# Patient Record
Sex: FEMALE | Race: WHITE | NOT HISPANIC OR LATINO | Employment: OTHER | URBAN - METROPOLITAN AREA
[De-identification: names, ages, dates, MRNs, and addresses within clinical notes are randomized per-mention and may not be internally consistent; named-entity substitution may affect disease eponyms.]

---

## 2017-02-27 ENCOUNTER — HOSPITAL ENCOUNTER (OUTPATIENT)
Dept: LAB | Facility: MEDICAL CENTER | Age: 67
End: 2017-02-27
Attending: GENERAL PRACTICE
Payer: MEDICARE

## 2017-02-27 LAB
BACTERIA #/AREA URNS HPF: ABNORMAL /HPF
EPITHELIAL CELLS 1715: ABNORMAL /HPF
MUCOUS THREADS URNS QL MICRO: ABNORMAL /HPF
RBC #/AREA URNS HPF: ABNORMAL /HPF
T3FREE SERPL-MCNC: 3.76 PG/ML (ref 2.4–4.2)
TRANS CELLS URNS QL MICRO: ABNORMAL /HPF
WBC #/AREA URNS HPF: ABNORMAL /HPF

## 2017-02-27 PROCEDURE — 86141 C-REACTIVE PROTEIN HS: CPT

## 2017-02-27 PROCEDURE — 80061 LIPID PANEL: CPT

## 2017-02-27 PROCEDURE — 36415 COLL VENOUS BLD VENIPUNCTURE: CPT

## 2017-02-27 PROCEDURE — 83550 IRON BINDING TEST: CPT

## 2017-02-27 PROCEDURE — 85652 RBC SED RATE AUTOMATED: CPT

## 2017-02-27 PROCEDURE — 84443 ASSAY THYROID STIM HORMONE: CPT

## 2017-02-27 PROCEDURE — 83090 ASSAY OF HOMOCYSTEINE: CPT

## 2017-02-27 PROCEDURE — 83036 HEMOGLOBIN GLYCOSYLATED A1C: CPT

## 2017-02-27 PROCEDURE — 87086 URINE CULTURE/COLONY COUNT: CPT

## 2017-02-27 PROCEDURE — 83540 ASSAY OF IRON: CPT

## 2017-02-27 PROCEDURE — 80053 COMPREHEN METABOLIC PANEL: CPT

## 2017-02-27 PROCEDURE — 81001 URINALYSIS AUTO W/SCOPE: CPT

## 2017-02-27 PROCEDURE — 84270 ASSAY OF SEX HORMONE GLOBUL: CPT

## 2017-02-27 PROCEDURE — 82306 VITAMIN D 25 HYDROXY: CPT

## 2017-02-27 PROCEDURE — 85025 COMPLETE CBC W/AUTO DIFF WBC: CPT

## 2017-02-27 PROCEDURE — 82533 TOTAL CORTISOL: CPT

## 2017-02-27 PROCEDURE — 84436 ASSAY OF TOTAL THYROXINE: CPT

## 2017-02-27 PROCEDURE — 82607 VITAMIN B-12: CPT

## 2017-02-27 PROCEDURE — 84481 FREE ASSAY (FT-3): CPT

## 2017-02-27 PROCEDURE — 82626 DEHYDROEPIANDROSTERONE: CPT

## 2017-02-27 PROCEDURE — 83525 ASSAY OF INSULIN: CPT

## 2017-02-27 PROCEDURE — 84403 ASSAY OF TOTAL TESTOSTERONE: CPT

## 2017-03-01 LAB
25(OH)D3 SERPL-MCNC: 39 NG/ML (ref 30–100)
ALBUMIN SERPL BCP-MCNC: 4.3 G/DL (ref 3.2–4.9)
ALBUMIN/GLOB SERPL: 1.5 G/DL
ALP SERPL-CCNC: 72 U/L (ref 30–99)
ALT SERPL-CCNC: 21 U/L (ref 2–50)
ANION GAP SERPL CALC-SCNC: 7 MMOL/L (ref 0–11.9)
APPEARANCE UR: ABNORMAL
AST SERPL-CCNC: 21 U/L (ref 12–45)
BACTERIA UR CULT: NORMAL
BASOPHILS # BLD AUTO: 0.04 K/UL (ref 0–0.12)
BASOPHILS NFR BLD AUTO: 0.6 % (ref 0–1.8)
BILIRUB SERPL-MCNC: 0.5 MG/DL (ref 0.1–1.5)
BILIRUB UR QL STRIP.AUTO: NEGATIVE
BUN SERPL-MCNC: 17 MG/DL (ref 8–22)
CALCIUM SERPL-MCNC: 9.5 MG/DL (ref 8.5–10.5)
CHLORIDE SERPL-SCNC: 106 MMOL/L (ref 96–112)
CHOLEST SERPL-MCNC: 221 MG/DL (ref 100–199)
CO2 SERPL-SCNC: 28 MMOL/L (ref 20–33)
COLOR UR AUTO: YELLOW
CORTIS SERPL-MCNC: 8.3 UG/DL (ref 0–23)
CREAT SERPL-MCNC: 0.65 MG/DL (ref 0.5–1.4)
CRP SERPL HS-MCNC: 1.7 MG/L (ref 0–7.5)
CULTURE IF INDICATED INDCX: YES UA CULTURE
DHEA SERPL-MCNC: 0.84 NG/ML (ref 0.63–4.7)
EOSINOPHIL # BLD: 0.12 K/UL (ref 0–0.51)
EOSINOPHIL NFR BLD AUTO: 1.8 % (ref 0–6.9)
ERYTHROCYTE [DISTWIDTH] IN BLOOD BY AUTOMATED COUNT: 43.8 FL (ref 35.9–50)
ERYTHROCYTE [SEDIMENTATION RATE] IN BLOOD BY WESTERGREN METHOD: 14 MM/HOUR (ref 0–30)
EST. AVERAGE GLUCOSE BLD GHB EST-MCNC: 108 MG/DL
GLOBULIN SER CALC-MCNC: 2.8 G/DL (ref 1.9–3.5)
GLUCOSE SERPL-MCNC: 101 MG/DL (ref 65–99)
GLUCOSE UR STRIP.AUTO-MCNC: NEGATIVE MG/DL
HBA1C MFR BLD: 5.4 % (ref 0–5.6)
HCT VFR BLD AUTO: 45.3 % (ref 37–47)
HCYS SERPL-SCNC: 8 UMOL/L
HDLC SERPL-MCNC: 67 MG/DL
HGB BLD-MCNC: 14.9 G/DL (ref 12–16)
IMM GRANULOCYTES # BLD AUTO: 0.02 K/UL (ref 0–0.11)
IMM GRANULOCYTES NFR BLD AUTO: 0.3 % (ref 0–0.9)
INSULIN P FAST SERPL-ACNC: 11 UIU/ML (ref 3–19)
IRON SATN MFR SERPL: 26 % (ref 15–55)
IRON SERPL-MCNC: 104 UG/DL (ref 40–170)
KETONES UR STRIP.AUTO-MCNC: NEGATIVE MG/DL
LDLC SERPL CALC-MCNC: 126 MG/DL
LEUKOCYTE ESTERASE UR QL STRIP.AUTO: ABNORMAL
LYMPHOCYTES # BLD: 3.16 K/UL (ref 1–4.8)
LYMPHOCYTES NFR BLD AUTO: 46.1 % (ref 22–41)
MCH RBC QN AUTO: 30.6 PG (ref 27–33)
MCHC RBC AUTO-ENTMCNC: 32.9 G/DL (ref 33.6–35)
MCV RBC AUTO: 93 FL (ref 81.4–97.8)
MICRO URNS: ABNORMAL
MONOCYTES # BLD: 0.33 K/UL (ref 0–0.85)
MONOCYTES NFR BLD AUTO: 4.8 % (ref 0–13.4)
NEUTROPHILS # BLD: 3.18 K/UL (ref 2–7.15)
NEUTROPHILS NFR BLD AUTO: 46.4 % (ref 44–72)
NITRITE UR QL STRIP.AUTO: NEGATIVE
NRBC # BLD AUTO: 0 K/UL
NRBC BLD-RTO: 0 /100 WBC
PH UR: 6 [PH]
PLATELET # BLD AUTO: 239 K/UL (ref 164–446)
PMV BLD AUTO: 10.2 FL (ref 9–12.9)
POTASSIUM SERPL-SCNC: 4.1 MMOL/L (ref 3.6–5.5)
PROT SERPL-MCNC: 7.1 G/DL (ref 6–8.2)
PROT UR QL STRIP: NEGATIVE MG/DL
RBC # BLD AUTO: 4.87 M/UL (ref 4.2–5.4)
RBC UR QL AUTO: NEGATIVE
SHBG SERPL-SCNC: 63 NMOL/L (ref 30–135)
SIGNIFICANT IND 70042: NORMAL
SODIUM SERPL-SCNC: 141 MMOL/L (ref 135–145)
SOURCE SOURCE: NORMAL
SP GR UR STRIP.AUTO: 1.01
T4 SERPL-MCNC: 4.2 UG/DL (ref 4–12)
TESTOST FREE SERPL-MCNC: 9.7 PG/ML (ref 0.6–3.8)
TESTOST SERPL-MCNC: 86 NG/DL (ref 5–32)
TIBC SERPL-MCNC: 406 UG/DL (ref 250–450)
TRIGL SERPL-MCNC: 142 MG/DL (ref 0–149)
TSH SERPL DL<=0.005 MIU/L-ACNC: 0.7 UIU/ML (ref 0.3–3.7)
VIT B12 SERPL-MCNC: 578 PG/ML (ref 211–911)
WBC # BLD AUTO: 6.9 K/UL (ref 4.8–10.8)

## 2017-03-06 ENCOUNTER — HOSPITAL ENCOUNTER (OUTPATIENT)
Facility: MEDICAL CENTER | Age: 67
End: 2017-03-06
Attending: GENERAL PRACTICE
Payer: COMMERCIAL

## 2017-03-06 LAB
APPEARANCE UR: CLEAR
BILIRUB UR QL STRIP.AUTO: NEGATIVE
COLOR UR AUTO: NORMAL
GLUCOSE UR STRIP.AUTO-MCNC: NEGATIVE MG/DL
KETONES UR STRIP.AUTO-MCNC: NEGATIVE MG/DL
LEUKOCYTE ESTERASE UR QL STRIP.AUTO: NEGATIVE
MICRO URNS: NORMAL
NITRITE UR QL STRIP.AUTO: NEGATIVE
PH UR: 6 [PH]
PROT UR QL STRIP: NEGATIVE MG/DL
RBC UR QL AUTO: NEGATIVE
SP GR UR STRIP.AUTO: 1.01

## 2017-03-06 PROCEDURE — 81003 URINALYSIS AUTO W/O SCOPE: CPT

## 2017-04-05 ENCOUNTER — HOSPITAL ENCOUNTER (OUTPATIENT)
Dept: RADIOLOGY | Facility: MEDICAL CENTER | Age: 67
End: 2017-04-05
Attending: GENERAL PRACTICE
Payer: MEDICARE

## 2017-04-05 DIAGNOSIS — E78.5 HYPERLIPIDEMIA, UNSPECIFIED HYPERLIPIDEMIA TYPE: ICD-10-CM

## 2017-04-05 PROCEDURE — 93880 EXTRACRANIAL BILAT STUDY: CPT

## 2017-04-13 ENCOUNTER — HOSPITAL ENCOUNTER (OUTPATIENT)
Dept: RADIOLOGY | Facility: MEDICAL CENTER | Age: 67
End: 2017-04-13
Attending: GENERAL PRACTICE
Payer: MEDICARE

## 2017-04-13 DIAGNOSIS — Z12.31 VISIT FOR SCREENING MAMMOGRAM: ICD-10-CM

## 2017-04-13 PROCEDURE — 77063 BREAST TOMOSYNTHESIS BI: CPT

## 2017-11-09 ENCOUNTER — APPOINTMENT (RX ONLY)
Dept: URBAN - METROPOLITAN AREA CLINIC 4 | Facility: CLINIC | Age: 67
Setting detail: DERMATOLOGY
End: 2017-11-09

## 2017-11-09 DIAGNOSIS — L91.8 OTHER HYPERTROPHIC DISORDERS OF THE SKIN: ICD-10-CM

## 2017-11-09 DIAGNOSIS — L82.0 INFLAMED SEBORRHEIC KERATOSIS: ICD-10-CM

## 2017-11-09 DIAGNOSIS — L81.4 OTHER MELANIN HYPERPIGMENTATION: ICD-10-CM

## 2017-11-09 DIAGNOSIS — D18.0 HEMANGIOMA: ICD-10-CM

## 2017-11-09 DIAGNOSIS — D22 MELANOCYTIC NEVI: ICD-10-CM

## 2017-11-09 DIAGNOSIS — L82.1 OTHER SEBORRHEIC KERATOSIS: ICD-10-CM

## 2017-11-09 PROBLEM — D22.61 MELANOCYTIC NEVI OF RIGHT UPPER LIMB, INCLUDING SHOULDER: Status: ACTIVE | Noted: 2017-11-09

## 2017-11-09 PROBLEM — D22.5 MELANOCYTIC NEVI OF TRUNK: Status: ACTIVE | Noted: 2017-11-09

## 2017-11-09 PROBLEM — D22.62 MELANOCYTIC NEVI OF LEFT UPPER LIMB, INCLUDING SHOULDER: Status: ACTIVE | Noted: 2017-11-09

## 2017-11-09 PROBLEM — D22.39 MELANOCYTIC NEVI OF OTHER PARTS OF FACE: Status: ACTIVE | Noted: 2017-11-09

## 2017-11-09 PROBLEM — D18.01 HEMANGIOMA OF SKIN AND SUBCUTANEOUS TISSUE: Status: ACTIVE | Noted: 2017-11-09

## 2017-11-09 PROBLEM — D22.71 MELANOCYTIC NEVI OF RIGHT LOWER LIMB, INCLUDING HIP: Status: ACTIVE | Noted: 2017-11-09

## 2017-11-09 PROBLEM — D22.72 MELANOCYTIC NEVI OF LEFT LOWER LIMB, INCLUDING HIP: Status: ACTIVE | Noted: 2017-11-09

## 2017-11-09 PROCEDURE — ? COUNSELING

## 2017-11-09 PROCEDURE — 99213 OFFICE O/P EST LOW 20 MIN: CPT | Mod: 25

## 2017-11-09 PROCEDURE — ? LIQUID NITROGEN

## 2017-11-09 PROCEDURE — 17111 DESTRUCTION B9 LESIONS 15/>: CPT

## 2017-11-09 ASSESSMENT — LOCATION SIMPLE DESCRIPTION DERM
LOCATION SIMPLE: LEFT POPLITEAL SKIN
LOCATION SIMPLE: LEFT FOREARM
LOCATION SIMPLE: LEFT POSTERIOR THIGH
LOCATION SIMPLE: LEFT CHEEK
LOCATION SIMPLE: LEFT HAND
LOCATION SIMPLE: LEFT BREAST
LOCATION SIMPLE: RIGHT CHEEK
LOCATION SIMPLE: RIGHT EYEBROW
LOCATION SIMPLE: RIGHT POSTERIOR UPPER ARM
LOCATION SIMPLE: LEFT UPPER BACK
LOCATION SIMPLE: LEFT AXILLARY VAULT
LOCATION SIMPLE: RIGHT POPLITEAL SKIN
LOCATION SIMPLE: LEFT ELBOW
LOCATION SIMPLE: RIGHT THIGH
LOCATION SIMPLE: LEFT POSTERIOR UPPER ARM
LOCATION SIMPLE: ABDOMEN
LOCATION SIMPLE: LEFT UPPER ARM
LOCATION SIMPLE: RIGHT PRETIBIAL REGION
LOCATION SIMPLE: RIGHT HAND
LOCATION SIMPLE: LEFT FOREHEAD
LOCATION SIMPLE: LEFT PRETIBIAL REGION
LOCATION SIMPLE: LEFT SHOULDER
LOCATION SIMPLE: CHEST
LOCATION SIMPLE: RIGHT UPPER BACK
LOCATION SIMPLE: RIGHT SCALP
LOCATION SIMPLE: LEFT ANTERIOR NECK
LOCATION SIMPLE: RIGHT POSTERIOR THIGH
LOCATION SIMPLE: RIGHT LOWER BACK
LOCATION SIMPLE: RIGHT FOREARM
LOCATION SIMPLE: RIGHT BREAST
LOCATION SIMPLE: UPPER BACK
LOCATION SIMPLE: LEFT LOWER BACK
LOCATION SIMPLE: RIGHT AXILLARY VAULT

## 2017-11-09 ASSESSMENT — LOCATION DETAILED DESCRIPTION DERM
LOCATION DETAILED: RIGHT MEDIAL SUPERIOR CHEST
LOCATION DETAILED: RIGHT ANTERIOR PROXIMAL THIGH
LOCATION DETAILED: RIGHT LATERAL SUPERIOR CHEST
LOCATION DETAILED: LEFT SUPERIOR MEDIAL BUCCAL CHEEK
LOCATION DETAILED: RIGHT PROXIMAL DORSAL FOREARM
LOCATION DETAILED: LEFT SUPERIOR MEDIAL LOWER BACK
LOCATION DETAILED: LEFT SUPERIOR FLANK
LOCATION DETAILED: RIGHT INFERIOR UPPER BACK
LOCATION DETAILED: RIGHT ANTERIOR DISTAL THIGH
LOCATION DETAILED: RIGHT LATERAL ABDOMEN
LOCATION DETAILED: RIGHT SUPERIOR MEDIAL BUCCAL CHEEK
LOCATION DETAILED: LEFT LATERAL SUPERIOR CHEST
LOCATION DETAILED: LOWER STERNUM
LOCATION DETAILED: LEFT LATERAL PROXIMAL UPPER ARM
LOCATION DETAILED: LEFT DISTAL POSTERIOR UPPER ARM
LOCATION DETAILED: RIGHT MID-UPPER BACK
LOCATION DETAILED: LEFT VENTRAL LATERAL PROXIMAL FOREARM
LOCATION DETAILED: LEFT INFERIOR LATERAL UPPER BACK
LOCATION DETAILED: RIGHT LATERAL BREAST 10-11:00 REGION
LOCATION DETAILED: LEFT LATERAL ELBOW
LOCATION DETAILED: LEFT SUPERIOR UPPER BACK
LOCATION DETAILED: LEFT PROXIMAL PRETIBIAL REGION
LOCATION DETAILED: RIGHT MEDIAL FRONTAL SCALP
LOCATION DETAILED: UPPER STERNUM
LOCATION DETAILED: LEFT MID-UPPER BACK
LOCATION DETAILED: RIGHT SUPERIOR UPPER BACK
LOCATION DETAILED: LEFT LATERAL BREAST 2-3:00 REGION
LOCATION DETAILED: LEFT POPLITEAL SKIN
LOCATION DETAILED: RIGHT CENTRAL EYEBROW
LOCATION DETAILED: SUBXIPHOID
LOCATION DETAILED: RIGHT LATERAL BREAST 9-10:00 REGION
LOCATION DETAILED: RIGHT ULNAR DORSAL HAND
LOCATION DETAILED: STERNAL NOTCH
LOCATION DETAILED: RIGHT INFERIOR FLANK
LOCATION DETAILED: RIGHT INFERIOR LATERAL MIDBACK
LOCATION DETAILED: RIGHT PROXIMAL POSTERIOR UPPER ARM
LOCATION DETAILED: RIGHT INFERIOR MEDIAL MIDBACK
LOCATION DETAILED: RIGHT MEDIAL UPPER BACK
LOCATION DETAILED: RIGHT DISTAL POSTERIOR THIGH
LOCATION DETAILED: INFERIOR THORACIC SPINE
LOCATION DETAILED: LEFT POSTERIOR SHOULDER
LOCATION DETAILED: LEFT AXILLARY VAULT
LOCATION DETAILED: LEFT LATERAL UPPER BACK
LOCATION DETAILED: RIGHT SUPERIOR MEDIAL UPPER BACK
LOCATION DETAILED: RIGHT PROXIMAL PRETIBIAL REGION
LOCATION DETAILED: RIGHT SUPERIOR MEDIAL MIDBACK
LOCATION DETAILED: LEFT ANTERIOR SHOULDER
LOCATION DETAILED: LEFT ULNAR DORSAL HAND
LOCATION DETAILED: LEFT CLAVICULAR NECK
LOCATION DETAILED: LEFT DISTAL DORSAL FOREARM
LOCATION DETAILED: LEFT DISTAL POSTERIOR THIGH
LOCATION DETAILED: MIDDLE STERNUM
LOCATION DETAILED: RIGHT VENTRAL PROXIMAL FOREARM
LOCATION DETAILED: RIGHT DISTAL POSTERIOR UPPER ARM
LOCATION DETAILED: RIGHT SUPERIOR FLANK
LOCATION DETAILED: RIGHT POPLITEAL SKIN
LOCATION DETAILED: LEFT INFERIOR FOREHEAD
LOCATION DETAILED: RIGHT AXILLARY VAULT
LOCATION DETAILED: LEFT MEDIAL SUPERIOR CHEST

## 2017-11-09 ASSESSMENT — LOCATION ZONE DERM
LOCATION ZONE: AXILLAE
LOCATION ZONE: TRUNK
LOCATION ZONE: ARM
LOCATION ZONE: HAND
LOCATION ZONE: LEG
LOCATION ZONE: FACE
LOCATION ZONE: NECK
LOCATION ZONE: SCALP

## 2017-11-09 NOTE — PROCEDURE: LIQUID NITROGEN
Medical Necessity Information: It is in your best interest to select a reason for this procedure from the list below. All of these items fulfill various CMS LCD requirements except the new and changing color options.
Render Post-Care Instructions In Note?: no
Post-Care Instructions: I reviewed with the patient in detail post-care instructions. Patient is to wear sunprotection, and avoid picking at any of the treated lesions. Pt may apply Vaseline to crusted or scabbing areas.
Consent: The patient's consent was obtained including but not limited to risks of crusting, scabbing, blistering, scarring, darker or lighter pigmentary change, recurrence, incomplete removal and infection.
Medical Necessity Clause: This procedure was medically necessary because the lesions that were treated were:
Detail Level: Detailed

## 2017-12-08 ENCOUNTER — HOSPITAL ENCOUNTER (OUTPATIENT)
Dept: LAB | Facility: MEDICAL CENTER | Age: 67
End: 2017-12-08
Attending: INTERNAL MEDICINE
Payer: MEDICARE

## 2017-12-08 LAB
ALBUMIN SERPL BCP-MCNC: 4.1 G/DL (ref 3.2–4.9)
ALBUMIN/GLOB SERPL: 1.8 G/DL
ALP SERPL-CCNC: 66 U/L (ref 30–99)
ALT SERPL-CCNC: 17 U/L (ref 2–50)
ANION GAP SERPL CALC-SCNC: 4 MMOL/L (ref 0–11.9)
AST SERPL-CCNC: 21 U/L (ref 12–45)
BASOPHILS # BLD AUTO: 0.7 % (ref 0–1.8)
BASOPHILS # BLD: 0.05 K/UL (ref 0–0.12)
BILIRUB SERPL-MCNC: 0.5 MG/DL (ref 0.1–1.5)
BUN SERPL-MCNC: 12 MG/DL (ref 8–22)
CALCIUM SERPL-MCNC: 9.8 MG/DL (ref 8.5–10.5)
CHLORIDE SERPL-SCNC: 106 MMOL/L (ref 96–112)
CO2 SERPL-SCNC: 31 MMOL/L (ref 20–33)
CREAT SERPL-MCNC: 0.66 MG/DL (ref 0.5–1.4)
CRP SERPL HS-MCNC: 0.16 MG/DL (ref 0–0.75)
EOSINOPHIL # BLD AUTO: 0.2 K/UL (ref 0–0.51)
EOSINOPHIL NFR BLD: 2.7 % (ref 0–6.9)
ERYTHROCYTE [DISTWIDTH] IN BLOOD BY AUTOMATED COUNT: 43 FL (ref 35.9–50)
ERYTHROCYTE [SEDIMENTATION RATE] IN BLOOD BY WESTERGREN METHOD: 16 MM/HOUR (ref 0–30)
GFR SERPL CREATININE-BSD FRML MDRD: >60 ML/MIN/1.73 M 2
GLOBULIN SER CALC-MCNC: 2.3 G/DL (ref 1.9–3.5)
GLUCOSE SERPL-MCNC: 97 MG/DL (ref 65–99)
HCT VFR BLD AUTO: 43.2 % (ref 37–47)
HGB BLD-MCNC: 14.1 G/DL (ref 12–16)
IMM GRANULOCYTES # BLD AUTO: 0.01 K/UL (ref 0–0.11)
IMM GRANULOCYTES NFR BLD AUTO: 0.1 % (ref 0–0.9)
LYMPHOCYTES # BLD AUTO: 3.58 K/UL (ref 1–4.8)
LYMPHOCYTES NFR BLD: 48.1 % (ref 22–41)
MCH RBC QN AUTO: 30.9 PG (ref 27–33)
MCHC RBC AUTO-ENTMCNC: 32.6 G/DL (ref 33.6–35)
MCV RBC AUTO: 94.5 FL (ref 81.4–97.8)
MONOCYTES # BLD AUTO: 0.41 K/UL (ref 0–0.85)
MONOCYTES NFR BLD AUTO: 5.5 % (ref 0–13.4)
NEUTROPHILS # BLD AUTO: 3.19 K/UL (ref 2–7.15)
NEUTROPHILS NFR BLD: 42.9 % (ref 44–72)
NRBC # BLD AUTO: 0 K/UL
NRBC BLD AUTO-RTO: 0 /100 WBC
PLATELET # BLD AUTO: 231 K/UL (ref 164–446)
PMV BLD AUTO: 10.8 FL (ref 9–12.9)
POTASSIUM SERPL-SCNC: 5.4 MMOL/L (ref 3.6–5.5)
PROT SERPL-MCNC: 6.4 G/DL (ref 6–8.2)
RBC # BLD AUTO: 4.57 M/UL (ref 4.2–5.4)
SODIUM SERPL-SCNC: 141 MMOL/L (ref 135–145)
WBC # BLD AUTO: 7.4 K/UL (ref 4.8–10.8)

## 2017-12-08 PROCEDURE — 80053 COMPREHEN METABOLIC PANEL: CPT

## 2017-12-08 PROCEDURE — 86140 C-REACTIVE PROTEIN: CPT

## 2017-12-08 PROCEDURE — 36415 COLL VENOUS BLD VENIPUNCTURE: CPT

## 2017-12-08 PROCEDURE — 85652 RBC SED RATE AUTOMATED: CPT

## 2017-12-08 PROCEDURE — 85025 COMPLETE CBC W/AUTO DIFF WBC: CPT

## 2018-01-19 ENCOUNTER — APPOINTMENT (RX ONLY)
Dept: URBAN - METROPOLITAN AREA CLINIC 4 | Facility: CLINIC | Age: 68
Setting detail: DERMATOLOGY
End: 2018-01-19

## 2018-01-19 DIAGNOSIS — L91.8 OTHER HYPERTROPHIC DISORDERS OF THE SKIN: ICD-10-CM

## 2018-01-19 DIAGNOSIS — L82.1 OTHER SEBORRHEIC KERATOSIS: ICD-10-CM

## 2018-01-19 DIAGNOSIS — D22 MELANOCYTIC NEVI: ICD-10-CM

## 2018-01-19 DIAGNOSIS — D18.0 HEMANGIOMA: ICD-10-CM

## 2018-01-19 DIAGNOSIS — L81.4 OTHER MELANIN HYPERPIGMENTATION: ICD-10-CM

## 2018-01-19 PROBLEM — D22.39 MELANOCYTIC NEVI OF OTHER PARTS OF FACE: Status: ACTIVE | Noted: 2018-01-19

## 2018-01-19 PROBLEM — D22.71 MELANOCYTIC NEVI OF RIGHT LOWER LIMB, INCLUDING HIP: Status: ACTIVE | Noted: 2018-01-19

## 2018-01-19 PROBLEM — D22.61 MELANOCYTIC NEVI OF RIGHT UPPER LIMB, INCLUDING SHOULDER: Status: ACTIVE | Noted: 2018-01-19

## 2018-01-19 PROBLEM — D22.72 MELANOCYTIC NEVI OF LEFT LOWER LIMB, INCLUDING HIP: Status: ACTIVE | Noted: 2018-01-19

## 2018-01-19 PROBLEM — D22.5 MELANOCYTIC NEVI OF TRUNK: Status: ACTIVE | Noted: 2018-01-19

## 2018-01-19 PROBLEM — D18.01 HEMANGIOMA OF SKIN AND SUBCUTANEOUS TISSUE: Status: ACTIVE | Noted: 2018-01-19

## 2018-01-19 PROBLEM — D22.62 MELANOCYTIC NEVI OF LEFT UPPER LIMB, INCLUDING SHOULDER: Status: ACTIVE | Noted: 2018-01-19

## 2018-01-19 PROCEDURE — ? LIQUID NITROGEN

## 2018-01-19 PROCEDURE — ? COUNSELING

## 2018-01-19 PROCEDURE — 99213 OFFICE O/P EST LOW 20 MIN: CPT

## 2018-01-19 ASSESSMENT — LOCATION ZONE DERM
LOCATION ZONE: ARM
LOCATION ZONE: SCALP
LOCATION ZONE: FACE
LOCATION ZONE: LEG
LOCATION ZONE: NECK
LOCATION ZONE: AXILLAE
LOCATION ZONE: TRUNK

## 2018-01-19 ASSESSMENT — LOCATION SIMPLE DESCRIPTION DERM
LOCATION SIMPLE: LEFT FOREHEAD
LOCATION SIMPLE: RIGHT POSTERIOR UPPER ARM
LOCATION SIMPLE: LEFT POSTERIOR THIGH
LOCATION SIMPLE: RIGHT THIGH
LOCATION SIMPLE: LEFT PRETIBIAL REGION
LOCATION SIMPLE: UPPER BACK
LOCATION SIMPLE: LEFT ELBOW
LOCATION SIMPLE: RIGHT PRETIBIAL REGION
LOCATION SIMPLE: LEFT POPLITEAL SKIN
LOCATION SIMPLE: LEFT TEMPLE
LOCATION SIMPLE: RIGHT POPLITEAL SKIN
LOCATION SIMPLE: LEFT CHEEK
LOCATION SIMPLE: LEFT SHOULDER
LOCATION SIMPLE: LEFT FOREARM
LOCATION SIMPLE: RIGHT CHEEK
LOCATION SIMPLE: RIGHT UPPER BACK
LOCATION SIMPLE: LEFT AXILLARY VAULT
LOCATION SIMPLE: ABDOMEN
LOCATION SIMPLE: LEFT POSTERIOR UPPER ARM
LOCATION SIMPLE: LEFT CLAVICULAR SKIN
LOCATION SIMPLE: POSTERIOR SCALP
LOCATION SIMPLE: CHEST
LOCATION SIMPLE: RIGHT EYEBROW
LOCATION SIMPLE: LEFT ANTERIOR NECK
LOCATION SIMPLE: RIGHT POSTERIOR THIGH
LOCATION SIMPLE: RIGHT FOREARM

## 2018-01-19 ASSESSMENT — LOCATION DETAILED DESCRIPTION DERM
LOCATION DETAILED: LEFT PROXIMAL PRETIBIAL REGION
LOCATION DETAILED: RIGHT ANTERIOR DISTAL THIGH
LOCATION DETAILED: LEFT VENTRAL LATERAL PROXIMAL FOREARM
LOCATION DETAILED: LEFT AXILLARY VAULT
LOCATION DETAILED: LEFT DISTAL POSTERIOR THIGH
LOCATION DETAILED: LEFT DISTAL DORSAL FOREARM
LOCATION DETAILED: RIGHT CENTRAL EYEBROW
LOCATION DETAILED: LEFT CLAVICULAR SKIN
LOCATION DETAILED: RIGHT POPLITEAL SKIN
LOCATION DETAILED: LOWER STERNUM
LOCATION DETAILED: LEFT LATERAL ELBOW
LOCATION DETAILED: RIGHT SUPERIOR MEDIAL BUCCAL CHEEK
LOCATION DETAILED: LEFT CLAVICULAR NECK
LOCATION DETAILED: MIDDLE STERNUM
LOCATION DETAILED: RIGHT PROXIMAL DORSAL FOREARM
LOCATION DETAILED: LEFT ANTERIOR SHOULDER
LOCATION DETAILED: LEFT LATERAL TEMPLE
LOCATION DETAILED: RIGHT DISTAL POSTERIOR THIGH
LOCATION DETAILED: RIGHT DISTAL POSTERIOR UPPER ARM
LOCATION DETAILED: LEFT SUPERIOR MEDIAL BUCCAL CHEEK
LOCATION DETAILED: LEFT DISTAL POSTERIOR UPPER ARM
LOCATION DETAILED: RIGHT VENTRAL PROXIMAL FOREARM
LOCATION DETAILED: RIGHT PROXIMAL PRETIBIAL REGION
LOCATION DETAILED: LEFT LATERAL ABDOMEN
LOCATION DETAILED: INFERIOR THORACIC SPINE
LOCATION DETAILED: RIGHT SUPERIOR UPPER BACK
LOCATION DETAILED: LEFT POPLITEAL SKIN
LOCATION DETAILED: SUBXIPHOID
LOCATION DETAILED: RIGHT PROXIMAL POSTERIOR UPPER ARM
LOCATION DETAILED: POSTERIOR MID-PARIETAL SCALP
LOCATION DETAILED: LEFT INFERIOR FOREHEAD
LOCATION DETAILED: RIGHT LATERAL SUPERIOR CHEST

## 2018-04-17 ENCOUNTER — HOSPITAL ENCOUNTER (OUTPATIENT)
Dept: RADIOLOGY | Facility: MEDICAL CENTER | Age: 68
End: 2018-04-17
Attending: GENERAL PRACTICE
Payer: MEDICARE

## 2018-04-17 DIAGNOSIS — Z12.31 VISIT FOR SCREENING MAMMOGRAM: ICD-10-CM

## 2018-04-17 PROCEDURE — 77063 BREAST TOMOSYNTHESIS BI: CPT

## 2018-05-04 ENCOUNTER — HOSPITAL ENCOUNTER (OUTPATIENT)
Dept: LAB | Facility: MEDICAL CENTER | Age: 68
End: 2018-05-04
Attending: GENERAL PRACTICE
Payer: MEDICARE

## 2018-05-04 LAB
25(OH)D3 SERPL-MCNC: 44 NG/ML (ref 30–100)
ALBUMIN SERPL BCP-MCNC: 4.1 G/DL (ref 3.2–4.9)
ALBUMIN/GLOB SERPL: 1.5 G/DL
ALP SERPL-CCNC: 57 U/L (ref 30–99)
ALT SERPL-CCNC: 18 U/L (ref 2–50)
ANION GAP SERPL CALC-SCNC: 7 MMOL/L (ref 0–11.9)
APPEARANCE UR: CLEAR
AST SERPL-CCNC: 23 U/L (ref 12–45)
BASOPHILS # BLD AUTO: 0.6 % (ref 0–1.8)
BASOPHILS # BLD: 0.04 K/UL (ref 0–0.12)
BILIRUB SERPL-MCNC: 0.5 MG/DL (ref 0.1–1.5)
BILIRUB UR QL STRIP.AUTO: NEGATIVE
BUN SERPL-MCNC: 17 MG/DL (ref 8–22)
CALCIUM SERPL-MCNC: 9.2 MG/DL (ref 8.5–10.5)
CHLORIDE SERPL-SCNC: 106 MMOL/L (ref 96–112)
CHOLEST SERPL-MCNC: 183 MG/DL (ref 100–199)
CO2 SERPL-SCNC: 30 MMOL/L (ref 20–33)
COLOR UR: YELLOW
CORTIS SERPL-MCNC: 14.4 UG/DL (ref 0–23)
CREAT SERPL-MCNC: 0.56 MG/DL (ref 0.5–1.4)
CRP SERPL HS-MCNC: 1.5 MG/L (ref 0–7.5)
EOSINOPHIL # BLD AUTO: 0.13 K/UL (ref 0–0.51)
EOSINOPHIL NFR BLD: 1.9 % (ref 0–6.9)
ERYTHROCYTE [DISTWIDTH] IN BLOOD BY AUTOMATED COUNT: 44.2 FL (ref 35.9–50)
ERYTHROCYTE [SEDIMENTATION RATE] IN BLOOD BY WESTERGREN METHOD: 13 MM/HOUR (ref 0–30)
EST. AVERAGE GLUCOSE BLD GHB EST-MCNC: 105 MG/DL
FOLATE SERPL-MCNC: >23.5 NG/ML
GLOBULIN SER CALC-MCNC: 2.7 G/DL (ref 1.9–3.5)
GLUCOSE SERPL-MCNC: 90 MG/DL (ref 65–99)
GLUCOSE UR STRIP.AUTO-MCNC: NEGATIVE MG/DL
HBA1C MFR BLD: 5.3 % (ref 0–5.6)
HCT VFR BLD AUTO: 42.5 % (ref 37–47)
HCV AB SER QL: NEGATIVE
HDLC SERPL-MCNC: 64 MG/DL
HGB BLD-MCNC: 14.1 G/DL (ref 12–16)
IMM GRANULOCYTES # BLD AUTO: 0.01 K/UL (ref 0–0.11)
IMM GRANULOCYTES NFR BLD AUTO: 0.1 % (ref 0–0.9)
KETONES UR STRIP.AUTO-MCNC: NEGATIVE MG/DL
LDLC SERPL CALC-MCNC: 92 MG/DL
LEUKOCYTE ESTERASE UR QL STRIP.AUTO: NEGATIVE
LYMPHOCYTES # BLD AUTO: 3.12 K/UL (ref 1–4.8)
LYMPHOCYTES NFR BLD: 45.2 % (ref 22–41)
MCH RBC QN AUTO: 31.7 PG (ref 27–33)
MCHC RBC AUTO-ENTMCNC: 33.2 G/DL (ref 33.6–35)
MCV RBC AUTO: 95.5 FL (ref 81.4–97.8)
MICRO URNS: NORMAL
MONOCYTES # BLD AUTO: 0.37 K/UL (ref 0–0.85)
MONOCYTES NFR BLD AUTO: 5.4 % (ref 0–13.4)
NEUTROPHILS # BLD AUTO: 3.23 K/UL (ref 2–7.15)
NEUTROPHILS NFR BLD: 46.8 % (ref 44–72)
NITRITE UR QL STRIP.AUTO: NEGATIVE
NRBC # BLD AUTO: 0 K/UL
NRBC BLD-RTO: 0 /100 WBC
PH UR STRIP.AUTO: 8 [PH]
PLATELET # BLD AUTO: 219 K/UL (ref 164–446)
PMV BLD AUTO: 10.4 FL (ref 9–12.9)
POTASSIUM SERPL-SCNC: 4.4 MMOL/L (ref 3.6–5.5)
PROT SERPL-MCNC: 6.8 G/DL (ref 6–8.2)
PROT UR QL STRIP: NEGATIVE MG/DL
RBC # BLD AUTO: 4.45 M/UL (ref 4.2–5.4)
RBC UR QL AUTO: NEGATIVE
RHEUMATOID FACT SER IA-ACNC: <10 IU/ML (ref 0–14)
SODIUM SERPL-SCNC: 143 MMOL/L (ref 135–145)
SP GR UR STRIP.AUTO: 1.02
T3 SERPL-MCNC: 161.5 NG/DL (ref 60–181)
T3FREE SERPL-MCNC: 4.25 PG/ML (ref 2.4–4.2)
T4 SERPL-MCNC: 6 UG/DL (ref 4–12)
TRIGL SERPL-MCNC: 133 MG/DL (ref 0–149)
TSH SERPL DL<=0.005 MIU/L-ACNC: 1.43 UIU/ML (ref 0.38–5.33)
UROBILINOGEN UR STRIP.AUTO-MCNC: 0.2 MG/DL
VIT B12 SERPL-MCNC: 650 PG/ML (ref 211–911)
WBC # BLD AUTO: 6.9 K/UL (ref 4.8–10.8)

## 2018-05-04 PROCEDURE — 84443 ASSAY THYROID STIM HORMONE: CPT

## 2018-05-04 PROCEDURE — 82746 ASSAY OF FOLIC ACID SERUM: CPT

## 2018-05-04 PROCEDURE — 82607 VITAMIN B-12: CPT

## 2018-05-04 PROCEDURE — 85652 RBC SED RATE AUTOMATED: CPT

## 2018-05-04 PROCEDURE — 82306 VITAMIN D 25 HYDROXY: CPT

## 2018-05-04 PROCEDURE — 86803 HEPATITIS C AB TEST: CPT

## 2018-05-04 PROCEDURE — 86431 RHEUMATOID FACTOR QUANT: CPT

## 2018-05-04 PROCEDURE — 86141 C-REACTIVE PROTEIN HS: CPT

## 2018-05-04 PROCEDURE — 84436 ASSAY OF TOTAL THYROXINE: CPT

## 2018-05-04 PROCEDURE — 85025 COMPLETE CBC W/AUTO DIFF WBC: CPT

## 2018-05-04 PROCEDURE — 80061 LIPID PANEL: CPT

## 2018-05-04 PROCEDURE — 83036 HEMOGLOBIN GLYCOSYLATED A1C: CPT

## 2018-05-04 PROCEDURE — 84481 FREE ASSAY (FT-3): CPT

## 2018-05-04 PROCEDURE — 84480 ASSAY TRIIODOTHYRONINE (T3): CPT

## 2018-05-04 PROCEDURE — 80053 COMPREHEN METABOLIC PANEL: CPT

## 2018-05-04 PROCEDURE — 82626 DEHYDROEPIANDROSTERONE: CPT

## 2018-05-04 PROCEDURE — 84402 ASSAY OF FREE TESTOSTERONE: CPT

## 2018-05-04 PROCEDURE — 83525 ASSAY OF INSULIN: CPT

## 2018-05-04 PROCEDURE — 81003 URINALYSIS AUTO W/O SCOPE: CPT

## 2018-05-04 PROCEDURE — 36415 COLL VENOUS BLD VENIPUNCTURE: CPT

## 2018-05-04 PROCEDURE — 82533 TOTAL CORTISOL: CPT

## 2018-05-06 LAB — INSULIN P FAST SERPL-ACNC: 7 UIU/ML (ref 3–19)

## 2018-05-08 LAB
DHEA SERPL-MCNC: 1.64 NG/ML (ref 0.63–4.7)
TESTOST FREE SERPL-MCNC: 1.8 PG/ML (ref 0.6–3.8)

## 2018-05-29 ENCOUNTER — OFFICE VISIT (OUTPATIENT)
Dept: MEDICAL GROUP | Facility: MEDICAL CENTER | Age: 68
End: 2018-05-29
Payer: MEDICARE

## 2018-05-29 VITALS
BODY MASS INDEX: 25.11 KG/M2 | HEIGHT: 67 IN | TEMPERATURE: 97.3 F | RESPIRATION RATE: 20 BRPM | DIASTOLIC BLOOD PRESSURE: 76 MMHG | HEART RATE: 75 BPM | WEIGHT: 160 LBS | OXYGEN SATURATION: 94 % | SYSTOLIC BLOOD PRESSURE: 124 MMHG

## 2018-05-29 DIAGNOSIS — G89.29 CHRONIC NECK PAIN: ICD-10-CM

## 2018-05-29 DIAGNOSIS — I10 ESSENTIAL HYPERTENSION: ICD-10-CM

## 2018-05-29 DIAGNOSIS — E03.9 ACQUIRED HYPOTHYROIDISM: ICD-10-CM

## 2018-05-29 DIAGNOSIS — M43.22 CERVICAL VERTEBRAL FUSION: ICD-10-CM

## 2018-05-29 DIAGNOSIS — L57.0 ACTINIC KERATOSIS: ICD-10-CM

## 2018-05-29 DIAGNOSIS — M54.2 CHRONIC NECK PAIN: ICD-10-CM

## 2018-05-29 DIAGNOSIS — H40.1132 PRIMARY OPEN ANGLE GLAUCOMA (POAG) OF BOTH EYES, MODERATE STAGE: ICD-10-CM

## 2018-05-29 PROCEDURE — 99214 OFFICE O/P EST MOD 30 MIN: CPT | Performed by: FAMILY MEDICINE

## 2018-05-29 RX ORDER — THYROID 60 MG/1
90 TABLET ORAL DAILY
COMMUNITY
Start: 2018-05-29 | End: 2018-07-30 | Stop reason: SDUPTHER

## 2018-05-29 RX ORDER — TRAMADOL HYDROCHLORIDE 50 MG/1
100 TABLET ORAL 2 TIMES DAILY
Qty: 120 TAB | Refills: 0 | Status: SHIPPED | OUTPATIENT
Start: 2018-06-04 | End: 2018-07-04

## 2018-05-29 ASSESSMENT — PATIENT HEALTH QUESTIONNAIRE - PHQ9: CLINICAL INTERPRETATION OF PHQ2 SCORE: 0

## 2018-05-29 NOTE — ASSESSMENT & PLAN NOTE
Chronic pain recheck:   Last dose of controlled substance: this am  Chronic pain treated with tramadol 100 mgtaken twice a day    She  reports that she drinks about 4.2 oz of alcohol per week .  She  reports that she does not use drugs.    Consequences of Chronic Opiate therapy:  (5 A's)  Analgesia: Compared to no treatment or prior treatment, pain is currently improved  Activity: improved  Adverse Events: She denies constipation, dry mouth, itchy skin, nausea and sedation  Aberrant Behaviors: She reports she is taking medication as prescribed and is not veering from agreed treatment regimen. There have been no inappropriate refills or lost/stolen meds reported.   Affect/Mood: Pain is not impacting patient's mood.  Patient denies depression/anxiety.    Nonnarcotic treatments that are being used: ice.   Treatment goals discussed.    Opioid Risk Score: No value filed.    Interpretation of Opioid Risk Score   Score 0-3 = Low risk of abuse. Do UDS at least once per year.  Score 4-7 = Moderate risk of abuse. Do UDS 1-4 times per year.  Score 8+ = High risk of abuse. Refer to specialist.    No order of CONTROLLED SUBSTANCE TREATMENT AGREEMENT is found.     UDS Summary     Patient has no health maintenance due at this time        UDS obtained today await results    I have reviewed the medical records, the Prescription Monitoring Program and I have determined that controlled substance treatment is medically indicated.

## 2018-05-29 NOTE — PATIENT INSTRUCTIONS
Hypothyroidism  Hypothyroidism is a disorder of the thyroid. The thyroid is a large gland that is located in the lower front of the neck. The thyroid releases hormones that control how the body works. With hypothyroidism, the thyroid does not make enough of these hormones.  What are the causes?  Causes of hypothyroidism may include:  · Viral infections.  · Pregnancy.  · Your own defense system (immune system) attacking your thyroid.  · Certain medicines.  · Birth defects.  · Past radiation treatments to your head or neck.  · Past treatment with radioactive iodine.  · Past surgical removal of part or all of your thyroid.  · Problems with the gland that is located in the center of your brain (pituitary).  What are the signs or symptoms?  Signs and symptoms of hypothyroidism may include:  · Feeling as though you have no energy (lethargy).  · Inability to tolerate cold.  · Weight gain that is not explained by a change in diet or exercise habits.  · Dry skin.  · Coarse hair.  · Menstrual irregularity.  · Slowing of thought processes.  · Constipation.  · Sadness or depression.  How is this diagnosed?  Your health care provider may diagnose hypothyroidism with blood tests and ultrasound tests.  How is this treated?  Hypothyroidism is treated with medicine that replaces the hormones that your body does not make. After you begin treatment, it may take several weeks for symptoms to go away.  Follow these instructions at home:  · Take medicines only as directed by your health care provider.  · If you start taking any new medicines, tell your health care provider.  · Keep all follow-up visits as directed by your health care provider. This is important. As your condition improves, your dosage needs may change. You will need to have blood tests regularly so that your health care provider can watch your condition.  Contact a health care provider if:  · Your symptoms do not get better with treatment.  · You are taking thyroid  replacement medicine and:  ¨ You sweat excessively.  ¨ You have tremors.  ¨ You feel anxious.  ¨ You lose weight rapidly.  ¨ You cannot tolerate heat.  ¨ You have emotional swings.  ¨ You have diarrhea.  ¨ You feel weak.  Get help right away if:  · You develop chest pain.  · You develop an irregular heartbeat.  · You develop a rapid heartbeat.  This information is not intended to replace advice given to you by your health care provider. Make sure you discuss any questions you have with your health care provider.  Document Released: 12/18/2006 Document Revised: 05/25/2017 Document Reviewed: 05/05/2015  [employer] Interactive Patient Education © 2017 [employer]  Cryosurgery for Skin Conditions, Care After  Refer to this sheet in the next few weeks. These instructions provide you with information on caring for yourself after your procedure. Your health care provider may also give you more specific instructions. Your treatment has been planned according to current medical practices, but problems sometimes occur. Call your health care provider if you have any problems or questions after your procedure.  WHAT TO EXPECT AFTER THE PROCEDURE  After your procedure, it is typical to have the following:  · The treated area will become red and swollen shortly after the procedure.  · Within 2-3 days, a blister will form over the treated area. The blister may contain a small amount of blood.  · In about 2 weeks, the blister will break on its own, leaving a scab. The treated area will then heal. After healing, there is usually little or no scarring.  HOME CARE INSTRUCTIONS   · Keep the treated area clean, dry, and covered with a bandage until healed. The area can be cleaned as usual with soap and water.  · You may take showers. If your bandage gets wet, change it right away.  · Do not pick at your blister or try to break it open. This can cause infection and scarring.  · Do not apply any medicine, cream, or lotion to the treated  area unless directed to do so by your health care provider.  SEEK MEDICAL CARE IF:   · You have increased pain, swelling, redness, fluid drainage, or bleeding in the treated area.  · Your blister becomes large and painful.     This information is not intended to replace advice given to you by your health care provider. Make sure you discuss any questions you have with your health care provider.     Document Released: 07/07/2006 Document Revised: 08/20/2014 Document Reviewed: 07/18/2014  [employer] Interactive Patient Education ©2016 [employer]

## 2018-05-31 NOTE — ASSESSMENT & PLAN NOTE
Stable. Currently taking atenolol 25 mg as directed.   She is not taking baby aspirin daily.   She is not monitoring BP at home.   Denies symptoms low BP: light-headed, tunnel-vision, unusual fatigue.   Denies symptoms high BP:pounding headache, visual changes, palpitations, flushed face.   Denies medicine side effects: unusual fatigue, slow heartbeat, foot/leg swelling, cough.

## 2018-05-31 NOTE — ASSESSMENT & PLAN NOTE
Patient complains of a enlarging scaling lesion on the dorsum of the left wrist. She does have a history of actinic keratoses and has had significant sun damage.

## 2018-05-31 NOTE — PROGRESS NOTES
Chief Complaint   Patient presents with   • Medication Refill     [place] Thyroid, Tramadol,          Carlee Hunt is a 68 y.o. female here to establish care and for evaluation and management of:        HPI:    Chronic neck pain  Chronic pain recheck:   Last dose of controlled substance: this am  Chronic pain treated with tramadol 100 mgtaken twice a day    She  reports that she drinks about 4.2 oz of alcohol per week .  She  reports that she does not use drugs.    Consequences of Chronic Opiate therapy:  (5 A's)  Analgesia: Compared to no treatment or prior treatment, pain is currently improved  Activity: improved  Adverse Events: She denies constipation, dry mouth, itchy skin, nausea and sedation  Aberrant Behaviors: She reports she is taking medication as prescribed and is not veering from agreed treatment regimen. There have been no inappropriate refills or lost/stolen meds reported.   Affect/Mood: Pain is not impacting patient's mood.  Patient denies depression/anxiety.    Nonnarcotic treatments that are being used: ice.   Treatment goals discussed.    Opioid Risk Score: No value filed.    Interpretation of Opioid Risk Score   Score 0-3 = Low risk of abuse. Do UDS at least once per year.  Score 4-7 = Moderate risk of abuse. Do UDS 1-4 times per year.  Score 8+ = High risk of abuse. Refer to specialist.    No order of CONTROLLED SUBSTANCE TREATMENT AGREEMENT is found.     UDS Summary     Patient has no health maintenance due at this time        UDS obtained today await results    I have reviewed the medical records, the Prescription Monitoring Program and I have determined that controlled substance treatment is medically indicated.        Acquired hypothyroidism  Stable. Currently taking [place] thyroid 60 mg daily as prescribed.  Denies palpitations, skin changes, temperature intolerance, or changes in bowel habits        Actinic keratosis  Patient complains of a enlarging scaling lesion on the dorsum of the  left wrist. She does have a history of actinic keratoses and has had significant sun damage.    Cervical vertebral fusion  Patient reports that she had cervical vertebral fusion at [facility]. She reports that they cut off the top of the spine at the head and then fused stitch. She is does not have a clear understandable history of what was done. She has chronic neck pain and is on tramadol for this.      Essential hypertension  Stable. Currently taking atenolol 25 mg as directed.   She is not taking baby aspirin daily.   She is not monitoring BP at home.   Denies symptoms low BP: light-headed, tunnel-vision, unusual fatigue.   Denies symptoms high BP:pounding headache, visual changes, palpitations, flushed face.   Denies medicine side effects: unusual fatigue, slow heartbeat, foot/leg swelling, cough.      Primary open angle glaucoma (POAG) of both eyes, moderate stage  Patient is followed by ophthalmology for this.      Allergies   Allergen Reactions   • Sulfa Drugs Rash     Per pt         Current medicines (including changes today)  Current Outpatient Prescriptions   Medication Sig Dispense Refill   • thyroid (ARMOUR THYROID) 60 MG Tab Take 1.5 Tabs by mouth every day.     • [START ON 6/4/2018] tramadol (ULTRAM) 50 MG Tab Take 2 Tabs by mouth 2 Times a Day for 30 days. 120 Tab 0   • atenolol (TENORMIN) 25 MG Tab Take 25 mg by mouth 2 Times a Day.     • timolol gel-forming (TIMOPTIC-XR) 0.25 % GEL FORMING SOLUTION Place 1 Drop in both eyes every day.     • latanoprost (XALATAN) 0.005 % Solution Place 1 Drop in both eyes every evening.     • Misc. Devices (POCKET MAGNIFIER) Misc by Does not apply route.     • vitamin D (CHOLECALCIFEROL) 1000 UNIT Tab Take 2,000 Units by mouth every day.     • coenzyme Q-10 30 MG capsule Take 60 mg by mouth every day.     • KRILL OIL PO Take  by mouth.     • Multiple Vitamins-Minerals (CENTRUM PO) Take  by mouth.     • Misc Natural Products (TURMERIC CURCUMIN) Cap Take  by mouth.     •  "doxycycline (VIBRAMYCIN) 100 MG TABS Take 100 mg by mouth 2 times a day.     • citalopram (CELEXA) 10 MG tablet Take 10 mg by mouth every day.       No current facility-administered medications for this visit.      She  has a past medical history of Cataract; Glaucoma; Hypertension; and Thyroid disease.  She  has a past surgical history that includes eye surgery and cervical fusion posterior.  Social History   Substance Use Topics   • Smoking status: Former Smoker     Packs/day: 1.00     Years: 16.00     Types: Cigarettes     Quit date: 1982   • Smokeless tobacco: Never Used   • Alcohol use 4.2 oz/week     7 Glasses of wine per week     Social History     Social History Narrative   • No narrative on file     Family History   Problem Relation Age of Onset   • Hypertension Mother    • Other Father      accident   • Other Sister      pneumoccocal pneumonia   • Other Brother      glaucoma   • Cancer Neg Hx    • Diabetes Neg Hx    • Heart Disease Neg Hx      Family Status   Relation Status   • Mother    • Father    • Sister    • Brother Alive   • Neg Hx          ROS  No fever or chills.  No nausea or vomiting.  No chest pain or palpitations.  No cough or SOB.  No pain with urination or hematuria.  No black or bloody stools.  All other systems reviewed and are negative     Objective:     Blood pressure 124/76, pulse 75, temperature 36.3 °C (97.3 °F), resp. rate 20, height 1.702 m (5' 7\"), weight 72.6 kg (160 lb), SpO2 94 %. Body mass index is 25.06 kg/m².  Physical Exam:      Well developed, well nourished.  Alert, oriented in no acute distress.  Psych: Eye contact is good, speech goal directed, affect calm  Eyes: conjunctiva non-injected, sclera non-icteric.  Ears: Pinna normal. TM pearly gray.   Nose: Nares are patent.  Normal mucosa  Mouth: Oral mucous membranes pink and moist with no lesions.  Neck decreased range of motion consistent with cervical fusion- .  No adenopathy or masses in the " neck or supraclavicular regions. No thyromegaly  Lungs: clear to auscultation bilaterally with good excursion. No wheezes or rhonchi  CV: regular rate and rhythm. No murmur  Left wrist with 1 cm scaling raised lesion on the dorsum of the wrist      Assessment and Plan:   The following treatment plan was discussed    1. Acquired hypothyroidism  Check labs and adjust her normal dose as needed  - TSH; Future  - FREE THYROXINE; Future  - TRIIDOTHYRONINE; Future    2. Essential hypertension  This is a chronic medical condition that is currently stable  Continue atenolol dose    3. Primary open angle glaucoma (POAG) of both eyes, moderate stage  Continue follow-up with ophthalmology. She is seen every 3 months    4. Cervical vertebral fusion  Controlled substance agreement signed. An [facility] was reviewed. Urine drug screen done today. Follow-up in 3 months  - tramadol (ULTRAM) 50 MG Tab; Take 2 Tabs by mouth 2 Times a Day for 30 days.  Dispense: 120 Tab; Refill: 0  - CONSENT FOR OPIATE PRESCRIPTION  - Controlled Substance Treatment Agreement  [email or web address]; Future    5. Chronic neck pain  See #4  - tramadol (ULTRAM) 50 MG Tab; Take 2 Tabs by mouth 2 Times a Day for 30 days.  Dispense: 120 Tab; Refill: 0  - CONSENT FOR OPIATE PRESCRIPTION  - Controlled Substance Treatment Agreement  [facility]; Future    6. Actinic keratosis  CRYOTHERAPY:  Discussed risks and benefits of cryotherapy. Patient verbally agreed. 3 applications of cryotherapy were applied to one lesion on left wrist. Patient tolerated procedure well. Aftercare instructions given.        Records requested.    Any change or worsening of signs or symptoms, patient encouraged to follow-up or report to the emergency room for further evaluation. Patient understands and agrees.    Followup: Return in about 3 months (around 8/29/2018).

## 2018-05-31 NOTE — ASSESSMENT & PLAN NOTE
Stable. Currently taking [place] thyroid 60 mg daily as prescribed.  Denies palpitations, skin changes, temperature intolerance, or changes in bowel habits

## 2018-05-31 NOTE — ASSESSMENT & PLAN NOTE
Patient reports that she had cervical vertebral fusion at [facility]. She reports that they cut off the top of the spine at the head and then fused stitch. She is does not have a clear understandable history of what was done. She has chronic neck pain and is on tramadol for this.

## 2018-07-05 ENCOUNTER — NON-PROVIDER VISIT (OUTPATIENT)
Dept: MEDICAL GROUP | Facility: MEDICAL CENTER | Age: 68
End: 2018-07-05
Payer: MEDICARE

## 2018-07-05 DIAGNOSIS — G89.29 CHRONIC NECK PAIN: ICD-10-CM

## 2018-07-05 DIAGNOSIS — M54.2 CHRONIC NECK PAIN: ICD-10-CM

## 2018-07-05 DIAGNOSIS — M43.22 CERVICAL VERTEBRAL FUSION: ICD-10-CM

## 2018-07-05 RX ORDER — TRAMADOL HYDROCHLORIDE 50 MG/1
100 TABLET ORAL EVERY 12 HOURS PRN
Qty: 120 TAB | Refills: 0 | Status: SHIPPED | OUTPATIENT
Start: 2018-07-05 | End: 2018-08-06 | Stop reason: SDUPTHER

## 2018-07-06 RX ORDER — TRAMADOL HYDROCHLORIDE 50 MG/1
100 TABLET ORAL 2 TIMES DAILY
Qty: 120 TAB | Refills: 0 | OUTPATIENT
Start: 2018-07-06 | End: 2018-08-05

## 2018-07-06 NOTE — TELEPHONE ENCOUNTER
Pt states that she was suppose to have the next 2 months of her Tramadol filled pending her drug screen results. She states she only received a 1 month supply at time of visit. Please advise.

## 2018-07-06 NOTE — PROGRESS NOTES
Carlee Hunt is a 68 y.o. female here for a non-provider visit for [facility] screen    If abnormal was an in office provider notified today (if so, indicate provider)? n/a  Routed to PCP? No

## 2018-07-27 ENCOUNTER — HOSPITAL ENCOUNTER (OUTPATIENT)
Dept: LAB | Facility: MEDICAL CENTER | Age: 68
End: 2018-07-27
Attending: FAMILY MEDICINE
Payer: MEDICARE

## 2018-07-27 DIAGNOSIS — E03.9 ACQUIRED HYPOTHYROIDISM: ICD-10-CM

## 2018-07-27 LAB
T3 SERPL-MCNC: 137.6 NG/DL (ref 60–181)
T4 FREE SERPL-MCNC: 0.69 NG/DL (ref 0.53–1.43)
TSH SERPL DL<=0.005 MIU/L-ACNC: 1.92 UIU/ML (ref 0.38–5.33)

## 2018-07-27 PROCEDURE — 84439 ASSAY OF FREE THYROXINE: CPT

## 2018-07-27 PROCEDURE — 84443 ASSAY THYROID STIM HORMONE: CPT

## 2018-07-27 PROCEDURE — 36415 COLL VENOUS BLD VENIPUNCTURE: CPT

## 2018-07-27 PROCEDURE — 84480 ASSAY TRIIODOTHYRONINE (T3): CPT

## 2018-07-30 ENCOUNTER — PATIENT MESSAGE (OUTPATIENT)
Dept: MEDICAL GROUP | Facility: MEDICAL CENTER | Age: 68
End: 2018-07-30

## 2018-07-30 RX ORDER — THYROID 60 MG/1
60 TABLET ORAL DAILY
Qty: 30 TAB | Refills: 6 | Status: SHIPPED | OUTPATIENT
Start: 2018-07-30 | End: 2019-03-04 | Stop reason: SDUPTHER

## 2018-07-30 NOTE — TELEPHONE ENCOUNTER
From: Carlee Hunt  To: Lori Hickey M.D.  Sent: 2018 7:26 AM PDT  Subject: Prescription Question    The [place] thyroid prescription was from my previous doctor and it is now . Since you reported that the blood test I took last week was normal, could you please send a prescription for 60 mg to the [facility] on [address] ([phone])?    Thank you

## 2018-08-06 DIAGNOSIS — M54.2 CHRONIC NECK PAIN: ICD-10-CM

## 2018-08-06 DIAGNOSIS — M43.22 CERVICAL VERTEBRAL FUSION: ICD-10-CM

## 2018-08-06 DIAGNOSIS — G89.29 CHRONIC NECK PAIN: ICD-10-CM

## 2018-08-06 NOTE — TELEPHONE ENCOUNTER
From: Carlee Hunt  Sent: 8/6/2018 1:59 PM PDT  Subject: Medication Renewal Request    Carlee Hunt would like a refill of the following medications:     tramadol (ULTRAM) 50 MG Tab [Lori Hickey M.D.]    Preferred pharmacy: [facility] #[id number] - [place], [place] - [address]

## 2018-08-06 NOTE — TELEPHONE ENCOUNTER
1. Caller Name: ***                      Call Back Number: ***    2. Message: ***    3. Patient approves office to leave a detailed voicemail/MyChart message: {YES/NO:63}

## 2018-08-07 RX ORDER — TRAMADOL HYDROCHLORIDE 50 MG/1
100 TABLET ORAL EVERY 12 HOURS PRN
Qty: 120 TAB | Refills: 0 | Status: SHIPPED | OUTPATIENT
Start: 2018-08-07 | End: 2018-08-30 | Stop reason: SDUPTHER

## 2018-08-30 ENCOUNTER — OFFICE VISIT (OUTPATIENT)
Dept: MEDICAL GROUP | Facility: MEDICAL CENTER | Age: 68
End: 2018-08-30
Payer: MEDICARE

## 2018-08-30 VITALS
OXYGEN SATURATION: 92 % | TEMPERATURE: 98.4 F | BODY MASS INDEX: 25.11 KG/M2 | RESPIRATION RATE: 20 BRPM | HEIGHT: 67 IN | DIASTOLIC BLOOD PRESSURE: 80 MMHG | SYSTOLIC BLOOD PRESSURE: 122 MMHG | HEART RATE: 62 BPM | WEIGHT: 160 LBS

## 2018-08-30 DIAGNOSIS — Z23 NEED FOR VACCINATION: ICD-10-CM

## 2018-08-30 DIAGNOSIS — M43.22 CERVICAL VERTEBRAL FUSION: ICD-10-CM

## 2018-08-30 DIAGNOSIS — M54.2 CHRONIC NECK PAIN: ICD-10-CM

## 2018-08-30 DIAGNOSIS — G89.29 CHRONIC NECK PAIN: ICD-10-CM

## 2018-08-30 PROCEDURE — 99214 OFFICE O/P EST MOD 30 MIN: CPT | Performed by: FAMILY MEDICINE

## 2018-08-30 RX ORDER — TRAMADOL HYDROCHLORIDE 50 MG/1
100 TABLET ORAL EVERY 12 HOURS PRN
Qty: 120 TAB | Refills: 0 | Status: SHIPPED | OUTPATIENT
Start: 2018-10-29 | End: 2018-11-28

## 2018-08-30 RX ORDER — TRAMADOL HYDROCHLORIDE 50 MG/1
100 TABLET ORAL EVERY 12 HOURS PRN
Qty: 120 TAB | Refills: 0 | Status: SHIPPED | OUTPATIENT
Start: 2018-08-30 | End: 2018-08-30 | Stop reason: SDUPTHER

## 2018-08-30 RX ORDER — DORZOLAMIDE HYDROCHLORIDE AND TIMOLOL MALEATE 20; 5 MG/ML; MG/ML
1 SOLUTION/ DROPS OPHTHALMIC 2 TIMES DAILY
COMMUNITY
End: 2019-04-04

## 2018-08-30 RX ORDER — TRAMADOL HYDROCHLORIDE 50 MG/1
100 TABLET ORAL EVERY 12 HOURS PRN
Qty: 120 TAB | Refills: 0 | Status: SHIPPED | OUTPATIENT
Start: 2018-09-29 | End: 2018-08-30 | Stop reason: SDUPTHER

## 2018-08-30 NOTE — PATIENT INSTRUCTIONS
"DTaP Vaccine (Diphtheria, Tetanus, and Pertussis): What You Need to Know  1. Why get vaccinated?  Diphtheria, tetanus, and pertussis are serious diseases caused by bacteria. Diphtheria and pertussis are spread from person to person. Tetanus enters the body through cuts or wounds.  DIPHTHERIA causes a thick covering in the back of the throat.  · It can lead to breathing problems, paralysis, heart failure, and even death.  TETANUS (Lockjaw) causes painful tightening of the muscles, usually all over the body.  · It can lead to \"locking\" of the jaw so the victim cannot open his mouth or swallow. Tetanus leads to death in up to 2 out of 10 cases.  PERTUSSIS (Whooping Cough) causes coughing spells so bad that it is hard for infants to eat, drink, or breathe. These spells can last for weeks.  · It can lead to pneumonia, seizures (jerking and staring spells), brain damage, and death.  Diphtheria, tetanus, and pertussis vaccine (DTaP) can help prevent these diseases. Most children who are vaccinated with DTaP will be protected throughout childhood. Many more children would get these diseases if we stopped vaccinating.  DTaP is a safer version of an older vaccine called DTP. DTP is no longer used in the [place].  2. Who should get DTaP vaccine and when?  Children should get 5 doses of DTaP vaccine, one dose at each of the following ages:  · 2 months  · 4 months  · 6 months  · 15-18 months  · 4-6 years  DTaP may be given at the same time as other vaccines.  3. Some children should not get DTaP vaccine or should wait  · Children with minor illnesses, such as a cold, may be vaccinated. But children who are moderately or severely ill should usually wait until they recover before getting DTaP vaccine.  · Any child who had a life-threatening allergic reaction after a dose of DTaP should not get another dose.  · Any child who suffered a brain or nervous system disease within 7 days after a dose of DTaP should not get another " dose.  · Talk with your doctor if your child:  ¨ had a seizure or collapsed after a dose of DTaP,  ¨ cried non-stop for 3 hours or more after a dose of DTaP,  ¨ had a fever over 105°F after a dose of DTaP.  Ask your doctor for more information. Some of these children should not get another dose of pertussis vaccine, but may get a vaccine without pertussis, called DT.  4. Older children and adults  DTaP is not licensed for adolescents, adults, or children 7 years of age and older.  But older people still need protection. A vaccine called Tdap is similar to DTaP. A single dose of Tdap is recommended for people 11 through 64 years of age. Another vaccine, called Td, protects against tetanus and diphtheria, but not pertussis. It is recommended every 10 years. There are separate Vaccine Information Statements for these vaccines.  5. What are the risks from DTaP vaccine?  Getting diphtheria, tetanus, or pertussis disease is much riskier than getting DTaP vaccine.  However, a vaccine, like any medicine, is capable of causing serious problems, such as severe allergic reactions. The risk of DTaP vaccine causing serious harm, or death, is extremely small.  Mild problems (common)  · Fever (up to about 1 child in 4)  · Redness or swelling where the shot was given (up to about 1 child in 4)  · Soreness or tenderness where the shot was given (up to about 1 child in 4)  These problems occur more often after the 4th and 5th doses of the DTaP series than after earlier doses. Sometimes the 4th or 5th dose of DTaP vaccine is followed by swelling of the entire arm or leg in which the shot was given, lasting 1-7 days (up to about 1 child in 30).  Other mild problems include:  · Fussiness (up to about 1 child in 3)  · Tiredness or poor appetite (up to about 1 child in 10)  · Vomiting (up to about 1 child in 50)  These problems generally occur 1-3 days after the shot.  Moderate problems (uncommon)  · Seizure (jerking or staring) (about 1  child out of 14,000)  · Non-stop crying, for 3 hours or more (up to about 1 child out of 1,000)  · High fever, over 105°F (about 1 child out of 16,000)  Severe problems (very rare)  · Serious allergic reaction (less than 1 out of a million doses)  · Several other severe problems have been reported after DTaP vaccine. These include:  ¨ Long-term seizures, coma, or lowered consciousness  ¨ Permanent brain damage.  These are so rare it is hard to tell if they are caused by the vaccine.  Controlling fever is especially important for children who have had seizures, for any reason. It is also important if another family member has had seizures. You can reduce fever and pain by giving your child an aspirin-free pain reliever when the shot is given, and for the next 24 hours, following the package instructions.  6. What if there is a serious reaction?  What should I look for?  Look for anything that concerns you, such as signs of a severe allergic reaction, very high fever, or behavior changes.  Signs of a severe allergic reaction can include hives, swelling of the face and throat, difficulty breathing, a fast heartbeat, dizziness, and weakness. These would start a few minutes to a few hours after the vaccination.  What should I do?  · If you think it is a severe allergic reaction or other emergency that can't wait, call 9-1-1 or get the person to the nearest hospital. Otherwise, call your doctor.  · Afterward, the reaction should be reported to the Vaccine Adverse Event Reporting System (VAERS). Your doctor might file this report, or you can do it yourself through the VAERS web site at www.vaers.hhs.gov, or by calling 1-201.566.8082.  ¨ VAERS is only for reporting reactions. They do not give medical advice.  7. The National Vaccine Injury Compensation Program  The National Vaccine Injury Compensation Program (VICP) is a federal program that was created to compensate people who may have been injured by certain  vaccines.  Persons who believe they may have been injured by a vaccine can learn about the program and about filing a claim by calling 1-290.574.8093 or visiting the VIC website at www.[facility].gov/vaccinecompensation.  8. How can I learn more?  · Ask your doctor.  · Call your local or state health department.  · Contact the Centers for Disease Control and Prevention (CDC):  ¨ Call 1-579.336.4599 (9-841-TWS-INFO) or  ¨ Visit CDC's website at www.cdc.gov/vaccines  CDC DTaP Vaccine (Diphtheria, Tetanus, and Pertussis) VIS (5/17/07)  This information is not intended to replace advice given to you by your health care provider. Make sure you discuss any questions you have with your health care provider.  Document Released: 10/15/2007 Document Revised: 09/07/2017 Document Reviewed: 09/07/2017  Elsevier Interactive Patient Education © 2017 [employer].

## 2018-08-30 NOTE — ASSESSMENT & PLAN NOTE
Chronic pain recheck:   Last dose of controlled substance: this morning  Chronic pain treated with tramadol taken twice a day    She  reports that she drinks about 4.2 oz of alcohol per week .  She  reports that she does not use drugs.    Consequences of Chronic Opiate therapy:  (5 A's)  Analgesia: Compared to no treatment or prior treatment, pain is currently significantly improved  Activity: significantly improved  Adverse Events: She denies constipation, dry mouth, itchy skin, nausea and sedation  Aberrant Behaviors: She reports she is taking medication as prescribed and is not veering from agreed treatment regimen. There have been no inappropriate refills or lost/stolen meds reported.   Affect/Mood: Pain is not impacting patient's mood.  Patient denies depression/anxiety.    Nonnarcotic treatments that are being used: topical meds.   Treatment goals discussed.    Opioid Risk Score: 0    Interpretation of Opioid Risk Score   Score 0-3 = Low risk of abuse. Do UDS at least once per year.  Score 4-7 = Moderate risk of abuse. Do UDS 1-4 times per year.  Score 8+ = High risk of abuse. Refer to specialist.    Last order of CONTROLLED SUBSTANCE TREATMENT AGREEMENT was found on 5/29/2018 from Office Visit on 5/29/2018     UDS Summary     Patient has no health maintenance due at this time        Most recent UDS reviewed today and is consistent with prescribed medications.    I have reviewed the medical records, the Prescription Monitoring Program and I have determined that controlled substance treatment is medically indicated.

## 2018-09-04 NOTE — PROGRESS NOTES
Subjective:     Chief Complaint   Patient presents with   • Medication Refill       Carlee Hunt is a 68 y.o. female here today for evaluation and management of:    Chronic neck pain  Chronic pain recheck:   Last dose of controlled substance: this morning  Chronic pain treated with tramadol taken twice a day    She  reports that she drinks about 4.2 oz of alcohol per week .  She  reports that she does not use drugs.    Consequences of Chronic Opiate therapy:  (5 A's)  Analgesia: Compared to no treatment or prior treatment, pain is currently significantly improved  Activity: significantly improved  Adverse Events: She denies constipation, dry mouth, itchy skin, nausea and sedation  Aberrant Behaviors: She reports she is taking medication as prescribed and is not veering from agreed treatment regimen. There have been no inappropriate refills or lost/stolen meds reported.   Affect/Mood: Pain is not impacting patient's mood.  Patient denies depression/anxiety.    Nonnarcotic treatments that are being used: topical meds.   Treatment goals discussed.    Opioid Risk Score: 0    Interpretation of Opioid Risk Score   Score 0-3 = Low risk of abuse. Do UDS at least once per year.  Score 4-7 = Moderate risk of abuse. Do UDS 1-4 times per year.  Score 8+ = High risk of abuse. Refer to specialist.    Last order of CONTROLLED SUBSTANCE TREATMENT AGREEMENT was found on 5/29/2018 from Office Visit on 5/29/2018     UDS Summary     Patient has no health maintenance due at this time        Most recent UDS reviewed today and is consistent with prescribed medications.    I have reviewed the medical records, the Prescription Monitoring Program and I have determined that controlled substance treatment is medically indicated.           Allergies   Allergen Reactions   • Rifampin Rash     Rash, fever   • Vancomycin Rash     Rash, fever   • Sulfa Drugs Rash     Per pt         Current medicines (including changes today)  Current Outpatient  "Prescriptions   Medication Sig Dispense Refill   • dorzolamide-timolol (COSOPT) 22.3-6.8 MG/ML Solution Place 1 Drop in both eyes 2 times a day.     • [START ON 10/29/2018] tramadol (ULTRAM) 50 MG Tab Take 2 Tabs by mouth every 12 hours as needed for up to 30 days. 120 Tab 0   • thyroid (ARMOUR THYROID) 60 MG Tab Take 1 Tab by mouth every day. 30 Tab 6   • atenolol (TENORMIN) 25 MG Tab Take 25 mg by mouth 2 Times a Day.     • latanoprost (XALATAN) 0.005 % Solution Place 1 Drop in both eyes every evening.     • Misc. Devices (POCKET MAGNIFIER) Misc by Does not apply route.     • vitamin D (CHOLECALCIFEROL) 1000 UNIT Tab Take 2,000 Units by mouth every day.     • coenzyme Q-10 30 MG capsule Take 60 mg by mouth every day.     • KRILL OIL PO Take  by mouth.     • Multiple Vitamins-Minerals (CENTRUM PO) Take  by mouth.     • Misc Natural Products (TURMERIC CURCUMIN) Cap Take  by mouth.     • doxycycline (VIBRAMYCIN) 100 MG TABS Take 100 mg by mouth 2 times a day.     • citalopram (CELEXA) 10 MG tablet Take 10 mg by mouth every day.     • timolol gel-forming (TIMOPTIC-XR) 0.25 % GEL FORMING SOLUTION Place 1 Drop in both eyes every day.       No current facility-administered medications for this visit.        She  has a past medical history of Cataract; Glaucoma; Hypertension; and Thyroid disease.    Patient Active Problem List    Diagnosis Date Noted   • Acquired hypothyroidism 05/29/2018   • Essential hypertension 05/29/2018   • Primary open angle glaucoma (POAG) of both eyes, moderate stage 05/29/2018   • Cervical vertebral fusion 05/29/2018   • Chronic neck pain 05/29/2018   • Actinic keratosis 05/29/2018       ROS   No fever or chills.  No nausea or vomiting.  No chest pain or palpitations.  No cough or SOB.  No pain with urination or hematuria.  No black or bloody stools.       Objective:     Blood pressure 122/80, pulse 62, temperature 36.9 °C (98.4 °F), resp. rate 20, height 1.702 m (5' 7\"), weight 72.6 kg (160 " lb), SpO2 92 %. Body mass index is 25.06 kg/m².   Physical Exam:  Well developed, well nourished.  Alert, oriented in no acute distress.  Eye contact is good, speech goal directed, affect calm  Eyes: conjunctiva non-injected, sclera non-icteric.  Neck.  No adenopathy or masses in the neck or supraclavicular regions. No thyromegaly.  No spinous process tenderness.  Decreased range of motion.  Paraspinal muscle tenderness  Lungs: clear to auscultation bilaterally with good excursion. No wheezes or rhonchi  CV: regular rate and rhythm. No murmur            Assessment and Plan:   The following treatment plan was discussed    1. Cervical vertebral fusion  [place]  reviewed.  Controlled substance agreement on chart.  [facility] UDS up-to-date  - tramadol (ULTRAM) 50 MG Tab; Take 2 Tabs by mouth every 12 hours as needed for up to 30 days.  Dispense: 120 Tab; Refill: 0    2. Chronic neck pain  See #1  - tramadol (ULTRAM) 50 MG Tab; Take 2 Tabs by mouth every 12 hours as needed for up to 30 days.  Dispense: 120 Tab; Refill: 0    3. Need for vaccination  Prescription given as we do not have this in stock.  - Zoster Vac Recomb Adjuvanted (SHINGRIX) 50 MCG Recon Susp; 0.5 mL by Intramuscular route Once for 1 dose.  Dispense: 0.5 mL; Refill: 1    Any change or worsening of signs or symptoms, patient encouraged to follow-up or report to the emergency room for further evaluation. Patient understands and agrees.    Followup: Return in about 3 months (around 11/30/2018).

## 2018-11-08 ENCOUNTER — PATIENT MESSAGE (OUTPATIENT)
Dept: MEDICAL GROUP | Facility: MEDICAL CENTER | Age: 68
End: 2018-11-08

## 2018-11-08 DIAGNOSIS — M43.22 CERVICAL VERTEBRAL FUSION: ICD-10-CM

## 2018-11-08 DIAGNOSIS — G89.29 CHRONIC NECK PAIN: ICD-10-CM

## 2018-11-08 DIAGNOSIS — M54.2 CHRONIC NECK PAIN: ICD-10-CM

## 2018-11-26 ENCOUNTER — PATIENT OUTREACH (OUTPATIENT)
Dept: HEALTH INFORMATION MANAGEMENT | Facility: OTHER | Age: 68
End: 2018-11-26

## 2018-11-26 NOTE — PROGRESS NOTES
1. Attempt #:1    2. HealthConnect Verified: yes    3. Verify PCP: yes    4. Review Care Team: yes    5. WebIZ Checked & Epic Updated:did not verify  · WebIZ Recommendations: did not verify  · Is patient due for Tdap? YES. Patient was not notified of copay/out of pocket cost.  · Is patient due for Shingles? YES. Patient was not notified of copay/out of pocket cost.    6. Reviewed/Updated the following with patient:       •   Communication Preference Obtained? YES       •   Preferred Pharmacy? YES       •   Preferred Lab? YES       •   Family History (document living status of immediate family members and if + hx of cancer, diabetes, hypertension, hyperlipidemia, heart attack, stroke) YES    7. Annual Wellness Visit Scheduling  · Scheduling Status:Scheduled     8. Care Gap Scheduling (Attempt to Schedule EACH Overdue Care Gap!)     Health Maintenance Due   Topic Date Due   • Annual Wellness Visit  1950   • IMM DTaP/Tdap/Td Vaccine (1 - Tdap) 02/09/1967   • COLONOSCOPY  02/09/1998   • IMM ZOSTER VACCINES (2 of 3) 03/27/2009   • BONE DENSITY  02/09/2013   • IMM PNEUMOCOCCAL 65+ (ADULT) LOW/MEDIUM RISK SERIES (1 of 2 - PCV13) 02/09/2013   • IMM INFLUENZA (1) 09/01/2018        Scheduled patient for Annual Wellness Visit     9. [employer] Activation: already active    10. [employer] Esperanza: no    11. Virtual Visits: no    12. Opt In to Text Messages: yes    13. Patient was advised: “This is a free wellness visit. The provider will screen for medical conditions to help you stay healthy. If you have other concerns to address you may be asked to discuss these at a separate visit or there may be an additional fee.”     14. Patient was informed to arrive 15 min prior to their scheduled appointment and bring in their medication bottles.

## 2018-11-29 ENCOUNTER — APPOINTMENT (RX ONLY)
Dept: URBAN - METROPOLITAN AREA CLINIC 4 | Facility: CLINIC | Age: 68
Setting detail: DERMATOLOGY
End: 2018-11-29

## 2018-11-29 DIAGNOSIS — D22 MELANOCYTIC NEVI: ICD-10-CM

## 2018-11-29 DIAGNOSIS — L81.4 OTHER MELANIN HYPERPIGMENTATION: ICD-10-CM

## 2018-11-29 DIAGNOSIS — L82.1 OTHER SEBORRHEIC KERATOSIS: ICD-10-CM

## 2018-11-29 DIAGNOSIS — D18.0 HEMANGIOMA: ICD-10-CM

## 2018-11-29 PROBLEM — D22.61 MELANOCYTIC NEVI OF RIGHT UPPER LIMB, INCLUDING SHOULDER: Status: ACTIVE | Noted: 2018-11-29

## 2018-11-29 PROBLEM — D22.62 MELANOCYTIC NEVI OF LEFT UPPER LIMB, INCLUDING SHOULDER: Status: ACTIVE | Noted: 2018-11-29

## 2018-11-29 PROBLEM — D22.5 MELANOCYTIC NEVI OF TRUNK: Status: ACTIVE | Noted: 2018-11-29

## 2018-11-29 PROBLEM — D22.71 MELANOCYTIC NEVI OF RIGHT LOWER LIMB, INCLUDING HIP: Status: ACTIVE | Noted: 2018-11-29

## 2018-11-29 PROBLEM — D22.72 MELANOCYTIC NEVI OF LEFT LOWER LIMB, INCLUDING HIP: Status: ACTIVE | Noted: 2018-11-29

## 2018-11-29 PROBLEM — D18.01 HEMANGIOMA OF SKIN AND SUBCUTANEOUS TISSUE: Status: ACTIVE | Noted: 2018-11-29

## 2018-11-29 PROCEDURE — ? COUNSELING

## 2018-11-29 PROCEDURE — 17111 DESTRUCTION B9 LESIONS 15/>: CPT

## 2018-11-29 PROCEDURE — ? LIQUID NITROGEN

## 2018-11-29 PROCEDURE — 99214 OFFICE O/P EST MOD 30 MIN: CPT | Mod: 25

## 2018-11-29 ASSESSMENT — LOCATION DETAILED DESCRIPTION DERM
LOCATION DETAILED: RIGHT SUPERIOR UPPER BACK
LOCATION DETAILED: LEFT INFERIOR FOREHEAD
LOCATION DETAILED: LEFT DISTAL DORSAL FOREARM
LOCATION DETAILED: RIGHT INFERIOR ANTERIOR NECK
LOCATION DETAILED: RIGHT POPLITEAL SKIN
LOCATION DETAILED: LEFT POPLITEAL SKIN
LOCATION DETAILED: RIGHT SUPERIOR ANTERIOR NECK
LOCATION DETAILED: RIGHT INFERIOR LATERAL NECK
LOCATION DETAILED: LEFT ULNAR DORSAL HAND
LOCATION DETAILED: RIGHT MEDIAL BREAST 2-3:00 REGION
LOCATION DETAILED: LEFT CLAVICULAR NECK
LOCATION DETAILED: LEFT LATERAL ELBOW
LOCATION DETAILED: RIGHT MEDIAL BREAST 3-4:00 REGION
LOCATION DETAILED: RIGHT DISTAL POSTERIOR THIGH
LOCATION DETAILED: RIGHT CENTRAL EYEBROW
LOCATION DETAILED: RIGHT PROXIMAL POSTERIOR UPPER ARM
LOCATION DETAILED: LEFT DORSAL WRIST
LOCATION DETAILED: LEFT MEDIAL BREAST 9-10:00 REGION
LOCATION DETAILED: LEFT PROXIMAL PRETIBIAL REGION
LOCATION DETAILED: RIGHT LATERAL SUPERIOR CHEST
LOCATION DETAILED: LOWER STERNUM
LOCATION DETAILED: LEFT DISTAL POSTERIOR THIGH
LOCATION DETAILED: RIGHT ANTERIOR DISTAL THIGH
LOCATION DETAILED: LEFT VENTRAL LATERAL PROXIMAL FOREARM
LOCATION DETAILED: RIGHT ANTERIOR SHOULDER
LOCATION DETAILED: RIGHT VENTRAL PROXIMAL FOREARM
LOCATION DETAILED: SUBXIPHOID
LOCATION DETAILED: LEFT ANTERIOR SHOULDER
LOCATION DETAILED: RIGHT PROXIMAL DORSAL FOREARM
LOCATION DETAILED: RIGHT DISTAL POSTERIOR UPPER ARM
LOCATION DETAILED: LEFT DISTAL POSTERIOR UPPER ARM
LOCATION DETAILED: RIGHT PROXIMAL PRETIBIAL REGION
LOCATION DETAILED: RIGHT CLAVICULAR SKIN
LOCATION DETAILED: MIDDLE STERNUM
LOCATION DETAILED: LEFT MEDIAL BREAST 10-11:00 REGION
LOCATION DETAILED: LEFT SUPERIOR ANTERIOR NECK
LOCATION DETAILED: RIGHT SUPERIOR PARIETAL SCALP
LOCATION DETAILED: LEFT INFERIOR LATERAL NECK
LOCATION DETAILED: INFERIOR THORACIC SPINE

## 2018-11-29 ASSESSMENT — LOCATION SIMPLE DESCRIPTION DERM
LOCATION SIMPLE: RIGHT CLAVICULAR SKIN
LOCATION SIMPLE: LEFT ELBOW
LOCATION SIMPLE: ABDOMEN
LOCATION SIMPLE: SCALP
LOCATION SIMPLE: RIGHT ANTERIOR NECK
LOCATION SIMPLE: LEFT POSTERIOR UPPER ARM
LOCATION SIMPLE: RIGHT THIGH
LOCATION SIMPLE: RIGHT SHOULDER
LOCATION SIMPLE: RIGHT POPLITEAL SKIN
LOCATION SIMPLE: LEFT PRETIBIAL REGION
LOCATION SIMPLE: LEFT BREAST
LOCATION SIMPLE: UPPER BACK
LOCATION SIMPLE: RIGHT EYEBROW
LOCATION SIMPLE: LEFT WRIST
LOCATION SIMPLE: LEFT HAND
LOCATION SIMPLE: RIGHT UPPER BACK
LOCATION SIMPLE: CHEST
LOCATION SIMPLE: LEFT FOREARM
LOCATION SIMPLE: LEFT ANTERIOR NECK
LOCATION SIMPLE: RIGHT PRETIBIAL REGION
LOCATION SIMPLE: LEFT POSTERIOR THIGH
LOCATION SIMPLE: RIGHT FOREARM
LOCATION SIMPLE: RIGHT POSTERIOR UPPER ARM
LOCATION SIMPLE: RIGHT BREAST
LOCATION SIMPLE: RIGHT POSTERIOR THIGH
LOCATION SIMPLE: LEFT SHOULDER
LOCATION SIMPLE: LEFT FOREHEAD
LOCATION SIMPLE: LEFT POPLITEAL SKIN

## 2018-11-29 ASSESSMENT — LOCATION ZONE DERM
LOCATION ZONE: NECK
LOCATION ZONE: FACE
LOCATION ZONE: TRUNK
LOCATION ZONE: LEG
LOCATION ZONE: HAND
LOCATION ZONE: SCALP
LOCATION ZONE: ARM

## 2018-11-29 NOTE — PROCEDURE: LIQUID NITROGEN
Post-Care Instructions: I reviewed with the patient in detail post-care instructions. Patient is to wear sunprotection, and avoid picking at any of the treated lesions. Pt may apply Vaseline to crusted or scabbing areas.
Detail Level: Detailed
Medical Necessity Information: It is in your best interest to select a reason for this procedure from the list below. All of these items fulfill various CMS LCD requirements except the new and changing color options.
Consent: The patient's consent was obtained including but not limited to risks of crusting, scabbing, blistering, scarring, darker or lighter pigmentary change, recurrence, incomplete removal and infection.
Include Z78.9 (Other Specified Conditions Influencing Health Status) As An Associated Diagnosis?: No
Medical Necessity Clause: This procedure was medically necessary because the lesions that were treated were:

## 2018-12-05 ENCOUNTER — HOSPITAL ENCOUNTER (OUTPATIENT)
Dept: LAB | Facility: MEDICAL CENTER | Age: 68
End: 2018-12-05
Attending: OBSTETRICS & GYNECOLOGY
Payer: MEDICARE

## 2018-12-05 PROCEDURE — 87624 HPV HI-RISK TYP POOLED RSLT: CPT

## 2018-12-05 PROCEDURE — 88175 CYTOPATH C/V AUTO FLUID REDO: CPT

## 2018-12-07 LAB
CYTOLOGY REG CYTOL: NORMAL
HPV HR 12 DNA CVX QL NAA+PROBE: NEGATIVE
HPV16 DNA SPEC QL NAA+PROBE: NEGATIVE
HPV18 DNA SPEC QL NAA+PROBE: NEGATIVE
SPECIMEN SOURCE: NORMAL

## 2018-12-17 ENCOUNTER — TELEPHONE (OUTPATIENT)
Dept: MEDICAL GROUP | Facility: LAB | Age: 68
End: 2018-12-17

## 2018-12-17 NOTE — TELEPHONE ENCOUNTER
ESTABLISHED PATIENT PRE-VISIT PLANNING     Patient was NOT contacted to complete PVP.     Note: Patient will not be contacted if there is no indication to call.     1.  Reviewed notes from the last few office visits within the medical group: Yes    2.  If any orders were placed at last visit or intended to be done for this visit (i.e. 6 mos follow-up), do we have Results/Consult Notes?        •  Labs - Labs were not ordered at last office visit.       •  Imaging - Imaging was not ordered at last office visit.       •  Referrals - No referrals were ordered at last office visit.    3. Is this appointment scheduled as a Hospital Follow-Up? No    4.  Immunizations were updated in Epic using WebIZ?: Epic matches WebIZ       •  Web Iz Recommendations: FLU, TDAP and SHINGRIX (Shingles)    5.  Patient is due for the following Health Maintenance Topics:   Health Maintenance Due   Topic Date Due   • Annual Wellness Visit  1950   • IMM DTaP/Tdap/Td Vaccine (1 - Tdap) 02/09/1967   • COLONOSCOPY  02/09/1998   • IMM ZOSTER VACCINES (2 of 3) 03/27/2009   • BONE DENSITY  02/09/2013   • IMM PNEUMOCOCCAL 65+ (ADULT) LOW/MEDIUM RISK SERIES (1 of 2 - PCV13) 02/09/2013   • IMM INFLUENZA (1) 09/01/2018       - Patient has completed PNEUMOVAX (PPSV23) and PREVNAR (PCV13)  Immunization(s) per WebIZ. Chart has been updated.    6.  MDX printed for Provider? NO    7.  Patient was NOT informed to arrive 15 min prior to their scheduled appointment and bring in their medication bottles.

## 2018-12-18 ENCOUNTER — TELEPHONE (OUTPATIENT)
Dept: MEDICAL GROUP | Facility: LAB | Age: 68
End: 2018-12-18

## 2018-12-18 NOTE — TELEPHONE ENCOUNTER
ESTABLISHED PATIENT PRE-VISIT PLANNING     Patient was NOT contacted to complete PVP.     Note: Patient will not be contacted if there is no indication to call.     1.  Reviewed notes from the last few office visits within the medical group: Yes    2.  If any orders were placed at last visit or intended to be done for this visit (i.e. 6 mos follow-up), do we have Results/Consult Notes?        •  Labs - Labs were not ordered at last office visit.       •  Imaging - Imaging was not ordered at last office visit.       •  Referrals - No referrals were ordered at last office visit.    3. Is this appointment scheduled as a Hospital Follow-Up? No    4.  Immunizations were updated in Epic using WebIZ?: Epic matches WebIZ       •  Web Iz Recommendations: FLU, TDAP and SHINGRIX (Shingles)    5.  Patient is due for the following Health Maintenance Topics:   Health Maintenance Due   Topic Date Due   • Annual Wellness Visit  1950   • IMM DTaP/Tdap/Td Vaccine (1 - Tdap) 02/09/1967   • COLONOSCOPY  02/09/1998   • IMM ZOSTER VACCINES (2 of 3) 03/27/2009   • BONE DENSITY  02/09/2013   • IMM INFLUENZA (1) 09/01/2018       - Patient has completed PNEUMOVAX (PPSV23) and PREVNAR (PCV13)  Immunization(s) per WebIZ. Chart has been updated.    6.  MDX printed for Provider? YES    7.  Patient was NOT informed to arrive 15 min prior to their scheduled appointment and bring in their medication bottles.

## 2018-12-19 ENCOUNTER — OFFICE VISIT (OUTPATIENT)
Dept: MEDICAL GROUP | Facility: LAB | Age: 68
End: 2018-12-19
Payer: MEDICARE

## 2018-12-19 VITALS
DIASTOLIC BLOOD PRESSURE: 74 MMHG | HEIGHT: 67 IN | WEIGHT: 165.34 LBS | BODY MASS INDEX: 25.95 KG/M2 | SYSTOLIC BLOOD PRESSURE: 124 MMHG | HEART RATE: 96 BPM | OXYGEN SATURATION: 64 % | RESPIRATION RATE: 20 BRPM | TEMPERATURE: 97.5 F

## 2018-12-19 DIAGNOSIS — G89.29 CHRONIC NECK PAIN: ICD-10-CM

## 2018-12-19 DIAGNOSIS — M43.22 CERVICAL VERTEBRAL FUSION: ICD-10-CM

## 2018-12-19 DIAGNOSIS — M54.2 CHRONIC NECK PAIN: ICD-10-CM

## 2018-12-19 PROCEDURE — 99214 OFFICE O/P EST MOD 30 MIN: CPT | Performed by: FAMILY MEDICINE

## 2018-12-19 RX ORDER — TRAMADOL HYDROCHLORIDE 50 MG/1
50 TABLET ORAL EVERY 6 HOURS PRN
Qty: 120 TAB | Refills: 0 | Status: SHIPPED | OUTPATIENT
Start: 2018-12-19 | End: 2018-12-19 | Stop reason: SDUPTHER

## 2018-12-19 RX ORDER — TRAMADOL HYDROCHLORIDE 50 MG/1
50 TABLET ORAL EVERY 6 HOURS PRN
Qty: 120 TAB | Refills: 0 | Status: SHIPPED | OUTPATIENT
Start: 2019-01-18 | End: 2018-12-19 | Stop reason: SDUPTHER

## 2018-12-19 RX ORDER — TRAMADOL HYDROCHLORIDE 50 MG/1
50 TABLET ORAL EVERY 6 HOURS PRN
Qty: 120 TAB | Refills: 0 | Status: SHIPPED | OUTPATIENT
Start: 2019-02-17 | End: 2019-03-19

## 2018-12-19 NOTE — ASSESSMENT & PLAN NOTE
Chronic pain recheck for: chronic neck pain with cervical fusion  Last dose of controlled substance: this am  Chronic pain treated with Tramadol 50 mg taken 3-4 times a day    She  reports that she drinks about 4.2 oz of alcohol per week .  She  reports that she does not use drugs.    Interval history:   Any major change in health since last appointment? No    Consequences of Chronic Opiate therapy:  (5 A's)  Analgesia: Compared to no treatment or prior treatment, pain is currently significantly improved  Activity: significantly improved  Adverse Events:CAPKELSEY@ denies constipation, dry mouth, itchy skin, nausea and sedation  Aberrant Behaviors: She reports she is taking medication as prescribed and is not veering from agreed treatment regimen or provider recommendations. There have been no inappropriate refills or lost/stolen meds reported.  Affect/Mood: Pain is not impacting patient's mood.  Patient denies depression/anxiety.    Nonnarcotic treatments that are being used: ice.       Opioid Risk Score: 0    Interpretation of Opioid Risk Score   Score 0-3 = Low risk of abuse. Do UDS at least once per year.  Score 4-7 = Moderate risk of abuse. Do UDS 1-4 times per year.  Score 8+ = High risk of abuse. Refer to specialist.    Last order of CONTROLLED SUBSTANCE TREATMENT AGREEMENT was found on 5/29/2018 from Office Visit on 5/29/2018     UDS Summary     Patient has no health maintenance due at this time        Most recent UDS reviewed today and is consistent with prescribed medications. Done on 7/5/18    I have reviewed the medical records, the Prescription Monitoring Program and I have determined that controlled substance treatment is medically indicated.

## 2018-12-27 NOTE — PROGRESS NOTES
Subjective:     Chief Complaint   Patient presents with   • Medication Refill     tramadol       Carlee Hunt is a 68 y.o. female here today for evaluation and management of:    Chronic neck pain  Chronic pain recheck for: chronic neck pain with cervical fusion  Last dose of controlled substance: this am  Chronic pain treated with Tramadol 50 mg taken 3-4 times a day    She  reports that she drinks about 4.2 oz of alcohol per week .  She  reports that she does not use drugs.    Interval history:   Any major change in health since last appointment? No    Consequences of Chronic Opiate therapy:  (5 A's)  Analgesia: Compared to no treatment or prior treatment, pain is currently significantly improved  Activity: significantly improved  Adverse Events:CAPHE@ denies constipation, dry mouth, itchy skin, nausea and sedation  Aberrant Behaviors: She reports she is taking medication as prescribed and is not veering from agreed treatment regimen or provider recommendations. There have been no inappropriate refills or lost/stolen meds reported.  Affect/Mood: Pain is not impacting patient's mood.  Patient denies depression/anxiety.    Nonnarcotic treatments that are being used: ice.       Opioid Risk Score: 0    Interpretation of Opioid Risk Score   Score 0-3 = Low risk of abuse. Do UDS at least once per year.  Score 4-7 = Moderate risk of abuse. Do UDS 1-4 times per year.  Score 8+ = High risk of abuse. Refer to specialist.    Last order of CONTROLLED SUBSTANCE TREATMENT AGREEMENT was found on 5/29/2018 from Office Visit on 5/29/2018     UDS Summary     Patient has no health maintenance due at this time        Most recent UDS reviewed today and is consistent with prescribed medications. Done on 7/5/18    I have reviewed the medical records, the Prescription Monitoring Program and I have determined that controlled substance treatment is medically indicated.          Allergies   Allergen Reactions   • Rifampin Rash     Rash,  fever   • Vancomycin Rash     Rash, fever   • Sulfa Drugs Rash     Per pt         Current medicines (including changes today)  Current Outpatient Prescriptions   Medication Sig Dispense Refill   • [START ON 2/17/2019] tramadol (ULTRAM) 50 MG Tab Take 1 Tab by mouth every 6 hours as needed for Moderate Pain for up to 30 days. 120 Tab 0   • dorzolamide-timolol (COSOPT) 22.3-6.8 MG/ML Solution Place 1 Drop in both eyes 2 times a day.     • thyroid (ARMOUR THYROID) 60 MG Tab Take 1 Tab by mouth every day. 30 Tab 6   • atenolol (TENORMIN) 25 MG Tab Take 25 mg by mouth 2 Times a Day.     • timolol gel-forming (TIMOPTIC-XR) 0.25 % GEL FORMING SOLUTION Place 1 Drop in both eyes every day.     • latanoprost (XALATAN) 0.005 % Solution Place 1 Drop in both eyes every evening.     • Misc. Devices (POCKET MAGNIFIER) Misc by Does not apply route.     • vitamin D (CHOLECALCIFEROL) 1000 UNIT Tab Take 2,000 Units by mouth every day.     • coenzyme Q-10 30 MG capsule Take 60 mg by mouth every day.     • KRILL OIL PO Take  by mouth.     • Multiple Vitamins-Minerals (CENTRUM PO) Take  by mouth.     • Misc Natural Products (TURMERIC CURCUMIN) Cap Take  by mouth.     • doxycycline (VIBRAMYCIN) 100 MG TABS Take 100 mg by mouth 2 times a day.     • citalopram (CELEXA) 10 MG tablet Take 10 mg by mouth every day.       No current facility-administered medications for this visit.        She  has a past medical history of Cataract; Glaucoma; Hypertension; and Thyroid disease.    Patient Active Problem List    Diagnosis Date Noted   • Acquired hypothyroidism 05/29/2018   • Essential hypertension 05/29/2018   • Primary open angle glaucoma (POAG) of both eyes, moderate stage 05/29/2018   • Cervical vertebral fusion 05/29/2018   • Chronic neck pain 05/29/2018   • Actinic keratosis 05/29/2018       ROS   No fever or chills.  No nausea or vomiting.  No chest pain or palpitations.  No cough or SOB.  No pain with urination or hematuria.  No black or  "bloody stools.       Objective:     Blood pressure 124/74, pulse 96, temperature 36.4 °C (97.5 °F), temperature source Temporal, resp. rate 20, height 1.702 m (5' 7\"), weight 75 kg (165 lb 5.5 oz), SpO2 (!) 64 %. Body mass index is 25.9 kg/m².   Physical Exam:  Well developed, well nourished.  Alert, oriented in no acute distress.  Eye contact is good, speech goal directed, affect calm  Eyes: conjunctiva non-injected, sclera non-icteric.  Neck - Decreased ROM.  No adenopathy or masses in the neck or supraclavicular regions. No thyromegaly  Lungs: clear to auscultation bilaterally with good excursion. No wheezes or rhonchi  CV: regular rate and rhythm. No murmur        Assessment and Plan:   The following treatment plan was discussed    1. Cervical vertebral fusion  Controlled substance agreement on chart.  [facility] reviewed.  Continue Ultram as needed for pain.  - tramadol (ULTRAM) 50 MG Tab; Take 1 Tab by mouth every 6 hours as needed for Moderate Pain for up to 30 days.  Dispense: 120 Tab; Refill: 0    2. Chronic neck pain  See #1  - tramadol (ULTRAM) 50 MG Tab; Take 1 Tab by mouth every 6 hours as needed for Moderate Pain for up to 30 days.  Dispense: 120 Tab; Refill: 0    Any change or worsening of signs or symptoms, patient encouraged to follow-up or report to the emergency room for further evaluation. Patient understands and agrees.    Followup: Return in about 3 months (around 3/19/2019).           "

## 2019-01-08 ENCOUNTER — HOSPITAL ENCOUNTER (OUTPATIENT)
Dept: LAB | Facility: MEDICAL CENTER | Age: 69
End: 2019-01-08
Attending: NURSE PRACTITIONER
Payer: MEDICARE

## 2019-01-08 LAB
ALBUMIN SERPL BCP-MCNC: 4 G/DL (ref 3.2–4.9)
ALBUMIN/GLOB SERPL: 1.5 G/DL
ALP SERPL-CCNC: 71 U/L (ref 30–99)
ALT SERPL-CCNC: 24 U/L (ref 2–50)
ANION GAP SERPL CALC-SCNC: 8 MMOL/L (ref 0–11.9)
AST SERPL-CCNC: 24 U/L (ref 12–45)
BASOPHILS # BLD AUTO: 0.5 % (ref 0–1.8)
BASOPHILS # BLD: 0.04 K/UL (ref 0–0.12)
BILIRUB SERPL-MCNC: 0.4 MG/DL (ref 0.1–1.5)
BUN SERPL-MCNC: 23 MG/DL (ref 8–22)
CALCIUM SERPL-MCNC: 9.4 MG/DL (ref 8.5–10.5)
CHLORIDE SERPL-SCNC: 107 MMOL/L (ref 96–112)
CO2 SERPL-SCNC: 29 MMOL/L (ref 20–33)
CREAT SERPL-MCNC: 0.93 MG/DL (ref 0.5–1.4)
CRP SERPL HS-MCNC: 0.16 MG/DL (ref 0–0.75)
EOSINOPHIL # BLD AUTO: 0.17 K/UL (ref 0–0.51)
EOSINOPHIL NFR BLD: 2.1 % (ref 0–6.9)
ERYTHROCYTE [DISTWIDTH] IN BLOOD BY AUTOMATED COUNT: 43.6 FL (ref 35.9–50)
ERYTHROCYTE [SEDIMENTATION RATE] IN BLOOD BY WESTERGREN METHOD: 10 MM/HOUR (ref 0–30)
FASTING STATUS PATIENT QL REPORTED: NORMAL
GLOBULIN SER CALC-MCNC: 2.7 G/DL (ref 1.9–3.5)
GLUCOSE SERPL-MCNC: 104 MG/DL (ref 65–99)
HCT VFR BLD AUTO: 43.3 % (ref 37–47)
HGB BLD-MCNC: 14.5 G/DL (ref 12–16)
IMM GRANULOCYTES # BLD AUTO: 0.01 K/UL (ref 0–0.11)
IMM GRANULOCYTES NFR BLD AUTO: 0.1 % (ref 0–0.9)
LYMPHOCYTES # BLD AUTO: 3.78 K/UL (ref 1–4.8)
LYMPHOCYTES NFR BLD: 47.4 % (ref 22–41)
MCH RBC QN AUTO: 32.2 PG (ref 27–33)
MCHC RBC AUTO-ENTMCNC: 33.5 G/DL (ref 33.6–35)
MCV RBC AUTO: 96 FL (ref 81.4–97.8)
MONOCYTES # BLD AUTO: 0.57 K/UL (ref 0–0.85)
MONOCYTES NFR BLD AUTO: 7.2 % (ref 0–13.4)
NEUTROPHILS # BLD AUTO: 3.4 K/UL (ref 2–7.15)
NEUTROPHILS NFR BLD: 42.7 % (ref 44–72)
NRBC # BLD AUTO: 0 K/UL
NRBC BLD-RTO: 0 /100 WBC
PLATELET # BLD AUTO: 227 K/UL (ref 164–446)
PMV BLD AUTO: 10.7 FL (ref 9–12.9)
POTASSIUM SERPL-SCNC: 4.4 MMOL/L (ref 3.6–5.5)
PROT SERPL-MCNC: 6.7 G/DL (ref 6–8.2)
RBC # BLD AUTO: 4.51 M/UL (ref 4.2–5.4)
SODIUM SERPL-SCNC: 144 MMOL/L (ref 135–145)
WBC # BLD AUTO: 8 K/UL (ref 4.8–10.8)

## 2019-01-08 PROCEDURE — 80053 COMPREHEN METABOLIC PANEL: CPT

## 2019-01-08 PROCEDURE — 36415 COLL VENOUS BLD VENIPUNCTURE: CPT

## 2019-01-08 PROCEDURE — 86140 C-REACTIVE PROTEIN: CPT

## 2019-01-08 PROCEDURE — 85652 RBC SED RATE AUTOMATED: CPT

## 2019-01-08 PROCEDURE — 85025 COMPLETE CBC W/AUTO DIFF WBC: CPT

## 2019-01-10 ENCOUNTER — TELEPHONE (OUTPATIENT)
Dept: MEDICAL GROUP | Facility: LAB | Age: 69
End: 2019-01-10

## 2019-01-10 NOTE — TELEPHONE ENCOUNTER
PVP WITH OUTREACH  Future Appointments       Provider Department Center    1/17/2019 1:20 PM Lori Hickey M.D.; [facility]  [facility]           ANNUAL WELLNESS VISIT PRE-VISIT PLANNING     1.  Immunizations were updated in Epic using WebIZ?: Epic matches WebIZ       •  WebIZ Recommendations: FLU, TDAP and SHINGRIX (Shingles)       •  Is patient due for Tdap? YES. Patient was notified of copay/out of pocket cost.       •  Is patient due for Shingles? NO/vaccine on back order     2.  Specialty Comments was updated with diagnosis information provided by [facility]: YES MDX printed

## 2019-01-17 ENCOUNTER — OFFICE VISIT (OUTPATIENT)
Dept: MEDICAL GROUP | Facility: LAB | Age: 69
End: 2019-01-17
Payer: MEDICARE

## 2019-01-17 VITALS
OXYGEN SATURATION: 96 % | DIASTOLIC BLOOD PRESSURE: 80 MMHG | BODY MASS INDEX: 26.21 KG/M2 | SYSTOLIC BLOOD PRESSURE: 116 MMHG | TEMPERATURE: 97.5 F | WEIGHT: 167 LBS | HEIGHT: 67 IN | HEART RATE: 60 BPM | RESPIRATION RATE: 12 BRPM

## 2019-01-17 DIAGNOSIS — Z23 NEED FOR VACCINATION: ICD-10-CM

## 2019-01-17 DIAGNOSIS — R73.01 ELEVATED FASTING GLUCOSE: ICD-10-CM

## 2019-01-17 DIAGNOSIS — G89.29 CHRONIC NECK PAIN: ICD-10-CM

## 2019-01-17 DIAGNOSIS — E03.9 ACQUIRED HYPOTHYROIDISM: ICD-10-CM

## 2019-01-17 DIAGNOSIS — I10 ESSENTIAL HYPERTENSION: ICD-10-CM

## 2019-01-17 DIAGNOSIS — Z79.891 CHRONIC USE OF OPIATE FOR THERAPEUTIC PURPOSE: ICD-10-CM

## 2019-01-17 DIAGNOSIS — N28.9 ABNORMAL RENAL FUNCTION: ICD-10-CM

## 2019-01-17 DIAGNOSIS — Z12.11 SCREENING FOR COLON CANCER: ICD-10-CM

## 2019-01-17 DIAGNOSIS — M54.2 CHRONIC NECK PAIN: ICD-10-CM

## 2019-01-17 DIAGNOSIS — H40.1132 PRIMARY OPEN ANGLE GLAUCOMA (POAG) OF BOTH EYES, MODERATE STAGE: ICD-10-CM

## 2019-01-17 DIAGNOSIS — Z00.00 MEDICARE ANNUAL WELLNESS VISIT, SUBSEQUENT: ICD-10-CM

## 2019-01-17 DIAGNOSIS — M43.22 CERVICAL VERTEBRAL FUSION: ICD-10-CM

## 2019-01-17 DIAGNOSIS — E78.5 DYSLIPIDEMIA: ICD-10-CM

## 2019-01-17 PROBLEM — L57.0 ACTINIC KERATOSIS: Status: RESOLVED | Noted: 2018-05-29 | Resolved: 2019-01-17

## 2019-01-17 PROCEDURE — 8041 PR SCP AHA: Performed by: FAMILY MEDICINE

## 2019-01-17 PROCEDURE — G0439 PPPS, SUBSEQ VISIT: HCPCS | Performed by: FAMILY MEDICINE

## 2019-01-17 PROCEDURE — 90471 IMMUNIZATION ADMIN: CPT | Performed by: FAMILY MEDICINE

## 2019-01-17 PROCEDURE — 90715 TDAP VACCINE 7 YRS/> IM: CPT | Performed by: FAMILY MEDICINE

## 2019-01-17 RX ORDER — DORZOLAMIDE HCL 20 MG/ML
1 SOLUTION/ DROPS OPHTHALMIC 3 TIMES DAILY
COMMUNITY
End: 2020-01-08

## 2019-01-17 RX ORDER — CALCIUM CARB/VITAMIN D3/VIT K1 500-500-40
200 TABLET,CHEWABLE ORAL DAILY
COMMUNITY
End: 2021-08-26

## 2019-01-17 ASSESSMENT — ACTIVITIES OF DAILY LIVING (ADL): BATHING_REQUIRES_ASSISTANCE: 0

## 2019-01-17 ASSESSMENT — PATIENT HEALTH QUESTIONNAIRE - PHQ9: CLINICAL INTERPRETATION OF PHQ2 SCORE: 0

## 2019-01-17 ASSESSMENT — ENCOUNTER SYMPTOMS: GENERAL WELL-BEING: GOOD

## 2019-01-17 NOTE — PROGRESS NOTES
Chief Complaint   Patient presents with   • Annual Wellness Visit               Subjective:     Carlee Hunt is a 68 y.o. female here today foMedicare Annual Wellness Visitrand Annual Health Assessment.    Dyslipidemia  Had labs at [facility] on 10/24/18.  , , HDL 62,   She is not on medications    Health Maintenance Summary                COLONOSCOPY Overdue 2/9/1998     IMM ZOSTER VACCINES Postponed 1/17/2020 Originally 3/27/2009. System: vaccine not available, other system reasons     Done 1/30/2009 Imm Admin: Zoster Vaccine Live (ZVL) (Zostavax)    MAMMOGRAM Next Due 4/17/2019      Done 4/17/2018 MA-MAMMO SCREENING BILAT W/TOMOSYNTHESIS W/CAD     Patient has more history with this topic...    BONE DENSITY Next Due 11/6/2023      Done 11/6/2018 DS-BONE DENSITY STUDY (DEXA)    IMM DTaP/Tdap/Td Vaccine Next Due 1/17/2029      Done 1/17/2019 Imm Admin: Tdap Vaccine          Annual Health Assessment Questions:     1.  Are you currently engaging in any exercise or physical activity? No    2.  How would you describe your mood or emotional well-being today? good    3.  Have you had any falls in the last year? No    4.  Have you noticed any problems with your balance or had difficulty walking? No    5.  In the last six months have you experienced any leakage of urine? No    6. DPA/Advanced Directive: Patient does not have an Advanced Directive.  A packet and workshop information was given on Advanced Directives.    Current medicines (including changes today)      She  has a past medical history of Cataract; Glaucoma; Hypertension; and Thyroid disease.    Rifampin; Vancomycin; and Sulfa drugs    She  reports that she quit smoking about 36 years ago. Her smoking use included Cigarettes. She has a 16.00 pack-year smoking history. She has never used smokeless tobacco. She reports that she drinks about 4.2 oz of alcohol per week . She reports that she does not use drugs.  Counseling given: Yes      ROS   No  chest pain, no shortness of breath, no abdominal pain.      Patient Active Problem List    Diagnosis Date Noted   • Acquired hypothyroidism 05/29/2018   • Essential hypertension 05/29/2018   • Primary open angle glaucoma (POAG) of both eyes, moderate stage 05/29/2018   • Cervical vertebral fusion 05/29/2018   • Chronic neck pain 05/29/2018   • Actinic keratosis 05/29/2018       Current Outpatient Prescriptions   Medication Sig Dispense Refill   • dorzolamide (TRUSOPT) 2 % Solution Place 1 Drop in both eyes 3 times a day.     • Probiotic Product (PRO-BIOTIC BLEND) Cap Take  by mouth.     • Alpha-Lipoic Acid 200 MG Tab Take 250 mg by mouth.     • [START ON 2/17/2019] tramadol (ULTRAM) 50 MG Tab Take 1 Tab by mouth every 6 hours as needed for Moderate Pain for up to 30 days. 120 Tab 0   • thyroid (ARMOUR THYROID) 60 MG Tab Take 1 Tab by mouth every day. 30 Tab 6   • atenolol (TENORMIN) 25 MG Tab Take 25 mg by mouth 2 Times a Day.     • timolol gel-forming (TIMOPTIC-XR) 0.25 % GEL FORMING SOLUTION Place 1 Drop in both eyes every day.     • latanoprost (XALATAN) 0.005 % Solution Place 1 Drop in both eyes every evening.     • vitamin D (CHOLECALCIFEROL) 1000 UNIT Tab Take 2,000 Units by mouth every day.     • coenzyme Q-10 30 MG capsule Take 60 mg by mouth every day.     • KRILL OIL PO Take  by mouth.     • Multiple Vitamins-Minerals (CENTRUM PO) Take  by mouth.     • Misc Natural Products (TURMERIC CURCUMIN) Cap Take  by mouth.     • doxycycline (VIBRAMYCIN) 100 MG TABS Take 100 mg by mouth 2 times a day.     • dorzolamide-timolol (COSOPT) 22.3-6.8 MG/ML Solution Place 1 Drop in both eyes 2 times a day.     • Misc. Devices (POCKET MAGNIFIER) Misc by Does not apply route.     • citalopram (CELEXA) 10 MG tablet Take 10 mg by mouth every day.       No current facility-administered medications for this visit.         Patient is taking medications as noted in medication list.  Current supplements as per medication list.      Allergies: Rifampin; Vancomycin; and Sulfa drugs    Current social contact/activities: playing cards, visiting friends, going out to eat, plays Bingo and [employer]     Is patient current with immunizations? No, due for TDAP and SHINGRIX (Shingles). Patient is interested in receiving NONE today.    She  reports that she quit smoking about 36 years ago. Her smoking use included Cigarettes. She has a 16.00 pack-year smoking history. She has never used smokeless tobacco. She reports that she drinks about 4.2 oz of alcohol per week . She reports that she does not use drugs.  Counseling given: Yes        DPA/Advanced directive: Patient has Advanced Directive, but it is not on file. Instructed to bring in a copy to scan into their chart.    ROS:    Gait: Uses no assistive device   Ostomy: No   Other tubes: No   Amputations: No   Chronic oxygen use No   Last eye exam every 3 months   Wears hearing aids: No   : Denies any urinary leakage during the last 6 months      Depression Screening    Little interest or pleasure in doing things?  0 - not at all  Feeling down, depressed, or hopeless? 0 - not at all  Patient Health Questionnaire Score: 0    If depressive symptoms identified deferred to follow up visit unless specifically addressed in assessment and plan.    Interpretation of PHQ-9 Total Score   Score Severity   1-4 No Depression   5-9 Mild Depression   10-14 Moderate Depression   15-19 Moderately Severe Depression   20-27 Severe Depression    Screening for Cognitive Impairment    Three Minute Recall (leader, season, table)  3/3    Ankit clock face with all 12 numbers and set the hands to show 10 past 11.  Yes 5/5  If cognitive concerns identified, deferred for follow up unless specifically addressed in assessment and plan.    Fall Risk Assessment    Has the patient had two or more falls in the last year or any fall with injury in the last year?  No  If fall risk identified, deferred for follow up unless specifically  addressed in assessment and plan.    Safety Assessment    Throw rugs on floor.  No  Handrails on all stairs.  Yes  Good lighting in all hallways.  Yes  Difficulty hearing.  No  Patient counseled about all safety risks that were identified.    Functional Assessment ADLs    Are there any barriers preventing you from cooking for yourself or meeting nutritional needs?  No.    Are there any barriers preventing you from driving safely or obtaining transportation?  No.    Are there any barriers preventing you from using a telephone or calling for help?  No.    Are there any barriers preventing you from shopping?  No.    Are there any barriers preventing you from taking care of your own finances?  No.    Are there any barriers preventing you from managing your medications?  No.    Are there any barriers preventing you from showering, bathing or dressing yourself?  No.    Are you currently engaging in any exercise or physical activity?  Yes.  Walking every day and chair yoga and summer swimming.  What is your perception of your health?  Good.    Health Maintenance Summary                Annual Wellness Visit Overdue 1950     IMM DTaP/Tdap/Td Vaccine Overdue 2/9/1967     COLONOSCOPY Overdue 2/9/1998     IMM ZOSTER VACCINES Overdue 3/27/2009      Done 1/30/2009 Imm Admin: Zoster Vaccine Live (ZVL) (Zostavax)    BONE DENSITY Overdue 2/9/2013     MAMMOGRAM Next Due 4/17/2019      Done 4/17/2018 MA-MAMMO SCREENING BILAT W/TOMOSYNTHESIS W/CAD     Patient has more history with this topic...          Patient Care Team:  Lori Hickey M.D. as PCP - General (Family Medicine)  [facility] Infectious Disease as Consulting Physician  Stu Santos M.D. as Consulting Physician (Ophthalmology)  Denice Roblero M.D. as Consulting Physician (OB/Gyn)  [facility] as Consulting Physician (Gastroenterology)    Social History   Substance Use Topics   • Smoking status: Former Smoker     Packs/day: 1.00     Years: 16.00     Types:  "Cigarettes     Quit date: 2/14/1982   • Smokeless tobacco: Never Used   • Alcohol use 4.2 oz/week     7 Glasses of wine per week     Family History   Problem Relation Age of Onset   • Hypertension Mother    • Other Father         accident   • Other Sister         pneumoccocal pneumonia   • Other Brother         glaucoma   • Heart Attack Paternal Grandfather    • Cancer Neg Hx    • Diabetes Neg Hx    • Heart Disease Neg Hx      She  has a past medical history of Cataract; Glaucoma; Hypertension; and Thyroid disease.   Past Surgical History:   Procedure Laterality Date   • CERVICAL FUSION POSTERIOR      Basilar \"invagination\"   • EYE SURGERY      cataracts - Dr. Martinez           Exam:     Blood pressure 116/80, pulse 60, temperature 36.4 °C (97.5 °F), temperature source Temporal, resp. rate 12, height 1.702 m (5' 7\"), weight 75.8 kg (167 lb), SpO2 96 %. Body mass index is 26.16 kg/m².    Hearing good.    Dentition good  Alert, oriented in no acute distress.  Eye contact is good, speech goal directed, affect calm  Skin: Warm, dry, good turgor, no rashes in visible areas.  Eye: Equal, round and reactive, conjunctiva clear, lids normal.  ENMT: Lips without lesions, good dentition, oropharynx clear.  Neck: Trachea midline, no masses, no thyromegaly. No cervical or supraclavicular lymphadenopathy.  Respiratory: Unlabored respiratory effort, lungs clear to auscultation, no wheezes, no rhonchi.  Cardiovascular: Normal S1, S2, no murmur, no edema.  Abdomen: Soft, non-tender, no masses, no hepatosplenomegaly.  Psych: Alert and oriented x3, normal affect and mood.    Assessment and Plan. The following treatment and monitoring plan is recommended:    1. Medicare annual wellness visit, subsequent  Routine anticipatory guidance.  No special services needed at this time.  Counseling as below  - Initial Annual Wellness Visit - Includes PPPS ()    2. Need for vaccination  Updated  - Initial Annual Wellness Visit - Includes PPPS " ()  - Tdap Vaccine =>8YO IM      3. Essential hypertension  This is a chronic medical condition that is currently stable  Continue to atenolol 25 mg twice a day  - Initial Annual Wellness Visit - Includes PPPS ()    4. Dyslipidemia  This is a chronic medical condition that is currently stable  Dietary counseling done.  - Initial Annual Wellness Visit - Includes PPPS ()    5. Abnormal renal function  Recheck renal function labs.  Patient is not using any NSAIDs  - Initial Annual Wellness Visit - Includes PPPS ()  - BASIC METABOLIC PANEL; Future    6. Elevated fasting glucose  Dietary counseling done.  Encourage weight loss.  Recheck labs  - Initial Annual Wellness Visit - Includes PPPS ()  - BASIC METABOLIC PANEL; Future  - HEMOGLOBIN A1C; Future    7. Acquired hypothyroidism  This is a chronic medical condition that is currently stable  Continue [place] Thyroid.  We did discuss the increased risk of atrial fibrillation with this and patients over 65.  She has not tolerated other medications and would like to stay on this.  - Initial Annual Wellness Visit - Includes PPPS ()    8. Cervical vertebral fusion  This is a chronic medical condition that is currently stable  Continue tramadol as needed  - Initial Annual Wellness Visit - Includes PPPS ()    9. Chronic neck pain  This is a chronic medical condition that is currently stable  Continue tramadol as needed  - Initial Annual Wellness Visit - Includes PPPS ()    10. Primary open angle glaucoma (POAG) of both eyes, moderate stage  Patient follows up with ophthalmology every 3 months  - Initial Annual Wellness Visit - Includes PPPS ()    11. Chronic use of opiate for therapeutic purpose  This is a chronic medical condition that is currently stable  Continue tramadol as needed.          Services suggested: No services needed at this time  Health Care Screening recommendations as per orders if indicated.  Referrals offered:  PT/OT/Nutrition counseling/Behavioral Health/Smoking cessation as per orders if indicated.    Discussion today about general wellness and lifestyle habits:    · Engage in regular physical activity and social activities.  · Prevent falls and reduce trip hazards; using ambulatory aides, hearing and vision testing if appropriate.  · Steps to improve urinary incontinence.  · Advanced care planning.    Follow-Up: Return in about 3 months (around 4/17/2019).         PLEASE NOTE: This dictation was created using voice recognition software. I have made every reasonable attempt to correct obvious errors, but I expect that there are errors of grammar and possibly content that I did not discover before finalizing the note.

## 2019-01-17 NOTE — LETTER
[employer]  Lori Hickey M.D.  [address]  [place] [address]  Fax: [phone]   Authorization for Release/Disclosure of   Protected Health Information   Name: CARLEE HUNT : 1950 SSN: [id number]   Address: [facility] [place] [address] Phone:    [phone] (home)    I authorize the entity listed below to release/disclose the PHI below to:   [facility]/Lori Hickey M.D. and Lori Hickey M.D.   Provider or Entity Name:  [facility]   Address   City, State, [facility]   Phone:      Fax:  [phone]   Reason for request: continuity of care   Information to be released:    [ XX ] LAST COLONOSCOPY,  including any PATH REPORT and follow-up  [  ] LAST FIT/COLOGUARD RESULT [  ] LAST DEXA  [  ] LAST MAMMOGRAM  [  ] LAST PAP  [  ] LAST LABS [  ] RETINA EXAM REPORT  [  ] IMMUNIZATION RECORDS  [  ] Release all info      [  ] Check here and initial the line next to each item to release ALL health information INCLUDING  _____ Care and treatment for drug and / or alcohol abuse  _____ HIV testing, infection status, or AIDS  _____ Genetic Testing    DATES OF SERVICE OR TIME PERIOD TO BE DISCLOSED: _____________  I understand and acknowledge that:  * This Authorization may be revoked at any time by you in writing, except if your health information has already been used or disclosed.  * Your health information that will be used or disclosed as a result of you signing this authorization could be re-disclosed by the recipient. If this occurs, your re-disclosed health information may no longer be protected by State or Federal laws.  * You may refuse to sign this Authorization. Your refusal will not affect your ability to obtain treatment.  * This Authorization becomes effective upon signing and will  on (date) __________.      If no date is indicated, this Authorization will  one (1) year from the signature date.    Name: Carlee Hunt    Signature:   Date:     2019       PLEASE FAX  REQUESTED RECORDS BACK TO: [phone]

## 2019-01-17 NOTE — PATIENT INSTRUCTIONS
Mediterranean Diet  A Mediterranean diet refers to food and lifestyle choices that are based on the traditions of countries located on the [place]. This way of eating has been shown to help prevent certain conditions and improve outcomes for people who have chronic diseases, like kidney disease and heart disease.  What are tips for following this plan?  Lifestyle  · Cook and eat meals together with your family, when possible.  · Drink enough fluid to keep your urine clear or pale yellow.  · Be physically active every day. This includes:  ¨ Aerobic exercise like running or swimming.  ¨ Leisure activities like gardening, walking, or housework.  · Get 7-8 hours of sleep each night.  · If recommended by your health care provider, drink red wine in moderation. This means 1 glass a day for nonpregnant women and 2 glasses a day for men. A glass of wine equals 5 oz (150 mL).  Reading food labels  · Check the serving size of packaged foods. For foods such as rice and pasta, the serving size refers to the amount of cooked product, not dry.  · Check the total fat in packaged foods. Avoid foods that have saturated fat or trans fats.  · Check the ingredients list for added sugars, such as corn syrup.  Shopping  · At the grocery store, buy most of your food from the areas near the walls of the store. This includes:  ¨ Fresh fruits and vegetables (produce).  ¨ Grains, beans, nuts, and seeds. Some of these may be available in unpackaged forms or large amounts (in bulk).  ¨ Fresh seafood.  ¨ Poultry and eggs.  ¨ Low-fat dairy products.  · Buy whole ingredients instead of prepackaged foods.  · Buy fresh fruits and vegetables in-season from local farmers markets.  · Buy frozen fruits and vegetables in resealable bags.  · If you do not have access to quality fresh seafood, buy precooked frozen shrimp or canned fish, such as tuna, salmon, or sardines.  · Buy small amounts of raw or cooked vegetables, salads, or olives from the  deli or salad bar at your store.  · Stock your pantry so you always have certain foods on hand, such as olive oil, canned tuna, canned tomatoes, rice, pasta, and beans.  Cooking  · Cook foods with extra-virgin olive oil instead of using butter or other vegetable oils.  · Have meat as a side dish, and have vegetables or grains as your main dish. This means having meat in small portions or adding small amounts of meat to foods like pasta or stew.  · Use beans or vegetables instead of meat in common dishes like chili or lasagna.  · [place] with different cooking methods. Try roasting or broiling vegetables instead of steaming or sautéeing them.  · Add frozen vegetables to soups, stews, pasta, or rice.  · Add nuts or seeds for added healthy fat at each meal. You can add these to yogurt, salads, or vegetable dishes.  · Marinate fish or vegetables using olive oil, lemon juice, garlic, and fresh herbs.  Meal planning  · Plan to eat 1 vegetarian meal one day each week. Try to work up to 2 vegetarian meals, if possible.  · Eat seafood 2 or more times a week.  · Have healthy snacks readily available, such as:  ¨ Vegetable sticks with hummus.  ¨ Greek yogurt.  ¨ Fruit and nut trail mix.  · Eat balanced meals throughout the week. This includes:  ¨ Fruit: 2-3 servings a day  ¨ Vegetables: 4-5 servings a day  ¨ Low-fat dairy: 2 servings a day  ¨ Fish, poultry, or lean meat: 1 serving a day  ¨ Beans and legumes: 2 or more servings a week  ¨ Nuts and seeds: 1-2 servings a day  ¨ Whole grains: 6-8 servings a day  ¨ Extra-virgin olive oil: 3-4 servings a day  · Limit red meat and sweets to only a few servings a month  What are my food choices?  · Mediterranean diet  ¨ Recommended  ¨ Grains: Whole-grain pasta. Brown rice. Bulgar wheat. Polenta. Couscous. Whole-wheat bread. Oatmeal. Quinoa.  ¨ Vegetables: Artichokes. Beets. Broccoli. Cabbage. Carrots. Eggplant. Green beans. Chard. Kale. Spinach. Onions. Leeks. Peas. Squash.  Tomatoes. Peppers. Radishes.  ¨ Fruits: Apples. Apricots. Avocado. Berries. Bananas. Cherries. Dates. Figs. Grapes. Sisi. Melon. Oranges. Peaches. Plums. Pomegranate.  ¨ Meats and other protein foods: Beans. Almonds. Sunflower seeds. Pine nuts. Peanuts. Cod. [place]. Scallops. Shrimp. Tuna. Tilapia. Clams. Oysters. Eggs.  ¨ Dairy: Low-fat milk. Cheese. Greek yogurt.  ¨ Beverages: Water. Red wine. Herbal tea.  ¨ Fats and oils: Extra virgin olive oil. Avocado oil. Grape seed oil.  ¨ Sweets and desserts: Greek yogurt with honey. Baked apples. Poached pears. Trail mix.  ¨ Seasoning and other foods: Basil. Cilantro. Coriander. Cumin. Mint. Parsley. [place]. Rosemary. Tarragon. Garlic. Oregano. Thyme. Pepper. Balsalmic vinegar. Tahini. Hummus. Tomato sauce. Olives. Mushrooms.  ¨ Limit these  ¨ Grains: Prepackaged pasta or rice dishes. Prepackaged cereal with added sugar.  ¨ Vegetables: Deep fried potatoes (french fries).  ¨ Fruits: Fruit canned in syrup.  ¨ Meats and other protein foods: Beef. Pork. Lamb. Poultry with skin. Hot dogs. [place].  ¨ Dairy: Ice cream. Sour cream. Whole milk.  ¨ Beverages: Juice. Sugar-sweetened soft drinks. Beer. Liquor and spirits.  ¨ Fats and oils: Butter. Canola oil. Vegetable oil. Beef fat (tallow). Lard.  ¨ Sweets and desserts: Cookies. Cakes. Pies. Candy.  ¨ Seasoning and other foods: Mayonnaise. Premade sauces and marinades.  ¨ The items listed may not be a complete list. Talk with your dietitian about what dietary choices are right for you.  Summary  · The Mediterranean diet includes both food and lifestyle choices.  · Eat a variety of fresh fruits and vegetables, beans, nuts, seeds, and whole grains.  · Limit the amount of red meat and sweets that you eat.  · Talk with your health care provider about whether it is safe for you to drink red wine in moderation. This means 1 glass a day for nonpregnant women and 2 glasses a day for men. A glass of wine equals 5 oz (150 mL).  This information  is not intended to replace advice given to you by your health care provider. Make sure you discuss any questions you have with your health care provider.  Document Released: 08/10/2017 Document Revised: 09/12/2017 Document Reviewed: 08/10/2017  [employer] Interactive Patient Education © 2017 [employer]  Prediabetes Eating Plan  Prediabetes--also called impaired glucose tolerance or impaired fasting glucose--is a condition that causes blood sugar (blood glucose) levels to be higher than normal. Following a healthy diet can help to keep prediabetes under control. It can also help to lower the risk of type 2 diabetes and heart disease, which are increased in people who have prediabetes. Along with regular exercise, a healthy diet:  · Promotes weight loss.  · Helps to control blood sugar levels.  · Helps to improve the way that the body uses insulin.  What do I need to know about this eating plan?  · Use the glycemic index (GI) to plan your meals. The index tells you how quickly a food will raise your blood sugar. Choose low-GI foods. These foods take a longer time to raise blood sugar.  · Pay close attention to the amount of carbohydrates in the food that you eat. Carbohydrates increase blood sugar levels.  · Keep track of how many calories you take in. Eating the right amount of calories will help you to achieve a healthy weight. Losing about 7 percent of your starting weight can help to prevent type 2 diabetes.  · You may want to follow a Mediterranean diet. This diet includes a lot of vegetables, lean meats or fish, whole grains, fruits, and healthy oils and fats.  What foods can I eat?  Grains   Whole grains, such as whole-wheat or whole-grain breads, crackers, cereals, and pasta. Unsweetened oatmeal. Bulgur. Barley. Quinoa. Brown rice. Corn or whole-wheat flour tortillas or taco shells.  Vegetables   Lettuce. Spinach. Peas. Beets. Cauliflower. Cabbage. Broccoli. Carrots. Tomatoes. Squash. Eggplant. Herbs.  Peppers. Onions. Cucumbers. [place] sprouts.  Fruits   Berries. Bananas. Apples. Oranges. Grapes. Papaya. [place]. Pomegranate. Kiwi. Grapefruit. Cherries.  Meats and Other Protein Sources   Seafood. Lean meats, such as chicken and turkey or lean cuts of pork and beef. Tofu. Eggs. Nuts. Beans.  Dairy   Low-fat or fat-free dairy products, such as yogurt, cottage cheese, and cheese.  Beverages   Water. Tea. Coffee. Sugar-free or diet soda. [place] water. Milk. Milk alternatives, such as soy or almond milk.  Condiments   Mustard. Relish. Low-fat, low-sugar ketchup. Low-fat, low-sugar barbecue sauce. Low-fat or fat-free mayonnaise.  Sweets and Desserts   Sugar-free or low-fat pudding. Sugar-free or low-fat ice cream and other frozen treats.  Fats and Oils   Avocado. Walnuts. Olive oil.  The items listed above may not be a complete list of recommended foods or beverages. Contact your dietitian for more options.   What foods are not recommended?  Grains   Refined white flour and flour products, such as bread, pasta, snack foods, and cereals.  Beverages   Sweetened drinks, such as sweet iced tea and soda.  Sweets and Desserts   Baked goods, such as cake, cupcakes, pastries, cookies, and cheesecake.  The items listed above may not be a complete list of foods and beverages to avoid. Contact your dietitian for more information.   This information is not intended to replace advice given to you by your health care provider. Make sure you discuss any questions you have with your health care provider.  Document Released: 05/03/2016 Document Revised: 05/25/2017 Document Reviewed: 01/13/2016  [employer]vier Interactive Patient Education © 2017 [employer]

## 2019-02-20 ENCOUNTER — HOSPITAL ENCOUNTER (OUTPATIENT)
Dept: LAB | Facility: MEDICAL CENTER | Age: 69
End: 2019-02-20
Attending: FAMILY MEDICINE
Payer: MEDICARE

## 2019-02-20 DIAGNOSIS — N28.9 ABNORMAL RENAL FUNCTION: ICD-10-CM

## 2019-02-20 DIAGNOSIS — R73.01 ELEVATED FASTING GLUCOSE: ICD-10-CM

## 2019-02-20 LAB
ANION GAP SERPL CALC-SCNC: 4 MMOL/L (ref 0–11.9)
BUN SERPL-MCNC: 14 MG/DL (ref 8–22)
CALCIUM SERPL-MCNC: 9.8 MG/DL (ref 8.5–10.5)
CHLORIDE SERPL-SCNC: 106 MMOL/L (ref 96–112)
CO2 SERPL-SCNC: 29 MMOL/L (ref 20–33)
CREAT SERPL-MCNC: 0.72 MG/DL (ref 0.5–1.4)
EST. AVERAGE GLUCOSE BLD GHB EST-MCNC: 114 MG/DL
GLUCOSE SERPL-MCNC: 98 MG/DL (ref 65–99)
HBA1C MFR BLD: 5.6 % (ref 0–5.6)
POTASSIUM SERPL-SCNC: 4.3 MMOL/L (ref 3.6–5.5)
SODIUM SERPL-SCNC: 139 MMOL/L (ref 135–145)

## 2019-02-20 PROCEDURE — 36415 COLL VENOUS BLD VENIPUNCTURE: CPT

## 2019-02-20 PROCEDURE — 83036 HEMOGLOBIN GLYCOSYLATED A1C: CPT

## 2019-02-20 PROCEDURE — 80048 BASIC METABOLIC PNL TOTAL CA: CPT

## 2019-03-04 NOTE — TELEPHONE ENCOUNTER
Was the patient seen in the last year in this department? Yes  LOV 1/17/19  Does patient have an active prescription for medications requested? No     Received Request Via: Pharmacy

## 2019-03-05 RX ORDER — THYROID 60 MG/1
60 TABLET ORAL DAILY
Qty: 90 TAB | Refills: 0 | Status: SHIPPED | OUTPATIENT
Start: 2019-03-05 | End: 2019-06-03 | Stop reason: SDUPTHER

## 2019-04-04 ENCOUNTER — OFFICE VISIT (OUTPATIENT)
Dept: MEDICAL GROUP | Facility: LAB | Age: 69
End: 2019-04-04
Payer: MEDICARE

## 2019-04-04 VITALS
DIASTOLIC BLOOD PRESSURE: 66 MMHG | TEMPERATURE: 97.7 F | BODY MASS INDEX: 25.9 KG/M2 | RESPIRATION RATE: 20 BRPM | WEIGHT: 165 LBS | SYSTOLIC BLOOD PRESSURE: 114 MMHG | HEIGHT: 67 IN | OXYGEN SATURATION: 94 % | HEART RATE: 56 BPM

## 2019-04-04 DIAGNOSIS — M54.2 CHRONIC NECK PAIN: ICD-10-CM

## 2019-04-04 DIAGNOSIS — M43.22 CERVICAL VERTEBRAL FUSION: ICD-10-CM

## 2019-04-04 DIAGNOSIS — Z79.891 CHRONIC USE OF OPIATE FOR THERAPEUTIC PURPOSE: ICD-10-CM

## 2019-04-04 DIAGNOSIS — G89.29 CHRONIC NECK PAIN: ICD-10-CM

## 2019-04-04 PROCEDURE — 99214 OFFICE O/P EST MOD 30 MIN: CPT | Performed by: FAMILY MEDICINE

## 2019-04-04 RX ORDER — TRAMADOL HYDROCHLORIDE 50 MG/1
100 TABLET ORAL EVERY 12 HOURS
Qty: 120 TAB | Refills: 0 | Status: SHIPPED | OUTPATIENT
Start: 2019-05-04 | End: 2019-04-04 | Stop reason: SDUPTHER

## 2019-04-04 RX ORDER — TRAMADOL HYDROCHLORIDE 50 MG/1
100 TABLET ORAL EVERY 12 HOURS
Qty: 120 TAB | Refills: 0 | Status: SHIPPED | OUTPATIENT
Start: 2019-06-03 | End: 2019-07-02 | Stop reason: SDUPTHER

## 2019-04-04 RX ORDER — TRAMADOL HYDROCHLORIDE 50 MG/1
100 TABLET ORAL EVERY 12 HOURS
Qty: 120 TAB | Refills: 0 | Status: SHIPPED | OUTPATIENT
Start: 2019-04-04 | End: 2019-04-04 | Stop reason: SDUPTHER

## 2019-04-04 NOTE — PATIENT INSTRUCTIONS
Fall Prevention in the Home  Falls can cause injuries and can affect people from all age groups. There are many simple things that you can do to make your home safe and to help prevent falls.  What can I do on the outside of my home?  · Regularly repair the edges of walkways and driveways and fix any cracks.  · Remove high doorway thresholds.  · Trim any shrubbery on the main path into your home.  · Use bright outdoor lighting.  · Clear walkways of debris and clutter, including tools and rocks.  · Regularly check that handrails are securely fastened and in good repair. Both sides of any steps should have handrails.  · Install guardrails along the edges of any raised decks or porches.  · Have leaves, snow, and ice cleared regularly.  · Use sand or salt on walkways during winter months.  · In the garage, clean up any spills right away, including grease or oil spills.  What can I do in the bathroom?  · Use night lights.  · Install grab bars by the toilet and in the tub and shower. Do not use towel bars as grab bars.  · Use non-skid mats or decals on the floor of the tub or shower.  · If you need to sit down while you are in the shower, use a plastic, non-slip stool.  · Keep the floor dry. Immediately clean up any water that spills on the floor.  · Remove soap buildup in the tub or shower on a regular basis.  · Attach bath mats securely with double-sided non-slip rug tape.  · Remove throw rugs and other tripping hazards from the floor.  What can I do in the bedroom?  · Use night lights.  · Make sure that a bedside light is easy to reach.  · Do not use oversized bedding that drapes onto the floor.  · Have a firm chair that has side arms to use for getting dressed.  · Remove throw rugs and other tripping hazards from the floor.  What can I do in the kitchen?  · Clean up any spills right away.  · Avoid walking on wet floors.  · Place frequently used items in easy-to-reach places.  · If you need to reach for something above  you, use a sturdy step stool that has a grab bar.  · Keep electrical cables out of the way.  · Do not use floor polish or wax that makes floors slippery. If you have to use wax, make sure that it is non-skid floor wax.  · Remove throw rugs and other tripping hazards from the floor.  What can I do in the stairways?  · Do not leave any items on the stairs.  · Make sure that there are handrails on both sides of the stairs. Fix handrails that are broken or loose. Make sure that handrails are as long as the stairways.  · Check any carpeting to make sure that it is firmly attached to the stairs. Fix any carpet that is loose or worn.  · Avoid having throw rugs at the top or bottom of stairways, or secure the rugs with carpet tape to prevent them from moving.  · Make sure that you have a light switch at the top of the stairs and the bottom of the stairs. If you do not have them, have them installed.  What are some other fall prevention tips?  · Wear closed-toe shoes that fit well and support your feet. Wear shoes that have rubber soles or low heels.  · When you use a stepladder, make sure that it is completely opened and that the sides are firmly locked. Have someone hold the ladder while you are using it. Do not climb a closed stepladder.  · Add color or contrast paint or tape to grab bars and handrails in your home. Place contrasting color strips on the first and last steps.  · Use mobility aids as needed, such as canes, walkers, scooters, and crutches.  · Turn on lights if it is dark. Replace any light bulbs that burn out.  · Set up furniture so that there are clear paths. Keep the furniture in the same spot.  · Fix any uneven floor surfaces.  · Choose a carpet design that does not hide the edge of steps of a stairway.  · Be aware of any and all pets.  · Review your medicines with your healthcare provider. Some medicines can cause dizziness or changes in blood pressure, which increase your risk of falling.  Talk with  your health care provider about other ways that you can decrease your risk of falls. This may include working with a physical therapist or  to improve your strength, balance, and endurance.  This information is not intended to replace advice given to you by your health care provider. Make sure you discuss any questions you have with your health care provider.  Document Released: 12/08/2003 Document Revised: 05/16/2017 Document Reviewed: 01/22/2016  Elsevier Interactive Patient Education © 2017 [employer]vier Inc.

## 2019-04-04 NOTE — PROGRESS NOTES
Subjective:     Chief Complaint   Patient presents with   • Medication Refill       Carlee Hunt is a 69 y.o. female here today for evaluation and management of:    Chronic use of opiate for therapeutic purpose  Chronic pain recheck for: chronic neck pain  Last dose of controlled substance: this am  Chronic pain treated withTramadol 100 mg taken twice a day    She  reports that she drinks about 4.2 oz of alcohol per week .  She  reports that she does not use drugs.    Interval history:   Any major change in health since last appointment? No    Consequences of Chronic Opiate therapy:  (5 A's)  Analgesia: Compared to no treatment or prior treatment, pain is currently improved  Activity: improved but she would like to add in 1 ibuprofen with each dose.  She enjoys gardening and this would help with that.  Her kidney function has improved so she can proceed with a maximum of 2 ibuprofen daily.  Adverse Events:CAPHE@ denies constipation, dry mouth, itchy skin, nausea and sedation  Aberrant Behaviors: She reports she is taking medication as prescribed and is not veering from agreed treatment regimen or provider recommendations. There have been no inappropriate refills or lost/stolen meds reported.  Affect/Mood: Pain is not impacting patient's mood.  Patient denies depression/anxiety.    Nonnarcotic treatments that are being used: NSAIDs/ROSENTHAL-2.       Opioid Risk Score: 0    Interpretation of Opioid Risk Score   Score 0-3 = Low risk of abuse. Do UDS at least once per year.  Score 4-7 = Moderate risk of abuse. Do UDS 1-4 times per year.  Score 8+ = High risk of abuse. Refer to specialist.    Last order of CONTROLLED SUBSTANCE TREATMENT AGREEMENT was found on 5/29/2018 from Office Visit on 5/29/2018     UDS Summary                URINE DRUG SCREEN Next Due 6/30/2019      Done 7/5/2018 [facility] PAIN MANAGEMENT SCREEN        Most recent UDS reviewed today and is consistent with prescribed medications.     I have reviewed the  medical records, the Prescription Monitoring Program and I have determined that controlled substance treatment is medically indicated.     Doing chair yoga once a week and that is helping.       Allergies   Allergen Reactions   • Rifampin Rash     Rash, fever   • Vancomycin Rash     Rash, fever   • Sulfa Drugs Rash     Per pt         Current medicines (including changes today)  Current Outpatient Prescriptions   Medication Sig Dispense Refill   • TURMERIC PO Take  by mouth.     • [START ON 6/3/2019] tramadol (ULTRAM) 50 MG Tab Take 2 Tabs by mouth every 12 hours for 30 days. 120 Tab 0   • thyroid (ARMOUR THYROID) 60 MG Tab Take 1 Tab by mouth every day. 90 Tab 0   • dorzolamide (TRUSOPT) 2 % Solution Place 1 Drop in both eyes 3 times a day.     • Probiotic Product (PRO-BIOTIC BLEND) Cap Take  by mouth.     • Alpha-Lipoic Acid 200 MG Tab Take 250 mg by mouth.     • atenolol (TENORMIN) 25 MG Tab Take 25 mg by mouth 2 Times a Day.     • timolol gel-forming (TIMOPTIC-XR) 0.25 % GEL FORMING SOLUTION Place 1 Drop in both eyes every day.     • latanoprost (XALATAN) 0.005 % Solution Place 1 Drop in both eyes every evening.     • Misc. Devices (POCKET MAGNIFIER) Misc by Does not apply route.     • vitamin D (CHOLECALCIFEROL) 1000 UNIT Tab Take 2,000 Units by mouth every day.     • coenzyme Q-10 30 MG capsule Take 60 mg by mouth every day.     • KRILL OIL PO Take  by mouth.     • Multiple Vitamins-Minerals (CENTRUM PO) Take  by mouth.     • doxycycline (VIBRAMYCIN) 100 MG TABS Take 100 mg by mouth 2 times a day.       No current facility-administered medications for this visit.        She  has a past medical history of Cataract; Glaucoma; Hypertension; and Thyroid disease.    Patient Active Problem List    Diagnosis Date Noted   • Dyslipidemia 01/17/2019   • Abnormal renal function 01/17/2019   • Elevated fasting glucose 01/17/2019   • Chronic use of opiate for therapeutic purpose 01/17/2019   • Acquired hypothyroidism  "05/29/2018   • Essential hypertension 05/29/2018   • Primary open angle glaucoma (POAG) of both eyes, moderate stage 05/29/2018   • Cervical vertebral fusion 05/29/2018   • Chronic neck pain 05/29/2018       ROS   No fever or chills.  No nausea or vomiting.  No chest pain or palpitations.  No cough or SOB.  No pain with urination or hematuria.  No black or bloody stools.       Objective:     /66 (BP Location: Left arm, Patient Position: Sitting, BP Cuff Size: Adult)   Pulse (!) 56   Temp 36.5 °C (97.7 °F) (Temporal)   Resp 20   Ht 1.702 m (5' 7\")   Wt 74.8 kg (165 lb)   SpO2 94%  Body mass index is 25.84 kg/m².   Physical Exam:  Well developed, well nourished.  Alert, oriented in no acute distress.  Eye contact is good, speech goal directed, affect calm  Eyes: conjunctiva non-injected, sclera non-icteric.  Neck decreased range of motion secondary to fusion.  No adenopathy or masses in the neck or supraclavicular regions. No thyromegaly  Lungs: clear to auscultation bilaterally with good excursion. No wheezes or rhonchi  CV: regular rate and rhythm. No murmur        Assessment and Plan:   The following treatment plan was discussed    1. Chronic use of opiate for therapeutic purpose  Continue tramadol 100 mg twice daily.  Opiate consent is on chart.  [facility] is [facility].  [facility] reviewed.  Okay to add in maximum of 2 ibuprofen daily.  We will recheck renal function in 3-6 months.  Continue stretches and chair yoga.  Follow-up in 3 months  - tramadol (ULTRAM) 50 MG Tab; Take 2 Tabs by mouth every 12 hours for 30 days.  Dispense: 120 Tab; Refill: 0    2. Chronic neck pain  Continue tramadol 100 mg twice daily.  Opiate consent is on chart.  [facility] drug screen is up-to-date.  [facility] reviewed.  Okay to add in maximum of 2 ibuprofen daily.  We will recheck renal function in 3-6 months.  Continue stretches and chair yoga.  Follow-up in 3 months  - tramadol (ULTRAM) 50 MG Tab; Take 2 " Tabs by mouth every 12 hours for 30 days.  Dispense: 120 Tab; Refill: 0    3. Cervical vertebral fusion  Continue tramadol 100 mg twice daily.  Opiate consent is on chart.  [facility] drug screen is up-to-date.  [place]  reviewed.  Okay to add in maximum of 2 ibuprofen daily.  We will recheck renal function in 3-6 months.  Continue stretches and chair yoga.  Follow-up in 3 months  - tramadol (ULTRAM) 50 MG Tab; Take 2 Tabs by mouth every 12 hours for 30 days.  Dispense: 120 Tab; Refill: 0    Any change or worsening of signs or symptoms, patient encouraged to follow-up or report to the emergency room for further evaluation. Patient understands and agrees.    Followup: Return in about 3 months (around 7/4/2019).

## 2019-04-04 NOTE — ASSESSMENT & PLAN NOTE
Chronic pain recheck for: chronic neck pain  Last dose of controlled substance: this am  Chronic pain treated withTramadol 100 mg taken twice a day    She  reports that she drinks about 4.2 oz of alcohol per week .  She  reports that she does not use drugs.    Interval history:   Any major change in health since last appointment? No    Consequences of Chronic Opiate therapy:  (5 A's)  Analgesia: Compared to no treatment or prior treatment, pain is currently improved  Activity: improved but she would like to add in 1 ibuprofen with each dose.  She enjoys gardening and this would help with that.  Her kidney function has improved so she can proceed with a maximum of 2 ibuprofen daily.  Adverse Events:CAPHE@ denies constipation, dry mouth, itchy skin, nausea and sedation  Aberrant Behaviors: She reports she is taking medication as prescribed and is not veering from agreed treatment regimen or provider recommendations. There have been no inappropriate refills or lost/stolen meds reported.  Affect/Mood: Pain is not impacting patient's mood.  Patient denies depression/anxiety.    Nonnarcotic treatments that are being used: NSAIDs/ROSENTHAL-2.       Opioid Risk Score: 0    Interpretation of Opioid Risk Score   Score 0-3 = Low risk of abuse. Do UDS at least once per year.  Score 4-7 = Moderate risk of abuse. Do UDS 1-4 times per year.  Score 8+ = High risk of abuse. Refer to specialist.    Last order of CONTROLLED SUBSTANCE TREATMENT AGREEMENT was found on 5/29/2018 from Office Visit on 5/29/2018     UDS Summary                URINE DRUG SCREEN Next Due 6/30/2019      Done 7/5/2018 [facility] PAIN MANAGEMENT SCREEN        Most recent UDS reviewed today and is consistent with prescribed medications.     I have reviewed the medical records, the Prescription Monitoring Program and I have determined that controlled substance treatment is medically indicated.     Doing chair yoga once a week and that is helping.

## 2019-04-23 ENCOUNTER — HOSPITAL ENCOUNTER (OUTPATIENT)
Dept: RADIOLOGY | Facility: MEDICAL CENTER | Age: 69
End: 2019-04-23
Attending: OBSTETRICS & GYNECOLOGY
Payer: MEDICARE

## 2019-04-23 DIAGNOSIS — Z12.31 VISIT FOR SCREENING MAMMOGRAM: ICD-10-CM

## 2019-04-23 PROCEDURE — 77063 BREAST TOMOSYNTHESIS BI: CPT

## 2019-05-01 ENCOUNTER — NON-PROVIDER VISIT (OUTPATIENT)
Dept: MEDICAL GROUP | Facility: LAB | Age: 69
End: 2019-05-01
Payer: MEDICARE

## 2019-05-01 DIAGNOSIS — Z23 NEED FOR VACCINATION: ICD-10-CM

## 2019-05-01 PROCEDURE — 90471 IMMUNIZATION ADMIN: CPT | Performed by: FAMILY MEDICINE

## 2019-05-01 PROCEDURE — 90750 HZV VACC RECOMBINANT IM: CPT | Performed by: FAMILY MEDICINE

## 2019-05-01 NOTE — PROGRESS NOTES
"Carlee Hunt is a 69 y.o. female here for a non-provider visit for:   SHINGRIX (Shingles)    Reason for immunization: Overdue/Provider Recommended  Immunization records indicate need for vaccine: Yes, confirmed with [place] [facility]  Minimum interval has been met for this vaccine: Yes  ABN completed: Not Indicated    Order and dose verified by:   VIS Dated  02/12/18 was given to patient: Yes  All IAC Questionnaire questions were answered \"No.\"    Patient tolerated injection and no adverse effects were observed or reported: Yes    Pt scheduled for next dose in series: No  "

## 2019-06-25 ENCOUNTER — TELEPHONE (OUTPATIENT)
Dept: MEDICAL GROUP | Facility: MEDICAL CENTER | Age: 69
End: 2019-06-25

## 2019-06-25 NOTE — TELEPHONE ENCOUNTER
ESTABLISHED PATIENT PRE-VISIT PLANNING     Patient was NOT contacted to complete PVP.     Note: Patient will not be contacted if there is no indication to call.     1.  Reviewed notes from the last few office visits within the medical group: Yes    2.  If any orders were placed at last visit or intended to be done for this visit (i.e. 6 mos follow-up), do we have Results/Consult Notes?        •  Labs - Labs were not ordered at last office visit.   Note: If patient appointment is for lab review and patient did not complete labs, check with provider if OK to reschedule patient until labs completed.       •  Imaging - Imaging ordered, completed and results are in chart.       •  Referrals - No referrals were ordered at last office visit.    3. Is this appointment scheduled as a Hospital Follow-Up? No    4.  Immunizations were updated in [employer] using WebIZ?: Yes       •  Web Iz Recommendations: SHINGRIX (Shingles)    5.  Patient is due for the following Health Maintenance Topics:   Health Maintenance Due   Topic Date Due   • COLONOSCOPY  02/09/1998   • URINE DRUG SCREEN  06/30/2019       - Patient has completed FLU, PNEUMOVAX (PPSV23), PREVNAR (PCV13)  and TDAP Immunization(s) per WebIZ. Chart has been updated.    6. Orders for overdue Health Maintenance topics pended in Pre-Charting? N\A    7.  AHA (MDX) form printed for Provider? No, already completed    8.  Patient was NOT informed to arrive 15 min prior to their scheduled appointment and bring in their medication bottles.

## 2019-07-02 ENCOUNTER — OFFICE VISIT (OUTPATIENT)
Dept: MEDICAL GROUP | Facility: MEDICAL CENTER | Age: 69
End: 2019-07-02
Payer: MEDICARE

## 2019-07-02 VITALS
TEMPERATURE: 97 F | HEIGHT: 67 IN | HEART RATE: 57 BPM | DIASTOLIC BLOOD PRESSURE: 80 MMHG | RESPIRATION RATE: 16 BRPM | BODY MASS INDEX: 25.74 KG/M2 | SYSTOLIC BLOOD PRESSURE: 110 MMHG | OXYGEN SATURATION: 96 % | WEIGHT: 164 LBS

## 2019-07-02 DIAGNOSIS — G89.29 CHRONIC NECK PAIN: ICD-10-CM

## 2019-07-02 DIAGNOSIS — I10 ESSENTIAL HYPERTENSION: ICD-10-CM

## 2019-07-02 DIAGNOSIS — E78.5 DYSLIPIDEMIA: ICD-10-CM

## 2019-07-02 DIAGNOSIS — L70.0 ACNE VULGARIS: ICD-10-CM

## 2019-07-02 DIAGNOSIS — E03.9 ACQUIRED HYPOTHYROIDISM: ICD-10-CM

## 2019-07-02 DIAGNOSIS — R73.01 ELEVATED FASTING GLUCOSE: ICD-10-CM

## 2019-07-02 DIAGNOSIS — Z79.891 CHRONIC USE OF OPIATE FOR THERAPEUTIC PURPOSE: ICD-10-CM

## 2019-07-02 DIAGNOSIS — M54.2 CHRONIC NECK PAIN: ICD-10-CM

## 2019-07-02 PROCEDURE — 99214 OFFICE O/P EST MOD 30 MIN: CPT | Performed by: NURSE PRACTITIONER

## 2019-07-02 RX ORDER — TRAMADOL HYDROCHLORIDE 50 MG/1
100 TABLET ORAL EVERY 12 HOURS
Qty: 120 TAB | Refills: 2 | Status: SHIPPED | OUTPATIENT
Start: 2019-07-02 | End: 2019-09-30

## 2019-07-02 RX ORDER — ATENOLOL 25 MG/1
25 TABLET ORAL 2 TIMES DAILY
Qty: 180 TAB | Refills: 3 | Status: SHIPPED
Start: 2019-07-02 | End: 2020-06-11

## 2019-07-02 RX ORDER — CLINDAMYCIN PHOSPHATE 11.9 MG/ML
SOLUTION TOPICAL
Qty: 1 BOTTLE | Refills: 0 | Status: ON HOLD
Start: 2019-07-02 | End: 2020-01-15

## 2019-07-02 NOTE — ASSESSMENT & PLAN NOTE
H/o fusion at [facility], extensive hardware present. Now with limited ROM and balance difficulty.

## 2019-07-03 NOTE — ASSESSMENT & PLAN NOTE
Pt has long standing difficulty of neck and back pain with prior surgeries. I have reviewed notes from last PCP  At this point her pain is reasonably controlled on tramadol 100 mg BID. She has been working on reducing dose gradually. She is able to stay active, go about her usual activities- she enjoys gardening/ working in the yard. I have reviewed  report which shows no aberrant behaviors, consistent fill history. She rarely drinks alcohol, no drug use. Pt denies SE r/t medication including constipation, depression, sedation. No drug screen in recent labs. Last dose of medication this morning

## 2019-07-03 NOTE — ASSESSMENT & PLAN NOTE
H/o slightly elevated glucose with most recent a1c normal at 5.6. Working on diet, minimal exercise d/t orthopedic issues

## 2019-07-03 NOTE — ASSESSMENT & PLAN NOTE
Chronic problem with painful pustular lesions, she has used topical clindamycin in the past and requesting refill

## 2019-07-03 NOTE — ASSESSMENT & PLAN NOTE
BP stable on atenolol 25 mg BID  No chest pain, dizziness, fatigue. Last labs with normal kidney function

## 2019-07-03 NOTE — PROGRESS NOTES
Subjective:     Chief Complaint   Patient presents with   • Establish Care   • Medication Refill     ADVISE FOR ARMOUR THYROID      Carlee Hunt is a 69 y.o. female here today to transfer care from Dr. Hickey. Last office notes reviewed. She is , lives alone. We discussed:    Cervical vertebral fusion  H/o fusion at [facility], extensive hardware present. Now with limited ROM and balance difficulty, working with physical therapist    Essential hypertension  BP stable on atenolol 25 mg BID  No chest pain, dizziness, fatigue. Last labs with normal kidney function    Elevated fasting glucose  H/o slightly elevated glucose with most recent a1c normal at 5.6. Working on diet, minimal exercise d/t orthopedic issues    Dyslipidemia  LDL slightly elevated in the past, no current treatment    Chronic use of opiate for therapeutic purpose  Pt has long standing difficulty of neck and back pain with prior surgeries. I have reviewed notes from last PCP  At this point her pain is reasonably controlled on tramadol 100 mg BID. She has been working on reducing dose gradually. She is able to stay active, go about her usual activities- she enjoys gardening/ working in the yard. I have reviewed  report which shows no aberrant behaviors, consistent fill history. She rarely drinks alcohol, no drug use. Pt denies SE r/t medication including constipation, depression, sedation. No drug screen in recent labs. Last dose of medication this morning    Acne vulgaris  Chronic problem with painful pustular lesions, she has used topical clindamycin in the past and requesting refill       Current medicines (including changes today)  Current Outpatient Prescriptions   Medication Sig Dispense Refill   • clindamycin (CLEOCIN) 1 % Solution Apply small amount to face twice daily as needed 1 Bottle 0   • atenolol (TENORMIN) 25 MG Tab Take 1 Tab by mouth 2 Times a Day. 180 Tab 3   • tramadol (ULTRAM) 50 MG Tab Take 2 Tabs by mouth every 12 hours for 90  "days. 120 Tab 2   • ARMOUR THYROID 60 MG Tab TAKE ONE TABLET BY MOUTH DAILY 30 Tab 3   • TURMERIC PO Take  by mouth.     • dorzolamide (TRUSOPT) 2 % Solution Place 1 Drop in both eyes 3 times a day.     • Probiotic Product (PRO-BIOTIC BLEND) Cap Take  by mouth.     • Alpha-Lipoic Acid 200 MG Tab Take 250 mg by mouth.     • timolol gel-forming (TIMOPTIC-XR) 0.25 % GEL FORMING SOLUTION Place 1 Drop in both eyes every day.     • latanoprost (XALATAN) 0.005 % Solution Place 1 Drop in both eyes every evening.     • Misc. Devices (POCKET MAGNIFIER) Misc by Does not apply route.     • vitamin D (CHOLECALCIFEROL) 1000 UNIT Tab Take 2,000 Units by mouth every day.     • coenzyme Q-10 30 MG capsule Take 60 mg by mouth every day.     • KRILL OIL PO Take  by mouth.     • Multiple Vitamins-Minerals (CENTRUM PO) Take  by mouth.     • doxycycline (VIBRAMYCIN) 100 MG TABS Take 100 mg by mouth 2 times a day.       No current facility-administered medications for this visit.      She  has a past medical history of Cataract; Glaucoma; Hypertension; and Thyroid disease.    ROS included above     Objective:     /80 (BP Location: Left arm, Patient Position: Sitting, BP Cuff Size: Adult)   Pulse (!) 57   Temp 36.1 °C (97 °F) (Temporal)   Resp 16   Ht 1.689 m (5' 6.5\")   Wt 74.4 kg (164 lb)   SpO2 96%  Body mass index is 26.07 kg/m².     Physical Exam:  General: Alert, oriented in no acute distress.  Eye contact is good, speech is normal, affect calm  Lungs: clear to auscultation bilaterally, normal effort, no wheeze/ rhonchi/ rales.  CV: regular rate and rhythm, S1, S2, no murmur  Abdomen: soft, nontender  MS: no point tenderness over cervical spine, limited ROM, strength 5/5 in bilateral UE  Ext: no edema, color normal, vascularity normal, temperature normal    Assessment and Plan:   The following treatment plan was discussed   1. Chronic use of opiate for therapeutic purpose  Stable on current medication regimen. UDS " obtained,  report reviewed. She is interested in continuing to reduce dose. Suggested supplementing with occassional ibuprofen or tylenol arthritis, plan to continue to monitor kidney function. Prescription provided, risks reviewed  tramadol (ULTRAM) 50 MG Tab    Consent for Opiate Prescription    Pain Management Screen   2. Chronic neck pain  tramadol (ULTRAM) 50 MG Tab   3. Essential hypertension  Stable, continue current medication  Comp Metabolic Panel    atenolol (TENORMIN) 25 MG Tab   4. Acquired hypothyroidism  Stable on armour at this time  TSH+FREE T4    TRIIDOTHYRONINE   5. Acne vulgaris  stable  clindamycin (CLEOCIN) 1 % Solution   6. Elevated fasting glucose  Continue to work on diet, continue to monitor   7. Dyslipidemia  Same as above       Followup: 3 months with labs prior       Please note that this dictation was created using voice recognition software. I have worked with consultants from the vendor as well as technical experts from [employer] to optimize the interface. I have made every reasonable attempt to correct obvious errors, but I expect that there are errors of grammar and possibly content that I did not discover before finalizing the note.

## 2019-07-30 ENCOUNTER — HOSPITAL ENCOUNTER (OUTPATIENT)
Dept: LAB | Facility: MEDICAL CENTER | Age: 69
End: 2019-07-30
Attending: NURSE PRACTITIONER
Payer: MEDICARE

## 2019-07-30 DIAGNOSIS — I10 ESSENTIAL HYPERTENSION: ICD-10-CM

## 2019-07-30 DIAGNOSIS — E03.9 ACQUIRED HYPOTHYROIDISM: ICD-10-CM

## 2019-07-30 LAB
ALBUMIN SERPL BCP-MCNC: 4.2 G/DL (ref 3.2–4.9)
ALBUMIN/GLOB SERPL: 1.5 G/DL
ALP SERPL-CCNC: 62 U/L (ref 30–99)
ALT SERPL-CCNC: 15 U/L (ref 2–50)
ANION GAP SERPL CALC-SCNC: 7 MMOL/L (ref 0–11.9)
AST SERPL-CCNC: 20 U/L (ref 12–45)
BILIRUB SERPL-MCNC: 0.5 MG/DL (ref 0.1–1.5)
BUN SERPL-MCNC: 17 MG/DL (ref 8–22)
CALCIUM SERPL-MCNC: 9.4 MG/DL (ref 8.5–10.5)
CHLORIDE SERPL-SCNC: 107 MMOL/L (ref 96–112)
CO2 SERPL-SCNC: 27 MMOL/L (ref 20–33)
CREAT SERPL-MCNC: 0.75 MG/DL (ref 0.5–1.4)
FASTING STATUS PATIENT QL REPORTED: NORMAL
GLOBULIN SER CALC-MCNC: 2.8 G/DL (ref 1.9–3.5)
GLUCOSE SERPL-MCNC: 97 MG/DL (ref 65–99)
POTASSIUM SERPL-SCNC: 4.5 MMOL/L (ref 3.6–5.5)
PROT SERPL-MCNC: 7 G/DL (ref 6–8.2)
SODIUM SERPL-SCNC: 141 MMOL/L (ref 135–145)
T3 SERPL-MCNC: 138.7 NG/DL (ref 60–181)
T4 FREE SERPL-MCNC: 0.62 NG/DL (ref 0.53–1.43)
TSH SERPL DL<=0.005 MIU/L-ACNC: 1.82 UIU/ML (ref 0.38–5.33)

## 2019-07-30 PROCEDURE — 36415 COLL VENOUS BLD VENIPUNCTURE: CPT

## 2019-07-30 PROCEDURE — 84439 ASSAY OF FREE THYROXINE: CPT

## 2019-07-30 PROCEDURE — 84480 ASSAY TRIIODOTHYRONINE (T3): CPT

## 2019-07-30 PROCEDURE — 84443 ASSAY THYROID STIM HORMONE: CPT

## 2019-07-30 PROCEDURE — 80053 COMPREHEN METABOLIC PANEL: CPT

## 2019-09-16 ENCOUNTER — TELEPHONE (OUTPATIENT)
Dept: MEDICAL GROUP | Facility: MEDICAL CENTER | Age: 69
End: 2019-09-16

## 2019-09-16 NOTE — TELEPHONE ENCOUNTER
ESTABLISHED PATIENT PRE-VISIT PLANNING     Patient was NOT contacted to complete PVP.     Note: Patient will not be contacted if there is no indication to call.     1.  Reviewed notes from the last few office visits within the medical group: Yes    2.  If any orders were placed at last visit or intended to be done for this visit (i.e. 6 mos follow-up), do we have Results/Consult Notes?        •  Labs - Labs ordered, completed on 07/30/2019 and results are in chart.   Note: If patient appointment is for lab review and patient did not complete labs, check with provider if OK to reschedule patient until labs completed.       •  Imaging - Imaging was not ordered at last office visit.       •  Referrals - No referrals were ordered at last office visit.    3. Is this appointment scheduled as a Hospital Follow-Up? No    4.  Immunizations were updated in Epic using WebIZ?: Epic matches WebIZ       •  Web Iz Recommendations: FLU    5.  Patient is due for the following Health Maintenance Topics:   Health Maintenance Due   Topic Date Due   • COLONOSCOPY  02/09/1998   • URINE DRUG SCREEN  06/30/2019   • IMM INFLUENZA (1) 09/01/2019       - Patient has completed PNEUMOVAX (PPSV23), PREVNAR (PCV13) , TDAP and SHINGRIX (Shingles) Immunization(s) per WebIZ. Chart has been updated.    6. Orders for overdue Health Maintenance topics pended in Pre-Charting? NO    7.  AHA (MDX) form printed for Provider? No, already completed    8.  Patient was NOT informed to arrive 15 min prior to their scheduled appointment and bring in their medication bottles.

## 2019-09-17 ENCOUNTER — OFFICE VISIT (OUTPATIENT)
Dept: MEDICAL GROUP | Facility: MEDICAL CENTER | Age: 69
End: 2019-09-17
Payer: MEDICARE

## 2019-09-17 VITALS
SYSTOLIC BLOOD PRESSURE: 112 MMHG | TEMPERATURE: 96.7 F | WEIGHT: 159 LBS | HEIGHT: 67 IN | RESPIRATION RATE: 16 BRPM | BODY MASS INDEX: 24.96 KG/M2 | HEART RATE: 64 BPM | OXYGEN SATURATION: 97 % | DIASTOLIC BLOOD PRESSURE: 84 MMHG

## 2019-09-17 DIAGNOSIS — E03.9 ACQUIRED HYPOTHYROIDISM: ICD-10-CM

## 2019-09-17 DIAGNOSIS — R00.2 PALPITATIONS: ICD-10-CM

## 2019-09-17 DIAGNOSIS — R09.82 PND (POST-NASAL DRIP): ICD-10-CM

## 2019-09-17 DIAGNOSIS — R49.0 HOARSENESS: ICD-10-CM

## 2019-09-17 DIAGNOSIS — I10 ESSENTIAL HYPERTENSION: ICD-10-CM

## 2019-09-17 PROCEDURE — 93000 ELECTROCARDIOGRAM COMPLETE: CPT | Performed by: NURSE PRACTITIONER

## 2019-09-17 PROCEDURE — 99214 OFFICE O/P EST MOD 30 MIN: CPT | Performed by: NURSE PRACTITIONER

## 2019-09-17 RX ORDER — FLUTICASONE PROPIONATE 50 MCG
1 SPRAY, SUSPENSION (ML) NASAL DAILY
Qty: 16 G | Refills: 5 | Status: ON HOLD
Start: 2019-09-17 | End: 2020-01-15

## 2019-09-17 RX ORDER — LEVOTHYROXINE SODIUM 0.1 MG/1
100 TABLET ORAL
Qty: 90 TAB | Refills: 0 | Status: SHIPPED | OUTPATIENT
Start: 2019-09-17 | End: 2019-12-10 | Stop reason: SDUPTHER

## 2019-09-17 NOTE — PROGRESS NOTES
Subjective:     Chief Complaint   Patient presents with   • Other     CHANGE THYROID MEDICATION      Carlee Hunt is a 69 y.o. female here today to follow up on:    Acquired hypothyroidism  She is currently managing with [place] Thyroid 60 mg daily, has been reading lately about concerns with desiccated thyroid and would like to change to levothyroxine.  She does note palpitations, shortness of breath at times which she feels may be related to her thyroid.  No unexpected weight changes, fatigue, hair loss reported.  No change in bowel patterns.  She does have concerns with hoarseness which is worse after periods of speaking, questions if this could be thyroid related.    Essential hypertension  Blood pressures controlled in clinic today, consistently taking atenolol    Palpitations  Increase in palpitations recently, having episodes about 3 times daily which can last a few seconds at a time.  She did have an episode last week associated with discomfort.  She can get short of breath at times with this.  No history of arrhythmia, no diaphoresis or nausea reported.  No recent EKG    PND (post-nasal drip)  Persistent difficulty with sinus congestion, postnasal drainage.  Intermittent hoarse voice.  No fever, chills, pain at in the face or teeth reported.  She is not using any consistent allergy management       Current medicines (including changes today)  Current Outpatient Medications   Medication Sig Dispense Refill   • fluticasone (FLONASE) 50 MCG/ACT nasal spray Spray 1 Spray in nose every day. 16 g 5   • levothyroxine (SYNTHROID) 100 MCG Tab Take 1 Tab by mouth Every morning on an empty stomach. 90 Tab 0   • clindamycin (CLEOCIN) 1 % Solution Apply small amount to face twice daily as needed 1 Bottle 0   • atenolol (TENORMIN) 25 MG Tab Take 1 Tab by mouth 2 Times a Day. 180 Tab 3   • tramadol (ULTRAM) 50 MG Tab Take 2 Tabs by mouth every 12 hours for 90 days. 120 Tab 2   • ARMOUR THYROID 60 MG Tab TAKE ONE TABLET BY  "MOUTH DAILY 30 Tab 3   • TURMERIC PO Take  by mouth.     • dorzolamide (TRUSOPT) 2 % Solution Place 1 Drop in both eyes 3 times a day.     • Probiotic Product (PRO-BIOTIC BLEND) Cap Take  by mouth.     • Alpha-Lipoic Acid 200 MG Tab Take 250 mg by mouth.     • latanoprost (XALATAN) 0.005 % Solution Place 1 Drop in both eyes every evening.     • Misc. Devices (POCKET MAGNIFIER) Misc by Does not apply route.     • vitamin D (CHOLECALCIFEROL) 1000 UNIT Tab Take 2,000 Units by mouth every day.     • coenzyme Q-10 30 MG capsule Take 60 mg by mouth every day.     • KRILL OIL PO Take  by mouth.     • Multiple Vitamins-Minerals (CENTRUM PO) Take  by mouth.     • doxycycline (VIBRAMYCIN) 100 MG TABS Take 100 mg by mouth 2 times a day.     • timolol gel-forming (TIMOPTIC-XR) 0.25 % GEL FORMING SOLUTION Place 1 Drop in both eyes every day.       No current facility-administered medications for this visit.      She  has a past medical history of Cataract, Glaucoma, Hypertension, and Thyroid disease.    ROS included above     Objective:     /84 (BP Location: Left arm, Patient Position: Sitting, BP Cuff Size: Adult)   Pulse 64   Temp 35.9 °C (96.7 °F) (Temporal)   Resp 16   Ht 1.689 m (5' 6.5\")   Wt 72.1 kg (159 lb)   SpO2 97%  Body mass index is 25.28 kg/m².     Physical Exam:  General: Alert, oriented in no acute distress.  Eye contact is good, speech is normal, affect calm  HEENT: Oral mucosa pink moist, no lesions. TMs gray with good landmarks bilaterally. No lymphadenopathy.  Lungs: clear to auscultation bilaterally, normal effort, no wheeze/ rhonchi/ rales.  CV: regular rate and rhythm, S1, S2, no murmur  Ext: no edema, color normal, vascularity normal, temperature normal    Assessment and Plan:   The following treatment plan was discussed   1. Acquired hypothyroidism   patient is requesting change from desiccated thyroid.  Instructions for switching to levothyroxine reviewed, will need to recheck labs in 6 to 8 " weeks.  She is having recurrent hoarseness, will evaluate ultrasound  US-SOFT TISSUES OF HEAD - NECK    TSH+FREE T4   2. Palpitations   recent increase in palpitations occurring daily.  EKG performed in the office today, reviewed by myself.  Sinus rhythm, bradycardic with rate of 52.  No ST elevation or arrhythmia.  We will evaluate 48-hour Holter study   3. PND (post-nasal drip)   significant nasal congestion postnasal drainage, possibly contributing to hoarseness.  Start trial of Flonase   4. Hoarseness     5. Essential hypertension   stable       Followup: pending tests         Please note that this dictation was created using voice recognition software. I have worked with consultants from the vendor as well as technical experts from [employer] [facility] [employer] to optimize the interface. I have made every reasonable attempt to correct obvious errors, but I expect that there are errors of grammar and possibly content that I did not discover before finalizing the note.

## 2019-09-17 NOTE — ASSESSMENT & PLAN NOTE
She is currently managing with [place] Thyroid 60 mg daily, has been reading lately about concerns with desiccated thyroid and would like to change to levothyroxine.  She does note palpitations, shortness of breath at times which she feels may be related to her thyroid.  No unexpected weight changes, fatigue, hair loss reported.  No change in bowel patterns.  She does have concerns with hoarseness which is worse after periods of speaking, questions if this could be thyroid related.

## 2019-09-17 NOTE — ASSESSMENT & PLAN NOTE
Persistent difficulty with sinus congestion, postnasal drainage.  Intermittent hoarse voice.  No fever, chills, pain at in the face or teeth reported.  She is not using any consistent allergy management

## 2019-09-17 NOTE — ASSESSMENT & PLAN NOTE
Increase in palpitations recently, having episodes about 3 times daily which can last a few seconds at a time.  She did have an episode last week associated with discomfort.  She can get short of breath at times with this.  No history of arrhythmia, no diaphoresis or nausea reported.  No recent EKG

## 2019-09-30 ENCOUNTER — HOSPITAL ENCOUNTER (OUTPATIENT)
Dept: RADIOLOGY | Facility: MEDICAL CENTER | Age: 69
End: 2019-09-30
Attending: NURSE PRACTITIONER
Payer: MEDICARE

## 2019-09-30 DIAGNOSIS — E03.9 ACQUIRED HYPOTHYROIDISM: ICD-10-CM

## 2019-09-30 PROCEDURE — 76536 US EXAM OF HEAD AND NECK: CPT

## 2019-10-01 ENCOUNTER — IMMUNIZATION (OUTPATIENT)
Dept: SOCIAL WORK | Facility: CLINIC | Age: 69
End: 2019-10-01
Payer: MEDICARE

## 2019-10-01 ENCOUNTER — HOSPITAL ENCOUNTER (OUTPATIENT)
Facility: MEDICAL CENTER | Age: 69
End: 2019-10-01
Payer: MEDICARE

## 2019-10-01 DIAGNOSIS — Z23 NEED FOR VACCINATION: ICD-10-CM

## 2019-10-01 LAB
CHOLEST SERPL-MCNC: 208 MG/DL (ref 100–199)
FASTING STATUS PATIENT QL REPORTED: NORMAL
GLUCOSE SERPL-MCNC: 91 MG/DL (ref 65–99)
HDLC SERPL-MCNC: 58 MG/DL
LDLC SERPL CALC-MCNC: 123 MG/DL
TRIGL SERPL-MCNC: 133 MG/DL (ref 0–149)

## 2019-10-01 PROCEDURE — 80061 LIPID PANEL: CPT

## 2019-10-01 PROCEDURE — 90662 IIV NO PRSV INCREASED AG IM: CPT | Performed by: REGISTERED NURSE

## 2019-10-01 PROCEDURE — G0008 ADMIN INFLUENZA VIRUS VAC: HCPCS | Performed by: REGISTERED NURSE

## 2019-10-01 PROCEDURE — 82947 ASSAY GLUCOSE BLOOD QUANT: CPT

## 2019-10-08 ENCOUNTER — NON-PROVIDER VISIT (OUTPATIENT)
Dept: CARDIOLOGY | Facility: MEDICAL CENTER | Age: 69
End: 2019-10-08
Payer: MEDICARE

## 2019-10-08 DIAGNOSIS — R00.2 PALPITATIONS: ICD-10-CM

## 2019-10-08 DIAGNOSIS — R00.1 SINUS BRADYCARDIA: ICD-10-CM

## 2019-10-08 PROCEDURE — 93224 XTRNL ECG REC UP TO 48 HRS: CPT | Performed by: INTERNAL MEDICINE

## 2019-10-11 LAB — EKG IMPRESSION: NORMAL

## 2019-11-07 ENCOUNTER — HOSPITAL ENCOUNTER (OUTPATIENT)
Dept: LAB | Facility: MEDICAL CENTER | Age: 69
End: 2019-11-07
Attending: NURSE PRACTITIONER
Payer: MEDICARE

## 2019-11-07 LAB
T4 FREE SERPL-MCNC: 1.07 NG/DL (ref 0.53–1.43)
TSH SERPL DL<=0.005 MIU/L-ACNC: 0.92 UIU/ML (ref 0.38–5.33)

## 2019-11-07 PROCEDURE — 84439 ASSAY OF FREE THYROXINE: CPT

## 2019-11-07 PROCEDURE — 36415 COLL VENOUS BLD VENIPUNCTURE: CPT

## 2019-11-07 PROCEDURE — 84443 ASSAY THYROID STIM HORMONE: CPT

## 2019-11-08 ENCOUNTER — PATIENT MESSAGE (OUTPATIENT)
Dept: MEDICAL GROUP | Facility: MEDICAL CENTER | Age: 69
End: 2019-11-08

## 2019-12-09 ENCOUNTER — APPOINTMENT (OUTPATIENT)
Dept: DERMATOLOGY | Facility: IMAGING CENTER | Age: 69
End: 2019-12-09

## 2019-12-09 ENCOUNTER — PATIENT MESSAGE (OUTPATIENT)
Dept: MEDICAL GROUP | Facility: MEDICAL CENTER | Age: 69
End: 2019-12-09

## 2019-12-10 RX ORDER — LEVOTHYROXINE SODIUM 0.1 MG/1
100 TABLET ORAL
Qty: 90 TAB | Refills: 3 | Status: SHIPPED | OUTPATIENT
Start: 2019-12-10 | End: 2020-11-30

## 2019-12-31 ENCOUNTER — OFFICE VISIT (OUTPATIENT)
Dept: DERMATOLOGY | Facility: IMAGING CENTER | Age: 69
End: 2019-12-31
Payer: MEDICARE

## 2019-12-31 DIAGNOSIS — L73.8 SEBACEOUS HYPERPLASIA OF FACE: ICD-10-CM

## 2019-12-31 DIAGNOSIS — L71.9 ROSACEA: ICD-10-CM

## 2019-12-31 PROCEDURE — 99214 OFFICE O/P EST MOD 30 MIN: CPT | Performed by: NURSE PRACTITIONER

## 2019-12-31 RX ORDER — TRETINOIN 0.05 G/100G
1 GEL TOPICAL
Qty: 1 TUBE | Refills: 3 | Status: SHIPPED | OUTPATIENT
Start: 2019-12-31 | End: 2023-02-24 | Stop reason: SDUPTHER

## 2019-12-31 ASSESSMENT — ENCOUNTER SYMPTOMS
FEVER: 0
WEIGHT LOSS: 0
DIAPHORESIS: 0
CHILLS: 0

## 2019-12-31 NOTE — PROGRESS NOTES
Dermatology New Patient Visit    Chief Complaint   Patient presents with   • Establish Care       Subjective:     HPI:   Carlee Hunt is a 69 y.o. female presenting for    Bumps on face  Acne on nose  Started: 30-40 years ago  Active on: face and nose  Aggravated by: stress   Treatment currently used: Clindamycin topical  Prior treatments used: None  Face wash/moisturizer: otc   Family history of scarring acne: no    History of skin cancer: SCC back 5+ years   History of precancers/actinic keratoses: No  History of biopsies:Yes, Details: back   History of blistering/severe sunburns:Yes, Details: back childhood   Family history of skin cancer:No  Family history of atypical moles:No            Past Medical History:   Diagnosis Date   • Cataract    • Glaucoma    • Hypertension    • Thyroid disease        Current Outpatient Medications on File Prior to Visit   Medication Sig Dispense Refill   • levothyroxine (SYNTHROID) 100 MCG Tab Take 1 Tab by mouth Every morning on an empty stomach. 90 Tab 3   • fluticasone (FLONASE) 50 MCG/ACT nasal spray Spray 1 Spray in nose every day. 16 g 5   • clindamycin (CLEOCIN) 1 % Solution Apply small amount to face twice daily as needed 1 Bottle 0   • atenolol (TENORMIN) 25 MG Tab Take 1 Tab by mouth 2 Times a Day. 180 Tab 3   • ARMOUR THYROID 60 MG Tab TAKE ONE TABLET BY MOUTH DAILY 30 Tab 3   • TURMERIC PO Take  by mouth.     • dorzolamide (TRUSOPT) 2 % Solution Place 1 Drop in both eyes 3 times a day.     • Probiotic Product (PRO-BIOTIC BLEND) Cap Take  by mouth.     • Alpha-Lipoic Acid 200 MG Tab Take 250 mg by mouth.     • timolol gel-forming (TIMOPTIC-XR) 0.25 % GEL FORMING SOLUTION Place 1 Drop in both eyes every day.     • latanoprost (XALATAN) 0.005 % Solution Place 1 Drop in both eyes every evening.     • Misc. Devices (POCKET MAGNIFIER) Misc by Does not apply route.     • vitamin D (CHOLECALCIFEROL) 1000 UNIT Tab Take 2,000 Units by mouth every day.     • coenzyme Q-10 30 MG  capsule Take 60 mg by mouth every day.     • KRILL OIL PO Take  by mouth.     • Multiple Vitamins-Minerals (CENTRUM PO) Take  by mouth.     • doxycycline (VIBRAMYCIN) 100 MG TABS Take 100 mg by mouth 2 times a day.       No current facility-administered medications on file prior to visit.        Allergies   Allergen Reactions   • Rifampin Rash     Rash, fever   • Vancomycin Rash     Rash, fever   • Sulfa Drugs Rash     Per pt         Family History   Problem Relation Age of Onset   • Hypertension Mother    • Other Father         accident   • Other Sister         pneumoccocal pneumonia   • Other Brother         glaucoma   • Heart Attack Paternal Grandfather    • Cancer Neg Hx    • Diabetes Neg Hx    • Heart Disease Neg Hx        Social History     Socioeconomic History   • Marital status:      Spouse name: Not on file   • Number of children: Not on file   • Years of education: Not on file   • Highest education level: Not on file   Occupational History   • Not on file   Social Needs   • Financial resource strain: Not on file   • Food insecurity:     Worry: Not on file     Inability: Not on file   • Transportation needs:     Medical: Not on file     Non-medical: Not on file   Tobacco Use   • Smoking status: Former Smoker     Packs/day: 1.00     Years: 16.00     Pack years: 16.00     Types: Cigarettes     Last attempt to quit: 1982     Years since quittin.9   • Smokeless tobacco: Never Used   Substance and Sexual Activity   • Alcohol use: Yes     Alcohol/week: 4.2 oz     Types: 7 Glasses of wine per week   • Drug use: No   • Sexual activity: Not Currently   Lifestyle   • Physical activity:     Days per week: Not on file     Minutes per session: Not on file   • Stress: Not on file   Relationships   • Social connections:     Talks on phone: Not on file     Gets together: Not on file     Attends [employer] service: Not on file     Active member of club or organization: Not on file     Attends meetings of  clubs or organizations: Not on file     Relationship status: Not on file   • Intimate partner violence:     Fear of current or ex partner: Not on file     Emotionally abused: Not on file     Physically abused: Not on file     Forced sexual activity: Not on file   Other Topics Concern   • Not on file   Social History Narrative   • Not on file       Review of Systems   Constitutional: Negative for chills, diaphoresis, fever, malaise/fatigue and weight loss.   Skin: Negative for itching and rash.        Objective:   There were no vitals taken for this visit.     A focused cutaneous exam was completed including: ears, face, eyelids, conjunctiva, lips and neck with the following pertinent findings listed below. Remaining above-listed examined areas within normal limits / negative for rashes or lesions.      Physical Exam   Constitutional: She is oriented to person, place, and time and well-developed, well-nourished, and in no distress. No distress.   HENT:   Head: Normocephalic.       Pulmonary/Chest: Effort normal.   Neurological: She is alert and oriented to person, place, and time.   Skin: Skin is warm and dry. No rash noted. No erythema.   Psychiatric: Mood normal.       DATA: none applicable to review    Assessment and Plan:     1. Sebaceous hyperplasia of face  Educated patient about diagnosis, management options, and expectations of treatment.  -Advised that this is very difficult to treat given number of lesions to entire face.  -Discussed hyfrecation however risk of scarring.  -Discussed possibility of CO2 laser resurfacing.  Patient to schedule consultation with laser provider.  -Discussed use of topical Retin-A to help minimize size of lesions.  Prescription sent to pharmacy.  - Tretinoin 0.05 % Gel; 1 Application by Apply externally route every bedtime.  Dispense: 1 Tube; Refill: 3    1. Rosacea - combination erythrotelangiectatic and papulopusutular to nose  - educated patient about diagnosis, management  options, and expectations of treatment  -Samples of Soolantra topical supplied to be applied once daily.  - recommended good OTC moisturizing products as well as products to decrease redness  - - discussed importance of regular sun protection/sunscreen use, SPF 30 or greater with broad spectrum coverage, need for reapplication every  minutes  - trigger avoidance  -Patient education handout provided for her review.  -Patient to contact me after a month and request prescription for Soolantra if she finds this effective.      Followup: Return if symptoms worsen or fail to improve.    SHARI Haider.

## 2020-01-08 RX ORDER — CHOLECALCIFEROL (VITAMIN D3) 25 MCG
1 TABLET,CHEWABLE ORAL DAILY
COMMUNITY
End: 2023-11-09

## 2020-01-08 RX ORDER — DORZOLAMIDE HYDROCHLORIDE AND TIMOLOL MALEATE 20; 5 MG/ML; MG/ML
1 SOLUTION/ DROPS OPHTHALMIC 2 TIMES DAILY
Status: ON HOLD | COMMUNITY
End: 2020-01-29

## 2020-01-08 RX ORDER — MULTIVIT WITH MINERALS/LUTEIN
1 TABLET ORAL DAILY
COMMUNITY
End: 2022-10-20

## 2020-01-08 RX ORDER — TRAMADOL HYDROCHLORIDE 50 MG/1
100 TABLET ORAL DAILY
COMMUNITY
End: 2020-09-14 | Stop reason: SDUPTHER

## 2020-01-08 RX ORDER — MAGNESIUM OXIDE 400 MG/1
400 TABLET ORAL EVERY EVENING
COMMUNITY

## 2020-01-08 RX ORDER — BIOTIN 1000 MCG
500 TABLET,CHEWABLE ORAL DAILY
COMMUNITY
End: 2023-04-10

## 2020-01-09 ENCOUNTER — TELEPHONE (OUTPATIENT)
Dept: DERMATOLOGY | Facility: IMAGING CENTER | Age: 70
End: 2020-01-09

## 2020-01-09 NOTE — TELEPHONE ENCOUNTER
Prior auth was started through [employer] for tretinoin . Status pending 24-72 hours , patient notified and once we get a response from insurance . We will call her.

## 2020-01-15 ENCOUNTER — APPOINTMENT (OUTPATIENT)
Dept: RADIOLOGY | Facility: MEDICAL CENTER | Age: 70
End: 2020-01-15
Attending: COLON & RECTAL SURGERY
Payer: MEDICARE

## 2020-01-15 ENCOUNTER — ANESTHESIA (OUTPATIENT)
Dept: SURGERY | Facility: MEDICAL CENTER | Age: 70
End: 2020-01-15
Payer: MEDICARE

## 2020-01-15 ENCOUNTER — ANESTHESIA EVENT (OUTPATIENT)
Dept: SURGERY | Facility: MEDICAL CENTER | Age: 70
End: 2020-01-15
Payer: MEDICARE

## 2020-01-15 ENCOUNTER — HOSPITAL ENCOUNTER (OUTPATIENT)
Facility: MEDICAL CENTER | Age: 70
End: 2020-01-15
Attending: COLON & RECTAL SURGERY | Admitting: COLON & RECTAL SURGERY
Payer: MEDICARE

## 2020-01-15 VITALS
HEIGHT: 68 IN | WEIGHT: 162.7 LBS | DIASTOLIC BLOOD PRESSURE: 58 MMHG | OXYGEN SATURATION: 98 % | BODY MASS INDEX: 24.66 KG/M2 | HEART RATE: 62 BPM | SYSTOLIC BLOOD PRESSURE: 130 MMHG | TEMPERATURE: 99 F | RESPIRATION RATE: 16 BRPM

## 2020-01-15 DIAGNOSIS — G89.18 POST-OP PAIN: ICD-10-CM

## 2020-01-15 PROCEDURE — 160028 HCHG SURGERY MINUTES - 1ST 30 MINS LEVEL 3: Performed by: COLON & RECTAL SURGERY

## 2020-01-15 PROCEDURE — 700101 HCHG RX REV CODE 250: Performed by: COLON & RECTAL SURGERY

## 2020-01-15 PROCEDURE — C1778 LEAD, NEUROSTIMULATOR: HCPCS | Performed by: COLON & RECTAL SURGERY

## 2020-01-15 PROCEDURE — 160002 HCHG RECOVERY MINUTES (STAT): Performed by: COLON & RECTAL SURGERY

## 2020-01-15 PROCEDURE — 502000 HCHG MISC OR IMPLANTS RC 0278: Performed by: COLON & RECTAL SURGERY

## 2020-01-15 PROCEDURE — 160048 HCHG OR STATISTICAL LEVEL 1-5: Performed by: COLON & RECTAL SURGERY

## 2020-01-15 PROCEDURE — 160009 HCHG ANES TIME/MIN: Performed by: COLON & RECTAL SURGERY

## 2020-01-15 PROCEDURE — C1897 LEAD, NEUROSTIM TEST KIT: HCPCS | Performed by: COLON & RECTAL SURGERY

## 2020-01-15 PROCEDURE — 160039 HCHG SURGERY MINUTES - EA ADDL 1 MIN LEVEL 3: Performed by: COLON & RECTAL SURGERY

## 2020-01-15 PROCEDURE — A6402 STERILE GAUZE <= 16 SQ IN: HCPCS | Performed by: COLON & RECTAL SURGERY

## 2020-01-15 PROCEDURE — 160046 HCHG PACU - 1ST 60 MINS PHASE II: Performed by: COLON & RECTAL SURGERY

## 2020-01-15 PROCEDURE — 700105 HCHG RX REV CODE 258: Performed by: COLON & RECTAL SURGERY

## 2020-01-15 PROCEDURE — 160025 RECOVERY II MINUTES (STATS): Performed by: COLON & RECTAL SURGERY

## 2020-01-15 PROCEDURE — 160035 HCHG PACU - 1ST 60 MINS PHASE I: Performed by: COLON & RECTAL SURGERY

## 2020-01-15 PROCEDURE — 700111 HCHG RX REV CODE 636 W/ 250 OVERRIDE (IP): Performed by: ANESTHESIOLOGY

## 2020-01-15 PROCEDURE — A4450 NON-WATERPROOF TAPE: HCPCS | Performed by: COLON & RECTAL SURGERY

## 2020-01-15 PROCEDURE — 160047 HCHG PACU  - EA ADDL 30 MINS PHASE II: Performed by: COLON & RECTAL SURGERY

## 2020-01-15 RX ORDER — LIDOCAINE HYDROCHLORIDE 10 MG/ML
INJECTION, SOLUTION EPIDURAL; INFILTRATION; INTRACAUDAL; PERINEURAL
Status: DISCONTINUED
Start: 2020-01-15 | End: 2020-01-15 | Stop reason: HOSPADM

## 2020-01-15 RX ORDER — ONDANSETRON 4 MG/1
4 TABLET, ORALLY DISINTEGRATING ORAL EVERY 6 HOURS PRN
Qty: 10 TAB | Refills: 0 | Status: ON HOLD | OUTPATIENT
Start: 2020-01-15 | End: 2020-01-29

## 2020-01-15 RX ORDER — SODIUM CHLORIDE, SODIUM LACTATE, POTASSIUM CHLORIDE, CALCIUM CHLORIDE 600; 310; 30; 20 MG/100ML; MG/100ML; MG/100ML; MG/100ML
INJECTION, SOLUTION INTRAVENOUS CONTINUOUS
Status: DISCONTINUED | OUTPATIENT
Start: 2020-01-15 | End: 2020-01-15 | Stop reason: HOSPADM

## 2020-01-15 RX ORDER — ONDANSETRON 2 MG/ML
4 INJECTION INTRAMUSCULAR; INTRAVENOUS
Status: DISCONTINUED | OUTPATIENT
Start: 2020-01-15 | End: 2020-01-15 | Stop reason: HOSPADM

## 2020-01-15 RX ORDER — TRAMADOL HYDROCHLORIDE 50 MG/1
50 TABLET ORAL EVERY 6 HOURS PRN
Qty: 10 TAB | Refills: 0 | Status: SHIPPED | OUTPATIENT
Start: 2020-01-15 | End: 2020-01-18

## 2020-01-15 RX ORDER — HYDROMORPHONE HYDROCHLORIDE 1 MG/ML
0.2 INJECTION, SOLUTION INTRAMUSCULAR; INTRAVENOUS; SUBCUTANEOUS
Status: DISCONTINUED | OUTPATIENT
Start: 2020-01-15 | End: 2020-01-15 | Stop reason: HOSPADM

## 2020-01-15 RX ORDER — OXYCODONE HCL 5 MG/5 ML
10 SOLUTION, ORAL ORAL
Status: DISCONTINUED | OUTPATIENT
Start: 2020-01-15 | End: 2020-01-15 | Stop reason: HOSPADM

## 2020-01-15 RX ORDER — LIDOCAINE HYDROCHLORIDE AND EPINEPHRINE 10; 10 MG/ML; UG/ML
INJECTION, SOLUTION INFILTRATION; PERINEURAL
Status: DISCONTINUED | OUTPATIENT
Start: 2020-01-15 | End: 2020-01-15 | Stop reason: HOSPADM

## 2020-01-15 RX ORDER — CEFAZOLIN SODIUM 1 G/3ML
INJECTION, POWDER, FOR SOLUTION INTRAMUSCULAR; INTRAVENOUS PRN
Status: DISCONTINUED | OUTPATIENT
Start: 2020-01-15 | End: 2020-01-15 | Stop reason: SURG

## 2020-01-15 RX ORDER — HYDROMORPHONE HYDROCHLORIDE 1 MG/ML
0.1 INJECTION, SOLUTION INTRAMUSCULAR; INTRAVENOUS; SUBCUTANEOUS
Status: DISCONTINUED | OUTPATIENT
Start: 2020-01-15 | End: 2020-01-15 | Stop reason: HOSPADM

## 2020-01-15 RX ORDER — OXYCODONE HCL 5 MG/5 ML
5 SOLUTION, ORAL ORAL
Status: DISCONTINUED | OUTPATIENT
Start: 2020-01-15 | End: 2020-01-15 | Stop reason: HOSPADM

## 2020-01-15 RX ORDER — BUPIVACAINE HYDROCHLORIDE AND EPINEPHRINE 5; 5 MG/ML; UG/ML
INJECTION, SOLUTION EPIDURAL; INTRACAUDAL; PERINEURAL
Status: DISCONTINUED | OUTPATIENT
Start: 2020-01-15 | End: 2020-01-15 | Stop reason: HOSPADM

## 2020-01-15 RX ORDER — HYDROMORPHONE HYDROCHLORIDE 1 MG/ML
0.4 INJECTION, SOLUTION INTRAMUSCULAR; INTRAVENOUS; SUBCUTANEOUS
Status: DISCONTINUED | OUTPATIENT
Start: 2020-01-15 | End: 2020-01-15 | Stop reason: HOSPADM

## 2020-01-15 RX ORDER — MIDAZOLAM HYDROCHLORIDE 1 MG/ML
INJECTION INTRAMUSCULAR; INTRAVENOUS PRN
Status: DISCONTINUED | OUTPATIENT
Start: 2020-01-15 | End: 2020-01-15 | Stop reason: SURG

## 2020-01-15 RX ADMIN — MIDAZOLAM 1 MG: 1 INJECTION INTRAMUSCULAR; INTRAVENOUS at 14:27

## 2020-01-15 RX ADMIN — SODIUM CHLORIDE, POTASSIUM CHLORIDE, SODIUM LACTATE AND CALCIUM CHLORIDE: 600; 310; 30; 20 INJECTION, SOLUTION INTRAVENOUS at 13:03

## 2020-01-15 RX ADMIN — LIDOCAINE HYDROCHLORIDE 0.5 ML: 10 INJECTION, SOLUTION EPIDURAL; INFILTRATION; INTRACAUDAL at 13:03

## 2020-01-15 RX ADMIN — CEFAZOLIN 2 G: 330 INJECTION, POWDER, FOR SOLUTION INTRAMUSCULAR; INTRAVENOUS at 14:27

## 2020-01-15 SDOH — HEALTH STABILITY: MENTAL HEALTH: HOW OFTEN DO YOU HAVE 6 OR MORE DRINKS ON ONE OCCASION?: DAILY OR ALMOST DAILY

## 2020-01-15 SDOH — HEALTH STABILITY: MENTAL HEALTH: HOW MANY STANDARD DRINKS CONTAINING ALCOHOL DO YOU HAVE ON A TYPICAL DAY?: 1 OR 2

## 2020-01-15 NOTE — ANESTHESIA TIME REPORT
Anesthesia Start and Stop Event Times     Date Time Event    1/15/2020 1416 Ready for Procedure     1421 Anesthesia Start     1516 Anesthesia Stop        Responsible Staff  01/15/20    Name Role Begin End    Tobey Gansert, M.D. Anesth 1421 1516        Preop Diagnosis (Free Text):  Pre-op Diagnosis     FECAL INCONTINENCE        Preop Diagnosis (Codes):    Post op Diagnosis  Fecal incontinence      Premium Reason  A. 3PM - 7AM    Comments:

## 2020-01-15 NOTE — ANESTHESIA QCDR
2019 [facility] Clinical Data Registry (for Quality Improvement)     Postoperative nausea/vomiting risk protocol (Adult = 18 yrs and Pediatric 3-17 yrs)- (430 and 463)  General inhalation anesthetic (NOT TIVA) with PONV risk factors: No  Provision of anti-emetic therapy with at least 2 different classes of agents: N/A  Patient DID NOT receive anti-emetic therapy and reason is documented in Medical Record: N/A    Multimodal Pain Management- (477)  Non-emergent surgery AND patient age >= 18: No  Use of Multimodal Pain Management, two or more drugs and/or interventions, NOT including systemic opioids:   Exception: Documented allergy to multiple classes of analgesics:     Smoking Abstinence (404)  Patient is current smoker (cigarette, pipe, e-cig, marijuanna): No  Elective Surgery:   Abstinence instructions provided prior to day of surgery:   Patient abstained from smoking on day of surgery:     Pre-Op Beta-Blocker in Isolated CABG (44)  Isolated CABG AND patient age >= 18: No  Beta-blocker admin within 24 hours of surgical incision:   Exception:of medical reason(s) for not administering beta blocker within 24 hours prior to surgical incision (e.g., not  indicated,other medical reason):     PACU assessment of acute postoperative pain prior to Anesthesia Care End- Applies to Patients Age = 18- (ABG7)  Initial PACU pain score is which of the following: < 7/10  Patient unable to report pain score: N/A    Post-anesthetic transfer of care checklist/protocol to PACU/ICU- (426 and 427)  Upon conclusion of case, patient transferred to which of the following locations: PACU/Non-ICU  Use of transfer checklist/protocol: Yes  Exclusion: Service Performed in Patient Hospital Room (and thus did not require transfer): N/A  Unplanned admission to ICU related to anesthesia service up through end of PACU care- (MD51)  Unplanned admission to ICU (not initially anticipated at anesthesia start time): No

## 2020-01-15 NOTE — ANESTHESIA PREPROCEDURE EVALUATION
Relevant Problems   CARDIAC   (+) Essential hypertension      ENDO   (+) Acquired hypothyroidism      Other   (+) Cervical vertebral fusion   (+) Chronic neck pain   (+) Chronic use of opiate for therapeutic purpose       Physical Exam    Airway   Mallampati: II  TM distance: >3 FB  Neck ROM: limited       Cardiovascular - normal exam  Rhythm: regular  Rate: normal  (-) murmur     Dental - normal exam         Pulmonary - normal exam  Breath sounds clear to auscultation     Abdominal    Neurological - normal exam                 Anesthesia Plan    ASA 2       Plan - MAC             Induction: intravenous    Postoperative Plan: Postoperative administration of opioids is intended.    Pertinent diagnostic labs and testing reviewed    Informed Consent:    Anesthetic plan and risks discussed with patient.

## 2020-01-15 NOTE — OR NURSING
1554 Pt arrived from PACU post INSERTION, ELECTRODE LEADS AND PULSE GENERATOR, NEUROSTIMULATOR, SACRAL-NEUROMODULATION TRIAL, report received. Pt breathing unlabored with clear lung sounds bilat. Dressing CDI. Pt sipping on juice. Denies pain or nausea. Bed locked rails up and in lowest position. Pt provided call light and education on use.     1630 Neurostimulator rep @ BS.    1700 Discharge instructions and medications gone over with Pt and ride. All questions answered. Pt meets DC criteria. PIV DC'd. RX for Zofran and Ultram provided.     1710 Pt transported to [facility] via ambulation in stable condition.

## 2020-01-15 NOTE — OR NURSING
PT to phase II, VSS.  PT friend Tami updated on status, advised to check in at surgery desk upon arrival.  PT from OR A/O x4, denies pain, nausea.  RX left for PT for Ultram/Zofran.  PT tolerating juice.  Dressing benzoin, steri strips, 4x4 and medipore tape.

## 2020-01-15 NOTE — ANESTHESIA POSTPROCEDURE EVALUATION
Patient: Carlee Hunt    Procedure Summary     Date:  01/15/20 Room / Location:  [place] [address] / SURGERY [facility]    Anesthesia Start:  1421 Anesthesia Stop:  1516    Procedure:  INSERTION, ELECTRODE LEADS AND PULSE GENERATOR, NEUROSTIMULATOR, SACRAL-NEUROMODULATION TRIAL (Back) Diagnosis:  (FECAL INCONTINENCE)    Surgeon:  Ishmael Goddard M.D. Responsible Provider:  Tobey Gansert, M.D.    Anesthesia Type:  MAC ASA Status:  2          Final Anesthesia Type: MAC  Last vitals  BP   Blood Pressure : 129/77    Temp   36 °C (96.8 °F)    Pulse   Pulse: (!) 58   Resp   18    SpO2   93 %      Anesthesia Post Evaluation    Patient location during evaluation: PACU  Patient participation: complete - patient participated  Level of consciousness: awake and alert    Airway patency: patent  Anesthetic complications: no  Cardiovascular status: hemodynamically stable  Respiratory status: acceptable  Hydration status: euvolemic    PONV: none           Nurse Pain Score: 0 (NPRS)

## 2020-01-16 NOTE — DISCHARGE INSTRUCTIONS
ACTIVITY: Rest and take it easy for the first 24 hours.  A responsible adult is recommended to remain with you during that time.  It is normal to feel sleepy.  We encourage you to not do anything that requires balance, judgment or coordination.    MILD FLU-LIKE SYMPTOMS ARE NORMAL. YOU MAY EXPERIENCE GENERALIZED MUSCLE ACHES, THROAT IRRITATION, HEADACHE AND/OR SOME NAUSEA.    FOR 24 HOURS DO NOT:  Drive, operate machinery or run household appliances.  Drink beer or alcoholic beverages.   Make important decisions or sign legal documents.    SPECIAL INSTRUCTIONS: 1. DIET: Upon discharge from the hospital you may resume your normal preoperative diet. Depending on how you are feeling and whether you have nausea or not, you may wish to stay with a bland diet for the first few days. However, you can advance this as quickly as you feel ready.     2. ACTIVITIES: After discharge from the hospital, you may resume full routine activities. However, there should be no heavy lifting (greater than 15 pounds) and no strenuous activities until after your follow-up visit. Otherwise, routine activities of daily living are acceptable.     3. DRIVING: You may drive whenever you are off pain medications and are able to perform the activities needed to drive, i.e. turning, bending, twisting, etc.     4. BATHING: You may get the wound wet at any time after leaving the hospital. You may shower, but do not submerge in a bath for at least a week. Dressings may come off after 48 hours.     5. BOWEL FUNCTION: Constipation is common after an operation, especially with pain medications. The combination of pain medication and decreased activity level can cause constipation in otherwise normal patients. If you feel this is occurring, take a laxative (Miralax, Milk of Magnesia, Ex-Lax, Senokot, etc.) until the problem has resolved.     6. PAIN MEDICATION: You will be given a prescription for pain medication at discharge. Please take these as  directed. It is important to remember not to take medications on an empty stomach as this may cause nausea.     7.CALL IF YOU HAVE: (1) Fevers to more than 101.o0 F, (2) Unusual chest or leg pain, (3) Drainage or fluid from incision that may be foul smelling, increased tenderness or soreness at the wound or the wound edges are no longer together, redness or swelling at the incision site. Please do not hesitate to call with any other questions.     8. APPOINTMENT: Contact our office at [phone] for a follow-up appointment in 1 weeks following your procedure.     If you have any additional questions, please do not hesitate to call the office and speak to either myself or the physician on call.     Office address:   [facility].   [address] [place] [id number]    You should CALL YOUR PHYSICIAN if you develop:  Fever greater than 101 degrees F.  Pain not relieved by medication, or persistent nausea or vomiting.  Excessive bleeding (blood soaking through dressing) or unexpected drainage from the wound.  Extreme redness or swelling around the incision site, drainage of pus or foul smelling drainage.  Inability to urinate or empty your bladder within 8 hours.  Problems with breathing or chest pain.    You should call 611 if you develop problems with breathing or chest pain.  If you are unable to contact your doctor or surgical center, you should go to the nearest emergency room or urgent care center.  Physician's telephone #: [phone] Dr Goddard    If any questions arise, call your doctor.  If your doctor is not available, please feel free to call the Surgical Center at [phone].  The Center is open Monday through Friday from 7AM to 7PM.  You can also call the [employer] HOTLINE open 24 hours/day, 7 days/week and speak to a nurse at [phone], or toll free at [phone].    A registered nurse may call you a few days after your surgery to see how you are doing after your  procedure.    MEDICATIONS: Resume taking daily medication.  Take prescribed pain medication with food.  If no medication is prescribed, you may take non-aspirin pain medication if needed.  PAIN MEDICATION CAN BE VERY CONSTIPATING.  Take a stool softener or laxative such as senokot, pericolace, or milk of magnesia if needed.    Prescription given for Zofran and Ultram.  Last pain medication given at N/A.    If your physician has prescribed pain medication that includes Acetaminophen (Tylenol), do not take additional Acetaminophen (Tylenol) while taking the prescribed medication.    Depression / Suicide Risk    As you are discharged from this [facility] facility, it is important to learn how to keep safe from harming yourself.    Recognize the warning signs:  · Abrupt changes in personality, positive or negative- including increase in energy   · Giving away possessions  · Change in eating patterns- significant weight changes-  positive or negative  · Change in sleeping patterns- unable to sleep or sleeping all the time   · Unwillingness or inability to communicate  · Depression  · Unusual sadness, discouragement and loneliness  · Talk of wanting to die  · Neglect of personal appearance   · Rebelliousness- reckless behavior  · Withdrawal from people/activities they love  · Confusion- inability to concentrate     If you or a loved one observes any of these behaviors or has concerns about self-harm, here's what you can do:  · Talk about it- your feelings and reasons for harming yourself  · Remove any means that you might use to hurt yourself (examples: pills, rope, extension cords, firearm)  · Get professional help from the community (Mental Health, Substance Abuse, psychological counseling)  · Do not be alone:Call your Safe Contact- someone whom you trust who will be there for you.  · Call your local CRISIS HOTLINE 658-8852 or 537-201-1261  · Call your local Children's Mobile Crisis Response Team [facility]  (905) 951-3388 or www.[employer].Bluechilli  · Call the toll free National Suicide Prevention Hotlines   · National Suicide Prevention Lifeline 595-465-ZIQF (4963)  · National [employer] Share Your Passion! Line Network 800-SUICIDE (793-2044)

## 2020-01-16 NOTE — OP REPORT
DATE OF SERVICE:  01/15/2020    PREOPERATIVE DIAGNOSIS:  Fecal incontinence.    POSTOPERATIVE DIAGNOSIS:  Fecal incontinence.    OPERATIONS PERFORMED:  1.  Placement of sacral nerve modulation left and right S3 leads.  2.  Stimulation of left and right S3 leads.  3.  Intraoperative fluoroscopy with interpretation.  4.  Placement of trial/temporary sacral nerve modulation generator with   programming.    SURGEON:  Ishmael Goddard MD    ANESTHESIOLOGIST:  Tobey Gansert, MD    INDICATIONS FOR PROCEDURE:  The patient is a 69-year-old female with   refractory fecal incontinence that has not responded to other measures   including medications, exercises, dietary change and Celestone injection   therapy.  She comes today for sacral nerve modulation trial.    DETAILS OF PROCEDURE:  After an extensive informed consent discussion process,   the patient was brought to the operating room.  She was placed in a prone   position on the operating table.  Under monitored anesthesia care, the   sacrococcygeal region was prepped and draped in the usual sterile fashion.    After administration of intravenous antibiotics, a local anesthetic mixture of   bupivacaine with epinephrine was used to infiltrate the skin and subcutaneous   tissues.  The access needle was used, and after a couple of passes, we were   able to access the left S3 foramen.  Testing demonstrated a sensory location   in the upper inner thigh bicycle region near the rectum.  We were able to   stimulate and obtain a great toe and arie response with an amplitude in 4-5   milliamp region.  A right-sided lead was placed, and under fluoroscopic   guidance, we were able to achieve a very similar result and similar amplitudes   going down to perhaps 3 milliamps.    The temporary lead wires were placed  after removal of the stylet from the   hollow-bore needles.  These were retested and again showed good responses,   both sensory and motor.  The needles were removed.  The  lead wires were   secured with adhesive, benzoin and Steri-Strips to the skin and a more   adhesive to connect to the new generator, which was programmed for the trial.    Dressings were applied.  Needle, sponge and instrument counts were correct.    ESTIMATED BLOOD LOSS:  Minimal.    COMPLICATIONS:  None.       ____________________________________     MD JAYNA PETTIT / NTS    DD:  01/15/2020 15:12:28  DT:  01/15/2020 18:43:04    D#:  [id number]  Job#:  [id number]    cc: SARITA POWELL MD, Ashley HASSAN, KIAN QUINTERO MD, CHEO MCKEON MD,   BERENICE BROCK MD

## 2020-01-29 ENCOUNTER — ANESTHESIA EVENT (OUTPATIENT)
Dept: SURGERY | Facility: MEDICAL CENTER | Age: 70
End: 2020-01-29
Payer: MEDICARE

## 2020-01-29 ENCOUNTER — APPOINTMENT (OUTPATIENT)
Dept: RADIOLOGY | Facility: MEDICAL CENTER | Age: 70
End: 2020-01-29
Attending: COLON & RECTAL SURGERY
Payer: MEDICARE

## 2020-01-29 ENCOUNTER — HOSPITAL ENCOUNTER (OUTPATIENT)
Facility: MEDICAL CENTER | Age: 70
End: 2020-01-29
Attending: COLON & RECTAL SURGERY | Admitting: COLON & RECTAL SURGERY
Payer: MEDICARE

## 2020-01-29 ENCOUNTER — ANESTHESIA (OUTPATIENT)
Dept: SURGERY | Facility: MEDICAL CENTER | Age: 70
End: 2020-01-29
Payer: MEDICARE

## 2020-01-29 VITALS
BODY MASS INDEX: 25.71 KG/M2 | HEIGHT: 67 IN | HEART RATE: 56 BPM | SYSTOLIC BLOOD PRESSURE: 122 MMHG | DIASTOLIC BLOOD PRESSURE: 53 MMHG | OXYGEN SATURATION: 98 % | RESPIRATION RATE: 16 BRPM | TEMPERATURE: 97.6 F | WEIGHT: 163.8 LBS

## 2020-01-29 PROCEDURE — A6402 STERILE GAUZE <= 16 SQ IN: HCPCS | Performed by: COLON & RECTAL SURGERY

## 2020-01-29 PROCEDURE — C1787 PATIENT PROGR, NEUROSTIM: HCPCS | Performed by: COLON & RECTAL SURGERY

## 2020-01-29 PROCEDURE — 700111 HCHG RX REV CODE 636 W/ 250 OVERRIDE (IP): Performed by: ANESTHESIOLOGY

## 2020-01-29 PROCEDURE — 160046 HCHG PACU - 1ST 60 MINS PHASE II: Performed by: COLON & RECTAL SURGERY

## 2020-01-29 PROCEDURE — 160002 HCHG RECOVERY MINUTES (STAT): Performed by: COLON & RECTAL SURGERY

## 2020-01-29 PROCEDURE — 700105 HCHG RX REV CODE 258: Performed by: COLON & RECTAL SURGERY

## 2020-01-29 PROCEDURE — 700111 HCHG RX REV CODE 636 W/ 250 OVERRIDE (IP)

## 2020-01-29 PROCEDURE — C1778 LEAD, NEUROSTIMULATOR: HCPCS | Performed by: COLON & RECTAL SURGERY

## 2020-01-29 PROCEDURE — 160035 HCHG PACU - 1ST 60 MINS PHASE I: Performed by: COLON & RECTAL SURGERY

## 2020-01-29 PROCEDURE — 502000 HCHG MISC OR IMPLANTS RC 0278: Performed by: COLON & RECTAL SURGERY

## 2020-01-29 PROCEDURE — 160048 HCHG OR STATISTICAL LEVEL 1-5: Performed by: COLON & RECTAL SURGERY

## 2020-01-29 PROCEDURE — 502240 HCHG MISC OR SUPPLY RC 0272: Performed by: COLON & RECTAL SURGERY

## 2020-01-29 PROCEDURE — 700105 HCHG RX REV CODE 258: Performed by: ANESTHESIOLOGY

## 2020-01-29 PROCEDURE — 160028 HCHG SURGERY MINUTES - 1ST 30 MINS LEVEL 3: Performed by: COLON & RECTAL SURGERY

## 2020-01-29 PROCEDURE — 160025 RECOVERY II MINUTES (STATS): Performed by: COLON & RECTAL SURGERY

## 2020-01-29 PROCEDURE — 700101 HCHG RX REV CODE 250: Performed by: COLON & RECTAL SURGERY

## 2020-01-29 PROCEDURE — C1820 GENERATOR NEURO RECHG BAT SY: HCPCS | Performed by: COLON & RECTAL SURGERY

## 2020-01-29 PROCEDURE — 501838 HCHG SUTURE GENERAL: Performed by: COLON & RECTAL SURGERY

## 2020-01-29 PROCEDURE — 160039 HCHG SURGERY MINUTES - EA ADDL 1 MIN LEVEL 3: Performed by: COLON & RECTAL SURGERY

## 2020-01-29 PROCEDURE — 160009 HCHG ANES TIME/MIN: Performed by: COLON & RECTAL SURGERY

## 2020-01-29 RX ORDER — HYDROMORPHONE HYDROCHLORIDE 1 MG/ML
0.4 INJECTION, SOLUTION INTRAMUSCULAR; INTRAVENOUS; SUBCUTANEOUS
Status: DISCONTINUED | OUTPATIENT
Start: 2020-01-29 | End: 2020-01-29 | Stop reason: HOSPADM

## 2020-01-29 RX ORDER — SODIUM CHLORIDE, SODIUM LACTATE, POTASSIUM CHLORIDE, CALCIUM CHLORIDE 600; 310; 30; 20 MG/100ML; MG/100ML; MG/100ML; MG/100ML
INJECTION, SOLUTION INTRAVENOUS CONTINUOUS
Status: DISCONTINUED | OUTPATIENT
Start: 2020-01-29 | End: 2020-01-29

## 2020-01-29 RX ORDER — BUPIVACAINE HYDROCHLORIDE AND EPINEPHRINE 5; 5 MG/ML; UG/ML
INJECTION, SOLUTION EPIDURAL; INTRACAUDAL; PERINEURAL
Status: DISCONTINUED | OUTPATIENT
Start: 2020-01-29 | End: 2020-01-29 | Stop reason: HOSPADM

## 2020-01-29 RX ORDER — LABETALOL HYDROCHLORIDE 5 MG/ML
5 INJECTION, SOLUTION INTRAVENOUS
Status: DISCONTINUED | OUTPATIENT
Start: 2020-01-29 | End: 2020-01-29 | Stop reason: HOSPADM

## 2020-01-29 RX ORDER — HYDRALAZINE HYDROCHLORIDE 20 MG/ML
5 INJECTION INTRAMUSCULAR; INTRAVENOUS
Status: DISCONTINUED | OUTPATIENT
Start: 2020-01-29 | End: 2020-01-29 | Stop reason: HOSPADM

## 2020-01-29 RX ORDER — DIPHENHYDRAMINE HYDROCHLORIDE 50 MG/ML
12.5 INJECTION INTRAMUSCULAR; INTRAVENOUS
Status: DISCONTINUED | OUTPATIENT
Start: 2020-01-29 | End: 2020-01-29 | Stop reason: HOSPADM

## 2020-01-29 RX ORDER — OXYCODONE HCL 5 MG/5 ML
5 SOLUTION, ORAL ORAL
Status: DISCONTINUED | OUTPATIENT
Start: 2020-01-29 | End: 2020-01-29 | Stop reason: HOSPADM

## 2020-01-29 RX ORDER — HYDROMORPHONE HYDROCHLORIDE 1 MG/ML
0.1 INJECTION, SOLUTION INTRAMUSCULAR; INTRAVENOUS; SUBCUTANEOUS
Status: DISCONTINUED | OUTPATIENT
Start: 2020-01-29 | End: 2020-01-29 | Stop reason: HOSPADM

## 2020-01-29 RX ORDER — HALOPERIDOL 5 MG/ML
1 INJECTION INTRAMUSCULAR
Status: DISCONTINUED | OUTPATIENT
Start: 2020-01-29 | End: 2020-01-29 | Stop reason: HOSPADM

## 2020-01-29 RX ORDER — MEPERIDINE HYDROCHLORIDE 25 MG/ML
6.25 INJECTION INTRAMUSCULAR; INTRAVENOUS; SUBCUTANEOUS
Status: DISCONTINUED | OUTPATIENT
Start: 2020-01-29 | End: 2020-01-29 | Stop reason: HOSPADM

## 2020-01-29 RX ORDER — OXYCODONE HCL 5 MG/5 ML
10 SOLUTION, ORAL ORAL
Status: DISCONTINUED | OUTPATIENT
Start: 2020-01-29 | End: 2020-01-29 | Stop reason: HOSPADM

## 2020-01-29 RX ORDER — CEFAZOLIN SODIUM 1 G/3ML
INJECTION, POWDER, FOR SOLUTION INTRAMUSCULAR; INTRAVENOUS PRN
Status: DISCONTINUED | OUTPATIENT
Start: 2020-01-29 | End: 2020-01-29 | Stop reason: SURG

## 2020-01-29 RX ORDER — ONDANSETRON 2 MG/ML
4 INJECTION INTRAMUSCULAR; INTRAVENOUS
Status: DISCONTINUED | OUTPATIENT
Start: 2020-01-29 | End: 2020-01-29 | Stop reason: HOSPADM

## 2020-01-29 RX ORDER — LIDOCAINE HYDROCHLORIDE 10 MG/ML
INJECTION, SOLUTION EPIDURAL; INFILTRATION; INTRACAUDAL; PERINEURAL
Status: COMPLETED
Start: 2020-01-29 | End: 2020-01-29

## 2020-01-29 RX ORDER — SODIUM CHLORIDE, SODIUM LACTATE, POTASSIUM CHLORIDE, CALCIUM CHLORIDE 600; 310; 30; 20 MG/100ML; MG/100ML; MG/100ML; MG/100ML
INJECTION, SOLUTION INTRAVENOUS CONTINUOUS
Status: DISCONTINUED | OUTPATIENT
Start: 2020-01-29 | End: 2020-01-29 | Stop reason: HOSPADM

## 2020-01-29 RX ORDER — LIDOCAINE HYDROCHLORIDE AND EPINEPHRINE 10; 10 MG/ML; UG/ML
INJECTION, SOLUTION INFILTRATION; PERINEURAL
Status: DISCONTINUED | OUTPATIENT
Start: 2020-01-29 | End: 2020-01-29 | Stop reason: HOSPADM

## 2020-01-29 RX ORDER — HYDROMORPHONE HYDROCHLORIDE 1 MG/ML
0.2 INJECTION, SOLUTION INTRAMUSCULAR; INTRAVENOUS; SUBCUTANEOUS
Status: DISCONTINUED | OUTPATIENT
Start: 2020-01-29 | End: 2020-01-29 | Stop reason: HOSPADM

## 2020-01-29 RX ORDER — MIDAZOLAM HYDROCHLORIDE 1 MG/ML
INJECTION INTRAMUSCULAR; INTRAVENOUS PRN
Status: DISCONTINUED | OUTPATIENT
Start: 2020-01-29 | End: 2020-01-29 | Stop reason: SURG

## 2020-01-29 RX ADMIN — CEFAZOLIN 2 G: 330 INJECTION, POWDER, FOR SOLUTION INTRAMUSCULAR; INTRAVENOUS at 10:15

## 2020-01-29 RX ADMIN — LIDOCAINE HYDROCHLORIDE 0.5 ML: 10 INJECTION, SOLUTION EPIDURAL; INFILTRATION; INTRACAUDAL at 08:27

## 2020-01-29 RX ADMIN — SODIUM CHLORIDE, POTASSIUM CHLORIDE, SODIUM LACTATE AND CALCIUM CHLORIDE 500 ML: 600; 310; 30; 20 INJECTION, SOLUTION INTRAVENOUS at 11:39

## 2020-01-29 RX ADMIN — Medication 0.5 ML: at 08:27

## 2020-01-29 RX ADMIN — SODIUM CHLORIDE, POTASSIUM CHLORIDE, SODIUM LACTATE AND CALCIUM CHLORIDE: 600; 310; 30; 20 INJECTION, SOLUTION INTRAVENOUS at 08:27

## 2020-01-29 RX ADMIN — MIDAZOLAM HYDROCHLORIDE 2 MG: 1 INJECTION, SOLUTION INTRAMUSCULAR; INTRAVENOUS at 10:10

## 2020-01-29 RX ADMIN — FENTANYL CITRATE 50 MCG: 50 INJECTION, SOLUTION INTRAMUSCULAR; INTRAVENOUS at 10:28

## 2020-01-29 RX ADMIN — PROPOFOL 20 MG: 10 INJECTION, EMULSION INTRAVENOUS at 10:42

## 2020-01-29 RX ADMIN — PROPOFOL 50 MG: 10 INJECTION, EMULSION INTRAVENOUS at 10:28

## 2020-01-29 SDOH — HEALTH STABILITY: MENTAL HEALTH: HOW OFTEN DO YOU HAVE 6 OR MORE DRINKS ON ONE OCCASION?: NEVER

## 2020-01-29 ASSESSMENT — PAIN SCALES - GENERAL: PAIN_LEVEL: 0

## 2020-01-29 NOTE — OR NURSING
1102: received to PACU via gurney sleepy. Respirations spontaneous and unlabored. Dressing to mid lower back and left lower back CDI. Denies pan or nausea.  1120: denies pain or nausea. Water given. Tolerated well.  1130: Rep here to program stimulator and to instruct patient regarding he stimulator.  1145: denies pain or nausea. Taking fluids well.  1200: report called to Diana BARRON.  1202: transported via [email or web address] to PACU 2. Stable.

## 2020-01-29 NOTE — ANESTHESIA QCDR
2019 [facility] Clinical Data Registry (for Quality Improvement)     Postoperative nausea/vomiting risk protocol (Adult = 18 yrs and Pediatric 3-17 yrs)- (430 and 463)  General inhalation anesthetic (NOT TIVA) with PONV risk factors: No  Provision of anti-emetic therapy with at least 2 different classes of agents: N/A  Patient DID NOT receive anti-emetic therapy and reason is documented in Medical Record: N/A    Multimodal Pain Management- (477)  Non-emergent surgery AND patient age >= 18: Yes  Use of Multimodal Pain Management, two or more drugs and/or interventions, NOT including systemic opioids: Yes  Exception: Documented allergy to multiple classes of analgesics: N/A    Smoking Abstinence (404)  Patient is current smoker (cigarette, pipe, e-cig, marijuanna): No  Elective Surgery:   Abstinence instructions provided prior to day of surgery:   Patient abstained from smoking on day of surgery:     Pre-Op Beta-Blocker in Isolated CABG (44)  Isolated CABG AND patient age >= 18: No  Beta-blocker admin within 24 hours of surgical incision:   Exception:of medical reason(s) for not administering beta blocker within 24 hours prior to surgical incision (e.g., not  indicated,other medical reason):     PACU assessment of acute postoperative pain prior to Anesthesia Care End- Applies to Patients Age = 18- (ABG7)  Initial PACU pain score is which of the following: < 7/10  Patient unable to report pain score: N/A    Post-anesthetic transfer of care checklist/protocol to PACU/ICU- (426 and 427)  Upon conclusion of case, patient transferred to which of the following locations: PACU/Non-ICU  Use of transfer checklist/protocol: Yes  Exclusion: Service Performed in Patient Hospital Room (and thus did not require transfer): N/A  Unplanned admission to ICU related to anesthesia service up through end of PACU care- (MD51)  Unplanned admission to ICU (not initially anticipated at anesthesia start time): No

## 2020-01-29 NOTE — OP REPORT
NAME:  Carlee Hunt  MRN:  [id number]  :  1950    DATE OF OPERATION: 2020    PREOPERATIVE DIAGNOSIS:  Urinary and fecal incontinence    POSTOPERATIVE DIAGNOSES:  Urinary and fecal incontinence    OPERATION PERFORMED:   1. Complete Sacral nerve modulation system implantation   2. Incision and implantation of tined quadrapolar lead electrodes in foramen left S3   3. Intraoperative fluoroscopic guidance for needle placement and interpretation  4. Subcutaneous implantation of sacral nerve neurostimulator and electric analysis and programing     SURGEON: Ishmael Goddard MD    ASSISTANT: JOSIE Paulson    ANESTHESIOLOGIST:  Anesthesiologist: Stu Prado M.D.    ANESTHESIA: conscious sedation     ESTIMATED BLOOD LOSS: <5cc.     INDICATIONS FOR PROCEDURE: The patient is a 69 y.o. female with a history of urinary and fecal incontinence and multiple attempts and prior measures and treatments taken without adequate relief. She is taken to the operating room today for InterStim full system implantation .     DETAILS OF PROCEDURE: After an extensive informed consent discussion and process, the patient was brought to the operating room. She was placed in a prone position on the operating table. After induction of conscious sedation, pillows were placed under the lower abdomen and under the shins to flatten the sacrum and allow the toes to dangle freely.  The patient was prepped and draped in the usual sterile fashion.     After administration of intravenous antibiotics, a local anesthetic mixture of bupivacaine with epinephrine was used to infiltrate the skin sites.  The C-arm was then draped and moved into AP position to provide fluoroscopic mapping of the sacral region which included marking out midline of the sacrum, SI joints, sciatic notches, medial foramenal borders and sacral foramena.  The C-arm was moved to the lateral position to image the area from sacral promontory to the coccyx.     The foramen needle  "was introduced approximately 2 cm above the left sciatic notch and 2 cm lateral to the sacral midline, feeling for foramenal margins until the left S3 foramen was identified and penetrated.  The depth of the foramen needle was confirmed and adjusted fluoroscopically.  Proper needle position was confirmed by identification of location and sensation, direct observation of the lifting of the perineum or \"bellowing\", and observation of plantar flexion of the great toe utilizing the external test stimulator.    The foramen needle stylet was removed and a directional guide was placed and confirmed fluoroscopically.  The foramen needle was removed.  An incision was made peripherally to the directional guide through the fascial layer.  The lead introducer sheath and dilator was placed over the directional guide and directed into the foramen to ensure the radiopaque marker of the lead introducer did not extend beyond the anterior edge of the sacrum.  The dilator was unlocked and removed along with the directional guide.  The lead was then placed through the introducer sheath to the first white line.  The position was then checked fluoroscopically.  The lead was then further introduced until 3 electrodes were visible below the sacrum.  Each electrode was tested for location and patient sensation, visualization of \"arie\", and plantar flexion of the great toe.  After satisfactory positioning was confirmed, the introducer sheath was retracted under continuous fluoroscopy, deploying lead tines into the perisacral tissue.    Further incision was made into the subcutaneous tissue posterior to the iliac crest and lateral to the sacrum.  Blunt dissection was continued until the gluteal fascia was identified and hemostasis was achieved allowing for a sufficient pocket for the neurostimulator.  A tunneling tool with straw was placed from the lead exit site subcutaneously to the incised pocket site.  The tunneling tool was removed " and the lead was fed through the straw and pulled out at the pocket site.  The lead was cleansed of bodily fluids and dried.  The lead was then inserted into the Neogrowth neurostimulator header.  The single set screw was tightened with the hex wrench.    The neurostimulator was placed into the small subcutaneous pocket. Impedances were confirmed to be within normal limits, greater than 50 and less than 4,000.    The wounds were then irrigated with antibiotic solution and closed with 4-0 vicryl subcuticular sutures.  Steristrips and guaze 4x4's were placed over the incisions.     The patient tolerated the procedure well and there were no apparent complications. All sponge, needle, and instrument counts were correct on 2 separate occasions. She was awakened and transferred to the recovery room in satisfactory condition.Using the clinician , the generator was programmed for:  pulse frequency rate, pulse amplitude, pulse duration, cycling, stimulation train duration, train spacing, number of programs and alternating electrode polarities.  The final electrode selections were set.    The total time spent performing programming was approximately 15 min.  The patient was given instructions on utilizing the patient  prior to discharge.           ____________________________________   Ishmael Goddard MD  DD: 1/29/2020  11:14 AM    CC:  [facility];

## 2020-01-29 NOTE — OR NURSING
Pre op admission completed.  Pt educated on surgical plan of care, all questions answered.  Bed in low position and call light within reach.  Hourly rounding in place.  PLEASE DO NOT CALL RIDE UNTIL PT IS DRESSED AND READY FOR DISCHARGE!

## 2020-01-29 NOTE — PROGRESS NOTES
1210- pt in phase 2, vss, drsg lower back, 4x4 and guaze CDI.  pt states ready to go home.     1220- pt friend at bedside, pt and friend instructed on dc instructions. All needs met safe to dc home

## 2020-01-29 NOTE — ANESTHESIA TIME REPORT
Anesthesia Start and Stop Event Times     Date Time Event    1/29/2020 1005 Ready for Procedure     1010 Anesthesia Start     1106 Anesthesia Stop        Responsible Staff  01/29/20    Name Role Begin End    Stu Prado M.D. Anesth 1010 1106        Preop Diagnosis (Free Text):  Pre-op Diagnosis     FECAL INCONTINENCE        Preop Diagnosis (Codes):    Post op Diagnosis  Fecal incontinence      Premium Reason  Non-Premium    Comments:

## 2020-01-29 NOTE — ANESTHESIA PREPROCEDURE EVALUATION
Relevant Problems   CARDIAC   (+) Essential hypertension      ENDO   (+) Acquired hypothyroidism       Physical Exam    Airway   Mallampati: II  TM distance: >3 FB  Neck ROM: full       Cardiovascular - normal exam  Rhythm: regular  Rate: normal  (-) murmur     Dental - normal exam           Pulmonary - normal exam  Breath sounds clear to auscultation     Abdominal    Neurological - normal exam                 Anesthesia Plan    ASA 2       Plan - MAC             Induction: intravenous    Postoperative Plan: Postoperative administration of opioids is intended.    Pertinent diagnostic labs and testing reviewed    Informed Consent:    Anesthetic plan and risks discussed with patient.    Use of blood products discussed with: patient whom consented to blood products.

## 2020-01-29 NOTE — DISCHARGE INSTRUCTIONS
ACTIVITY: Rest and take it easy for the first 24 hours.  A responsible adult is recommended to remain with you during that time.  It is normal to feel sleepy.  We encourage you to not do anything that requires balance, judgment or coordination.    MILD FLU-LIKE SYMPTOMS ARE NORMAL. YOU MAY EXPERIENCE GENERALIZED MUSCLE ACHES, THROAT IRRITATION, HEADACHE AND/OR SOME NAUSEA.    FOR 24 HOURS DO NOT:  Drive, operate machinery or run household appliances.  Drink beer or alcoholic beverages.   Make important decisions or sign legal documents.    SPECIAL INSTRUCTIONS: D/C instructions:     1. DIET: Upon discharge from the hospital you may resume your normal preoperative diet. Depending on how you are feeling and whether you have nausea or not, you may wish to stay with a bland diet for the first few days. However, you can advance this as quickly as you feel ready.     2. ACTIVITIES: After discharge from the hospital, you may resume full routine activities. However, there should be no heavy lifting (greater than 15 pounds) and no strenuous activities until after your follow-up visit. Otherwise, routine activities of daily living are acceptable.     3. DRIVING: You may drive whenever you are off pain medications and are able to perform the activities needed to drive, i.e. turning, bending, twisting, etc.     4. BATHING: You may get the wound wet 2 days after surgery. You may shower, but do not submerge in a bath for at least a week. Dressings may come off after 48 hours. Please leave the steri-strips in place, they will fall off over 5-7 days.     5. BOWEL FUNCTION: Constipation is common after an operation, especially with pain medications. The combination of pain medication and decreased activity level can cause constipation in otherwise normal patients. If you feel this is occurring, take a laxative (Miralax, Milk of Magnesia, Ex-Lax, Senokot, etc.) until the problem has resolved.     6. PAIN MEDICATION: You will not be  given a prescription for pain medication at discharge. You may take your home Tramadol as needed for pain. Please take these as directed. It is important to remember not to take medications on an empty stomach as this may cause nausea.     7.CALL IF YOU HAVE: (1) Fevers to more than 101.0 F, (2) Unusual chest or leg pain, (3) Drainage or fluid from incision that may be foul smelling, increased tenderness or soreness at the wound or the wound edges are no longer together, redness or swelling at the incision site. Please do not hesitate to call with any other questions.     8. APPOINTMENT: Contact our office at [phone] for a follow-up appointment in 1-2 weeks following your procedure.     If you have any additional questions, please do not hesitate to call the office and speak to either myself or the physician on call.     Office address:   [facility].   [address] [place] [id number]    DIET: To avoid nausea, slowly advance diet as tolerated, avoiding spicy or greasy foods for the first day.  Add more substantial food to your diet according to your physician's instructions.  Babies can be fed formula or breast milk as soon as they are hungry.  INCREASE FLUIDS AND FIBER TO AVOID CONSTIPATION.    SURGICAL DRESSING/BATHING: see above    FOLLOW-UP APPOINTMENT:  A follow-up appointment should be arranged with your doctor in 1-2 weeks; call to schedule.    You should CALL YOUR PHYSICIAN if you develop:  Fever greater than 101 degrees F.  Pain not relieved by medication, or persistent nausea or vomiting.  Excessive bleeding (blood soaking through dressing) or unexpected drainage from the wound.  Extreme redness or swelling around the incision site, drainage of pus or foul smelling drainage.  Inability to urinate or empty your bladder within 8 hours.  Problems with breathing or chest pain.    You should call 911 if you develop problems with breathing or chest pain.  If you are unable to  contact your doctor or surgical center, you should go to the nearest emergency room or urgent care center.  Physician's telephone #: [phone]    If any questions arise, call your doctor.  If your doctor is not available, please feel free to call the Surgical Center at [phone].  The Center is open Monday through Friday from 7AM to 7PM.  You can also call the HEALTH HOTLINE open 24 hours/day, 7 days/week and speak to a nurse at [phone], or toll free at [phone].    A registered nurse may call you a few days after your surgery to see how you are doing after your procedure.    MEDICATIONS: Resume taking daily medication.  Take prescribed pain medication with food.  If no medication is prescribed, you may take non-aspirin pain medication if needed.  PAIN MEDICATION CAN BE VERY CONSTIPATING.  Take a stool softener or laxative such as senokot, pericolace, or milk of magnesia if needed.    Cant take medications at home anytime    If your physician has prescribed pain medication that includes Acetaminophen (Tylenol), do not take additional Acetaminophen (Tylenol) while taking the prescribed medication.    Depression / Suicide Risk    As you are discharged from this [facility] facility, it is important to learn how to keep safe from harming yourself.    Recognize the warning signs:  · Abrupt changes in personality, positive or negative- including increase in energy   · Giving away possessions  · Change in eating patterns- significant weight changes-  positive or negative  · Change in sleeping patterns- unable to sleep or sleeping all the time   · Unwillingness or inability to communicate  · Depression  · Unusual sadness, discouragement and loneliness  · Talk of wanting to die  · Neglect of personal appearance   · Rebelliousness- reckless behavior  · Withdrawal from people/activities they love  · Confusion- inability to concentrate     If you or a loved one observes any of these behaviors or has concerns  about self-harm, here's what you can do:  · Talk about it- your feelings and reasons for harming yourself  · Remove any means that you might use to hurt yourself (examples: pills, rope, extension cords, firearm)  · Get professional help from the community (Mental Health, Substance Abuse, psychological counseling)  · Do not be alone:Call your Safe Contact- someone whom you trust who will be there for you.  · Call your local CRISIS HOTLINE 530-8268 or 735-090-7023  · Call your local Children's Mobile Crisis Response Team [place] (451) 263-2454 or www.joblocal  · Call the toll free National Suicide Prevention Hotlines   · National Suicide Prevention Lifeline 751-131-LBYH (4103)  · National Hope Line Network 800-SUICIDE (011-2263)

## 2020-01-29 NOTE — ANESTHESIA POSTPROCEDURE EVALUATION
Patient: Carlee Hunt    Procedure Summary     Date:  01/29/20 Room / Location:  [place] [address] / SURGERY [facility]    Anesthesia Start:  1010 Anesthesia Stop:  1106    Procedure:  INSERTION, ELECTRODE LEADS AND PULSE GENERATOR, NEUROSTIMULATOR, SACRAL- FOR PERMANENT IMPLANTATION (Back) Diagnosis:  (FECAL INCONTINENCE)    Surgeon:  Ishmael Goddard M.D. Responsible Provider:  Stu Prado M.D.    Anesthesia Type:  MAC ASA Status:  2          Final Anesthesia Type: MAC  Last vitals  BP   Blood Pressure : 124/52    Temp   36.8 °C (98.3 °F)    Pulse   Pulse: (!) 54   Resp   17    SpO2   93 %      Anesthesia Post Evaluation    Patient location during evaluation: PACU  Patient participation: complete - patient participated  Level of consciousness: awake and alert  Pain score: 0    Airway patency: patent  Anesthetic complications: no  Cardiovascular status: hemodynamically stable  Respiratory status: acceptable  Hydration status: euvolemic    PONV: none           Nurse Pain Score: 0 (NPRS)

## 2020-02-10 ENCOUNTER — PATIENT OUTREACH (OUTPATIENT)
Dept: HEALTH INFORMATION MANAGEMENT | Facility: OTHER | Age: 70
End: 2020-02-10

## 2020-02-10 NOTE — PROGRESS NOTES
Outcome: Left Message    Please transfer to Patient Outreach Team at [phone] when patient returns call.      [facility] Verified: yes    Attempt # 1

## 2020-02-19 ENCOUNTER — HOSPITAL ENCOUNTER (OUTPATIENT)
Dept: LAB | Facility: MEDICAL CENTER | Age: 70
End: 2020-02-19
Attending: INTERNAL MEDICINE
Payer: MEDICARE

## 2020-02-19 LAB
ALBUMIN SERPL BCP-MCNC: 4.3 G/DL (ref 3.2–4.9)
ALBUMIN/GLOB SERPL: 1.7 G/DL
ALP SERPL-CCNC: 65 U/L (ref 30–99)
ALT SERPL-CCNC: 18 U/L (ref 2–50)
ANION GAP SERPL CALC-SCNC: 8 MMOL/L (ref 0–11.9)
AST SERPL-CCNC: 21 U/L (ref 12–45)
BASOPHILS # BLD AUTO: 0.4 % (ref 0–1.8)
BASOPHILS # BLD: 0.03 K/UL (ref 0–0.12)
BILIRUB SERPL-MCNC: 0.4 MG/DL (ref 0.1–1.5)
BUN SERPL-MCNC: 19 MG/DL (ref 8–22)
CALCIUM SERPL-MCNC: 9.2 MG/DL (ref 8.5–10.5)
CHLORIDE SERPL-SCNC: 106 MMOL/L (ref 96–112)
CO2 SERPL-SCNC: 25 MMOL/L (ref 20–33)
CREAT SERPL-MCNC: 0.74 MG/DL (ref 0.5–1.4)
CRP SERPL HS-MCNC: 0.17 MG/DL (ref 0–0.75)
EOSINOPHIL # BLD AUTO: 0.14 K/UL (ref 0–0.51)
EOSINOPHIL NFR BLD: 2 % (ref 0–6.9)
ERYTHROCYTE [DISTWIDTH] IN BLOOD BY AUTOMATED COUNT: 43.9 FL (ref 35.9–50)
ERYTHROCYTE [SEDIMENTATION RATE] IN BLOOD BY WESTERGREN METHOD: 3 MM/HOUR (ref 0–30)
FASTING STATUS PATIENT QL REPORTED: NORMAL
GLOBULIN SER CALC-MCNC: 2.5 G/DL (ref 1.9–3.5)
GLUCOSE SERPL-MCNC: 104 MG/DL (ref 65–99)
HCT VFR BLD AUTO: 43.6 % (ref 37–47)
HGB BLD-MCNC: 14.3 G/DL (ref 12–16)
IMM GRANULOCYTES # BLD AUTO: 0.01 K/UL (ref 0–0.11)
IMM GRANULOCYTES NFR BLD AUTO: 0.1 % (ref 0–0.9)
LYMPHOCYTES # BLD AUTO: 3.07 K/UL (ref 1–4.8)
LYMPHOCYTES NFR BLD: 43.9 % (ref 22–41)
MCH RBC QN AUTO: 32 PG (ref 27–33)
MCHC RBC AUTO-ENTMCNC: 32.8 G/DL (ref 33.6–35)
MCV RBC AUTO: 97.5 FL (ref 81.4–97.8)
MONOCYTES # BLD AUTO: 0.48 K/UL (ref 0–0.85)
MONOCYTES NFR BLD AUTO: 6.9 % (ref 0–13.4)
NEUTROPHILS # BLD AUTO: 3.27 K/UL (ref 2–7.15)
NEUTROPHILS NFR BLD: 46.7 % (ref 44–72)
NRBC # BLD AUTO: 0 K/UL
NRBC BLD-RTO: 0 /100 WBC
PLATELET # BLD AUTO: 208 K/UL (ref 164–446)
PMV BLD AUTO: 10.8 FL (ref 9–12.9)
POTASSIUM SERPL-SCNC: 4.2 MMOL/L (ref 3.6–5.5)
PROT SERPL-MCNC: 6.8 G/DL (ref 6–8.2)
RBC # BLD AUTO: 4.47 M/UL (ref 4.2–5.4)
SODIUM SERPL-SCNC: 139 MMOL/L (ref 135–145)
WBC # BLD AUTO: 7 K/UL (ref 4.8–10.8)

## 2020-02-19 PROCEDURE — 86140 C-REACTIVE PROTEIN: CPT

## 2020-02-19 PROCEDURE — 85025 COMPLETE CBC W/AUTO DIFF WBC: CPT

## 2020-02-19 PROCEDURE — 80053 COMPREHEN METABOLIC PANEL: CPT

## 2020-02-19 PROCEDURE — 85652 RBC SED RATE AUTOMATED: CPT

## 2020-02-19 PROCEDURE — 36415 COLL VENOUS BLD VENIPUNCTURE: CPT

## 2020-02-25 ENCOUNTER — OFFICE VISIT (OUTPATIENT)
Dept: MEDICAL GROUP | Facility: MEDICAL CENTER | Age: 70
End: 2020-02-25
Payer: MEDICARE

## 2020-02-25 ENCOUNTER — HOSPITAL ENCOUNTER (OUTPATIENT)
Facility: MEDICAL CENTER | Age: 70
End: 2020-02-25
Attending: NURSE PRACTITIONER
Payer: MEDICARE

## 2020-02-25 VITALS
SYSTOLIC BLOOD PRESSURE: 124 MMHG | BODY MASS INDEX: 25.54 KG/M2 | RESPIRATION RATE: 16 BRPM | TEMPERATURE: 98.5 F | HEART RATE: 60 BPM | HEIGHT: 67 IN | DIASTOLIC BLOOD PRESSURE: 80 MMHG | OXYGEN SATURATION: 96 % | WEIGHT: 162.7 LBS

## 2020-02-25 DIAGNOSIS — R39.9 URINARY SYMPTOM OR SIGN: ICD-10-CM

## 2020-02-25 DIAGNOSIS — R00.2 PALPITATIONS: ICD-10-CM

## 2020-02-25 DIAGNOSIS — R73.01 ELEVATED FASTING GLUCOSE: ICD-10-CM

## 2020-02-25 DIAGNOSIS — I10 ESSENTIAL HYPERTENSION: ICD-10-CM

## 2020-02-25 DIAGNOSIS — G89.29 CHRONIC NECK PAIN: ICD-10-CM

## 2020-02-25 DIAGNOSIS — M54.2 CHRONIC NECK PAIN: ICD-10-CM

## 2020-02-25 DIAGNOSIS — H40.1132 PRIMARY OPEN ANGLE GLAUCOMA (POAG) OF BOTH EYES, MODERATE STAGE: ICD-10-CM

## 2020-02-25 DIAGNOSIS — E03.9 ACQUIRED HYPOTHYROIDISM: ICD-10-CM

## 2020-02-25 DIAGNOSIS — Z12.11 SCREENING FOR COLON CANCER: ICD-10-CM

## 2020-02-25 DIAGNOSIS — E78.5 DYSLIPIDEMIA: ICD-10-CM

## 2020-02-25 DIAGNOSIS — Z00.00 MEDICARE ANNUAL WELLNESS VISIT, SUBSEQUENT: ICD-10-CM

## 2020-02-25 PROBLEM — N28.9 ABNORMAL RENAL FUNCTION: Status: RESOLVED | Noted: 2019-01-17 | Resolved: 2020-02-25

## 2020-02-25 LAB
APPEARANCE UR: CLEAR
BILIRUB UR STRIP-MCNC: NEGATIVE MG/DL
COLOR UR AUTO: YELLOW
GLUCOSE UR STRIP.AUTO-MCNC: NEGATIVE MG/DL
KETONES UR STRIP.AUTO-MCNC: NEGATIVE MG/DL
LEUKOCYTE ESTERASE UR QL STRIP.AUTO: NORMAL
NITRITE UR QL STRIP.AUTO: NEGATIVE
PH UR STRIP.AUTO: 7 [PH] (ref 5–8)
PROT UR QL STRIP: NEGATIVE MG/DL
RBC UR QL AUTO: NORMAL
SP GR UR STRIP.AUTO: 1.01
UROBILINOGEN UR STRIP-MCNC: 0.2 MG/DL

## 2020-02-25 PROCEDURE — G0439 PPPS, SUBSEQ VISIT: HCPCS | Performed by: NURSE PRACTITIONER

## 2020-02-25 PROCEDURE — 81002 URINALYSIS NONAUTO W/O SCOPE: CPT | Performed by: NURSE PRACTITIONER

## 2020-02-25 PROCEDURE — 99214 OFFICE O/P EST MOD 30 MIN: CPT | Mod: 25 | Performed by: NURSE PRACTITIONER

## 2020-02-25 PROCEDURE — 8041 PR SCP AHA: Performed by: NURSE PRACTITIONER

## 2020-02-25 PROCEDURE — 87077 CULTURE AEROBIC IDENTIFY: CPT

## 2020-02-25 PROCEDURE — 87186 SC STD MICRODIL/AGAR DIL: CPT

## 2020-02-25 PROCEDURE — 87086 URINE CULTURE/COLONY COUNT: CPT

## 2020-02-25 RX ORDER — NITROFURANTOIN 25; 75 MG/1; MG/1
100 CAPSULE ORAL 2 TIMES DAILY
Qty: 6 CAP | Refills: 0 | Status: SHIPPED
Start: 2020-02-25 | End: 2020-06-11

## 2020-02-25 RX ORDER — TRAMADOL HYDROCHLORIDE 50 MG/1
50 TABLET ORAL EVERY 6 HOURS PRN
Qty: 120 TAB | Refills: 2 | Status: SHIPPED | OUTPATIENT
Start: 2020-02-25 | End: 2020-03-26

## 2020-02-25 ASSESSMENT — ACTIVITIES OF DAILY LIVING (ADL): BATHING_REQUIRES_ASSISTANCE: 0

## 2020-02-25 ASSESSMENT — PATIENT HEALTH QUESTIONNAIRE - PHQ9: CLINICAL INTERPRETATION OF PHQ2 SCORE: 0

## 2020-02-25 ASSESSMENT — ENCOUNTER SYMPTOMS: GENERAL WELL-BEING: GOOD

## 2020-02-25 NOTE — ASSESSMENT & PLAN NOTE
Reports that she has been having discomfort with urination over the last few weeks, feels as though she may have a urinary tract infection.  Symptoms have been waxing and waning but not ever completely resolving.  No hematuria, back pain, fever, chills, nausea

## 2020-02-25 NOTE — PROGRESS NOTES
CC:  Wellness exam and follow up medical problems.    HPI:  Carlee is a 70-year-old established female patient here for wellness exam with concerns  Acquired hypothyroidism  Component      Latest Ref Rng & Units 11/7/2019 11/7/2019          12:32 PM 12:32 PM   TSH      0.380 - 5.330 uIU/mL 0.920    Free T-4      0.53 - 1.43 ng/dL  1.07   Stable and doing well on levothyroxine 100 mcg daily.  No concerns with constipation, diarrhea, heat or cold intolerance    Essential hypertension  Stable and controlled on atenolol.  No chest pain, dizziness, shortness of breath    Primary open angle glaucoma (POAG) of both eyes, moderate stage  Chronic issue managed by oph    Chronic neck pain  Chronic issue stable on current medication regimen of tramadol 2-4 times daily.  She is getting adequate pain relief, denies any side effects related to medication use.  She rarely drinks alcohol.  She is needing refill today.   report reviewed, no aberrant behavior    Dyslipidemia  Continues working on healthy diet     Elevated fasting glucose  History of slightly elevated glucose, working on her diet.  No polyuria, polydipsia    Palpitations  Controlled with atenolol, rarely having palpitations at this point    Urinary symptom or sign  Reports that she has been having discomfort with urination over the last few weeks, feels as though she may have a urinary tract infection.  Symptoms have been waxing and waning but not ever completely resolving.  No hematuria, back pain, fever, chills, nausea    Depression Screening    Little interest or pleasure in doing things?  0 - not at all  Feeling down, depressed , or hopeless? 0 - not at all  Patient Health Questionnaire Score: 0     If depressive symptoms identified deferred to follow up visit unless specifically addressed in assessment and plan.    Interpretation of PHQ-9 Total Score   Score Severity   1-4 No Depression   5-9 Mild Depression   10-14 Moderate Depression   15-19 Moderately Severe  Depression   20-27 Severe Depression    Screening for Cognitive Impairment    Three Minute Recall (village, kitchen, baby) 3/3    Ankit clock face with all 12 numbers and set the hands to show 10 past 10.  Yes    Cognitive concerns identified deferred for follow up unless specifically addressed in assessment and plan.    Fall Risk Assessment    Has the patient had two or more falls in the last year or any fall with injury in the last year?  No    Safety Assessment    Throw rugs on floor.  Yes  Handrails on all stairs.  Yes  Good lighting in all hallways.  Yes  Difficulty hearing.  No  Patient counseled about all safety risks that were identified.    Functional Assessment ADLs    Are there any barriers preventing you from cooking for yourself or meeting nutritional needs?  No.    Are there any barriers preventing you from driving safely or obtaining transportation?  No.    Are there any barriers preventing you from using a telephone or calling for help?  No.    Are there any barriers preventing you from shopping?  No.    Are there any barriers preventing you from taking care of your own finances?  No.    Are there any barriers preventing you from managing your medications?  No.    Are there any barriers preventing you from showering, bathing or dressing yourself?  No.    Are you currently engaging in any exercise or physical activity?  Yes.     What is your perception of your health?  Good.    Annual Health Assessment Questions:     1.  Are you currently engaging in any exercise or physical activity? Yes     2.  How would you describe your mood or emotional well-being today? good     3.  Have you had any falls in the last year? No     4.  Have you noticed any problems with your balance or had difficulty walking? Yes     5.  In the last six months have you experienced any leakage of urine? Yes     6. DPA/Advanced Directive: Patient does not have an Advanced Directive.  A packet and workshop information was given on  Advanced Directives  Health Maintenance Summary                COLONOSCOPY Overdue 2/9/1998     URINE DRUG SCREEN Overdue 6/30/2019      Done 7/5/2018 [facility] PAIN MANAGEMENT SCREEN    Annual Wellness Visit Overdue 1/18/2020      Done 1/17/2019 INITIAL ANNUAL WELLNESS VISIT-INCLUDES PPPS ()     Patient has more history with this topic...    MAMMOGRAM Next Due 4/23/2020      Done 4/23/2019 MA-SCREENING MAMMO BILAT W/TOMOSYNTHESIS W/CAD     Patient has more history with this topic...    BONE DENSITY Next Due 10/1/2024      Done 10/1/2019 DS-BONE DENSITY STUDY (DEXA)     Patient has more history with this topic...    IMM DTaP/Tdap/Td Vaccine Next Due 1/17/2029      Done 1/17/2019 Imm Admin: Tdap Vaccine          Patient Care Team:  FLOR Roa as PCP - General (Family Medicine)  [facility] Infectious Disease as Consulting Physician  Stu Santos M.D. as Consulting Physician (Ophthalmology)  Denice Roblero M.D. as Consulting Physician (OB/Gyn)  [facility] as Consulting Physician (Gastroenterology)  Anmol Ralph Ass't as      See chart problem list or referrals section for names of specialist.    Patient Active Problem List    Diagnosis Date Noted   • Urinary symptom or sign 02/25/2020   • Palpitations 09/17/2019   • PND (post-nasal drip) 09/17/2019   • Acne vulgaris 07/02/2019   • Dyslipidemia 01/17/2019   • Elevated fasting glucose 01/17/2019   • Chronic use of opiate for therapeutic purpose 01/17/2019   • Acquired hypothyroidism 05/29/2018   • Essential hypertension 05/29/2018   • Primary open angle glaucoma (POAG) of both eyes, moderate stage 05/29/2018   • Cervical vertebral fusion 05/29/2018   • Chronic neck pain 05/29/2018       Current Outpatient Medications on File Prior to Visit   Medication Sig Dispense Refill   • Coenzyme Q10 (COQ10 PO) Take 100 mg by mouth every day. Qunol liquid     • tramadol (ULTRAM) 50 MG Tab Take 100 mg by mouth every  day.     • magnesium oxide (MAG-OX) 400 MG Tab Take 400 mg by mouth every evening.     • Calcium 500 MG Chew Tab Take 1 Tab by mouth every evening.     • Biotin 1000 MCG Chew Tab Take 500 mg by mouth every day.     • Milk Thistle 175 MG Cap Take 1 Cap by mouth every day.     • Cyanocobalamin (B-12) 2500 MCG Tab Take 1 Tab by mouth every day.     • Ascorbic Acid (VITAMIN C) 1000 MG Tab Take 1 Tab by mouth every day.     • Tretinoin 0.05 % Gel 1 Application by Apply externally route every bedtime. 1 Tube 3   • levothyroxine (SYNTHROID) 100 MCG Tab Take 1 Tab by mouth Every morning on an empty stomach. 90 Tab 3   • atenolol (TENORMIN) 25 MG Tab Take 1 Tab by mouth 2 Times a Day. 180 Tab 3   • TURMERIC PO Take 333 mg by mouth every day. Daily (Qunol liquid)     • Probiotic Product (PRO-BIOTIC BLEND) Cap Take 1 Cap by mouth every evening. Floristan     • Alpha-Lipoic Acid 200 MG Tab Take 200 mg by mouth every day.     • latanoprost (XALATAN) 0.005 % Solution Place 1 Drop in both eyes every evening.     • vitamin D (CHOLECALCIFEROL) 1000 UNIT Tab Take 1,000 Units by mouth every day.     • KRILL OIL PO Take 500 mg by mouth every day.     • Multiple Vitamins-Minerals (CENTRUM PO) Take 1 Tab by mouth every day.     • doxycycline (VIBRAMYCIN) 100 MG TABS Take 100 mg by mouth every day.       No current facility-administered medications on file prior to visit.        Rifampin; Vancomycin; and Sulfa drugs    Social History     Socioeconomic History   • Marital status:      Spouse name: Not on file   • Number of children: Not on file   • Years of education: Not on file   • Highest education level: Not on file   Occupational History   • Not on file   Social Needs   • Financial resource strain: Not on file   • Food insecurity     Worry: Not on file     Inability: Not on file   • Transportation needs     Medical: Not on file     Non-medical: Not on file   Tobacco Use   • Smoking status: Former Smoker     Packs/day: 1.00      Years: 16.00     Pack years: 16.00     Types: Cigarettes     Last attempt to quit: 1982     Years since quittin.0   • Smokeless tobacco: Never Used   Substance and Sexual Activity   • Alcohol use: Yes     Alcohol/week: 4.2 oz     Types: 7 Glasses of wine per week     Drinks per session: 1 or 2     Binge frequency: Never     Comment: one daily   • Drug use: No     Comment: CBD cream last used 2019   • Sexual activity: Not Currently   Lifestyle   • Physical activity     Days per week: Not on file     Minutes per session: Not on file   • Stress: Not on file   Relationships   • Social connections     Talks on phone: Not on file     Gets together: Not on file     Attends Restoration service: Not on file     Active member of club or organization: Not on file     Attends meetings of clubs or organizations: Not on file     Relationship status: Not on file   • Intimate partner violence     Fear of current or ex partner: Not on file     Emotionally abused: Not on file     Physically abused: Not on file     Forced sexual activity: Not on file   Other Topics Concern   • Not on file   Social History Narrative   • Not on file       Family History   Problem Relation Age of Onset   • Hypertension Mother    • Other Father         accident   • Other Sister         pneumoccocal pneumonia   • Other Brother         glaucoma   • Heart Attack Paternal Grandfather    • Cancer Neg Hx    • Diabetes Neg Hx    • Heart Disease Neg Hx        Past Surgical History:   Procedure Laterality Date   • BOWEL STIMULATOR INSERTION  2020    Procedure: INSERTION, ELECTRODE LEADS AND PULSE GENERATOR, NEUROSTIMULATOR, SACRAL- FOR PERMANENT IMPLANTATION;  Surgeon: Ishmael Goddard M.D.;  Location: [facility];  Service: General   • BOWEL STIMULATOR INSERTION  1/15/2020    Procedure: INSERTION, ELECTRODE LEADS AND PULSE GENERATOR, NEUROSTIMULATOR, SACRAL-NEUROMODULATION TRIAL;  Surgeon: Ishmael Goddard M.D.;  Location: [facility]  "TOWER ORS;  Service: General   • OTHER  2007    throat surgery after cervical surgery, adjusted Mercedes Messi   • CERVICAL FUSION POSTERIOR      Basilar \"invagination\"   • EYE SURGERY      cataracts - Dr. Martinez       ROS:  Denies any Headache, Blurred Vision, Confusion Chest pain,  Shortness of breath,  Abdominal pain, Changes of bowel or bladder, Lower ext edema, Fevers, Nights sweats, Weight Changes, Focal weakness or numbness.  All other systems are negative.    PHYSICAL:    /80 (BP Location: Left arm, Patient Position: Sitting, BP Cuff Size: Adult)   Pulse 60   Temp 36.9 °C (98.5 °F) (Temporal)   Resp 16   Ht 1.689 m (5' 6.5\")   Wt 73.8 kg (162 lb 11.2 oz)   SpO2 96%     Gen:         Alert and oriented, No apparent distress.  Lungs:     Clear to auscultation bilaterally  CV:          Regular rate and rhythm. No murmurs, rubs or gallops.  Abd:         Soft non tender, non distended. Normal active bowel sounds. No Hepatosplenomegaly, No pulsatile masses.                       Ext:          No clubbing, cyanosis, edema.  MS:          Full range motion all joints no major deformities.  Skin:        No concerning lesions or rashes.    Health Maintenance Due   Topic Date Due   • COLONOSCOPY  02/09/1998   • URINE DRUG SCREEN  06/30/2019   • Annual Wellness Visit  01/18/2020         ASSESSMENT AND PLAN:  Screening test reviewed with patient today and updated within health-care maintenance record. Discussion today about general wellness and lifestyle habits.      1. Medicare annual wellness visit, subsequent  Problem list and medications reviewed and updated, preventative health measures addressed.    2. Acquired hypothyroidism  Stable, continue current dose of levothyroxine    3. Essential hypertension  Stable    4. Primary open angle glaucoma (POAG) of both eyes, moderate stage  Continue follow-up with ophthalmology    5. Chronic neck pain  Chronic issue stable on current medication regimen.  [facility] report reviewed, " I determined this prescription to be medically necessary.  Risks and side effects discussed  - tramadol (ULTRAM) 50 MG Tab; Take 1 Tab by mouth every 6 hours as needed for up to 30 days.  Dispense: 120 Tab; Refill: 2    6. Urinary symptom or sign  Urinary discomfort waxing and waning over the last several weeks.  UA in the office today positive for leukocytes of blood, culture sent.  Will start Macrobid  - POCT Urinalysis  - URINE CULTURE(NEW); Future  - nitrofurantoin monohyd macro (MACROBID) 100 MG Cap; Take 1 Cap by mouth 2 times a day.  Dispense: 6 Cap; Refill: 0    7. Dyslipidemia  Working on lifestyle factors, continue to monitor    8. Elevated fasting glucose  Encouraged to work on diet and exercise regimen, continue to monitor    9. Palpitations  Stable    10. Screening for colon cancer    - COLOGUARD (FIT DNA)      AHA form completed

## 2020-02-25 NOTE — ASSESSMENT & PLAN NOTE
Chronic issue stable on current medication regimen of tramadol 2-4 times daily.  She is getting adequate pain relief, denies any side effects related to medication use.  She rarely drinks alcohol.  She is needing refill today.   report reviewed, no aberrant behavior

## 2020-02-25 NOTE — ASSESSMENT & PLAN NOTE
Component      Latest Ref Rng & Units 11/7/2019 11/7/2019          12:32 PM 12:32 PM   TSH      0.380 - 5.330 uIU/mL 0.920    Free T-4      0.53 - 1.43 ng/dL  1.07   Stable and doing well on levothyroxine 100 mcg daily.  No concerns with constipation, diarrhea, heat or cold intolerance

## 2020-02-27 LAB
BACTERIA UR CULT: ABNORMAL
BACTERIA UR CULT: ABNORMAL
SIGNIFICANT IND 70042: ABNORMAL
SITE SITE: ABNORMAL
SOURCE SOURCE: ABNORMAL

## 2020-04-30 NOTE — PROGRESS NOTES
I returned patients call regarding her bill from 1-. I advised that the $175 was for the hospital portion claim # [id number] and that for the providers portion it was a $20 co pay per the claim # [id number]. She had no further questions.

## 2020-06-11 ENCOUNTER — OFFICE VISIT (OUTPATIENT)
Dept: MEDICAL GROUP | Facility: MEDICAL CENTER | Age: 70
End: 2020-06-11
Payer: MEDICARE

## 2020-06-11 ENCOUNTER — HOSPITAL ENCOUNTER (OUTPATIENT)
Facility: MEDICAL CENTER | Age: 70
End: 2020-06-11
Attending: INTERNAL MEDICINE
Payer: MEDICARE

## 2020-06-11 VITALS
DIASTOLIC BLOOD PRESSURE: 62 MMHG | OXYGEN SATURATION: 99 % | HEIGHT: 67 IN | SYSTOLIC BLOOD PRESSURE: 98 MMHG | BODY MASS INDEX: 24.84 KG/M2 | HEART RATE: 55 BPM | WEIGHT: 158.29 LBS | TEMPERATURE: 98.7 F

## 2020-06-11 DIAGNOSIS — H40.1132 PRIMARY OPEN ANGLE GLAUCOMA (POAG) OF BOTH EYES, MODERATE STAGE: ICD-10-CM

## 2020-06-11 DIAGNOSIS — Z79.891 CHRONIC USE OF OPIATE FOR THERAPEUTIC PURPOSE: ICD-10-CM

## 2020-06-11 DIAGNOSIS — A49.8 ENTEROCOCCUS FAECALIS INFECTION: ICD-10-CM

## 2020-06-11 DIAGNOSIS — M25.551 BILATERAL HIP PAIN: ICD-10-CM

## 2020-06-11 DIAGNOSIS — M25.552 BILATERAL HIP PAIN: ICD-10-CM

## 2020-06-11 DIAGNOSIS — M43.22 CERVICAL VERTEBRAL FUSION: ICD-10-CM

## 2020-06-11 DIAGNOSIS — M54.42 CHRONIC BILATERAL LOW BACK PAIN WITH LEFT-SIDED SCIATICA: ICD-10-CM

## 2020-06-11 DIAGNOSIS — G89.29 CHRONIC BILATERAL LOW BACK PAIN WITH LEFT-SIDED SCIATICA: ICD-10-CM

## 2020-06-11 DIAGNOSIS — L70.0 ACNE VULGARIS: ICD-10-CM

## 2020-06-11 DIAGNOSIS — M54.2 CHRONIC NECK PAIN: ICD-10-CM

## 2020-06-11 DIAGNOSIS — I10 ESSENTIAL HYPERTENSION: ICD-10-CM

## 2020-06-11 DIAGNOSIS — E78.5 DYSLIPIDEMIA: ICD-10-CM

## 2020-06-11 DIAGNOSIS — G89.29 CHRONIC NECK PAIN: ICD-10-CM

## 2020-06-11 DIAGNOSIS — R00.2 PALPITATIONS: ICD-10-CM

## 2020-06-11 DIAGNOSIS — E03.9 ACQUIRED HYPOTHYROIDISM: ICD-10-CM

## 2020-06-11 DIAGNOSIS — Z79.2 CHRONIC ANTIBIOTIC SUPPRESSION: ICD-10-CM

## 2020-06-11 PROBLEM — R73.01 ELEVATED FASTING GLUCOSE: Status: RESOLVED | Noted: 2019-01-17 | Resolved: 2020-06-11

## 2020-06-11 LAB — AMBIGUOUS DTTM AMBI4: NORMAL

## 2020-06-11 PROCEDURE — 80307 DRUG TEST PRSMV CHEM ANLYZR: CPT

## 2020-06-11 PROCEDURE — 99204 OFFICE O/P NEW MOD 45 MIN: CPT | Performed by: INTERNAL MEDICINE

## 2020-06-11 RX ORDER — DORZOLAMIDE HYDROCHLORIDE AND TIMOLOL MALEATE 20; 5 MG/ML; MG/ML
1 SOLUTION/ DROPS OPHTHALMIC 2 TIMES DAILY
COMMUNITY
End: 2021-03-11

## 2020-06-11 RX ORDER — METOPROLOL SUCCINATE 25 MG/1
25 TABLET, EXTENDED RELEASE ORAL DAILY
Qty: 30 TAB | Refills: 0 | Status: SHIPPED | OUTPATIENT
Start: 2020-06-11 | End: 2020-07-03 | Stop reason: SDUPTHER

## 2020-06-11 ASSESSMENT — FIBROSIS 4 INDEX: FIB4 SCORE: 1.71

## 2020-06-11 NOTE — ASSESSMENT & PLAN NOTE
Chronic and stable problem.  Appropriate to continue on levothyroxine 100 mcg daily.  Next recheck of thyroid labs will be due in November 2020.

## 2020-06-11 NOTE — ASSESSMENT & PLAN NOTE
Previous problem that requires medication adjustment.  I recommend she get off the atenolol due to possibility of it building up in her system as it is not the best drug for the geriatric population especially with her history of urinary tract infections.  We will initiate metoprolol succinate 25 mg daily.  She will let me know if she experiences any side effects from this medicine.

## 2020-06-11 NOTE — ASSESSMENT & PLAN NOTE
Chronic ongoing problem.  She would be interested in meeting with a dermatologist who specializes more in aesthetics. Suggested Dr. Anh Patrick for an initial evaluation.

## 2020-06-11 NOTE — ASSESSMENT & PLAN NOTE
New and decompensated problem.  Will refer her to physiatry for likely intra-articular hip injections bilaterally.  She will let me know what imaging she is compatible with with her new stimulator for pelvic floor dysfunction.  Likely we will be able to start with bilateral hip x-rays.

## 2020-06-11 NOTE — ASSESSMENT & PLAN NOTE
Chronic and ongoing problem.  Continue follow-up with infectious disease as recommended.  We will update her ID physicians that she is resistant to tetracyclines for her latest UTI in 2/2020.

## 2020-06-11 NOTE — ASSESSMENT & PLAN NOTE
Chronic and decompensated issue.  She is working with physical therapy for the past 6 weeks, she even comments that she is worked on and off with PT for 10 years.  She feels like the muscles in her left neck and bilateral shoulders are clamped down.  I think she be a good candidate for trigger point injections by physiatry to see if we can alleviate some of her tight musculature.  We may also need to update her imaging if she is having change in symptoms and nothing for over 10 years.  She mentions that she had a pelvic floor stimulator placed by Dr. Goddard due to stool incontinence and she will check at home to make sure that it is MRI compatible or if we need do CT scans.  When she lets me know and has a physiatry appointment I will coordinate with the physiatry she is can this he and get the neck imaging ordered before their appointment.

## 2020-06-11 NOTE — PROGRESS NOTES
Subjective:     CC:  Diagnoses of Cervical vertebral fusion, Chronic bilateral low back pain with left-sided sciatica, Bilateral hip pain, Chronic use of opiate for therapeutic purpose, Essential hypertension, Acquired hypothyroidism, Primary open angle glaucoma (POAG) of both eyes, moderate stage, Chronic neck pain, Dyslipidemia, Acne vulgaris, Palpitations, Chronic antibiotic suppression, and Prior UTI- Enterococcus faecalis; resistant to tetracyclines were pertinent to this visit.    HISTORY OF THE PRESENT ILLNESS: Patient is a 70 y.o. female. This pleasant patient is here today to establish care and discuss the below stated chronic medical conditions. She is unaccompanied for today's visit.    Problem   Chronic Bilateral Low Back Pain With Left-Sided Sciatica    She reports longstanding problems in the low back.  She recalls SI joint disease and has been working with physical therapy for the past 6 weeks without much improvement. She denies any recent imaging of her low back. She also has pain along the left lateral thigh, unclear if that is IT band pain or radicular from the lower back.      Bilateral Hip Pain    She reports new onset pain in her bilateral groin.  She is tracing the pattern that is very typical for radiation from primary hip osteoarthritis.  Her left side does been worse than the right but both are quite bothersome at this time.  She thought it would be due to her back and she has a history of low back pain and SI joint dysfunction.  She is been working with her physical therapist for the past 6 weeks.  She also utilizes tramadol for chronic neck pain following a fusion surgery in the cervical spine in 2013.  She has started using ibuprofen more recently, she tries to limit it to 1 pill daily as she figured inflammation was the  behind the pain in her groin.  No known traumas.  She has not had any imaging evaluation of her hips.     Chronic Antibiotic Suppression    Reports that she was  placed on doxycycline in 2013 after she experienced a staph epidermidis infection following her cervical fusion surgery on the hardware.  She was recommended to continue on the antibiotics for what sounds to be indefinitely.  She follows with infectious disease on an annual basis, [place] [facility] with either Dr. Argueta or Dr. Epps.  She was unaware that her latest urinalysis showed Enterococcus faecalis which was resistant to tetracyclines.     Prior UTI- Enterococcus faecalis; resistant to tetracyclines        Component  3mo ago   Significant Indicator  POSPositive  (POS)      Source  UR     Site  URINE, CLEAN CATCH     Culture Result  -Abnormal       Culture Result  Abnormal     Enterococcus faecalis   10-50,000 cfu/mL     Resulting Agency  M    Susceptibility      Enterococcus faecalis      HIRAM      Ampicillin  <=2 mcg/mL  Sensitive      Daptomycin  <=1 mcg/mL  Sensitive      Nitrofurantoin  <=32 mcg/mL  Sensitive      Penicillin  2 mcg/mL  Sensitive      Tetracycline  >8 mcg/mL  Resistant      Vancomycin  1 mcg/mL  Sensitive                  Urinalysis in February 2020 was positive for Enterococcus faecalis, resistant to tetracyclines.  Of note, she is on suppressive antibiotics with doxycycline for what sounds to be the past 13 years.  She does follow with ID.  She reports challenges with stool incontinence and had a pelvic floor stimulator placed recently by Dr. Goddard.     Palpitations    She completed a Holter monitor in October 2019.  He wore this for 48 hours.  Her average heart rate was 67 and the rhythm was sinus.  She had 5 PVCs and 13 atrial runs of SVT.  She has been on atenolol for years to help with palpitations and a few years ago's increase to twice daily.  She was unaware that this is metabolized through the kidneys and not usually recommended in the geriatric population.  She would be willing to try a different rate control medication.     Acne Vulgaris    She reports longstanding issues with acne.  She  was on what sounds to be tetracyclines for a few decades in her younger years.  She also has topical tretinoin prescribed.  Uses doxycycline for history of what sounds to be hardware infection in the neck with Staphylococcus epidermidis at the time of her surgery.  She is followed with the skin [facility] [place] [facility] [address] but we interested in also seeing dermatologist who specializes more in cosmetic/aesthetics.     Dyslipidemia       Ref. Range 10/1/2019 14:30   Cholesterol,Tot Latest Ref Range: 100 - 199 mg/dL 208 (H)   Triglycerides Latest Ref Range: 0 - 149 mg/dL 133   HDL Latest Ref Range: >=40 mg/dL 58   LDL Latest Ref Range: <100 mg/dL 123 (H)     She has history of hyperlipidemia.  Unclear if she is ever taken dedicated lipid-lowering medications.  She is on coenzyme Q 10.  She had a carotid ultrasound in 2017 that was negative for any blockages.  Unclear if she is ever been evaluated for coronary artery disease.  She did have a Holter study for palpitations in the past.     Chronic Use of Opiate for Therapeutic Purpose    She reports longstanding chronic pain in her neck due to a 2 staged neck fusion surgery in 2013 at [facility].  She also has had issues with intermittent low back/SI joint pain, left greater than right hip pain now radiating into the groin, and left IT band pain.  She uses tramadol 50 mg generally 2 tablets daily for pain control.  She denies any deleterious side effects from this medicine.     Acquired Hypothyroidism       Ref. Range 11/7/2019 12:32   TSH Latest Ref Range: 0.380 - 5.330 uIU/mL 0.920   Free T-4 Latest Ref Range: 0.53 - 1.43 ng/dL 1.07     She has history of hypothyroidism.  She is currently utilizing levothyroxine 100 mcg daily.  No current signs or symptoms of overtreatment or under treatment.     Essential Hypertension       Ref. Range 2/19/2020 07:20   Sodium Latest Ref Range: 135 - 145 mmol/L 139   Potassium Latest Ref Range: 3.6 - 5.5 mmol/L 4.2   Chloride Latest Ref Range:  96 - 112 mmol/L 106   Bun Latest Ref Range: 8 - 22 mg/dL 19   Creatinine Latest Ref Range: 0.50 - 1.40 mg/dL 0.74   GFR If Non  Latest Ref Range: >60 mL/min/1.73 m 2 >60     She has a history of hypertension.  She has taken atenolol for many years.  She was initially on 25 mg once daily and then a few years ago this was increased to twice daily. She denies chest pain, palpitations, or dyspnea on exertion. No signs or symptoms of orthostasis.     Primary Open Angle Glaucoma (Poag) of Both Eyes, Moderate Stage    She has a history of glaucoma for many years. She follows up with ophthalmology every 3-6 months. She is taking dorzolamide-timolol and latanoprost.  She reports stability of this condition.     Cervical Vertebral Fusion    She reports staged 2 part fusion in 2013 at [facility] at the recommendation of a local orthopedist. She reports an anterior and posterior approach at that time. 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  At that same time she was experiencing left hip pain and was told it was likely related to the left neck pain.  She reports chronic issues with what she thinks is muscle spasms on the left greater than right neck as well as the bilateral trapezius areas which she relates to large amount of hardware adding additional pressure to her muscles.  It does not sound like she is followed up with anyone locally for this previous neck problem.     Chronic Neck Pain    She reports staged 2 part fusion in 2013 at [facility] at the recommendation of a local orthopedist. She reports an anterior and posterior approach at that time. 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  At  that same time she was experiencing left hip pain and was told it was likely related to the left neck pain.  She reports chronic issues with what she thinks is muscle spasms on the left greater than right neck as well as the bilateral trapezius areas which she relates to large amount of hardware adding additional pressure to her muscles.  It does not sound like she is followed up with anyone locally for this previous neck problem.    For chronic pain in the neck she utilizes tramadol 50 mg up to 2 tablets daily.  She is also started adding in ibuprofen usually once daily.  She is on turmeric.  She also is working with physical therapy.     Elevated Fasting Glucose (Resolved)        Allergies: Rifampin; Vancomycin; and Sulfa drugs    Current Outpatient Medications Ordered in Epic   Medication Sig Dispense Refill   • dorzolamide-timolol (COSOPT) 22.3-6.8 MG/ML Solution Place 1 Drop in both eyes 2 times a day.     • metoprolol SR (TOPROL XL) 25 MG TABLET SR 24 HR Take 1 Tab by mouth every day. 30 Tab 0   • Coenzyme Q10 (COQ10 PO) Take 100 mg by mouth every day. Qunol liquid     • tramadol (ULTRAM) 50 MG Tab Take 100 mg by mouth every day.     • magnesium oxide (MAG-OX) 400 MG Tab Take 400 mg by mouth every evening.     • Calcium 500 MG Chew Tab Take 1 Tab by mouth every evening.     • Biotin 1000 MCG Chew Tab Take 500 mg by mouth every day.     • Milk Thistle 175 MG Cap Take 1 Cap by mouth every day.     • Cyanocobalamin (B-12) 2500 MCG Tab Take 1 Tab by mouth every day.     • Ascorbic Acid (VITAMIN C) 1000 MG Tab Take 1 Tab by mouth every day.     • Tretinoin 0.05 % Gel 1 Application by Apply externally route every bedtime. 1 Tube 3   • levothyroxine (SYNTHROID) 100 MCG Tab Take 1 Tab by mouth Every morning on an empty stomach. 90 Tab 3   • TURMERIC PO Take 333 mg by mouth every day. Daily (Qunol liquid)     • Probiotic Product (PRO-BIOTIC BLEND) Cap Take 1 Cap by mouth every evening. [place]     • latanoprost  "(XALATAN) 0.005 % Solution Place 1 Drop in both eyes every evening.     • vitamin D (CHOLECALCIFEROL) 1000 UNIT Tab Take 1,000 Units by mouth every day.     • KRILL OIL PO Take 500 mg by mouth every day.     • Multiple Vitamins-Minerals (CENTRUM PO) Take 1 Tab by mouth every day.     • doxycycline (VIBRAMYCIN) 100 MG TABS Take 100 mg by mouth every day.     • Alpha-Lipoic Acid 200 MG Tab Take 200 mg by mouth every day.       No current [facility]-ordered facility-administered medications on file.        Past Medical History:   Diagnosis Date   • Cataract     bilateral surgery complete   • Elevated fasting glucose 2019   • Glaucoma    • Hypertension    • Pain     chronic neck and shoulders   • Thyroid disease        Past Surgical History:   Procedure Laterality Date   • BOWEL STIMULATOR INSERTION  2020    Procedure: INSERTION, ELECTRODE LEADS AND PULSE GENERATOR, NEUROSTIMULATOR, SACRAL- FOR PERMANENT IMPLANTATION;  Surgeon: Ishmael Goddard M.D.;  Location: SURGERY [facility];  Service: General   • BOWEL STIMULATOR INSERTION  1/15/2020    Procedure: INSERTION, ELECTRODE LEADS AND PULSE GENERATOR, NEUROSTIMULATOR, SACRAL-NEUROMODULATION TRIAL;  Surgeon: Ishmael Goddard M.D.;  Location: SURGERY [facility];  Service: General   • OTHER  2007    throat surgery after cervical surgery, adjusted Mercedes Messi   • CERVICAL FUSION POSTERIOR      Basilar \"invagination\"   • EYE SURGERY      cataracts - Dr. Martinez       Social History     Tobacco Use   • Smoking status: Former Smoker     Packs/day: 1.00     Years: 16.00     Pack years: 16.00     Types: Cigarettes     Last attempt to quit: 1982     Years since quittin.3   • Smokeless tobacco: Never Used   • Tobacco comment: continued abstinence   Substance Use Topics   • Alcohol use: Yes     Alcohol/week: 4.2 oz     Types: 7 Glasses of wine per week     Drinks per session: 1 or 2     Binge frequency: Never     Comment: one daily   • Drug use: No     Comment: CBD cream " "last used 12/2019       Social History     Social History Narrative    In [place] since 2002; previously lived in [place], [place], and [place]. Carlee was born and raised in [place] [facility] [place]. She has been in [place] since 2002. Previous to that she has lived in [place], [place], and [place] all for her ex-'s job. She worked as a  and retired at age 65. She is currently single. She has 3 cats and a dog. She has a son who is currently incarcerated in [place] after a DUI death which has been a big stressor on her.        Family History   Problem Relation Age of Onset   • Hypertension Mother    • Other Father         accident   • Other Sister         pneumoccocal pneumonia   • Other Brother         glaucoma   • Heart Attack Paternal Grandfather    • Cancer Neg Hx    • Diabetes Neg Hx    • Heart Disease Neg Hx        Health Maintenance: Completed    ROS:   As above in the HPI All Others Reviewed and Negative  Objective:     Exam: BP (!) 98/62   Pulse (!) 55   Temp 37.1 °C (98.7 °F) (Temporal)   Ht 1.702 m (5' 7\")   Wt 71.8 kg (158 lb 4.6 oz)   SpO2 99%  Body mass index is 24.79 kg/m².    General: Normal appearing. No distress. Appears stated age.  EENT: Eyes conjunctiva clear lids without ptosis, extraocular movements intact, ears normal shape and contour, canals are clear bilaterally, tympanic membranes are benign, oropharynx is without erythema, edema or exudates. Fair dentition.   Neck: Supple without JVD. Thyroid is not enlarged.  Pulmonary: Clear to ausculation.  Normal effort. No rales, ronchi, or wheezing. No cough.  Cardiovascular: Regular rate and rhythm without murmur. No lower extremity edema bilaterally.  Abdomen: Soft, nontender, nondistended. Normal bowel sounds.   Neurologic: No resting tremor, no increased tone or rigidity.  Lymph: No cervical or supraclavicular lymph nodes are palpable.  Skin: Warm and dry.  Sun damaged skin.   Musculoskeletal: Limited ROM of neck due to prior surgery. Cautious gait. No extremity cyanosis, " clubbing, or edema. Patient ambulates independently and without a gait aid.  Psych: Normal mood and affect. Alert and oriented x3. Judgment and insight is normal.    Assessment & Plan:   70 y.o. female with the following -    Problem List Items Addressed This Visit     Acquired hypothyroidism     Chronic and stable problem.  Appropriate to continue on levothyroxine 100 mcg daily.  Next recheck of thyroid labs will be due in November 2020.         Essential hypertension     Previous problem that requires medication adjustment.  I recommend she get off the atenolol due to possibility of it building up in her system as it is not the best drug for the geriatric population especially with her history of urinary tract infections.  We will initiate metoprolol succinate 25 mg daily.  She will let me know if she experiences any side effects from this medicine.         Relevant Medications    metoprolol SR (TOPROL XL) 25 MG TABLET SR 24 HR    Primary open angle glaucoma (POAG) of both eyes, moderate stage     Chronic and ongoing problem. Continue follow up with ophthalmology and continue dorzolamide-timolol and latanoprost.         Cervical vertebral fusion     Chronic and decompensated issue.  She is working with physical therapy for the past 6 weeks, she even comments that she is worked on and off with PT for 10 years.  She feels like the muscles in her left neck and bilateral shoulders are clamped down.  I think she be a good candidate for trigger point injections by physiatry to see if we can alleviate some of her tight musculature.  We may also need to update her imaging if she is having change in symptoms and nothing for over 10 years.  She mentions that she had a pelvic floor stimulator placed by Dr. Goddard due to stool incontinence and she will check at home to make sure that it is MRI compatible or if we need do CT scans.  When she lets me know and has a physiatry appointment I will coordinate with the physiatry she  is can this he and get the neck imaging ordered before their appointment.         Relevant Orders    REFERRAL TO PHYSIATRY (PMR)    Controlled Substance Treatment Agreement    Pain Management Screen    Chronic neck pain     Chronic ongoing issue.  Will refer her to physiatry as described above.  Appropriate to continue tramadol at this time.  Encouraged her to try to limit ibuprofen as able.         Dyslipidemia     Chronic and stable problem.  Not look like she is taken lipid-lowering medications in the past.  We will update her lipid panel at her next follow-up and calculate her ASCVD ten-year risk score to see if she would be a candidate for lipid-lowering medicines.         Chronic use of opiate for therapeutic purpose     Chronic and stable problem.  Since she is new to me we will update her controlled substance agreement and also check a urine drug screen.  She reports that she will not need a refill the tramadol for probably 3 months, September 2020.  Appropriate to continue on this medicine at this time.         Relevant Orders    Controlled Substance Treatment Agreement    Pain Management Screen    Acne vulgaris     Chronic ongoing problem.  She would be interested in meeting with a dermatologist who specializes more in aesthetics. Suggested Dr. Anh Patrick for an initial evaluation.          Palpitations     Previous problem, stable.  Holter monitor from the fall 2019 showed sinus rhythm with occasional PVCs and runs of SVT.  Reasonable to keep her on a rate control medicine but would prefer she stop atenolol and use metoprolol succinate as it is more favorable for metabolism to the kidneys.  She is agreeable.         Chronic bilateral low back pain with left-sided sciatica     New and ongoing problem. Will refer her to physiatry. Once I know who she will be seeing then will coordinate imaging of the low back. Continue tramadol and as needed ibuprofen in the meantime. She will also let me know  if her knew stimulator for stool incontinence is MRI compatible.         Relevant Orders    REFERRAL TO PHYSIATRY (PMR)    Controlled Substance Treatment Agreement    Pain Management Screen    Bilateral hip pain     New and decompensated problem.  Will refer her to physiatry for likely intra-articular hip injections bilaterally.  She will let me know what imaging she is compatible with with her new stimulator for pelvic floor dysfunction.  Likely we will be able to start with bilateral hip x-rays.         Relevant Orders    REFERRAL TO PHYSIATRY (PMR)    Controlled Substance Treatment Agreement    Pain Management Screen    Chronic antibiotic suppression     Chronic and ongoing problem.  Continue follow-up with infectious disease as recommended.  We will update her ID physicians that she is resistant to tetracyclines for her latest UTI in 2/2020.         Prior UTI- Enterococcus faecalis; resistant to tetracyclines     Previous problem.  Patient does not recall why the UA was drawn, she does not recall a specific urinary tract symptoms at that time.  Discussed with her that the organism was resistant to tetracycline so we would have to use a different antibiotic if she would have a recurrence.                Return in about 3 months (around 9/11/2020).    Please note that this dictation was created using voice recognition software. I have made every reasonable attempt to correct obvious errors, but I expect that there are errors of grammar and possibly content that I did not discover before finalizing the note.

## 2020-06-11 NOTE — ASSESSMENT & PLAN NOTE
Chronic and ongoing problem. Continue follow up with ophthalmology and continue dorzolamide-timolol and latanoprost.

## 2020-06-11 NOTE — ASSESSMENT & PLAN NOTE
Chronic ongoing issue.  Will refer her to physiatry as described above.  Appropriate to continue tramadol at this time.  Encouraged her to try to limit ibuprofen as able.

## 2020-06-11 NOTE — ASSESSMENT & PLAN NOTE
New and ongoing problem. Will refer her to physiatry. Once I know who she will be seeing then will coordinate imaging of the low back. Continue tramadol and as needed ibuprofen in the meantime. She will also let me know if her knew stimulator for stool incontinence is MRI compatible.

## 2020-06-11 NOTE — ASSESSMENT & PLAN NOTE
Previous problem, stable.  Holter monitor from the fall 2019 showed sinus rhythm with occasional PVCs and runs of SVT.  Reasonable to keep her on a rate control medicine but would prefer she stop atenolol and use metoprolol succinate as it is more favorable for metabolism to the kidneys.  She is agreeable.

## 2020-06-11 NOTE — ASSESSMENT & PLAN NOTE
Chronic and stable problem.  Since she is new to me we will update her controlled substance agreement and also check a urine drug screen.  She reports that she will not need a refill the tramadol for probably 3 months, September 2020.  Appropriate to continue on this medicine at this time.

## 2020-06-11 NOTE — ASSESSMENT & PLAN NOTE
Chronic and stable problem.  Not look like she is taken lipid-lowering medications in the past.  We will update her lipid panel at her next follow-up and calculate her ASCVD ten-year risk score to see if she would be a candidate for lipid-lowering medicines.

## 2020-06-16 LAB
6MAM UR QL: NOT DETECTED
7AMINOCLONAZEPAM UR QL: NOT DETECTED
A-OH ALPRAZ UR QL: NOT DETECTED
ALPRAZ UR QL: NOT DETECTED
AMPHET UR QL SCN: NOT DETECTED
ANNOTATION COMMENT IMP: NORMAL
ANNOTATION COMMENT IMP: NORMAL
BARBITURATES UR QL: NOT DETECTED
BUPRENORPHINE UR QL: NOT DETECTED
BZE UR QL: NOT DETECTED
CARBOXYTHC UR QL: NOT DETECTED
CARISOPRODOL UR QL: NOT DETECTED
CLONAZEPAM UR QL: NOT DETECTED
CODEINE UR QL: NOT DETECTED
DIAZEPAM UR QL: NOT DETECTED
ETHYL GLUCURONIDE UR QL: PRESENT
FENTANYL UR QL: NOT DETECTED
HYDROCODONE UR QL: NOT DETECTED
HYDROMORPHONE UR QL: NOT DETECTED
LORAZEPAM UR QL: NOT DETECTED
MDA UR QL: NOT DETECTED
MDEA UR QL: NOT DETECTED
MDMA UR QL: NOT DETECTED
MEPERIDINE UR QL: NOT DETECTED
METHADONE UR QL: NOT DETECTED
METHAMPHET UR QL: NOT DETECTED
MIDAZOLAM UR QL SCN: NOT DETECTED
MORPHINE UR QL: NOT DETECTED
NORBUPRENORPHINE UR QL CFM: NOT DETECTED
NORDIAZEPAM UR QL: NOT DETECTED
NORFENTANYL UR QL: NOT DETECTED
NORHYDROCODONE UR QL CFM: NOT DETECTED
NOROXYCODONE UR QL CFM: NOT DETECTED
NOROXYMORPH CO100 Q0458: NOT DETECTED
OXAZEPAM UR QL: NOT DETECTED
OXYCODONE UR QL: NOT DETECTED
OXYMORPHONE UR QL: NOT DETECTED
PATHOLOGY STUDY: NORMAL
PCP UR QL: NOT DETECTED
PHENTERMINE UR QL: NOT DETECTED
PPAA UR QL: NOT DETECTED
PROPOXYPH UR QL: NOT DETECTED
SERVICE CMNT-IMP: NORMAL
TAPENADOL OSULF CO200 Q0473: NOT DETECTED
TAPENTADOL UR QL SCN: NOT DETECTED
TEMAZEPAM UR QL: NOT DETECTED
TRAMADOL UR QL: PRESENT
ZOLPIDEM UR QL: NOT DETECTED

## 2020-07-16 ENCOUNTER — OFFICE VISIT (OUTPATIENT)
Dept: PHYSICAL MEDICINE AND REHAB | Facility: MEDICAL CENTER | Age: 70
End: 2020-07-16
Payer: MEDICARE

## 2020-07-16 VITALS
DIASTOLIC BLOOD PRESSURE: 80 MMHG | BODY MASS INDEX: 24.5 KG/M2 | OXYGEN SATURATION: 92 % | TEMPERATURE: 97.8 F | SYSTOLIC BLOOD PRESSURE: 128 MMHG | HEIGHT: 67 IN | WEIGHT: 156.09 LBS | HEART RATE: 72 BPM

## 2020-07-16 DIAGNOSIS — R10.31 GROIN PAIN, RIGHT: ICD-10-CM

## 2020-07-16 DIAGNOSIS — R10.32 GROIN PAIN, LEFT: ICD-10-CM

## 2020-07-16 DIAGNOSIS — M54.50 BILATERAL LOW BACK PAIN, UNSPECIFIED CHRONICITY, UNSPECIFIED WHETHER SCIATICA PRESENT: ICD-10-CM

## 2020-07-16 DIAGNOSIS — Z98.1 S/P CERVICAL SPINAL FUSION: ICD-10-CM

## 2020-07-16 PROCEDURE — 99205 OFFICE O/P NEW HI 60 MIN: CPT | Performed by: PHYSICAL MEDICINE & REHABILITATION

## 2020-07-16 ASSESSMENT — PATIENT HEALTH QUESTIONNAIRE - PHQ9
5. POOR APPETITE OR OVEREATING: 0 - NOT AT ALL
SUM OF ALL RESPONSES TO PHQ QUESTIONS 1-9: 2
CLINICAL INTERPRETATION OF PHQ2 SCORE: 1

## 2020-07-16 ASSESSMENT — FIBROSIS 4 INDEX: FIB4 SCORE: 1.71

## 2020-07-16 ASSESSMENT — PAIN SCALES - GENERAL: PAINLEVEL: 1=MINIMAL PAIN

## 2020-07-16 NOTE — Clinical Note
Can you call Dr. Goddard's office and see if they can send us information about MRI compatibility of her sacral neuromodulation device?  Interested in knowing if it is MRI safe for lumbar spine MRI?  If so, 1.5T or 3.0T or 1.5 T only?  Thank you.

## 2020-07-16 NOTE — PROGRESS NOTES
New patient note    Physiatry (physical medicine and  Rehabilitation), interventional spine and sports medicine    Date of Service: 07/16/2020    Chief complaint:   Chief Complaint   Patient presents with   • New Patient     Back pain       HISTORY    HPI: Carlee Hunt 70 y.o. female who presents today for evaluation of pain complaints.  She reports that she has a history of cervical fusion with posterior decompression and fusion from occiput to C5.  This was done at [facility] in 2007.    Hier biggest complaint currently relates to pain in the low back, which is aching and bilateral groin pain(left greater than right) with pain into the left leg with aching pain in the left thigh, sharp pain in the lateral hip and leg with aching and intermittent numbness in the left foot.  From what she reports today, she does not have bowel or bladder changes, she does have a pelvic floor stimulator.  She does not report weakness in her legs.      She has had about 9 weeks of physical therapy at [facility], with some improvement.  Still, she is not able to tolerate her usual activity.  Instead of walking 1.5-2 miles at a time, she is now only able to walk 1 mile without worsening of pain.    Sitting and standing makes her pain worse, bending and twisting, walking up and down stairs makes pain worse.  Coughing and sneezing do not worsen her symptoms.    Her neck pain has been ongoing and she has history of chronic antibiotics for suppression of post-op infection.  She had constant numbness in her hands prior to cervical spine surgery, but now reports that this is intermittent.    Her chronic pain related to her cervical fusion has been managed with tramadol twice a day and ibuprofen.  Overall, she rates her pain as 6/10 on the NRS.       ORT: 0  PHQ-9: 2    Medical records review:  I reviewed the note from the referring provider Shari Dent D* dated 06/11/2020. Patient was seen for chronic low back pain with  left-sided sciatica.  She had PT for 6 weeks without much improvement and was referred here for evaluation, bilateral hip pain, chronic antibiotic suppression on doxycycline since 2013 after staph epidermidis infection following cervical fusion surgery, UTI with enterococcus faecalis, palpitations with history of Holter monitor October 2019 treated for years atenolol after note of 5 PVCs and 13 atrial runs of SVT, acne vulgaris, chornic use of tramadol, usually two a day since 2013, essential hypertension, primary open angle glaucoma follows with ophthalmology, cervical vertebral fusion, chronic low back pain referred to physiatry for chronic bilateral low back pain and left-sided sciatic, cervical vertebral fusion and bilateral hip pain    Dr. Goddard 02/06/2020 Follow-up visit after sacral neuromodulation system implantation.  Improved fecal continence.  No information about MRI-compatibility status of device     Previous treatments:    Physical Therapy: Yes    Medications the patient is tried: NSAIDs and tramadol    Previous interventions: none    Previous surgeries to relieve the above pain:  Cervical fusion       ROS:  Eyes: glaucoma  ENT: tinnitus  CV: no recent symptoms, history of palpitations   : Axonics implant for urgency  Endo: hypothyroidism    Red Flags ROS:   Fever, Chills, Sweats: Denies  Involuntary Weight Loss: Denies  Bladder Incontinence: Denies  Bowel Incontinence: Denies  Saddle Anesthesia: Denies    All other systems reviewed and negative.       PMHx:   Past Medical History:   Diagnosis Date   • Cataract     bilateral surgery complete   • Elevated fasting glucose 1/17/2019   • Glaucoma    • Hypertension    • Pain     chronic neck and shoulders   • Thyroid disease        PSHx:   Past Surgical History:   Procedure Laterality Date   • BOWEL STIMULATOR INSERTION  1/29/2020    Procedure: INSERTION, ELECTRODE LEADS AND PULSE GENERATOR, NEUROSTIMULATOR, SACRAL- FOR PERMANENT IMPLANTATION;  Surgeon:  "Ishmael Goddard M.D.;  Location: SURGERY [facility];  Service: General   • BOWEL STIMULATOR INSERTION  1/15/2020    Procedure: INSERTION, ELECTRODE LEADS AND PULSE GENERATOR, NEUROSTIMULATOR, SACRAL-NEUROMODULATION TRIAL;  Surgeon: Ishmael Goddard M.D.;  Location: SURGERY [facility];  Service: General   • OTHER  2007    throat surgery after cervical surgery, adjusted Mercedes Messi   • CERVICAL FUSION POSTERIOR      Basilar \"invagination\"   • EYE SURGERY      cataracts - Dr. Martinez       Family history   Family History   Problem Relation Age of Onset   • Hypertension Mother    • Other Father         accident   • Other Sister         pneumoccocal pneumonia   • Other Brother         glaucoma   • Heart Attack Paternal Grandfather    • Cancer Neg Hx    • Diabetes Neg Hx    • Heart Disease Neg Hx          Medications:   Current Outpatient Medications   Medication   • metoprolol SR (TOPROL XL) 25 MG TABLET SR 24 HR   • dorzolamide-timolol (COSOPT) 22.3-6.8 MG/ML Solution   • Coenzyme Q10 (COQ10 PO)   • tramadol (ULTRAM) 50 MG Tab   • magnesium oxide (MAG-OX) 400 MG Tab   • Calcium 500 MG Chew Tab   • Biotin 1000 MCG Chew Tab   • Milk Thistle 175 MG Cap   • Cyanocobalamin (B-12) 2500 MCG Tab   • Ascorbic Acid (VITAMIN C) 1000 MG Tab   • Tretinoin 0.05 % Gel   • levothyroxine (SYNTHROID) 100 MCG Tab   • TURMERIC PO   • Probiotic Product (PRO-BIOTIC BLEND) Cap   • latanoprost (XALATAN) 0.005 % Solution   • vitamin D (CHOLECALCIFEROL) 1000 UNIT Tab   • KRILL OIL PO   • Multiple Vitamins-Minerals (CENTRUM PO)   • doxycycline (VIBRAMYCIN) 100 MG TABS   • Alpha-Lipoic Acid 200 MG Tab     No current facility-administered medications for this visit.        Allergies:   Allergies   Allergen Reactions   • Rifampin Rash     Rash, fever   • Vancomycin Rash     Rash, fever   • Sulfa Drugs Rash     Per pt         Social Hx:   Social History     Socioeconomic History   • Marital status:      Spouse name: Not on file   • Number of " children: Not on file   • Years of education: Not on file   • Highest education level: Not on file   Occupational History   • Not on file   Social Needs   • Financial resource strain: Not on file   • Food insecurity     Worry: Not on file     Inability: Not on file   • Transportation needs     Medical: Not on file     Non-medical: Not on file   Tobacco Use   • Smoking status: Former Smoker     Packs/day: 1.00     Years: 16.00     Pack years: 16.00     Types: Cigarettes     Last attempt to quit: 1982     Years since quittin.4   • Smokeless tobacco: Never Used   • Tobacco comment: continued abstinence   Substance and Sexual Activity   • Alcohol use: Yes     Alcohol/week: 4.2 oz     Types: 7 Glasses of wine per week     Drinks per session: 1 or 2     Binge frequency: Never     Comment: one daily   • Drug use: No     Comment: CBD cream last used 2019   • Sexual activity: Not Currently     Birth control/protection: Post-Menopausal   Lifestyle   • Physical activity     Days per week: Not on file     Minutes per session: Not on file   • Stress: Not on file   Relationships   • Social connections     Talks on phone: Not on file     Gets together: Not on file     Attends Zoroastrianism service: Not on file     Active member of club or organization: Not on file     Attends meetings of clubs or organizations: Not on file     Relationship status: Not on file   • Intimate partner violence     Fear of current or ex partner: Not on file     Emotionally abused: Not on file     Physically abused: Not on file     Forced sexual activity: Not on file   Other Topics Concern   •  Service No   • Blood Transfusions No   • Caffeine Concern No   • Occupational Exposure No   • Hobby Hazards No   • Sleep Concern No   • Stress Concern Yes   • Weight Concern No   • Special Diet No   • Back Care Yes   • Exercise No   • Bike Helmet No   • Seat Belt Yes   • Self-Exams No   Social History Narrative    In [place] since ; previously  "lived in [place], [place], and [place]. Carlee was born and raised in [facility] [place] [facility] [place]. She has been in [place] since 2002. Previous to that she has lived in [place], [place], and [place] all for her ex-'s job. She worked as a  and retired at age 65. She is currently single. She has 3 cats and a dog. She has a son who is currently incarcerated in [place] after a DUI death which has been a big stressor on her.          EXAMINATION     Physical Exam:   Vitals: /80 (BP Location: Left arm, Patient Position: Sitting, BP Cuff Size: Small adult)   Pulse 72   Temp 36.6 °C (97.8 °F) (Temporal)   Ht 1.702 m (5' 7\")   Wt 70.8 kg (156 lb 1.4 oz)   SpO2 92%     Constitutional:   Body Habitus: Body mass index is 24.45 kg/m².  Cooperation: Fully cooperates with exam  Appearance: Well-groomed, well-nourished, not disheveled, in no acute distress    Eyes: No scleral icterus, no proptosis     ENT -no obvious auditory deficits, wearing face mask    Skin -no rashes or lesions noted     Respiratory-  breathing comfortable on room air, no audible wheezing    Cardiovascular- capillary refills less than 2 seconds. No lower extremity edema is noted.     Gastrointestinal - no obvious abdominal masses, No tenderness to palpation in the abdomen    Psychiatric- alert and oriented ×3. Normal affect.     Gait - normal gait, no use of ambulatory device, nonantalgic. The patient can toe walk with ease. The patient can heel walk with ease..     Musculoskeletal -   Cervical spine   Inspection: No deformities of the skin over the cervical spine. No rashes or lesions.    minimal A/P ROM in all directions, without  pain      Spurling’s sign: deferred    No signs of muscular atrophy in bilateral upper extremities     No tenderness to palpation of the cervical facets    Thoracic/Lumbar Spine/Sacral Spine/Hips   Inspection: No evidence of atrophy in bilateral lower extremities throughout     ROM: full  AROM with flexion, extension, lateral flexion, and rotation " bilaterally, with pain     Palpation:   No tenderness to palpation in midline at T1-T12 levels. No tenderness to palpation in the left and right of the midline T1-L5  palpation over SI joint: positive bilaterally    palpation over buttock: negative bilaterally    palpation in hip or over the greater trochanters: positive right, negative left      Lumbar spine Special tests  Neuro tension  Straight leg test negative bilaterally      HIP  Range of motion in the hips is symmetric with mild decrease in internal ROM left greater than right    SI joint tests  Observation patient sits on one buttocks: Negative  RAJENDRA test positive bilaterally for anterior pain      Neuro       Key points for the international standards for neurological classification of spinal cord injury (ISNCSCI) to light touch.     Dermatome R L   C4 2 2   C5 2 2   C6 2 2   C7 2 2   C8 2 2   T1 2 2   T2 2 2   L2 2 2   L3 2 2   L4 2 2   L5 2 2   S1 2 2   S2 2 2         Motor Exam Upper Extremities   ? Myotome R L   Shoulder flexion C5 5 5   Elbow flexion C5 5 5   Wrist extension C6 5 5   Elbow extension C7 5 5   Finger flexion C8 5 5   Finger abduction T1 5 5       Motor Exam Lower Extremities    ? Myotome R L   Hip flexion L2 5 5   Knee extension L3 5 5   Ankle dorsiflexion L4 5 5   Toe extension L5 5 5   Ankle plantarflexion S1 5 5         Escudero’s sign negative bilaterally   Babinski sign negative bilaterally   Clonus of the ankle negative bilaterally     Reflexes  ?  R L   Biceps  2+ 2+   Brachioradialis  2+ 2+   Patella  2+ 2+   Achilles   1+ 2+       MEDICAL DECISION MAKING    Medical records review: see under HPI section.     DATA    Labs:   Lab Results   Component Value Date/Time    SODIUM 139 02/19/2020 07:20 AM    POTASSIUM 4.2 02/19/2020 07:20 AM    CHLORIDE 106 02/19/2020 07:20 AM    CO2 25 02/19/2020 07:20 AM    ANION 8.0 02/19/2020 07:20 AM    GLUCOSE 104 (H) 02/19/2020 07:20 AM    BUN 19 02/19/2020 07:20 AM    CREATININE 0.74 02/19/2020  07:20 AM    CALCIUM 9.2 02/19/2020 07:20 AM    ASTSGOT 21 02/19/2020 07:20 AM    ALTSGPT 18 02/19/2020 07:20 AM    TBILIRUBIN 0.4 02/19/2020 07:20 AM    ALBUMIN 4.3 02/19/2020 07:20 AM    TOTPROTEIN 6.8 02/19/2020 07:20 AM    GLOBULIN 2.5 02/19/2020 07:20 AM    AGRATIO 1.7 02/19/2020 07:20 AM       No results found for: PROTHROMBTM, INR     Lab Results   Component Value Date/Time    WBC 7.0 02/19/2020 07:20 AM    RBC 4.47 02/19/2020 07:20 AM    HEMOGLOBIN 14.3 02/19/2020 07:20 AM    HEMATOCRIT 43.6 02/19/2020 07:20 AM    MCV 97.5 02/19/2020 07:20 AM    MCH 32.0 02/19/2020 07:20 AM    MCHC 32.8 (L) 02/19/2020 07:20 AM    MPV 10.8 02/19/2020 07:20 AM    NEUTSPOLYS 46.70 02/19/2020 07:20 AM    LYMPHOCYTES 43.90 (H) 02/19/2020 07:20 AM    MONOCYTES 6.90 02/19/2020 07:20 AM    EOSINOPHILS 2.00 02/19/2020 07:20 AM    BASOPHILS 0.40 02/19/2020 07:20 AM        Lab Results   Component Value Date/Time    HBA1C 5.6 02/20/2019 08:20 AM        Imaging: I personally reviewed following images, these are my reads  None reviewed    IMAGING radiology reads. I reviewed the following radiology reads                                                                                                                     Results for orders placed during the hospital encounter of 02/25/16   DX-KNEE 3 VIEWS LEFT    Impression 1.  Mild medial femorotibial component osteoarthritis    2.  Probable 5 mm intra-articular ossific body                                                 Diagnosis   Visit Diagnoses     ICD-10-CM   1. Bilateral low back pain, unspecified chronicity, unspecified whether sciatica present  M54.5   2. Groin pain, left  R10.32   3. Groin pain, right  R10.31   4. S/P cervical spinal fusion  Z98.1           ASSESSMENT:  Carlee Hunt 70 y.o. female seen for above     Carlee was seen today for new patient.    Diagnoses and all orders for this visit:    Bilateral low back pain, unspecified chronicity, unspecified whether sciatica  present  -     DX-LUMBAR SPINE-2 OR 3 VIEWS; Future    Groin pain, left  -     DX-HIP-BILATERAL-WITH PELVIS-3/4 VIEWS; Future    Groin pain, right  -     DX-HIP-BILATERAL-WITH PELVIS-3/4 VIEWS; Future    S/P cervical spinal fusion  -     DX-CERVICAL SPINE-4+ VIEWS; Future      1. Discussed getting xrays of the left and right hips to evaluate for groin pain and mild limitations to ROM.  2. Cervical spine xrays with flexion and extension ordered to assess anatomy and stability.  Plan to attempt to get records from surgery at [facility] in 2007.  3. Xrays of the lumbar spine ordered.  Her Axonics neuromodulation system may be MRI conditional, but will need to determine this.   If not compatible, will consider CT lumbar spine.  4. For now, she will continue with her PT and home exercise program, activity as tolerated.  5. Continue current medications for pain management including tramadol twice a day and prn ibuprofen.    Outside records requested:  The patient signed outside records request form for her outside records including outside images.      Follow-up: Return in about 3 weeks (around 8/6/2020).    Thank you very much for asking me to participate in Carlee Hunt's care.  Please contact me with any questions or concerns.      Please note that this dictation was created using voice recognition software. I have made every reasonable attempt to correct obvious errors but there may be errors of grammar and content that I may have overlooked prior to finalization of this note.      Wilberto Carbone MD  Physical Medicine and Rehabilitation  Interventional Spine and Sports Physiatry  [facility]         Shari Zurita D

## 2020-07-21 ENCOUNTER — HOSPITAL ENCOUNTER (OUTPATIENT)
Dept: RADIOLOGY | Facility: MEDICAL CENTER | Age: 70
End: 2020-07-21
Attending: PHYSICAL MEDICINE & REHABILITATION
Payer: MEDICARE

## 2020-07-21 DIAGNOSIS — M54.50 BILATERAL LOW BACK PAIN, UNSPECIFIED CHRONICITY, UNSPECIFIED WHETHER SCIATICA PRESENT: ICD-10-CM

## 2020-07-21 DIAGNOSIS — R10.32 GROIN PAIN, LEFT: ICD-10-CM

## 2020-07-21 DIAGNOSIS — R10.31 GROIN PAIN, RIGHT: ICD-10-CM

## 2020-07-21 DIAGNOSIS — Z98.1 S/P CERVICAL SPINAL FUSION: ICD-10-CM

## 2020-07-21 PROCEDURE — 73522 X-RAY EXAM HIPS BI 3-4 VIEWS: CPT

## 2020-07-21 PROCEDURE — 72100 X-RAY EXAM L-S SPINE 2/3 VWS: CPT

## 2020-07-21 PROCEDURE — 72050 X-RAY EXAM NECK SPINE 4/5VWS: CPT

## 2020-07-24 ENCOUNTER — HOSPITAL ENCOUNTER (OUTPATIENT)
Dept: RADIOLOGY | Facility: MEDICAL CENTER | Age: 70
End: 2020-07-24
Attending: NURSE PRACTITIONER
Payer: MEDICARE

## 2020-07-24 DIAGNOSIS — Z12.31 VISIT FOR SCREENING MAMMOGRAM: ICD-10-CM

## 2020-07-24 PROCEDURE — 77067 SCR MAMMO BI INCL CAD: CPT

## 2020-08-07 ENCOUNTER — OFFICE VISIT (OUTPATIENT)
Dept: PHYSICAL MEDICINE AND REHAB | Facility: MEDICAL CENTER | Age: 70
End: 2020-08-07
Payer: MEDICARE

## 2020-08-07 VITALS
WEIGHT: 155.65 LBS | OXYGEN SATURATION: 98 % | BODY MASS INDEX: 24.43 KG/M2 | HEART RATE: 67 BPM | HEIGHT: 67 IN | DIASTOLIC BLOOD PRESSURE: 82 MMHG | TEMPERATURE: 97.6 F | SYSTOLIC BLOOD PRESSURE: 124 MMHG

## 2020-08-07 DIAGNOSIS — M21.42 PES PLANUS OF LEFT FOOT: ICD-10-CM

## 2020-08-07 DIAGNOSIS — Z98.1 S/P CERVICAL SPINAL FUSION: ICD-10-CM

## 2020-08-07 DIAGNOSIS — M54.50 BILATERAL LOW BACK PAIN, UNSPECIFIED CHRONICITY, UNSPECIFIED WHETHER SCIATICA PRESENT: ICD-10-CM

## 2020-08-07 DIAGNOSIS — M16.0 PRIMARY OSTEOARTHRITIS OF BOTH HIPS: ICD-10-CM

## 2020-08-07 PROCEDURE — 99214 OFFICE O/P EST MOD 30 MIN: CPT | Performed by: PHYSICAL MEDICINE & REHABILITATION

## 2020-08-07 ASSESSMENT — PAIN SCALES - GENERAL: PAINLEVEL: 5=MODERATE PAIN

## 2020-08-07 ASSESSMENT — PATIENT HEALTH QUESTIONNAIRE - PHQ9: CLINICAL INTERPRETATION OF PHQ2 SCORE: 0

## 2020-08-07 ASSESSMENT — FIBROSIS 4 INDEX: FIB4 SCORE: 1.71

## 2020-08-07 NOTE — PROGRESS NOTES
"Follow-up patient note    Physiatry (physical medicine and  Rehabilitation), interventional spine and sports medicine    Date of Service: 08/07/2020    Chief complaint:   Chief Complaint   Patient presents with   • Follow-Up     Hip pain       HISTORY    HPI: Carlee Hunt 70 y.o. female who presents today for evaluation of pain complaints.  She reports that she has a history of cervical fusion with posterior decompression and fusion from occiput to C5.  This was done at [facility] in 2007.    Since her last visit, she has seen Jaylyn Peter PT at [facility].      She reports that the right groin pain has \"gone away\" symptoms in the left groin is better.  She walked 1 mile.  This is the best that she has done in a while.  It is difficult for her to stand up straight when she walks.  She continues to have some thigh pain on the left    No bowel or bladder changes.  Unchanged: Sitting and standing makes her pain worse, bending and twisting, walking up and down stairs makes pain worse.      Her neck pain has been ongoing and she has history of chronic antibiotics for suppression of post-op infection    Her chronic pain related to her cervical fusion has been managed with tramadol 100mg once a day and ibuprofen, usually 3 times a week.  Overall, she rates her pain as 7/10 on the NRS.    In the past, she has tried to use an orthotic in her left foot.  This did not help.  She has been wondering if she should try custom orthotics again.       ORT: 0  PHQ-9: 0    Medical records review:  I reviewed the note from the referring provider Shari Dent D* dated 06/11/2020. Patient was seen for chronic low back pain with left-sided sciatica.  She had PT for 6 weeks without much improvement and was referred here for evaluation, bilateral hip pain, chronic antibiotic suppression on doxycycline since 2013 after staph epidermidis infection following cervical fusion surgery, UTI with enterococcus faecalis, " palpitations with history of Holter monitor October 2019 treated for years atenolol after note of 5 PVCs and 13 atrial runs of SVT, acne vulgaris, chornic use of tramadol, usually two a day since 2013, essential hypertension, primary open angle glaucoma follows with ophthalmology, cervical vertebral fusion, chronic low back pain referred to physiatry for chronic bilateral low back pain and left-sided sciatic, cervical vertebral fusion and bilateral hip pain    Dr. Goddard 02/06/2020 Follow-up visit after sacral neuromodulation system implantation.  Improved fecal continence.  No information about MRI-compatibility status of device     Previous treatments:    Physical Therapy: Yes    Medications the patient is tried: NSAIDs and tramadol    Previous interventions: none    Previous surgeries to relieve the above pain:  Cervical fusion       ROS: Unchanged from 07/16/2020   Eyes: glaucoma  ENT: tinnitus  CV: no recent symptoms, history of palpitations   : [employer]onics implant for urgency  Endo: hypothyroidism    Red Flags ROS:   Fever, Chills, Sweats: Denies  Involuntary Weight Loss: Denies  Bladder Incontinence: Denies  Bowel Incontinence: Denies  Saddle Anesthesia: Denies    All other systems reviewed and negative.       PMHx:   Past Medical History:   Diagnosis Date   • Cataract     bilateral surgery complete   • Elevated fasting glucose 1/17/2019   • Glaucoma    • Hypertension    • Pain     chronic neck and shoulders   • Thyroid disease        PSHx:   Past Surgical History:   Procedure Laterality Date   • BOWEL STIMULATOR INSERTION  1/29/2020    Procedure: INSERTION, ELECTRODE LEADS AND PULSE GENERATOR, NEUROSTIMULATOR, SACRAL- FOR PERMANENT IMPLANTATION;  Surgeon: Ishmael Goddard M.D.;  Location: SURGERY [facility];  Service: General   • BOWEL STIMULATOR INSERTION  1/15/2020    Procedure: INSERTION, ELECTRODE LEADS AND PULSE GENERATOR, NEUROSTIMULATOR, SACRAL-NEUROMODULATION TRIAL;  Surgeon: Ishmael Goddard M.D.;   "Location: SURGERY [facility];  Service: General   • OTHER  2007    throat surgery after cervical surgery, adjusted Mercedes Messi   • CERVICAL FUSION POSTERIOR      Basilar \"invagination\"   • EYE SURGERY      cataracts - Dr. Martinez       Family history   Family History   Problem Relation Age of Onset   • Hypertension Mother    • Other Father         accident   • Other Sister         pneumoccocal pneumonia   • Other Brother         glaucoma   • Heart Attack Paternal Grandfather    • Cancer Neg Hx    • Diabetes Neg Hx    • Heart Disease Neg Hx          Medications:   Current Outpatient Medications   Medication   • metoprolol SR (TOPROL XL) 25 MG TABLET SR 24 HR   • dorzolamide-timolol (COSOPT) 22.3-6.8 MG/ML Solution   • Coenzyme Q10 (COQ10 PO)   • tramadol (ULTRAM) 50 MG Tab   • magnesium oxide (MAG-OX) 400 MG Tab   • Calcium 500 MG Chew Tab   • Biotin 1000 MCG Chew Tab   • Milk Thistle 175 MG Cap   • Cyanocobalamin (B-12) 2500 MCG Tab   • Ascorbic Acid (VITAMIN C) 1000 MG Tab   • Tretinoin 0.05 % Gel   • levothyroxine (SYNTHROID) 100 MCG Tab   • TURMERIC PO   • Probiotic Product (PRO-BIOTIC BLEND) Cap   • Alpha-Lipoic Acid 200 MG Tab   • latanoprost (XALATAN) 0.005 % Solution   • vitamin D (CHOLECALCIFEROL) 1000 UNIT Tab   • KRILL OIL PO   • Multiple Vitamins-Minerals (CENTRUM PO)   • doxycycline (VIBRAMYCIN) 100 MG TABS     No current facility-administered medications for this visit.        Allergies:   Allergies   Allergen Reactions   • Rifampin Rash     Rash, fever   • Vancomycin Rash     Rash, fever   • Sulfa Drugs Rash     Per pt         Social Hx:   Social History     Socioeconomic History   • Marital status:      Spouse name: Not on file   • Number of children: Not on file   • Years of education: Not on file   • Highest education level: Not on file   Occupational History   • Not on file   Social Needs   • Financial resource strain: Not on file   • Food insecurity     Worry: Not on file     Inability: Not " on file   • Transportation needs     Medical: Not on file     Non-medical: Not on file   Tobacco Use   • Smoking status: Former Smoker     Packs/day: 1.00     Years: 16.00     Pack years: 16.00     Types: Cigarettes     Quit date: 1982     Years since quittin.5   • Smokeless tobacco: Never Used   • Tobacco comment: continued abstinence   Substance and Sexual Activity   • Alcohol use: Yes     Alcohol/week: 4.2 oz     Types: 7 Glasses of wine per week     Drinks per session: 1 or 2     Binge frequency: Never     Comment: one daily   • Drug use: No     Comment: CBD cream last used 2019   • Sexual activity: Not Currently     Birth control/protection: Post-Menopausal   Lifestyle   • Physical activity     Days per week: Not on file     Minutes per session: Not on file   • Stress: Not on file   Relationships   • Social connections     Talks on phone: Not on file     Gets together: Not on file     Attends [employer] service: Not on file     Active member of club or organization: Not on file     Attends meetings of clubs or organizations: Not on file     Relationship status: Not on file   • Intimate partner violence     Fear of current or ex partner: Not on file     Emotionally abused: Not on file     Physically abused: Not on file     Forced sexual activity: Not on file   Other Topics Concern   •  Service No   • Blood Transfusions No   • Caffeine Concern No   • Occupational Exposure No   • Hobby Hazards No   • Sleep Concern No   • Stress Concern Yes   • Weight Concern No   • Special Diet No   • Back Care Yes   • Exercise No   • Bike Helmet No   • Seat Belt Yes   • Self-Exams No   Social History Narrative    In [place] since ; previously lived in [place], [place], and [place]. Carlee was born and raised in [facility]. She has been in [place] since . Previous to that she has lived in [place], [place], and [place] all for her ex-'s job. She worked as a  and retired at age 65. She is currently single. She has 3 cats and a  "dog. She has a son who is currently incarcerated in [place] after a DUI death which has been a big stressor on her.          EXAMINATION     Physical Exam:   Vitals: /82 (BP Location: Left arm, Patient Position: Sitting, BP Cuff Size: Small adult)   Pulse 67   Temp 36.4 °C (97.6 °F) (Temporal)   Ht 1.702 m (5' 7\")   Wt 70.6 kg (155 lb 10.3 oz)   SpO2 98%     Constitutional:   Body Habitus: Body mass index is 24.38 kg/m².  Cooperation: Fully cooperates with exam  Appearance: Well-groomed, well-nourished, not disheveled, in no acute distress    Eyes: No scleral icterus, no proptosis     ENT -no obvious auditory deficits, wearing a face mask    Skin -no rashes or lesions noted     Respiratory-  breathing comfortable on room air, no audible wheezing    Cardiovascular- No lower extremity edema is noted.     Psychiatric- alert and oriented ×3. Normal affect.     Gait - normal gait, no use of ambulatory device, nonantalgic.    Arch of left foot collapses with weight bearing    Musculoskeletal -   Cervical spine   Inspection: No deformities of the skin over the cervical spine. No rashes or lesions.    minimal A/P ROM in all directions, without  pain        Thoracic/Lumbar Spine/Sacral Spine/Hips   Inspection: No evidence of atrophy in bilateral lower extremities throughout     ROM: full  AROM with flexion, extension, lateral flexion, and rotation bilaterally, with pain     Palpation:   No tenderness to palpation in midline at T1-T12 levels. No tenderness to palpation in the left and right of the midline T1-L5  palpation over SI joint: positive bilaterally    palpation over buttock: negative bilaterally    palpation in hip or over the greater trochanters: positive right, negative left        Neuro       Key points for the international standards for neurological classification of spinal cord injury (ISNCSCI) to light touch.     Dermatome R L   C4 2 2   C5 2 2   C6 2 2   C7 2 2   C8 2 2   T1 2 2   T2 2 2   L2 2 2 "   L3 2 2   L4 2 2   L5 2 2   S1 2 2   S2 2 2         Motor Exam Upper Extremities   ? Myotome R L   Shoulder flexion C5 5 5   Elbow flexion C5 5 5   Wrist extension C6 5 5   Elbow extension C7 5 5   Finger flexion C8 5 5   Finger abduction T1 5 5       Motor Exam Lower Extremities    ? Myotome R L   Hip flexion L2 5 5   Knee extension L3 5 5   Ankle dorsiflexion L4 5 5   Toe extension L5 5 5   Ankle plantarflexion S1 5 5         Clonus of the ankle negative bilaterally     Reflexes  ?  R L   Biceps  2+ 2+   Brachioradialis  2+ 2+   Patella  2+ 2+   Achilles   1+ 2+       MEDICAL DECISION MAKING    Medical records review: see under HPI section.     DATA    Labs:   Lab Results   Component Value Date/Time    SODIUM 139 02/19/2020 07:20 AM    POTASSIUM 4.2 02/19/2020 07:20 AM    CHLORIDE 106 02/19/2020 07:20 AM    CO2 25 02/19/2020 07:20 AM    ANION 8.0 02/19/2020 07:20 AM    GLUCOSE 104 (H) 02/19/2020 07:20 AM    BUN 19 02/19/2020 07:20 AM    CREATININE 0.74 02/19/2020 07:20 AM    CALCIUM 9.2 02/19/2020 07:20 AM    ASTSGOT 21 02/19/2020 07:20 AM    ALTSGPT 18 02/19/2020 07:20 AM    TBILIRUBIN 0.4 02/19/2020 07:20 AM    ALBUMIN 4.3 02/19/2020 07:20 AM    TOTPROTEIN 6.8 02/19/2020 07:20 AM    GLOBULIN 2.5 02/19/2020 07:20 AM    AGRATIO 1.7 02/19/2020 07:20 AM       No results found for: PROTHROMBTM, INR     Lab Results   Component Value Date/Time    WBC 7.0 02/19/2020 07:20 AM    RBC 4.47 02/19/2020 07:20 AM    HEMOGLOBIN 14.3 02/19/2020 07:20 AM    HEMATOCRIT 43.6 02/19/2020 07:20 AM    MCV 97.5 02/19/2020 07:20 AM    MCH 32.0 02/19/2020 07:20 AM    MCHC 32.8 (L) 02/19/2020 07:20 AM    MPV 10.8 02/19/2020 07:20 AM    NEUTSPOLYS 46.70 02/19/2020 07:20 AM    LYMPHOCYTES 43.90 (H) 02/19/2020 07:20 AM    MONOCYTES 6.90 02/19/2020 07:20 AM    EOSINOPHILS 2.00 02/19/2020 07:20 AM    BASOPHILS 0.40 02/19/2020 07:20 AM        Lab Results   Component Value Date/Time    HBA1C 5.6 02/20/2019 08:20 AM        Imaging: I personally  reviewed following images, these are my reads  Xray cervical spine 07/21/2020  There is note of fusion from occiput to C6 with multilevel laminectomy.  No evidence of loosening of hardware    Xray lumbar spine 07/21/2020  There is dextroscoliosis in the lumbar spine.  Grade I anterolisthesis at L3-4 and L4-5.  Note of multilevel facet arthropathy.  Left-sided sacral stimulator    Xray hips 07/21/2020  There is mild degenerative change of the hips, left greater than right    IMAGING radiology reads. I reviewed the following radiology reads           Xray cervical spine 07/21/2020  IMPRESSION:     1.  Metallic surgical hardware fusion extending from the occipital calvarium to the C6 level.     2.  Multilevel degenerative disc disease and facet degeneration.          Xray lumbar spine 07/21/2020  IMPRESSION:     1.  Multilevel degenerative disc disease and facet degeneration.     2.  Scoliotic deformity.     3.  Implanted electronic device overlying the left sacrum.      Xray hips 07/21/2020              IMPRESSION:     Degenerative change of the hips bilaterally, left greater than right.                                                                                                  Results for orders placed during the hospital encounter of 02/25/16   DX-KNEE 3 VIEWS LEFT    Impression 1.  Mild medial femorotibial component osteoarthritis    2.  Probable 5 mm intra-articular ossific body                                                 Diagnosis   Visit Diagnoses     ICD-10-CM   1. Pes planus of left foot  M21.42   2. Bilateral low back pain, unspecified chronicity, unspecified whether sciatica present  M54.5   3. S/P cervical spinal fusion  Z98.1   4. Primary osteoarthritis of both hips  M16.0           ASSESSMENT:  Carlee Oro Hunt 70 y.o. female seen for above     Carlee was seen today for follow-up.    Diagnoses and all orders for this visit:    Pes planus of left foot  -     REFERRAL TO PODIATRY    Bilateral low back  pain, unspecified chronicity, unspecified whether sciatica present    S/P cervical spinal fusion    Primary osteoarthritis of both hips  Comments:  Mild         1. Discussed continuing PT  2. Reviewed xrays of the hip, cervical and lumbar spine.  Cervical fusion appears stable  3. She does feel like she has been making some progress.  4. Discussed consultation with Podiatrist, Dr. Yanez, regarding addressing functional pes planus, greater on the left.  5. Continue current medications for pain management including tramadol twice a day and prn ibuprofen.    Outside records requested:  The patient signed outside records request form for her outside records including outside images.      Follow-up: Return in about 6 weeks (around 9/18/2020).    Thank you very much for asking me to participate in Carlee Hunt's care.  Please contact me with any questions or concerns.      Please note that this dictation was created using voice recognition software. I have made every reasonable attempt to correct obvious errors but there may be errors of grammar and content that I may have overlooked prior to finalization of this note.      Wilberto Carbone MD  Physical Medicine and Rehabilitation  Interventional Spine and Sports Physiatry  [facility]         Shari Zurita D

## 2020-09-14 ENCOUNTER — OFFICE VISIT (OUTPATIENT)
Dept: MEDICAL GROUP | Facility: MEDICAL CENTER | Age: 70
End: 2020-09-14
Payer: MEDICARE

## 2020-09-14 VITALS
OXYGEN SATURATION: 96 % | BODY MASS INDEX: 24.48 KG/M2 | TEMPERATURE: 96.7 F | DIASTOLIC BLOOD PRESSURE: 80 MMHG | HEIGHT: 67 IN | WEIGHT: 156 LBS | HEART RATE: 62 BPM | SYSTOLIC BLOOD PRESSURE: 120 MMHG

## 2020-09-14 DIAGNOSIS — I10 ESSENTIAL HYPERTENSION: ICD-10-CM

## 2020-09-14 DIAGNOSIS — G89.29 CHRONIC NECK PAIN: ICD-10-CM

## 2020-09-14 DIAGNOSIS — M54.2 CHRONIC NECK PAIN: ICD-10-CM

## 2020-09-14 DIAGNOSIS — M54.42 CHRONIC BILATERAL LOW BACK PAIN WITH LEFT-SIDED SCIATICA: ICD-10-CM

## 2020-09-14 DIAGNOSIS — Z79.891 CHRONIC USE OF OPIATE FOR THERAPEUTIC PURPOSE: ICD-10-CM

## 2020-09-14 DIAGNOSIS — E03.9 ACQUIRED HYPOTHYROIDISM: ICD-10-CM

## 2020-09-14 DIAGNOSIS — G89.29 CHRONIC BILATERAL LOW BACK PAIN WITH LEFT-SIDED SCIATICA: ICD-10-CM

## 2020-09-14 DIAGNOSIS — A49.8 ENTEROCOCCUS FAECALIS INFECTION: ICD-10-CM

## 2020-09-14 DIAGNOSIS — L70.0 ACNE VULGARIS: ICD-10-CM

## 2020-09-14 PROCEDURE — 99214 OFFICE O/P EST MOD 30 MIN: CPT | Performed by: INTERNAL MEDICINE

## 2020-09-14 RX ORDER — TRAMADOL HYDROCHLORIDE 50 MG/1
100 TABLET ORAL 2 TIMES DAILY PRN
Qty: 120 TAB | Refills: 0 | Status: SHIPPED | OUTPATIENT
Start: 2020-11-13 | End: 2020-12-13

## 2020-09-14 RX ORDER — TRAMADOL HYDROCHLORIDE 50 MG/1
100 TABLET ORAL 2 TIMES DAILY PRN
Qty: 120 TAB | Refills: 0 | Status: SHIPPED | OUTPATIENT
Start: 2020-10-14 | End: 2020-11-13

## 2020-09-14 RX ORDER — METRONIDAZOLE 10 MG/G
GEL TOPICAL
COMMUNITY
Start: 2020-07-28 | End: 2021-08-26

## 2020-09-14 RX ORDER — TRAMADOL HYDROCHLORIDE 50 MG/1
100 TABLET ORAL 2 TIMES DAILY PRN
Qty: 120 TAB | Refills: 0 | Status: SHIPPED | OUTPATIENT
Start: 2020-09-14 | End: 2020-10-14

## 2020-09-14 ASSESSMENT — FIBROSIS 4 INDEX: FIB4 SCORE: 1.71

## 2020-09-14 NOTE — ASSESSMENT & PLAN NOTE
Previous problem, stable.  She is done well since we transition her onto metoprolol and off of atenolol.  Electrolytes and kidney function remained stable.  Continue metoprolol succinate 25 mg daily.

## 2020-09-14 NOTE — ASSESSMENT & PLAN NOTE
Chronic and ongoing problem.  She is working with physiatry and orthopedics due to multiple areas of pain.  She also has a physical therapist who she sees regularly.  Continues with tramadol 50 mg 2 to 4 tablets daily depending on pain level.  She will use anti-inflammatories as needed as well as turmeric.  Next follow-up with physiatry is later this week.  Will refill tramadol, [place]  reviewed and appropriate, controlled substance agreement on file, urine drug screen is up-to-date.

## 2020-09-14 NOTE — PROGRESS NOTES
Subjective:   Chief Complaint/History of Present Illness:  Carlee Hunt is a 70 y.o. female established patient who presents today to discuss medical problems as listed below. She is unaccompanied for today's visit.    Problem   Chronic Bilateral Low Back Pain With Left-Sided Sciatica    Hip Xray (7/2020):  There is an implanted electronic device within the left buttock with wires extending into the sacrum. There is mild degenerative change of the left greater than right hip joint characterized by joint space loss and osteophytic spurring. There is pelvic calcification.     IMPRESSION: Degenerative change of the hips bilaterally, left greater than right.    Lumbar Xray (7/2020):  There is convex right scoliotic curvature. There is multilevel decreased disc height and endplate spurring. There is prominent multilevel facet degeneration. There is grade 1 anterior subluxation at L3-4 and L4-5. There is vascular calcification. There is an implanted electronic device overlying the left sacrum posteriorly.     IMPRESSION:  1.  Multilevel degenerative disc disease and facet degeneration.  2.  Scoliotic deformity.  3.  Implanted electronic device overlying the left sacrum.      She reports longstanding problems in the low back.  She recalls SI joint disease and has been working with physical therapy from late April until currently. She denies any recent imaging of her low back. She also has pain along the left lateral thigh, unclear if that is IT band pain or radicular from the lower back. She has established with Dr. Carbone and has follow up later this week (Sept 2020).     Prior UTI- Enterococcus faecalis; resistant to tetracyclines        Component  3mo ago   Significant Indicator  POSPositive  (POS)      Source  UR     Site  URINE, CLEAN CATCH     Culture Result  -Abnormal       Culture Result  Abnormal     Enterococcus faecalis   10-50,000 cfu/mL     Resulting Agency  [facility]    Susceptibility      Enterococcus faecalis       HIRAM      Ampicillin  <=2 mcg/mL  Sensitive      Daptomycin  <=1 mcg/mL  Sensitive      Nitrofurantoin  <=32 mcg/mL  Sensitive      Penicillin  2 mcg/mL  Sensitive      Tetracycline  >8 mcg/mL  Resistant      Vancomycin  1 mcg/mL  Sensitive                  Urinalysis in February 2020 was positive for Enterococcus faecalis, resistant to tetracyclines.  Of note, she is on suppressive antibiotics with doxycycline for what sounds to be the past 13 years.  She does follow with ID.  She reports challenges with stool incontinence and had a pelvic floor stimulator placed recently by Dr. Goddard.     Acne Vulgaris    She reports longstanding issues with acne.  She was on what sounds to be tetracyclines for a few decades in her younger years.  She also has topical tretinoin prescribed.  Uses doxycycline for history of what sounds to be hardware infection in the neck with Staphylococcus epidermidis at the time of her surgery.  She is followed with the skin cancer [place] [facility] [address] but we interested in also seeing dermatologist who specializes more in cosmetic/aesthetics.    She saw Dr. Patrick with cosmetic dermatology who performed a treatment on her face and plans to do CO2 treatments later this year.  She was very happy with the encounter and hopes that it will result in improvement of her acne and rosacea.     Chronic Use of Opiate for Therapeutic Purpose    She reports longstanding chronic pain in her neck due to a 2 staged neck fusion surgery in 2013 at [facility].  She also has had issues with intermittent low back/SI joint pain, left greater than right hip pain now radiating into the groin, and left IT band pain.  She uses tramadol 50 mg generally 2 tablets daily for pain control; more recently has required 3 and rarely 4 tablets.  She denies any deleterious side effects from this medicine.     Acquired Hypothyroidism       Ref. Range 11/7/2019 12:32   TSH Latest Ref Range: 0.380 - 5.330 uIU/mL 0.920   Free T-4 Latest Ref  Range: 0.53 - 1.43 ng/dL 1.07     She has history of hypothyroidism.  She is currently utilizing levothyroxine 100 mcg daily.  No current signs or symptoms of overtreatment or under treatment.     Essential Hypertension       Ref. Range 2/19/2020 07:20   Sodium Latest Ref Range: 135 - 145 mmol/L 139   Potassium Latest Ref Range: 3.6 - 5.5 mmol/L 4.2   Chloride Latest Ref Range: 96 - 112 mmol/L 106   Bun Latest Ref Range: 8 - 22 mg/dL 19   Creatinine Latest Ref Range: 0.50 - 1.40 mg/dL 0.74   GFR If Non  Latest Ref Range: >60 mL/min/1.73 m 2 >60     She has a history of hypertension.  She has taken atenolol for many years but we transition her to metoprolol due to her history of recurrent urinary tract infections and risk of progression of kidney disease. She denies chest pain, palpitations, or dyspnea on exertion. No signs or symptoms of orthostasis.    Current regimen: Metoprolol succinate 25 mg daily  Previous regimen: Atenolol 25 mg daily     Chronic Neck Pain    She reports staged 2 part fusion in 2013 at [facility] at the recommendation of a local orthopedist. She reports an anterior and posterior approach at that time. 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  At that same time she was experiencing left hip pain and was told it was likely related to the left neck pain.  She reports chronic issues with what she thinks is muscle spasms on the left greater than right neck as well as the bilateral trapezius areas which she relates to large amount of hardware adding additional pressure to her muscles.  It does not sound like she is followed up with anyone locally for this previous neck problem.    For chronic pain in the neck she utilizes tramadol 50 mg up to 2 tablets daily.  She is also started adding in ibuprofen usually once daily.  She is  on turmeric.  She also is working with physical therapy.          Additional History:   Allergies:    Rifampin, Vancomycin, and Sulfa drugs     Current Medications:     Current Outpatient Medications   Medication Sig Dispense Refill   • tramadol (ULTRAM) 50 MG Tab Take 2 Tabs by mouth 2 times a day as needed for Moderate Pain for up to 30 days. 120 Tab 0   • [START ON 10/14/2020] tramadol (ULTRAM) 50 MG Tab Take 2 Tabs by mouth 2 times a day as needed for Moderate Pain for up to 30 days. 120 Tab 0   • [START ON 11/13/2020] tramadol (ULTRAM) 50 MG Tab Take 2 Tabs by mouth 2 times a day as needed for Moderate Pain for up to 30 days. 120 Tab 0   • metoprolol SR (TOPROL XL) 25 MG TABLET SR 24 HR Take 1 Tab by mouth every day. 100 Tab 3   • dorzolamide-timolol (COSOPT) 22.3-6.8 MG/ML Solution Place 1 Drop in both eyes 2 times a day.     • Coenzyme Q10 (COQ10 PO) Take 100 mg by mouth every day. Qunol liquid     • magnesium oxide (MAG-OX) 400 MG Tab Take 400 mg by mouth every evening.     • Calcium 500 MG Chew Tab Take 1 Tab by mouth every evening.     • Biotin 1000 MCG Chew Tab Take 500 mg by mouth every day.     • Milk Thistle 175 MG Cap Take 1 Cap by mouth every day.     • Cyanocobalamin (B-12) 2500 MCG Tab Take 1 Tab by mouth every day.     • Ascorbic Acid (VITAMIN C) 1000 MG Tab Take 1 Tab by mouth every day.     • Tretinoin 0.05 % Gel 1 Application by Apply externally route every bedtime. 1 Tube 3   • levothyroxine (SYNTHROID) 100 MCG Tab Take 1 Tab by mouth Every morning on an empty stomach. 90 Tab 3   • TURMERIC PO Take 333 mg by mouth every day. Daily (Qunol liquid)     • Probiotic Product (PRO-BIOTIC BLEND) Cap Take 1 Cap by mouth every evening. [place]     • latanoprost (XALATAN) 0.005 % Solution Place 1 Drop in both eyes every evening.     • KRILL OIL PO Take 500 mg by mouth every day.     • Multiple Vitamins-Minerals (CENTRUM PO) Take 1 Tab by mouth every day.     • doxycycline (VIBRAMYCIN) 100 MG TABS Take  "100 mg by mouth every day.     • influenza Vac High-Dose Quad (FLUZONE HIGH-DOSE) 0.7 mL Suspension Prefilled Syringe injection Fluzone High-Dose 7800-7045 (PF) 180 mcg/0.5 mL intramuscular syringe     • metronidazole (METROGEL) 1 % gel      • Alpha-Lipoic Acid 200 MG Tab Take 200 mg by mouth every day.     • vitamin D (CHOLECALCIFEROL) 1000 UNIT Tab Take 1,000 Units by mouth every day.       No current facility-administered medications for this visit.         Social History:     Social History     Tobacco Use   • Smoking status: Former Smoker     Packs/day: 1.00     Years: 16.00     Pack years: 16.00     Types: Cigarettes     Quit date: 1982     Years since quittin.6   • Smokeless tobacco: Never Used   • Tobacco comment: continued abstinence   Substance Use Topics   • Alcohol use: Yes     Alcohol/week: 4.2 oz     Types: 7 Glasses of wine per week     Drinks per session: 1 or 2     Binge frequency: Never     Comment: one daily   • Drug use: No     Comment: CBD cream last used 2019       ROS: As above in the HPI      Objective:   Physical Exam:    Vitals: /80   Pulse 62   Temp 35.9 °C (96.7 °F) (Temporal)   Ht 1.702 m (5' 7\")   Wt 70.8 kg (156 lb)   SpO2 96%    BMI: Body mass index is 24.43 kg/m².   General/Constitutional: Vitals as above, Well nourished, well developed female in no acute distress   Head/Eyes: Head is grossly normal & atraumatic, bilateral conjunctivae clear and not injected, bilateral EOMI, glasses in place   ENT: Bilateral external ears grossly normal in appearance, Hearing grossly intact, External nares normal in appearance and without discharge/bleeding   Respiratory: No respiratory distress, bilateral lungs are clear to ausculation in all lung fields, no wheezing/rhonchi/rales   Cardiovascular: Regular rate and rhythm without murmur/rubs, distal pulses are intact and equal bilaterally (radial), no bilateral lower extremity edema   MSK: Limited active ROM of neck, pain in " left > right hip with active ROM   Integumentary: No apparent rashes, acne and rosacea present   Psych: Judgment grossly appropriate, no apparent depression/anxiety    Health Maintenance:     - Completed      Assessment and Plan:     Problem List Items Addressed This Visit     Acquired hypothyroidism     Chronic and stable problem.  Continue levothyroxine 100 mcg daily.  Will be due for thyroid function studies in the coming months.         Essential hypertension     Previous problem, stable.  She is done well since we transition her onto metoprolol and off of atenolol.  Electrolytes and kidney function remained stable.  Continue metoprolol succinate 25 mg daily.         Chronic neck pain     Chronic and ongoing problem.  She is working with physiatry and orthopedics due to multiple areas of pain.  She also has a physical therapist who she sees regularly.  Continues with tramadol 50 mg 2 to 4 tablets daily depending on pain level.  She will use anti-inflammatories as needed as well as turmeric.  Next follow-up with physiatry is later this week.  Will refill tramadol, [place]  reviewed and appropriate, controlled substance agreement on file, urine drug screen is up-to-date.         Relevant Medications    tramadol (ULTRAM) 50 MG Tab    tramadol (ULTRAM) 50 MG Tab (Start on 10/14/2020)    tramadol (ULTRAM) 50 MG Tab (Start on 11/13/2020)    Chronic use of opiate for therapeutic purpose     Chronic and ongoing problem. [place]  reviewed and appropriate. UDS up to date. Controlled substance agreement on file.          Acne vulgaris     Chronic and ongoing problem.  Continue follow-up with cosmetic dermatology, she is happy with her initial meeting with Dr. Patrick and will see her again later this year for CO2 treatments.         Chronic bilateral low back pain with left-sided sciatica     Chronic ongoing problem.  She continues to have flareups of low back pain and left greater than right hip pain.  She will follow-up   storm later this week.  Encouraged her to discuss potential for trigger point vs trochanteric bursa vs intra-articular hip injection.  Will renew tramadol prescription, she notes that she is having to use 3 tablets more often due to this flareup of pain (in the back/hip) in addition to her chronic neck pain for which she originally utilize the medicine. [place]  reviewed and appropriate, UDS up to date, controlled substance agreement on file.         Relevant Medications    tramadol (ULTRAM) 50 MG Tab    tramadol (ULTRAM) 50 MG Tab (Start on 10/14/2020)    tramadol (ULTRAM) 50 MG Tab (Start on 11/13/2020)    Prior UTI- Enterococcus faecalis; resistant to tetracyclines     Previous problem.  We will request records from infectious disease to make sure they are aware of her tetracycline resistance as they had her on doxycycline for suppression due to prior hardware infection in the cervical spine following a fusion many years ago.  Urinalysis from February 2020 showed Enterococcus faecalis resistant to tetracyclines so we will need to make sure to get culture and sensitivity with any future urinary tract infection evaluations.                  RTC: 5 months.    PLEASE NOTE: This dictation was created using voice recognition software. I have made every reasonable attempt to correct obvious errors, but I expect that there are errors of grammar and possibly content that I did not discover before finalizing the note.

## 2020-09-14 NOTE — LETTER
[facility]  Shari Dent D.O.  [address]  [place] [address]  Fax: [phone]   Authorization for Release/Disclosure of   Protected Health Information   Name: CARLEE ORR : 1950 SSN: [id number]   Address: [place] [address] Phone:    [phone] (home)    I authorize the entity listed below to release/disclose the PHI below to:   [facility]/Shari Dent D.O. and Shari Dent D.O.   Provider or Entity Name:  Dr. Yanez- Podiatry    Address   City, [facility], [facility]   Phone:      Fax:     Reason for request: continuity of care   Information to be released:    [  ] LAST COLONOSCOPY,  including any PATH REPORT and follow-up  [  ] LAST FIT/COLOGUARD RESULT [  ] LAST DEXA  [  ] LAST MAMMOGRAM  [  ] LAST PAP  [  ] LAST LABS [  ] RETINA EXAM REPORT  [  ] IMMUNIZATION RECORDS  [ x ] Release all info      [ x ] Check here and initial the line next to each item to release ALL health information INCLUDING  _____ Care and treatment for drug and / or alcohol abuse  _____ HIV testing, infection status, or AIDS  _____ Genetic Testing    DATES OF SERVICE OR TIME PERIOD TO BE DISCLOSED: __2020___________  I understand and acknowledge that:  * This Authorization may be revoked at any time by you in writing, except if your health information has already been used or disclosed.  * Your health information that will be used or disclosed as a result of you signing this authorization could be re-disclosed by the recipient. If this occurs, your re-disclosed health information may no longer be protected by State or Federal laws.  * You may refuse to sign this Authorization. Your refusal will not affect your ability to obtain treatment.  * This Authorization becomes effective upon signing and will  on (date) __________.      If no date is indicated, this Authorization will  one (1) year from the signature date.    Name: Carlee Orr    Signature:   Date:          9/14/2020       PLEASE FAX REQUESTED RECORDS BACK TO: [phone]

## 2020-09-14 NOTE — ASSESSMENT & PLAN NOTE
Previous problem.  We will request records from infectious disease to make sure they are aware of her tetracycline resistance as they had her on doxycycline for suppression due to prior hardware infection in the cervical spine following a fusion many years ago.  Urinalysis from February 2020 showed Enterococcus faecalis resistant to tetracyclines so we will need to make sure to get culture and sensitivity with any future urinary tract infection evaluations.

## 2020-09-14 NOTE — ASSESSMENT & PLAN NOTE
Chronic and ongoing problem.  Continue follow-up with cosmetic dermatology, she is happy with her initial meeting with Dr. Patrick and will see her again later this year for CO2 treatments.

## 2020-09-14 NOTE — ASSESSMENT & PLAN NOTE
Brenda Lira Chronic and ongoing problem. [place]  reviewed and appropriate. UDS up to date. Controlled substance agreement on file.    Brenda Appiah

## 2020-09-14 NOTE — LETTER
[facility]  Shari Dent D.O.  [address]  [place] [address]  Fax: [phone]   Authorization for Release/Disclosure of   Protected Health Information   Name: CARLEE ORR : 1950 SSN: [id number]   Address: [facility] [place] [address] Phone:    [phone] (home)    I authorize the entity listed below to release/disclose the PHI below to:   [facility]/Shari Dent D.O. and Shari Dent D.O.   Provider or Entity Name:  Cleo Milan   Address   City, State, Zip   Phone:      Fax:     Reason for request: continuity of care   Information to be released:    [  ] LAST COLONOSCOPY,  including any PATH REPORT and follow-up  [  ] LAST FIT/COLOGUARD RESULT [  ] LAST DEXA  [  ] LAST MAMMOGRAM  [  ] LAST PAP  [  ] LAST LABS [  ] RETINA EXAM REPORT  [  ] IMMUNIZATION RECORDS  [ x ] Release all info      [ x ] Check here and initial the line next to each item to release ALL health information INCLUDING  _____ Care and treatment for drug and / or alcohol abuse  _____ HIV testing, infection status, or AIDS  _____ Genetic Testing    DATES OF SERVICE OR TIME PERIOD TO BE DISCLOSED: __-___________  I understand and acknowledge that:  * This Authorization may be revoked at any time by you in writing, except if your health information has already been used or disclosed.  * Your health information that will be used or disclosed as a result of you signing this authorization could be re-disclosed by the recipient. If this occurs, your re-disclosed health information may no longer be protected by State or Federal laws.  * You may refuse to sign this Authorization. Your refusal will not affect your ability to obtain treatment.  * This Authorization becomes effective upon signing and will  on (date) __________.      If no date is indicated, this Authorization will  one (1) year from the signature date.    Name: Carlee Orr    Signature:   Date:          9/14/2020       PLEASE FAX REQUESTED RECORDS BACK TO: [phone]

## 2020-09-14 NOTE — ASSESSMENT & PLAN NOTE
Chronic and stable problem.  Continue levothyroxine 100 mcg daily.  Will be due for thyroid function studies in the coming months.

## 2020-09-14 NOTE — ASSESSMENT & PLAN NOTE
Chronic ongoing problem.  She continues to have flareups of low back pain and left greater than right hip pain.  She will follow-up Dr. godoy later this week.  Encouraged her to discuss potential for trigger point vs trochanteric bursa vs intra-articular hip injection.  Will renew tramadol prescription, she notes that she is having to use 3 tablets more often due to this flareup of pain (in the back/hip) in addition to her chronic neck pain for which she originally utilize the medicine. [place]  reviewed and appropriate, UDS up to date, controlled substance agreement on file.

## 2020-09-18 ENCOUNTER — OFFICE VISIT (OUTPATIENT)
Dept: PHYSICAL MEDICINE AND REHAB | Facility: MEDICAL CENTER | Age: 70
End: 2020-09-18
Payer: MEDICARE

## 2020-09-18 VITALS
BODY MASS INDEX: 24.33 KG/M2 | SYSTOLIC BLOOD PRESSURE: 140 MMHG | OXYGEN SATURATION: 98 % | TEMPERATURE: 97 F | WEIGHT: 154.98 LBS | DIASTOLIC BLOOD PRESSURE: 90 MMHG | HEIGHT: 67 IN | HEART RATE: 60 BPM

## 2020-09-18 DIAGNOSIS — M51.36 DDD (DEGENERATIVE DISC DISEASE), LUMBAR: ICD-10-CM

## 2020-09-18 DIAGNOSIS — M16.0 PRIMARY OSTEOARTHRITIS OF BOTH HIPS: ICD-10-CM

## 2020-09-18 DIAGNOSIS — Z98.1 S/P CERVICAL SPINAL FUSION: ICD-10-CM

## 2020-09-18 DIAGNOSIS — M21.42 PES PLANUS OF LEFT FOOT: ICD-10-CM

## 2020-09-18 PROCEDURE — 99214 OFFICE O/P EST MOD 30 MIN: CPT | Performed by: PHYSICAL MEDICINE & REHABILITATION

## 2020-09-18 ASSESSMENT — PAIN SCALES - GENERAL: PAINLEVEL: 2=MINIMAL-SLIGHT

## 2020-09-18 ASSESSMENT — FIBROSIS 4 INDEX: FIB4 SCORE: 1.71

## 2020-09-18 ASSESSMENT — PATIENT HEALTH QUESTIONNAIRE - PHQ9: CLINICAL INTERPRETATION OF PHQ2 SCORE: 0

## 2020-09-18 NOTE — PROGRESS NOTES
Follow-up patient note    Physiatry (physical medicine and  Rehabilitation), interventional spine and sports medicine    Date of Service: 09/18/2020    Chief complaint:   Chief Complaint   Patient presents with   • Follow-Up     Back Pain       HISTORY    HPI: Carlee Hunt 70 y.o. female who presents today for evaluation of pain complaints.  She reports that she has a history of cervical fusion with posterior decompression and fusion from occiput to C5.  This was done at [facility] in 2007.    She has been going to PT at [facility] and this does seem to help.Jaylyn Peter, PT at [facility].    She is going about twice a month now and is doing her home exercise program.    Her left groin pain is the biggest pain that she has.  When she is doing exercises are very painful.  The groin pain is not as bad as it was.  The right groin pain has not returned.    Her pain is a 2/10 on the NRS.     Visit with podiatry was not as helpful as she had hoped.    No bowel or bladder changes.  No significant radicular symptoms.  No tingling in her legs.    By history:  Her neck pain has been ongoing and she has history of chronic antibiotics for suppression of post-op infection    Her chronic pain related to her cervical fusion has been managed with tramadol 100mg once a day and ibuprofen, usually 3 times a week.       ORT: 0  PHQ-9: 0    Medical records review:  I reviewed the note from the referring provider Shari Dent D* dated 06/11/2020. Patient was seen for chronic low back pain with left-sided sciatica.  She had PT for 6 weeks without much improvement and was referred here for evaluation, bilateral hip pain, chronic antibiotic suppression on doxycycline since 2013 after staph epidermidis infection following cervical fusion surgery, UTI with enterococcus faecalis, palpitations with history of Holter monitor October 2019 treated for years atenolol after note of 5 PVCs and 13 atrial runs of SVT, acne  vulgaris, chornic use of tramadol, usually two a day since 2013, essential hypertension, primary open angle glaucoma follows with ophthalmology, cervical vertebral fusion, chronic low back pain referred to physiatry for chronic bilateral low back pain and left-sided sciatic, cervical vertebral fusion and bilateral hip pain    Dr. Goddard 02/06/2020 Follow-up visit after sacral neuromodulation system implantation.  Improved fecal continence.  No information about MRI-compatibility status of device     Previous treatments:    Physical Therapy: Yes    Medications the patient is tried: NSAIDs and tramadol    Previous interventions: none    Previous surgeries to relieve the above pain:  Cervical fusion       ROS: Unchanged from 08/07/2020  Eyes: glaucoma  ENT: tinnitus  CV: no recent symptoms, history of palpitations   : [employer]on[employer] implant for urgency  Endo: hypothyroidism    Red Flags ROS:   Fever, Chills, Sweats: Denies  Involuntary Weight Loss: Denies  Bladder Incontinence: Denies  Bowel Incontinence: Denies  Saddle Anesthesia: Denies    All other systems reviewed and negative.       PMHx:   Past Medical History:   Diagnosis Date   • Cataract     bilateral surgery complete   • Elevated fasting glucose 1/17/2019   • Glaucoma    • Hypertension    • Pain     chronic neck and shoulders   • Thyroid disease        PSHx:   Past Surgical History:   Procedure Laterality Date   • BOWEL STIMULATOR INSERTION  1/29/2020    Procedure: INSERTION, ELECTRODE LEADS AND PULSE GENERATOR, NEUROSTIMULATOR, SACRAL- FOR PERMANENT IMPLANTATION;  Surgeon: Ishmael Goddard M.D.;  Location: SURGERY [facility];  Service: General   • BOWEL STIMULATOR INSERTION  1/15/2020    Procedure: INSERTION, ELECTRODE LEADS AND PULSE GENERATOR, NEUROSTIMULATOR, SACRAL-NEUROMODULATION TRIAL;  Surgeon: Ishmael Goddard M.D.;  Location: SURGERY [facility];  Service: General   • OTHER  2007    throat surgery after cervical surgery, adjusted Mercedes Messi   • CERVICAL  "FUSION POSTERIOR      Basilar \"invagination\"   • EYE SURGERY      cataracts - Dr. Martinez       Family history   Family History   Problem Relation Age of Onset   • Hypertension Mother    • Other Father         accident   • Other Sister         pneumoccocal pneumonia   • Other Brother         glaucoma   • Heart Attack Paternal Grandfather    • Cancer Neg Hx    • Diabetes Neg Hx    • Heart Disease Neg Hx          Medications:   Current Outpatient Medications   Medication   • influenza Vac High-Dose Quad (FLUZONE HIGH-DOSE) 0.7 mL Suspension Prefilled Syringe injection   • metronidazole (METROGEL) 1 % gel   • tramadol (ULTRAM) 50 MG Tab   • [START ON 10/14/2020] tramadol (ULTRAM) 50 MG Tab   • [START ON 11/13/2020] tramadol (ULTRAM) 50 MG Tab   • metoprolol SR (TOPROL XL) 25 MG TABLET SR 24 HR   • dorzolamide-timolol (COSOPT) 22.3-6.8 MG/ML Solution   • Coenzyme Q10 (COQ10 PO)   • magnesium oxide (MAG-OX) 400 MG Tab   • Calcium 500 MG Chew Tab   • Biotin 1000 MCG Chew Tab   • Milk Thistle 175 MG Cap   • Cyanocobalamin (B-12) 2500 MCG Tab   • Ascorbic Acid (VITAMIN C) 1000 MG Tab   • Tretinoin 0.05 % Gel   • levothyroxine (SYNTHROID) 100 MCG Tab   • TURMERIC PO   • Probiotic Product (PRO-BIOTIC BLEND) Cap   • Alpha-Lipoic Acid 200 MG Tab   • latanoprost (XALATAN) 0.005 % Solution   • vitamin D (CHOLECALCIFEROL) 1000 UNIT Tab   • KRILL OIL PO   • Multiple Vitamins-Minerals (CENTRUM PO)   • doxycycline (VIBRAMYCIN) 100 MG TABS     No current facility-administered medications for this visit.        Allergies:   Allergies   Allergen Reactions   • Rifampin Rash     Rash, fever   • Vancomycin Rash     Rash, fever   • Sulfa Drugs Rash     Per pt         Social Hx:   Social History     Socioeconomic History   • Marital status:      Spouse name: Not on file   • Number of children: Not on file   • Years of education: Not on file   • Highest education level: Not on file   Occupational History   • Not on file   Social Needs "   • Financial resource strain: Not on file   • Food insecurity     Worry: Not on file     Inability: Not on file   • Transportation needs     Medical: Not on file     Non-medical: Not on file   Tobacco Use   • Smoking status: Former Smoker     Packs/day: 1.00     Years: 16.00     Pack years: 16.00     Types: Cigarettes     Quit date: 1982     Years since quittin.6   • Smokeless tobacco: Never Used   • Tobacco comment: continued abstinence   Substance and Sexual Activity   • Alcohol use: Yes     Alcohol/week: 4.2 oz     Types: 7 Glasses of wine per week     Drinks per session: 1 or 2     Binge frequency: Never     Comment: one daily   • Drug use: No     Comment: CBD cream last used 2019   • Sexual activity: Not Currently     Birth control/protection: Post-Menopausal   Lifestyle   • Physical activity     Days per week: Not on file     Minutes per session: Not on file   • Stress: Not on file   Relationships   • Social connections     Talks on phone: Not on file     Gets together: Not on file     Attends Latter day service: Not on file     Active member of club or organization: Not on file     Attends meetings of clubs or organizations: Not on file     Relationship status: Not on file   • Intimate partner violence     Fear of current or ex partner: Not on file     Emotionally abused: Not on file     Physically abused: Not on file     Forced sexual activity: Not on file   Other Topics Concern   •  Service No   • Blood Transfusions No   • Caffeine Concern No   • Occupational Exposure No   • Hobby Hazards No   • Sleep Concern No   • Stress Concern Yes   • Weight Concern No   • Special Diet No   • Back Care Yes   • Exercise No   • Bike Helmet No   • Seat Belt Yes   • Self-Exams No   Social History Narrative    In [place] since ; previously lived in [place] [facility] and [place]. Carlee [place] She has been in [place] since . Previous to that she has lived in [place], [place], and [place] all for her  "ex-'s job. She worked as a  and retired at age 65. She is currently single. She has 3 cats and a dog. She has a son who is currently incarcerated in [place] after a DUI death which has been a big stressor on her.          EXAMINATION     Physical Exam:   Vitals: /90 (BP Location: Left arm, Patient Position: Sitting, BP Cuff Size: Adult)   Pulse 60   Temp 36.1 °C (97 °F) (Temporal)   Ht 1.702 m (5' 7\")   Wt 70.3 kg (154 lb 15.7 oz)   SpO2 98%     Constitutional:   Body Habitus: Body mass index is 24.27 kg/m².  Cooperation: Fully cooperates with exam  Appearance: Well-groomed, well-nourished, not disheveled, in no acute distress    Eyes: No scleral icterus, no proptosis     ENT -no obvious auditory deficits, wearing a face mask    Skin -no rashes or lesions noted     Respiratory-  breathing comfortable on room air, no audible wheezing    Cardiovascular- No lower extremity edema is noted.     Psychiatric- alert and oriented ×3. Normal affect.     Gait - normal gait, no use of ambulatory device, nonantalgic.    Arch of left foot collapses with weight bearing    Musculoskeletal -   Cervical spine   Inspection: No deformities of the skin over the cervical spine. No rashes or lesions.    minimal A/P ROM in all directions    Thoracic/Lumbar Spine/Sacral Spine/Hips   Inspection: No evidence of atrophy in bilateral lower extremities throughout     Palpation:   No tenderness to palpation in midline at T1-T12 levels. No tenderness to palpation in the left and right of the midline T1-L5  palpation over SI joint: positive bilaterally    palpation over buttock: negative bilaterally    palpation in hip or over the greater trochanters: negative right, mild left     Glo's test positive for anterior groin pain in the left    Neuro       Key points for the international standards for neurological classification of spinal cord injury (ISNCSCI) to light touch.     Dermatome R L   L2 2 2   L3 2 2   L4 2 2   L5 " 2 2   S1 2 2   S2 2 2       Motor Exam Lower Extremities    ? Myotome R L   Hip flexion L2 5 5   Knee extension L3 5 5   Ankle dorsiflexion L4 5 5   Toe extension L5 5 5   Ankle plantarflexion S1 5 5     Hip abduction 4-/5 on the left and 4/5 on the right    Clonus of the ankle negative bilaterally     Reflexes  ?  R L   Patella  2+ 2+   Achilles   1+ 2+       MEDICAL DECISION MAKING    Medical records review: see under HPI section.     DATA    Labs:   Lab Results   Component Value Date/Time    SODIUM 139 02/19/2020 07:20 AM    POTASSIUM 4.2 02/19/2020 07:20 AM    CHLORIDE 106 02/19/2020 07:20 AM    CO2 25 02/19/2020 07:20 AM    ANION 8.0 02/19/2020 07:20 AM    GLUCOSE 104 (H) 02/19/2020 07:20 AM    BUN 19 02/19/2020 07:20 AM    CREATININE 0.74 02/19/2020 07:20 AM    CALCIUM 9.2 02/19/2020 07:20 AM    ASTSGOT 21 02/19/2020 07:20 AM    ALTSGPT 18 02/19/2020 07:20 AM    TBILIRUBIN 0.4 02/19/2020 07:20 AM    ALBUMIN 4.3 02/19/2020 07:20 AM    TOTPROTEIN 6.8 02/19/2020 07:20 AM    GLOBULIN 2.5 02/19/2020 07:20 AM    AGRATIO 1.7 02/19/2020 07:20 AM       No results found for: PROTHROMBTM, INR     Lab Results   Component Value Date/Time    WBC 7.0 02/19/2020 07:20 AM    RBC 4.47 02/19/2020 07:20 AM    HEMOGLOBIN 14.3 02/19/2020 07:20 AM    HEMATOCRIT 43.6 02/19/2020 07:20 AM    MCV 97.5 02/19/2020 07:20 AM    MCH 32.0 02/19/2020 07:20 AM    MCHC 32.8 (L) 02/19/2020 07:20 AM    MPV 10.8 02/19/2020 07:20 AM    NEUTSPOLYS 46.70 02/19/2020 07:20 AM    LYMPHOCYTES 43.90 (H) 02/19/2020 07:20 AM    MONOCYTES 6.90 02/19/2020 07:20 AM    EOSINOPHILS 2.00 02/19/2020 07:20 AM    BASOPHILS 0.40 02/19/2020 07:20 AM        Lab Results   Component Value Date/Time    HBA1C 5.6 02/20/2019 08:20 AM        Imaging: I personally reviewed following images, these are my reads  Xray cervical spine 07/21/2020  There is note of fusion from occiput to C6 with multilevel laminectomy.  No evidence of loosening of hardware    Xray lumbar spine  07/21/2020  There is dextroscoliosis in the lumbar spine.  Grade I anterolisthesis at L3-4 and L4-5.  Note of multilevel facet arthropathy.  Left-sided sacral stimulator    Xray hips 07/21/2020  There is mild degenerative change of the hips, left greater than right    IMAGING radiology reads. I reviewed the following radiology reads           Xray cervical spine 07/21/2020  IMPRESSION:     1.  Metallic surgical hardware fusion extending from the occipital calvarium to the C6 level.     2.  Multilevel degenerative disc disease and facet degeneration.          Xray lumbar spine 07/21/2020  IMPRESSION:     1.  Multilevel degenerative disc disease and facet degeneration.     2.  Scoliotic deformity.     3.  Implanted electronic device overlying the left sacrum.      Xray hips 07/21/2020              IMPRESSION:     Degenerative change of the hips bilaterally, left greater than right.                                                                                                  Results for orders placed during the hospital encounter of 02/25/16   DX-KNEE 3 VIEWS LEFT    Impression 1.  Mild medial femorotibial component osteoarthritis    2.  Probable 5 mm intra-articular ossific body                                                 Diagnosis   Visit Diagnoses     ICD-10-CM   1. Primary osteoarthritis of both hips  M16.0   2. Pes planus of left foot  M21.42   3. S/P cervical spinal fusion  Z98.1   4. DDD (degenerative disc disease), lumbar  M51.36         ASSESSMENT:  Carlee Hunt 70 y.o. female seen for above     Carlee was seen today for follow-up.    Diagnoses and all orders for this visit:    Primary osteoarthritis of both hips  -     REFERRAL TO PHYSICAL THERAPY Reason for Therapy: Eval/Treat/Report    Pes planus of left foot    S/P cervical spinal fusion    DDD (degenerative disc disease), lumbar         1. Continue with physical therapy, discussed adding more exercises to work on hip abductor weakness, weaker on  the left than the right.  2. Continue tramadol twice a day and prn ibuprofen.  reviewed.   3. Discussed left hip intra-articular injection.  We discussed risks, benefits and expectations of left intra-articular injection.  This would be diagnostic and therapeutic.  After discussing risks, Carlee prefers to continue with physical therapy.  We can revisit, should her symptoms worsen.  Overall, she does feel like her pain has decreased since starting PT.  4. She had consultation with podiatry, but did not elect to proceed with new orthotics for now.  5. Continue to follow-up with Dr. Dent for general medical care.    Outside records requested:  The patient signed outside records request form for her outside records including outside images.      Follow-up: Return in about 2 months (around 11/18/2020).    Thank you very much for asking me to participate in Carlee Hunt's care.  Please contact me with any questions or concerns.      Please note that this dictation was created using voice recognition software. I have made every reasonable attempt to correct obvious errors but there may be errors of grammar and content that I may have overlooked prior to finalization of this note.      Wilberto Carbone MD  Physical Medicine and Rehabilitation  Interventional Spine and Sports Physiatry  [facility]       Shari Zurita D

## 2020-11-19 ENCOUNTER — OFFICE VISIT (OUTPATIENT)
Dept: PHYSICAL MEDICINE AND REHAB | Facility: MEDICAL CENTER | Age: 70
End: 2020-11-19
Payer: MEDICARE

## 2020-11-19 VITALS
WEIGHT: 157.41 LBS | HEIGHT: 67 IN | DIASTOLIC BLOOD PRESSURE: 64 MMHG | OXYGEN SATURATION: 97 % | TEMPERATURE: 97.2 F | SYSTOLIC BLOOD PRESSURE: 122 MMHG | HEART RATE: 64 BPM | BODY MASS INDEX: 24.71 KG/M2

## 2020-11-19 DIAGNOSIS — M51.36 DDD (DEGENERATIVE DISC DISEASE), LUMBAR: ICD-10-CM

## 2020-11-19 DIAGNOSIS — Z98.1 S/P CERVICAL SPINAL FUSION: ICD-10-CM

## 2020-11-19 DIAGNOSIS — M16.0 PRIMARY OSTEOARTHRITIS OF BOTH HIPS: ICD-10-CM

## 2020-11-19 PROCEDURE — 99214 OFFICE O/P EST MOD 30 MIN: CPT | Performed by: PHYSICAL MEDICINE & REHABILITATION

## 2020-11-19 ASSESSMENT — FIBROSIS 4 INDEX: FIB4 SCORE: 1.71

## 2020-11-19 ASSESSMENT — PAIN SCALES - GENERAL: PAINLEVEL: 4=SLIGHT-MODERATE PAIN

## 2020-11-19 ASSESSMENT — PATIENT HEALTH QUESTIONNAIRE - PHQ9: CLINICAL INTERPRETATION OF PHQ2 SCORE: 0

## 2020-11-19 NOTE — PROGRESS NOTES
Follow-up patient note    Physiatry (physical medicine and  Rehabilitation), interventional spine and sports medicine    Date of Service: 11/19/2020    Chief complaint:   Chief Complaint   Patient presents with   • Follow-Up     Hip and lower back pain       HISTORY    HPI: Carlee Hunt 70 y.o. female who presents today for evaluation of pain complaints.  She reports that she has a history of cervical fusion with posterior decompression and fusion from occiput to C5.  This was done at [facility] in 2007.    Since her last visit, she reports that she has been going to PT at [facility], working with Jaylyn Peter PT.  They are meeting twice a month now.  Carlee reports that her pain is much better.  She has more intermittent symptoms.  She has been doing her home exercise program for her arms and also for her previously reported left groin pain.  This is much less now.  She finds that walking is easier.  She does not report radicular symptoms.    Her medications for pain include the tramadol that she takes twice a day and advil once a day.      No bowel or bladder changes.  No significant radicular symptoms.  No tingling in her legs.    By history:  Her neck pain has been ongoing and she has history of chronic antibiotics for suppression of post-op infection    Her chronic pain related to her cervical fusion has been managed with tramadol 100mg once a day and ibuprofen, usually 3 times a week.       ORT: 0  PHQ-9: 0    Medical records review:  I reviewed the note from the referring provider Shari Dent D* dated 06/11/2020. Patient was seen for chronic low back pain with left-sided sciatica.  She had PT for 6 weeks without much improvement and was referred here for evaluation, bilateral hip pain, chronic antibiotic suppression on doxycycline since 2013 after staph epidermidis infection following cervical fusion surgery, UTI with enterococcus faecalis, palpitations with history of Holter monitor October 2019  treated for years atenolol after note of 5 PVCs and 13 atrial runs of SVT, acne vulgaris, chornic use of tramadol, usually two a day since 2013, essential hypertension, primary open angle glaucoma follows with ophthalmology, cervical vertebral fusion, chronic low back pain referred to physiatry for chronic bilateral low back pain and left-sided sciatic, cervical vertebral fusion and bilateral hip pain    Dr. Goddard 02/06/2020 Follow-up visit after sacral neuromodulation system implantation.  Improved fecal continence.  No information about MRI-compatibility status of device     Previous treatments:    Physical Therapy: Yes    Medications the patient is tried: NSAIDs and tramadol    Previous interventions: none    Previous surgeries to relieve the above pain:  Cervical fusion       ROS: Unchanged from 09/18/2020  Eyes: glaucoma  ENT: tinnitus  CV: no recent symptoms, history of palpitations   : [employer]on[employer] implant for urgency  Endo: hypothyroidism    Red Flags ROS:   Fever, Chills, Sweats: Denies  Involuntary Weight Loss: Denies  Bladder Incontinence: Denies  Bowel Incontinence: Denies  Saddle Anesthesia: Denies    All other systems reviewed and negative.       PMHx:   Past Medical History:   Diagnosis Date   • Cataract     bilateral surgery complete   • Elevated fasting glucose 1/17/2019   • Glaucoma    • Hypertension    • Pain     chronic neck and shoulders   • Thyroid disease        PSHx:   Past Surgical History:   Procedure Laterality Date   • BOWEL STIMULATOR INSERTION  1/29/2020    Procedure: INSERTION, ELECTRODE LEADS AND PULSE GENERATOR, NEUROSTIMULATOR, SACRAL- FOR PERMANENT IMPLANTATION;  Surgeon: Ishmael Goddard M.D.;  Location: [facility];  Service: General   • BOWEL STIMULATOR INSERTION  1/15/2020    Procedure: INSERTION, ELECTRODE LEADS AND PULSE GENERATOR, NEUROSTIMULATOR, SACRAL-NEUROMODULATION TRIAL;  Surgeon: Ishmael Goddard M.D.;  Location: [facility];  Service: General   • OTHER  " 2007    throat surgery after cervical surgery, adjusted Mercedes Messi   • CERVICAL FUSION POSTERIOR      Basilar \"invagination\"   • EYE SURGERY      cataracts - Dr. Martinez       Family history   Family History   Problem Relation Age of Onset   • Hypertension Mother    • Other Father         accident   • Other Sister         pneumoccocal pneumonia   • Other Brother         glaucoma   • Heart Attack Paternal Grandfather    • Cancer Neg Hx    • Diabetes Neg Hx    • Heart Disease Neg Hx          Medications:   Current Outpatient Medications   Medication   • metronidazole (METROGEL) 1 % gel   • tramadol (ULTRAM) 50 MG Tab   • metoprolol SR (TOPROL XL) 25 MG TABLET SR 24 HR   • dorzolamide-timolol (COSOPT) 22.3-6.8 MG/ML Solution   • Coenzyme Q10 (COQ10 PO)   • magnesium oxide (MAG-OX) 400 MG Tab   • Calcium 500 MG Chew Tab   • Biotin 1000 MCG Chew Tab   • Milk Thistle 175 MG Cap   • Cyanocobalamin (B-12) 2500 MCG Tab   • Ascorbic Acid (VITAMIN C) 1000 MG Tab   • Tretinoin 0.05 % Gel   • levothyroxine (SYNTHROID) 100 MCG Tab   • TURMERIC PO   • Probiotic Product (PRO-BIOTIC BLEND) Cap   • Alpha-Lipoic Acid 200 MG Tab   • latanoprost (XALATAN) 0.005 % Solution   • vitamin D (CHOLECALCIFEROL) 1000 UNIT Tab   • KRILL OIL PO   • Multiple Vitamins-Minerals (CENTRUM PO)   • doxycycline (VIBRAMYCIN) 100 MG TABS   • influenza Vac High-Dose Quad (FLUZONE HIGH-DOSE) 0.7 mL Suspension Prefilled Syringe injection     No current facility-administered medications for this visit.        Allergies:   Allergies   Allergen Reactions   • Rifampin Rash     Rash, fever   • Vancomycin Rash     Rash, fever   • Sulfa Drugs Rash     Per pt         Social Hx:   Social History     Socioeconomic History   • Marital status:      Spouse name: Not on file   • Number of children: Not on file   • Years of education: Not on file   • Highest education level: Not on file   Occupational History   • Not on file   Social Needs   • Financial resource strain: " Not on file   • Food insecurity     Worry: Not on file     Inability: Not on file   • Transportation needs     Medical: Not on file     Non-medical: Not on file   Tobacco Use   • Smoking status: Former Smoker     Packs/day: 1.00     Years: 16.00     Pack years: 16.00     Types: Cigarettes     Quit date: 1982     Years since quittin.8   • Smokeless tobacco: Never Used   • Tobacco comment: continued abstinence   Substance and Sexual Activity   • Alcohol use: Yes     Alcohol/week: 4.2 oz     Types: 7 Glasses of wine per week     Drinks per session: 1 or 2     Binge frequency: Never     Comment: one daily   • Drug use: No     Comment: CBD cream last used 2019   • Sexual activity: Not Currently     Birth control/protection: Post-Menopausal   Lifestyle   • Physical activity     Days per week: Not on file     Minutes per session: Not on file   • Stress: Not on file   Relationships   • Social connections     Talks on phone: Not on file     Gets together: Not on file     Attends [employer]ism service: Not on file     Active member of club or organization: Not on file     Attends meetings of clubs or organizations: Not on file     Relationship status: Not on file   • Intimate partner violence     Fear of current or ex partner: Not on file     Emotionally abused: Not on file     Physically abused: Not on file     Forced sexual activity: Not on file   Other Topics Concern   •  Service No   • Blood Transfusions No   • Caffeine Concern No   • Occupational Exposure No   • Hobby Hazards No   • Sleep Concern No   • Stress Concern Yes   • Weight Concern No   • Special Diet No   • Back Care Yes   • Exercise No   • Bike Helmet No   • Seat Belt Yes   • Self-Exams No   Social History Narrative    In [place] since ; previously lived in [place], [place], and [place]. Carlee was born and raised in [place] [facility] [place]. She has been in [place] since . Previous to that she has lived in [place], [place], and [place] all for her ex-'s job. She worked as a  " and retired at age 65. She is currently single. She has 3 cats and a dog. She has a son who is currently incarcerated in [place] after a DUI death which has been a big stressor on her.          EXAMINATION     Physical Exam:   Vitals: /64 (BP Location: Right arm, Patient Position: Sitting, BP Cuff Size: Small adult)   Pulse 64   Temp 36.2 °C (97.2 °F) (Temporal)   Ht 1.702 m (5' 7\")   Wt 71.4 kg (157 lb 6.5 oz)   SpO2 97%     Constitutional:   Body Habitus: Body mass index is 24.65 kg/m².  Cooperation: Fully cooperates with exam  Appearance: Well-groomed, well-nourished, not disheveled, in no acute distress    Eyes: No scleral icterus, no proptosis     ENT -no obvious auditory deficits, wearing a face mask    Skin -no rashes or lesions noted     Respiratory-  breathing comfortable on room air, no audible wheezing    Cardiovascular- No lower extremity edema is noted.     Psychiatric- alert and oriented ×3. Normal affect.     Gait - normal gait, no use of ambulatory device, nonantalgic.    Arch of left foot collapses with weight bearing    Musculoskeletal -   Cervical spine   Inspection: No deformities of the skin over the cervical spine. No rashes or lesions.    minimal A/P ROM in all directions    Thoracic/Lumbar Spine/Sacral Spine/Hips   Inspection: No evidence of atrophy in bilateral lower extremities throughout     Palpation:   No tenderness to palpation in midline at T1-T12 levels. No tenderness to palpation in the left and right of the midline T1-L5  palpation over SI joint: mildly positive on the right and negative on the right     palpation over buttock: negative bilaterally    palpation in hip or over the greater trochanters: negative bilaterally      Neuro       Key points for the international standards for neurological classification of spinal cord injury (ISNCSCI) to light touch.     Dermatome R L   L2 2 2   L3 2 2   L4 2 2   L5 2 2   S1 2 2   S2 2 2       Motor Exam Lower " Extremities    ? Myotome R L   Hip flexion L2 5 5   Knee extension L3 5 5   Ankle dorsiflexion L4 5 5   Toe extension L5 5 5   Ankle plantarflexion S1 5 5   Hip abductors 4/5 bilaterally    Clonus of the ankle negative bilaterally     Reflexes  ?  R L   Patella  2+ 2+   Achilles   1+ 2+       MEDICAL DECISION MAKING    Medical records review: see under HPI section.     DATA    Labs:   Lab Results   Component Value Date/Time    SODIUM 139 02/19/2020 07:20 AM    POTASSIUM 4.2 02/19/2020 07:20 AM    CHLORIDE 106 02/19/2020 07:20 AM    CO2 25 02/19/2020 07:20 AM    ANION 8.0 02/19/2020 07:20 AM    GLUCOSE 104 (H) 02/19/2020 07:20 AM    BUN 19 02/19/2020 07:20 AM    CREATININE 0.74 02/19/2020 07:20 AM    CALCIUM 9.2 02/19/2020 07:20 AM    ASTSGOT 21 02/19/2020 07:20 AM    ALTSGPT 18 02/19/2020 07:20 AM    TBILIRUBIN 0.4 02/19/2020 07:20 AM    ALBUMIN 4.3 02/19/2020 07:20 AM    TOTPROTEIN 6.8 02/19/2020 07:20 AM    GLOBULIN 2.5 02/19/2020 07:20 AM    AGRATIO 1.7 02/19/2020 07:20 AM       No results found for: PROTHROMBTM, INR     Lab Results   Component Value Date/Time    WBC 7.0 02/19/2020 07:20 AM    RBC 4.47 02/19/2020 07:20 AM    HEMOGLOBIN 14.3 02/19/2020 07:20 AM    HEMATOCRIT 43.6 02/19/2020 07:20 AM    MCV 97.5 02/19/2020 07:20 AM    MCH 32.0 02/19/2020 07:20 AM    MCHC 32.8 (L) 02/19/2020 07:20 AM    MPV 10.8 02/19/2020 07:20 AM    NEUTSPOLYS 46.70 02/19/2020 07:20 AM    LYMPHOCYTES 43.90 (H) 02/19/2020 07:20 AM    MONOCYTES 6.90 02/19/2020 07:20 AM    EOSINOPHILS 2.00 02/19/2020 07:20 AM    BASOPHILS 0.40 02/19/2020 07:20 AM        Lab Results   Component Value Date/Time    HBA1C 5.6 02/20/2019 08:20 AM        Imaging: I personally reviewed following images, these are my reads  Xray cervical spine 07/21/2020  There is note of fusion from occiput to C6 with multilevel laminectomy.  No evidence of loosening of hardware    Xray lumbar spine 07/21/2020  There is dextroscoliosis in the lumbar spine.  Grade I  anterolisthesis at L3-4 and L4-5.  Note of multilevel facet arthropathy.  Left-sided sacral stimulator    Xray hips 07/21/2020  There is mild degenerative change of the hips, left greater than right    IMAGING radiology reads. I reviewed the following radiology reads           Xray cervical spine 07/21/2020  IMPRESSION:     1.  Metallic surgical hardware fusion extending from the occipital calvarium to the C6 level.     2.  Multilevel degenerative disc disease and facet degeneration.          Xray lumbar spine 07/21/2020  IMPRESSION:     1.  Multilevel degenerative disc disease and facet degeneration.  2.  Scoliotic deformity.  3.  Implanted electronic device overlying the left sacrum.      Xray hips 07/21/2020              IMPRESSION:     Degenerative change of the hips bilaterally, left greater than right.                                                                                                  Results for orders placed during the hospital encounter of 02/25/16   DX-KNEE 3 VIEWS LEFT    Impression 1.  Mild medial femorotibial component osteoarthritis    2.  Probable 5 mm intra-articular ossific body                                                 Diagnosis   Visit Diagnoses     ICD-10-CM   1. S/P cervical spinal fusion  Z98.1   2. Primary osteoarthritis of both hips  M16.0   3. DDD (degenerative disc disease), lumbar  M51.36         ASSESSMENT:  Carlee Hunt 70 y.o. female seen for above     Carlee was seen today for follow-up.    Diagnoses and all orders for this visit:    S/P cervical spinal fusion    Primary osteoarthritis of both hips    DDD (degenerative disc disease), lumbar         1. Hold off on left intra-articular hip injection as she is doing much better with her PT and home program.  2. Continue with physical therapy.  She will continue with home exercises daily.  She is happy with the progress that she has made.  3. Continue tramadol up to twice a day.  She is taking this for severe pain,  script from her PCP, Dr. Dent.      Outside records requested:  The patient signed outside records request form for her outside records including outside images.      Follow-up: Return if symptoms worsen or fail to improve.    Thank you very much for asking me to participate in Carlee Hunt's care.  Please contact me with any questions or concerns.      Please note that this dictation was created using voice recognition software. I have made every reasonable attempt to correct obvious errors but there may be errors of grammar and content that I may have overlooked prior to finalization of this note.      Wilberto Carbone MD  Physical Medicine and Rehabilitation  Interventional Spine and Sports Physiatry  [facility]       CC Shari Dent D

## 2021-01-15 DIAGNOSIS — Z23 NEED FOR VACCINATION: ICD-10-CM

## 2021-02-15 ENCOUNTER — HOSPITAL ENCOUNTER (OUTPATIENT)
Dept: LAB | Facility: MEDICAL CENTER | Age: 71
End: 2021-02-15
Attending: INTERNAL MEDICINE
Payer: MEDICARE

## 2021-02-15 LAB
ALBUMIN SERPL BCP-MCNC: 4.4 G/DL (ref 3.2–4.9)
ALBUMIN/GLOB SERPL: 1.5 G/DL
ALP SERPL-CCNC: 82 U/L (ref 30–99)
ALT SERPL-CCNC: 18 U/L (ref 2–50)
ANION GAP SERPL CALC-SCNC: 10 MMOL/L (ref 7–16)
AST SERPL-CCNC: 22 U/L (ref 12–45)
BASOPHILS # BLD AUTO: 0.9 % (ref 0–1.8)
BASOPHILS # BLD: 0.07 K/UL (ref 0–0.12)
BILIRUB SERPL-MCNC: 0.4 MG/DL (ref 0.1–1.5)
BUN SERPL-MCNC: 14 MG/DL (ref 8–22)
CALCIUM SERPL-MCNC: 9.5 MG/DL (ref 8.4–10.2)
CHLORIDE SERPL-SCNC: 103 MMOL/L (ref 96–112)
CO2 SERPL-SCNC: 29 MMOL/L (ref 20–33)
CREAT SERPL-MCNC: 0.68 MG/DL (ref 0.5–1.4)
CRP SERPL HS-MCNC: 0.16 MG/DL (ref 0–0.75)
EOSINOPHIL # BLD AUTO: 0.18 K/UL (ref 0–0.51)
EOSINOPHIL NFR BLD: 2.2 % (ref 0–6.9)
ERYTHROCYTE [DISTWIDTH] IN BLOOD BY AUTOMATED COUNT: 42.9 FL (ref 35.9–50)
ERYTHROCYTE [SEDIMENTATION RATE] IN BLOOD BY WESTERGREN METHOD: 5 MM/HOUR (ref 0–30)
FASTING STATUS PATIENT QL REPORTED: NORMAL
GLOBULIN SER CALC-MCNC: 3 G/DL (ref 1.9–3.5)
GLUCOSE SERPL-MCNC: 99 MG/DL (ref 65–99)
HCT VFR BLD AUTO: 44.4 % (ref 37–47)
HGB BLD-MCNC: 14.6 G/DL (ref 12–16)
IMM GRANULOCYTES # BLD AUTO: 0.02 K/UL (ref 0–0.11)
IMM GRANULOCYTES NFR BLD AUTO: 0.2 % (ref 0–0.9)
LYMPHOCYTES # BLD AUTO: 3.28 K/UL (ref 1–4.8)
LYMPHOCYTES NFR BLD: 40.2 % (ref 22–41)
MCH RBC QN AUTO: 31.3 PG (ref 27–33)
MCHC RBC AUTO-ENTMCNC: 32.9 G/DL (ref 33.6–35)
MCV RBC AUTO: 95.3 FL (ref 81.4–97.8)
MONOCYTES # BLD AUTO: 0.46 K/UL (ref 0–0.85)
MONOCYTES NFR BLD AUTO: 5.6 % (ref 0–13.4)
NEUTROPHILS # BLD AUTO: 4.15 K/UL (ref 2–7.15)
NEUTROPHILS NFR BLD: 50.9 % (ref 44–72)
NRBC # BLD AUTO: 0 K/UL
NRBC BLD-RTO: 0 /100 WBC
PLATELET # BLD AUTO: 218 K/UL (ref 164–446)
PMV BLD AUTO: 9.5 FL (ref 9–12.9)
POTASSIUM SERPL-SCNC: 4.4 MMOL/L (ref 3.6–5.5)
PROT SERPL-MCNC: 7.4 G/DL (ref 6–8.2)
RBC # BLD AUTO: 4.66 M/UL (ref 4.2–5.4)
SODIUM SERPL-SCNC: 142 MMOL/L (ref 135–145)
WBC # BLD AUTO: 8.2 K/UL (ref 4.8–10.8)

## 2021-02-15 PROCEDURE — 80053 COMPREHEN METABOLIC PANEL: CPT

## 2021-02-15 PROCEDURE — 85025 COMPLETE CBC W/AUTO DIFF WBC: CPT

## 2021-02-15 PROCEDURE — 36415 COLL VENOUS BLD VENIPUNCTURE: CPT

## 2021-02-15 PROCEDURE — 86140 C-REACTIVE PROTEIN: CPT

## 2021-02-15 PROCEDURE — 85652 RBC SED RATE AUTOMATED: CPT

## 2021-03-11 ENCOUNTER — OFFICE VISIT (OUTPATIENT)
Dept: MEDICAL GROUP | Facility: PHYSICIAN GROUP | Age: 71
End: 2021-03-11
Payer: MEDICARE

## 2021-03-11 VITALS
SYSTOLIC BLOOD PRESSURE: 136 MMHG | OXYGEN SATURATION: 96 % | WEIGHT: 162 LBS | HEART RATE: 61 BPM | TEMPERATURE: 97.5 F | BODY MASS INDEX: 25.43 KG/M2 | DIASTOLIC BLOOD PRESSURE: 80 MMHG | HEIGHT: 67 IN

## 2021-03-11 DIAGNOSIS — Z12.31 ENCOUNTER FOR SCREENING MAMMOGRAM FOR BREAST CANCER: ICD-10-CM

## 2021-03-11 DIAGNOSIS — R31.1 BENIGN ESSENTIAL MICROSCOPIC HEMATURIA: ICD-10-CM

## 2021-03-11 DIAGNOSIS — Z78.0 MENOPAUSE: ICD-10-CM

## 2021-03-11 DIAGNOSIS — M54.42 CHRONIC BILATERAL LOW BACK PAIN WITH LEFT-SIDED SCIATICA: ICD-10-CM

## 2021-03-11 DIAGNOSIS — E03.9 ACQUIRED HYPOTHYROIDISM: ICD-10-CM

## 2021-03-11 DIAGNOSIS — E78.5 DYSLIPIDEMIA: ICD-10-CM

## 2021-03-11 DIAGNOSIS — E55.9 VITAMIN D DEFICIENCY: ICD-10-CM

## 2021-03-11 DIAGNOSIS — G89.29 CHRONIC BILATERAL LOW BACK PAIN WITH LEFT-SIDED SCIATICA: ICD-10-CM

## 2021-03-11 PROCEDURE — 99215 OFFICE O/P EST HI 40 MIN: CPT | Performed by: INTERNAL MEDICINE

## 2021-03-11 RX ORDER — NITROFURANTOIN 25; 75 MG/1; MG/1
CAPSULE ORAL
COMMUNITY
End: 2021-08-26

## 2021-03-11 RX ORDER — CHOLECALCIFEROL (VITAMIN D3) 125 MCG
CAPSULE ORAL
COMMUNITY

## 2021-03-11 RX ORDER — LUTEIN 6 MG
TABLET ORAL
COMMUNITY

## 2021-03-11 RX ORDER — BRIMONIDINE TARTRATE, TIMOLOL MALEATE 2; 5 MG/ML; MG/ML
SOLUTION/ DROPS OPHTHALMIC
COMMUNITY
Start: 2021-02-18

## 2021-03-11 RX ORDER — DOXYCYCLINE HYCLATE 100 MG/1
CAPSULE ORAL
COMMUNITY
Start: 2021-02-09 | End: 2021-11-24

## 2021-03-11 RX ORDER — TRAMADOL HYDROCHLORIDE 100 MG/1
100 TABLET, COATED ORAL
Qty: 180 TABLET | Refills: 0 | Status: SHIPPED | OUTPATIENT
Start: 2021-03-11 | End: 2021-03-15

## 2021-03-11 RX ORDER — TIMOLOL MALEATE 5 MG/ML
SOLUTION/ DROPS OPHTHALMIC
COMMUNITY
End: 2021-08-26

## 2021-03-11 RX ORDER — BACLOFEN 5 MG/1
5 TABLET ORAL 3 TIMES DAILY
Qty: 30 TABLET | Refills: 0 | Status: SHIPPED
Start: 2021-03-11 | End: 2021-04-05

## 2021-03-11 RX ORDER — TRAMADOL HYDROCHLORIDE 50 MG/1
TABLET ORAL
COMMUNITY
Start: 2021-01-12 | End: 2021-03-11 | Stop reason: SDUPTHER

## 2021-03-11 ASSESSMENT — PATIENT HEALTH QUESTIONNAIRE - PHQ9: CLINICAL INTERPRETATION OF PHQ2 SCORE: 0

## 2021-03-11 ASSESSMENT — FIBROSIS 4 INDEX: FIB4 SCORE: 1.74

## 2021-03-12 NOTE — ASSESSMENT & PLAN NOTE
Previous problem that requires follow-up.  Will recheck lipid panel and recalculate her ASCVD score.  She may be a good candidate for coronary calcium score.  We will off on starting any cholesterol medicines at this time.

## 2021-03-12 NOTE — ASSESSMENT & PLAN NOTE
Chronic and ongoing problem.  Will send in renewal for tramadol 100 mg 1-2 times daily.  We will also write new prescription for baclofen 5 mg at night.  Spent significant amount of time discussing potential risks with these medicines including fall risk and changes in mentation.  She cannot mix this with alcohol.  We have a controlled substance agreement on file from June 2020.  No one else is prescribing her these medicines.  She has significant history of joint disease and surgery in the neck.  She follows with physiatry and is also completed physical therapy.

## 2021-03-12 NOTE — PROGRESS NOTES
Subjective:   Chief Complaint/History of Present Illness:  Carlee Hunt is a 71 y.o. female established patient who presents today to discuss medical problems as listed below. Carlee is unaccompanied for today's visit.    Problem   Benign Essential Microscopic Hematuria    She had hematuria in her latest urine sample.  There has not been a follow-up yet to ensure resolution.     Chronic Bilateral Low Back Pain With Left-Sided Sciatica    Hip Xray (7/2020):  There is an implanted electronic device within the left buttock with wires extending into the sacrum. There is mild degenerative change of the left greater than right hip joint characterized by joint space loss and osteophytic spurring. There is pelvic calcification.     IMPRESSION: Degenerative change of the hips bilaterally, left greater than right.    Lumbar Xray (7/2020):  There is convex right scoliotic curvature. There is multilevel decreased disc height and endplate spurring. There is prominent multilevel facet degeneration. There is grade 1 anterior subluxation at L3-4 and L4-5. There is vascular calcification. There is an implanted electronic device overlying the left sacrum posteriorly.     IMPRESSION:  1.  Multilevel degenerative disc disease and facet degeneration.  2.  Scoliotic deformity.  3.  Implanted electronic device overlying the left sacrum.      She reports longstanding problems in the low back.  She recalls SI joint disease and has been working with physical therapy in 2019. She denies any recent imaging of her low back. She also has pain along the left lateral thigh, unclear if that is IT band pain or radicular from the lower back. She has established with Dr. Carbone.    Current regimen: Tramadol 100 mg 1-2 times daily, baclofen 5 mg at night     Dyslipidemia       Ref. Range 10/1/2019 14:30   Cholesterol,Tot Latest Ref Range: 100 - 199 mg/dL 208 (H)   Triglycerides Latest Ref Range: 0 - 149 mg/dL 133   HDL Latest Ref Range: >=40 mg/dL 58    LDL Latest Ref Range: <100 mg/dL 123 (H)     She has history of hyperlipidemia.  Unclear if she is ever taken dedicated lipid-lowering medications.  She is on coenzyme Q 10.  She had a carotid ultrasound in 2017 that was negative for any blockages.  Unclear if she is ever been evaluated for coronary artery disease.  She did have a Holter study for palpitations in the past.     Acquired Hypothyroidism       Ref. Range 11/7/2019 12:32   TSH Latest Ref Range: 0.380 - 5.330 uIU/mL 0.920   Free T-4 Latest Ref Range: 0.53 - 1.43 ng/dL 1.07     She has history of hypothyroidism.  She is currently utilizing levothyroxine 100 mcg daily.  No current signs or symptoms of overtreatment or under treatment.          Current Medications:  Current Outpatient Medications Ordered in Epic   Medication Sig Dispense Refill   • timolol (TIMOPTIC) 0.5 % Solution timolol maleate 0.5 % eye drops     • doxycycline (VIBRAMYCIN) 100 MG Cap      • COMBIGAN 0.2-0.5 % Solution      • Lutein 20 MG Tab Take  by mouth.     • L-Theanine 200 MG Cap Take  by mouth.     • traMADol 100 MG Tab Take 100 mg by mouth 2 Times a Day for 90 days. 180 tablet 0   • baclofen (LIORESAL) 5 MG Tab Take 1 tablet by mouth 3 times a day. 30 tablet 0   • levothyroxine (SYNTHROID) 100 MCG Tab TAKE ONE TABLET BY MOUTH EVERY MORNING ON AN EMPTY STOMACH 100 Tab 3   • metronidazole (METROGEL) 1 % gel      • metoprolol SR (TOPROL XL) 25 MG TABLET SR 24 HR Take 1 Tab by mouth every day. 100 Tab 3   • Coenzyme Q10 (COQ10 PO) Take 100 mg by mouth every day. Qunol liquid     • magnesium oxide (MAG-OX) 400 MG Tab Take 400 mg by mouth every evening.     • Calcium 500 MG Chew Tab Take 1 Tab by mouth every evening.     • Biotin 1000 MCG Chew Tab Take 500 mg by mouth every day.     • Milk Thistle 175 MG Cap Take 1 Cap by mouth every day.     • Cyanocobalamin (B-12) 2500 MCG Tab Take 1 Tab by mouth every day.     • Ascorbic Acid (VITAMIN C) 1000 MG Tab Take 1 Tab by mouth every day.    "  • Tretinoin 0.05 % Gel 1 Application by Apply externally route every bedtime. 1 Tube 3   • TURMERIC PO Take 333 mg by mouth every day. Daily (Qunol liquid)     • Probiotic Product (PRO-BIOTIC BLEND) Cap Take 1 Cap by mouth every evening. [place]     • Alpha-Lipoic Acid 200 MG Tab Take 200 mg by mouth every day.     • latanoprost (XALATAN) 0.005 % Solution Place 1 Drop in both eyes every evening.     • Multiple Vitamins-Minerals (CENTRUM PO) Take 1 Tab by mouth every day.     • nitrofurantoin (MACROBID) 100 MG Cap nitrofurantoin monohydrate/macrocrystals 100 mg capsule     • COVID-19 mRNA vaccine (COVID-19 VACCINE) 100 MCG/0.5ML Suspension injection Moderna COVID-19 Vaccine (PF) 100 mcg/0.5 mL intramuscular susp. (EUA)   ADMINISTER 0.5ML IN THE MUSCLE AS DIRECTED     • influenza Vac High-Dose Quad (FLUZONE HIGH-DOSE) 0.7 mL Suspension Prefilled Syringe injection Fluzone High-Dose 6619-1113 (PF) 180 mcg/0.5 mL intramuscular syringe     • vitamin D (CHOLECALCIFEROL) 1000 UNIT Tab Take 1,000 Units by mouth every day.     • KRILL OIL PO Take 500 mg by mouth every day.       No current [facility]-ordered facility-administered medications on file.          Objective:   Physical Exam:    Vitals: /80 (BP Location: Left arm, Patient Position: Sitting, BP Cuff Size: Adult)   Pulse 61   Temp 36.4 °C (97.5 °F) (Temporal)   Ht 1.702 m (5' 7\")   Wt 73.5 kg (162 lb)   SpO2 96%    BMI: Body mass index is 25.37 kg/m².  Physical Exam   Constitutional: She is well-developed, well-nourished, and in no distress.   Eyes: Conjunctivae are normal.   Cardiovascular: Normal rate, regular rhythm and normal heart sounds.   No murmur heard.  Pulmonary/Chest: Effort normal and breath sounds normal. No respiratory distress.   Musculoskeletal:         General: No edema.   Neurological: She is alert.   Skin: Skin is warm and dry. No rash noted.   Psychiatric: Mood, memory, affect and judgment normal.             Assessment and Plan:   Carlee" is a 71 y.o. female with the following:  Problem List Items Addressed This Visit     Acquired hypothyroidism     Chronic and stable problem.  Appropriate to continue levothyroxine 100 mcg daily.  We will update her thyroid function studies to ensure ongoing stability.         Relevant Orders    TSH WITH REFLEX TO FT4    Dyslipidemia     Previous problem that requires follow-up.  Will recheck lipid panel and recalculate her ASCVD score.  She may be a good candidate for coronary calcium score.  We will off on starting any cholesterol medicines at this time.         Relevant Orders    Lipid Profile    Chronic bilateral low back pain with left-sided sciatica     Chronic and ongoing problem.  Will send in renewal for tramadol 100 mg 1-2 times daily.  We will also write new prescription for baclofen 5 mg at night.  Spent significant amount of time discussing potential risks with these medicines including fall risk and changes in mentation.  She cannot mix this with alcohol.  We have a controlled substance agreement on file from June 2020.  No one else is prescribing her these medicines.  She has significant history of joint disease and surgery in the neck.  She follows with physiatry and is also completed physical therapy.         Relevant Medications    traMADol 100 MG Tab    baclofen (LIORESAL) 5 MG Tab    Benign essential microscopic hematuria     Previous problem that requires follow-up.  Will recheck urinalysis to ensure there is no residual hematuria.  If there is we will need to discuss follow-up imaging such as CT pelvis urogram.         Relevant Orders    URINALYSIS,CULTURE IF INDICATED      Other Visit Diagnoses     Menopause        Relevant Orders    DS-BONE DENSITY STUDY (DEXA)    Encounter for screening mammogram for breast cancer        Relevant Orders    MA-SCREENING MAMMO BILAT W/TOMOSYNTHESIS W/CAD    Vitamin D deficiency        Relevant Orders    VITAMIN D,25 HYDROXY           Annual Health Assessment  Questions:    1.  Are you currently engaging in any exercise or physical activity? Yes  Exercises for legs back and arms  Walks  2.  How would you describe your mood or emotional well-being today? good    3.  Have you had any falls in the last year? No    4.  Have you noticed any problems with your balance or had difficulty walking? Yes  Balance    5.  In the last six months have you experienced any leakage of urine? No    6. DPA/Advanced Directive: Patient has Advanced Directive, but it is not on file. Instructed to bring in a copy to scan into their chart.       RTC: Return in about 6 months (around 9/11/2021).    I spent a total of 40 minutes with record review, exam, communication with the patient, communication with other providers, and documentation of this encounter.    PLEASE NOTE: This dictation was created using voice recognition software. I have made every reasonable attempt to correct obvious errors, but I expect that there are errors of grammar and possibly content that I did not discover before finalizing the note.      Shari Dent, DO  Geriatric and Internal Medicine  [facility]

## 2021-03-12 NOTE — ASSESSMENT & PLAN NOTE
Chronic and stable problem.  Appropriate to continue levothyroxine 100 mcg daily.  We will update her thyroid function studies to ensure ongoing stability.

## 2021-03-12 NOTE — ASSESSMENT & PLAN NOTE
Previous problem that requires follow-up.  Will recheck urinalysis to ensure there is no residual hematuria.  If there is we will need to discuss follow-up imaging such as CT pelvis urogram.

## 2021-03-15 DIAGNOSIS — G89.29 CHRONIC NECK PAIN: ICD-10-CM

## 2021-03-15 DIAGNOSIS — M54.2 CHRONIC NECK PAIN: ICD-10-CM

## 2021-03-15 DIAGNOSIS — M25.551 BILATERAL HIP PAIN: ICD-10-CM

## 2021-03-15 DIAGNOSIS — G89.29 CHRONIC BILATERAL LOW BACK PAIN WITH LEFT-SIDED SCIATICA: ICD-10-CM

## 2021-03-15 DIAGNOSIS — M25.552 BILATERAL HIP PAIN: ICD-10-CM

## 2021-03-15 DIAGNOSIS — M54.42 CHRONIC BILATERAL LOW BACK PAIN WITH LEFT-SIDED SCIATICA: ICD-10-CM

## 2021-03-15 DIAGNOSIS — Z79.891 CHRONIC USE OF OPIATE FOR THERAPEUTIC PURPOSE: ICD-10-CM

## 2021-03-15 RX ORDER — TRAMADOL HYDROCHLORIDE 50 MG/1
100 TABLET ORAL 2 TIMES DAILY PRN
Qty: 120 TABLET | Refills: 0 | Status: SHIPPED | OUTPATIENT
Start: 2021-03-15 | End: 2021-04-14

## 2021-03-15 RX ORDER — TRAMADOL HYDROCHLORIDE 50 MG/1
50 TABLET ORAL EVERY 4 HOURS PRN
COMMUNITY
End: 2021-03-15 | Stop reason: SDUPTHER

## 2021-03-16 ENCOUNTER — NON-PROVIDER VISIT (OUTPATIENT)
Dept: MEDICAL GROUP | Facility: PHYSICIAN GROUP | Age: 71
End: 2021-03-16
Payer: MEDICARE

## 2021-03-16 NOTE — NON-PROVIDER
Carlee Hunt is a 71 y.o. female here for a non-provider visit for bilateral ear lavage.    If abnormal was an in office provider notified today (if so, indicate provider)? Yes  Routed to PCP? Yes

## 2021-04-05 DIAGNOSIS — M43.22 CERVICAL VERTEBRAL FUSION: ICD-10-CM

## 2021-04-05 DIAGNOSIS — M54.2 CHRONIC NECK PAIN: ICD-10-CM

## 2021-04-05 DIAGNOSIS — G89.29 CHRONIC NECK PAIN: ICD-10-CM

## 2021-04-05 RX ORDER — CYCLOBENZAPRINE HCL 10 MG
5-10 TABLET ORAL 2 TIMES DAILY PRN
Qty: 30 TABLET | Refills: 1 | Status: SHIPPED
Start: 2021-04-05 | End: 2021-08-26

## 2021-04-05 RX ORDER — CYCLOBENZAPRINE HCL 10 MG
10 TABLET ORAL 3 TIMES DAILY PRN
COMMUNITY
End: 2021-04-05 | Stop reason: SDUPTHER

## 2021-04-26 ENCOUNTER — HOSPITAL ENCOUNTER (OUTPATIENT)
Dept: LAB | Facility: MEDICAL CENTER | Age: 71
End: 2021-04-26
Attending: INTERNAL MEDICINE
Payer: MEDICARE

## 2021-04-26 DIAGNOSIS — R31.1 BENIGN ESSENTIAL MICROSCOPIC HEMATURIA: ICD-10-CM

## 2021-04-26 DIAGNOSIS — E03.9 ACQUIRED HYPOTHYROIDISM: ICD-10-CM

## 2021-04-26 DIAGNOSIS — E55.9 VITAMIN D DEFICIENCY: ICD-10-CM

## 2021-04-26 DIAGNOSIS — E78.5 DYSLIPIDEMIA: ICD-10-CM

## 2021-04-26 LAB
APPEARANCE UR: CLEAR
BILIRUB UR QL STRIP.AUTO: NEGATIVE
CHOLEST SERPL-MCNC: 203 MG/DL (ref 100–199)
COLOR UR: YELLOW
FASTING STATUS PATIENT QL REPORTED: NORMAL
GLUCOSE UR STRIP.AUTO-MCNC: NEGATIVE MG/DL
HDLC SERPL-MCNC: 61 MG/DL
KETONES UR STRIP.AUTO-MCNC: NEGATIVE MG/DL
LDLC SERPL CALC-MCNC: 109 MG/DL
LEUKOCYTE ESTERASE UR QL STRIP.AUTO: NEGATIVE
MICRO URNS: NORMAL
NITRITE UR QL STRIP.AUTO: NEGATIVE
PH UR STRIP.AUTO: 7.5 [PH] (ref 5–8)
PROT UR QL STRIP: NEGATIVE MG/DL
RBC UR QL AUTO: NEGATIVE
SP GR UR STRIP.AUTO: 1.01
TRIGL SERPL-MCNC: 164 MG/DL (ref 0–149)
TSH SERPL DL<=0.005 MIU/L-ACNC: 2.21 UIU/ML (ref 0.38–5.33)

## 2021-04-26 PROCEDURE — 80061 LIPID PANEL: CPT

## 2021-04-26 PROCEDURE — 81003 URINALYSIS AUTO W/O SCOPE: CPT

## 2021-04-26 PROCEDURE — 82306 VITAMIN D 25 HYDROXY: CPT

## 2021-04-26 PROCEDURE — 36415 COLL VENOUS BLD VENIPUNCTURE: CPT

## 2021-04-26 PROCEDURE — 84443 ASSAY THYROID STIM HORMONE: CPT

## 2021-04-28 LAB — 25(OH)D3 SERPL-MCNC: 38 NG/ML (ref 30–80)

## 2021-05-05 DIAGNOSIS — M43.22 CERVICAL VERTEBRAL FUSION: ICD-10-CM

## 2021-05-05 DIAGNOSIS — G89.29 CHRONIC NECK PAIN: ICD-10-CM

## 2021-05-05 DIAGNOSIS — M54.2 CHRONIC NECK PAIN: ICD-10-CM

## 2021-05-05 NOTE — PROGRESS NOTES
Received request via: Patient    Was the patient seen in the last year in this department? Yes    Does the patient have an active prescription (recently filled or refills available) for medication(s) requested? No         Last Office Visit March 11, 2021.

## 2021-05-06 ENCOUNTER — TELEPHONE (OUTPATIENT)
Dept: MEDICAL GROUP | Facility: PHYSICIAN GROUP | Age: 71
End: 2021-05-06

## 2021-05-06 DIAGNOSIS — Z79.891 CHRONIC USE OF OPIATE FOR THERAPEUTIC PURPOSE: ICD-10-CM

## 2021-05-06 DIAGNOSIS — M54.2 CHRONIC NECK PAIN: ICD-10-CM

## 2021-05-06 DIAGNOSIS — G89.29 CHRONIC NECK PAIN: ICD-10-CM

## 2021-05-06 DIAGNOSIS — M43.22 CERVICAL VERTEBRAL FUSION: ICD-10-CM

## 2021-05-06 RX ORDER — TRAMADOL HYDROCHLORIDE 50 MG/1
50 TABLET ORAL EVERY 4 HOURS PRN
Qty: 120 TABLET | Refills: 0 | Status: SHIPPED | OUTPATIENT
Start: 2021-06-06 | End: 2021-07-06

## 2021-05-06 RX ORDER — TRAMADOL HYDROCHLORIDE 50 MG/1
50 TABLET ORAL EVERY 4 HOURS PRN
Qty: 120 TABLET | Refills: 0 | Status: SHIPPED | OUTPATIENT
Start: 2021-05-06 | End: 2021-06-05

## 2021-05-06 NOTE — PROGRESS NOTES
Please update patient that I sent in 2 additional prescriptions of tramadol. I did not see it ordered by you so I took care of it.

## 2021-06-21 DIAGNOSIS — G89.29 CHRONIC NECK PAIN: ICD-10-CM

## 2021-06-21 DIAGNOSIS — M43.22 CERVICAL VERTEBRAL FUSION: ICD-10-CM

## 2021-06-21 DIAGNOSIS — M54.2 CHRONIC NECK PAIN: ICD-10-CM

## 2021-06-21 DIAGNOSIS — Z79.891 CHRONIC USE OF OPIATE FOR THERAPEUTIC PURPOSE: ICD-10-CM

## 2021-06-21 RX ORDER — TRAMADOL HYDROCHLORIDE 50 MG/1
TABLET ORAL
Qty: 120 TABLET | Refills: 0 | OUTPATIENT
Start: 2021-06-21

## 2021-06-21 NOTE — TELEPHONE ENCOUNTER
Received request via: Pharmacy    Was the patient seen in the last year in this department? Yes    Does the patient have an active prescription (recently filled or refills available) for medication(s) requested? No     Last seen on 03/11/2021.

## 2021-06-22 NOTE — TELEPHONE ENCOUNTER
Called [facility] to verify Rx was sent on 6/6/21. [facility] did not receive refill.     Spoke with pharmacist, Kimberlyn and gave VO for Tramadol. Called pt and advised Rx should be filled tonight.

## 2021-06-22 NOTE — TELEPHONE ENCOUNTER
Prescription sent in on June 6th, according to  patient has not yet filled this prescription.  New request from her pharmacy.  Can we find out where the issue happened and if I need to truly send in a new refill request or if she just needs to pick it up.

## 2021-06-29 DIAGNOSIS — I10 ESSENTIAL HYPERTENSION: ICD-10-CM

## 2021-06-29 RX ORDER — METOPROLOL SUCCINATE 25 MG/1
TABLET, EXTENDED RELEASE ORAL
Qty: 90 TABLET | Refills: 2 | OUTPATIENT
Start: 2021-06-29

## 2021-06-29 RX ORDER — METOPROLOL SUCCINATE 25 MG/1
25 TABLET, EXTENDED RELEASE ORAL DAILY
Qty: 100 TABLET | Refills: 3 | Status: SHIPPED | OUTPATIENT
Start: 2021-06-29 | End: 2022-06-24

## 2021-08-02 ENCOUNTER — APPOINTMENT (OUTPATIENT)
Dept: RADIOLOGY | Facility: MEDICAL CENTER | Age: 71
End: 2021-08-02
Attending: INTERNAL MEDICINE
Payer: MEDICARE

## 2021-08-12 ENCOUNTER — PATIENT MESSAGE (OUTPATIENT)
Dept: HEALTH INFORMATION MANAGEMENT | Facility: OTHER | Age: 71
End: 2021-08-12

## 2021-08-13 ENCOUNTER — PATIENT OUTREACH (OUTPATIENT)
Dept: HEALTH INFORMATION MANAGEMENT | Facility: OTHER | Age: 71
End: 2021-08-13

## 2021-08-24 PROBLEM — I77.9 DISORDER OF ARTERIES AND ARTERIOLES (HCC): Status: ACTIVE | Noted: 2021-08-24

## 2021-08-26 ENCOUNTER — OFFICE VISIT (OUTPATIENT)
Dept: MEDICAL GROUP | Facility: PHYSICIAN GROUP | Age: 71
End: 2021-08-26
Payer: MEDICARE

## 2021-08-26 ENCOUNTER — HOSPITAL ENCOUNTER (OUTPATIENT)
Facility: MEDICAL CENTER | Age: 71
End: 2021-08-26
Attending: INTERNAL MEDICINE
Payer: MEDICARE

## 2021-08-26 VITALS
RESPIRATION RATE: 12 BRPM | DIASTOLIC BLOOD PRESSURE: 76 MMHG | HEIGHT: 67 IN | BODY MASS INDEX: 25.58 KG/M2 | SYSTOLIC BLOOD PRESSURE: 120 MMHG | HEART RATE: 57 BPM | OXYGEN SATURATION: 100 % | TEMPERATURE: 97 F | WEIGHT: 163 LBS

## 2021-08-26 DIAGNOSIS — M79.601 BILATERAL ARM PAIN: ICD-10-CM

## 2021-08-26 DIAGNOSIS — M43.22 CERVICAL VERTEBRAL FUSION: ICD-10-CM

## 2021-08-26 DIAGNOSIS — G89.29 CHRONIC NECK PAIN: ICD-10-CM

## 2021-08-26 DIAGNOSIS — M54.2 CHRONIC NECK PAIN: ICD-10-CM

## 2021-08-26 DIAGNOSIS — M79.602 BILATERAL ARM PAIN: ICD-10-CM

## 2021-08-26 DIAGNOSIS — Z79.891 CHRONIC USE OF OPIATE FOR THERAPEUTIC PURPOSE: ICD-10-CM

## 2021-08-26 PROCEDURE — 99215 OFFICE O/P EST HI 40 MIN: CPT | Performed by: INTERNAL MEDICINE

## 2021-08-26 RX ORDER — TRAMADOL HYDROCHLORIDE 50 MG/1
50 TABLET ORAL EVERY 4 HOURS PRN
COMMUNITY
End: 2021-08-26 | Stop reason: SDUPTHER

## 2021-08-26 RX ORDER — TRAMADOL HYDROCHLORIDE 50 MG/1
50 TABLET ORAL EVERY 6 HOURS PRN
Qty: 120 TABLET | Refills: 0 | Status: SHIPPED | OUTPATIENT
Start: 2021-08-26 | End: 2021-09-25

## 2021-08-26 ASSESSMENT — FIBROSIS 4 INDEX: FIB4 SCORE: 1.74

## 2021-08-27 NOTE — ASSESSMENT & PLAN NOTE
"Chronic and ongoing problem.  Continues to benefit from use of tramadol in conjunction with Advil and physical therapy for her chronic neck pain.  Tries to limit use to 2 pills/day but will go up to 4 pills/day with deterioration of pain.    Obtained and reviewed patient utilization report from [facility] pharmacy database on 8/26/2021 5:42 PM  prior to writing prescription for controlled substance II, III or IV per [place] bill . Based on assessment of the report, the prescription is medically necessary.     Patient understands this prescription is a controlled substance which is potentially habit-forming and its use is regulated by the [employer]. We also discussed the new \"black box\" warning regarding the lethal combination of opioids and benzodiazepines. Refills are subject to terms of a controlled substance agreement and patient has an updated one on file. Most recent UDS is appropriate. Any refill requires an office visit. Narcotics have may adverse effects and the risks of addiction, accidental overdose and death were emphasized. Provided prescriptions for the next one months.  "

## 2021-08-27 NOTE — ASSESSMENT & PLAN NOTE
Chronic and ongoing problem.  Last completed imaging in July 2020 with cervical spine x-rays.  She met with Dr. Carbone at that time.  She developed sudden onset of severe bilateral arm pain in mid August 2021 after exercise.  She is very worried that there could be hardware malrotation or other abnormalities contributing to the radiculopathy.  She has not noticed any weakness per se.  Pain is described as burning and intense and made it hard for her to even clench her fist.  Will proceed with cervical MRI and I will message her physiatrist as I will be going out for 2 weeks on vacation to see if they can follow-up with her.  Also refer her to neurosurgery who she has not yet established with locally.  She is status post 2 part fusion procedure in 2007 at [facility].

## 2021-08-27 NOTE — ASSESSMENT & PLAN NOTE
New and decompensated problem.  Will reach out to Dr. Carbone of physiatry to update her with what is going on.  Will order a stat MRI of the cervical spine to ensure there is been no abnormal dislocation of her known hardware or effacement of the spinal cord.  Advised her with paralysis or decompensated pain in the arms to proceed for immediate evaluation.  Will set up referral to neurosurgery as well to establish care for additional recommendations.  I will be going out of town for 2 weeks so I will let Dr. Carbone know what is going on so her office can also reach out to her to follow-up.  She was appreciative of the visit and discussion.

## 2021-08-27 NOTE — PROGRESS NOTES
Subjective:   Chief Complaint/History of Present Illness:  Carlee Hunt is a 71 y.o. female established patient who presents today to discuss medical problems as listed below. Carlee is unaccompanied for today's visit.    Problem   Bilateral Arm Pain    She reports abrupt onset of severe bilateral arm pain approximately 2 weeks ago.  It happened after she was doing an exercise video and performing arm circles while reaching posteriorly.  The pain occurred from her elbows distally described as a burning sensation.  It would come and go and she also reports episodes of intense pain in the left hand third through fifth digits which caused her inability to use the hand or clenched fist.  She is left-handed.  The pain has since subsided but she has tried to avoid any movements as she is worried that it will come back.  She has significant history of two-stage cervical neck fusion in 2007 at [facility].  She has not establish a local neurosurgeon.  She saw our physiatrist in July 2020 with x-ray imaging of the cervical spine.  No recent MRI imaging.  She is very worried that there could be abnormality with her hardware or impingement on spinal cord.  Of note reflexes and strength are currently intact.     Chronic Use of Opiate for Therapeutic Purpose    She reports longstanding chronic pain in her neck due to a 2 staged neck fusion surgery in 2013 at [facility].  She also has had issues with intermittent low back/SI joint pain, left greater than right hip pain now radiating into the groin, and left IT band pain.  She uses tramadol 50 mg generally 2 tablets daily for pain control; more recently has required 3 and rarely 4 tablets.  She denies any deleterious side effects from this medicine.     Cervical Vertebral Fusion    Cervical spine (7/2020): There are surgical changes consistent with pedicular screw and mary fixation which extends from the occipital calvarium to the level of C4 on the left and C6 on the right. There is multilevel  laminectomy. There is osteopenia. There is convex right scoliotic curvature. There is multilevel degenerative disc disease and facet degeneration.    IMPRESSION:  1.  Metallic surgical hardware fusion extending from the occipital calvarium to the C6 level.  2.  Multilevel degenerative disc disease and facet degeneration.        She reports staged 2 part fusion in 2007 at [facility] at the recommendation of a local orthopedist. She reports an anterior and posterior approach at that time. 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  At that same time she was experiencing left hip pain and was told it was likely related to the left neck pain.  She reports chronic issues with what she thinks is muscle spasms on the left greater than right neck as well as the bilateral trapezius areas which she relates to large amount of hardware adding additional pressure to her muscles.      Chronic Neck Pain    She reports staged 2 part fusion in 2007 at [facility] at the recommendation of a local orthopedist. She reports an anterior and posterior approach at that time. 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  At that same time she was experiencing left hip pain and was told it was likely related to the left neck pain.  She reports chronic issues with what she thinks is muscle spasms on the left greater than right neck as well as the bilateral trapezius areas which she relates to large amount of hardware adding additional pressure to her muscles.  It does not sound like she is followed up with anyone locally for this previous neck problem.    For chronic pain in the neck she utilizes tramadol 50 mg up to 2 tablets daily.  She  is also started addingadvil usually once at night.  She is on turmeric.  She also is working with physical therapy.          Current Medications:  Current Outpatient Medications Ordered in Epic   Medication Sig Dispense Refill   • traMADol (ULTRAM) 50 MG Tab Take 1 Tablet by mouth every 6 hours as needed for Moderate Pain or Severe Pain for up to 30 days. 120 Tablet 0   • metoprolol SR (TOPROL XL) 25 MG TABLET SR 24 HR Take 1 tablet by mouth every day. 100 tablet 3   • doxycycline (VIBRAMYCIN) 100 MG Cap      • COMBIGAN 0.2-0.5 % Solution      • Lutein 20 MG Tab Take  by mouth.     • L-Theanine 200 MG Cap Take  by mouth.     • levothyroxine (SYNTHROID) 100 MCG Tab TAKE ONE TABLET BY MOUTH EVERY MORNING ON AN EMPTY STOMACH 100 Tab 3   • Coenzyme Q10 (COQ10 PO) Take 100 mg by mouth every day. Qunol liquid     • magnesium oxide (MAG-OX) 400 MG Tab Take 400 mg by mouth every evening.     • Calcium 500 MG Chew Tab Take 1 Tab by mouth every evening.     • Biotin 1000 MCG Chew Tab Take 500 mg by mouth every day.     • Milk Thistle 175 MG Cap Take 1 Cap by mouth every day.     • Cyanocobalamin (B-12) 2500 MCG Tab Take 1 Tab by mouth every day.     • Ascorbic Acid (VITAMIN C) 1000 MG Tab Take 1 Tab by mouth every day.     • Tretinoin 0.05 % Gel 1 Application by Apply externally route every bedtime. 1 Tube 3   • TURMERIC PO Take 333 mg by mouth every day. Daily (Qunol liquid)     • Probiotic Product (PRO-BIOTIC BLEND) Cap Take 1 Cap by mouth every evening. [place]     • latanoprost (XALATAN) 0.005 % Solution Place 1 Drop in both eyes every evening.     • KRILL OIL PO Take 500 mg by mouth every day.     • Multiple Vitamins-Minerals (CENTRUM PO) Take 1 Tab by mouth every day.     • influenza Vac High-Dose Quad (FLUZONE HIGH-DOSE) 0.7 mL Suspension Prefilled Syringe injection Fluzone High-Dose [id number] (PF) 180 mcg/0.5 mL intramuscular syringe (Patient not taking: Reported on 8/26/2021)       No current Epic-ordered  "facility-administered medications on file.          Objective:   Physical Exam:    Vitals: /76 (BP Location: Right arm, Patient Position: Sitting, BP Cuff Size: Adult)   Pulse (!) 57   Temp 36.1 °C (97 °F) (Temporal)   Resp 12   Ht 1.702 m (5' 7\")   Wt 73.9 kg (163 lb)   SpO2 100%    BMI: Body mass index is 25.53 kg/m².  Physical Exam  Constitutional:       General: She is not in acute distress.     Appearance: Normal appearance. She is not ill-appearing.   HENT:      Right Ear: Tympanic membrane and ear canal normal. There is no impacted cerumen.      Left Ear: Tympanic membrane and ear canal normal. There is no impacted cerumen.   Eyes:      Extraocular Movements: Extraocular movements intact.      Conjunctiva/sclera: Conjunctivae normal.   Cardiovascular:      Rate and Rhythm: Normal rate and regular rhythm.      Pulses: Normal pulses.      Heart sounds: No murmur heard.     Pulmonary:      Effort: Pulmonary effort is normal. No respiratory distress.      Breath sounds: Normal breath sounds. No wheezing or rhonchi.   Abdominal:      General: Bowel sounds are normal.      Palpations: Abdomen is soft.      Tenderness: There is no abdominal tenderness.   Musculoskeletal:      Right lower leg: No edema.      Left lower leg: No edema.      Comments: Strength in upper extremities intact bilaterally. Reflexes 2+ in bilateral brachial and brachioradialis.    Skin:     General: Skin is warm and dry.      Findings: No rash.   Psychiatric:         Mood and Affect: Mood normal.         Behavior: Behavior normal.         Thought Content: Thought content normal.         Judgment: Judgment normal.          Assessment and Plan:   Carlee is a 71 y.o. female with the following:  Problem List Items Addressed This Visit     Cervical vertebral fusion     Chronic and ongoing problem.  Last completed imaging in July 2020 with cervical spine x-rays.  She met with Dr. Carbone at that time.  She developed sudden onset of severe " bilateral arm pain in mid August 2021 after exercise.  She is very worried that there could be hardware malrotation or other abnormalities contributing to the radiculopathy.  She has not noticed any weakness per se.  Pain is described as burning and intense and made it hard for her to even clench her fist.  Will proceed with cervical MRI and I will message her physiatrist as I will be going out for 2 weeks on vacation to see if they can follow-up with her.  Also refer her to neurosurgery who she has not yet established with locally.  She is status post 2 part fusion procedure in 2007 at [facility].         Relevant Medications    traMADol (ULTRAM) 50 MG Tab    Other Relevant Orders    REFERRAL TO NEUROSURGERY    MR-CERVICAL SPINE-W/O    Controlled Substance Treatment Agreement    Pain Management Screen    Chronic neck pain     Chronic and ongoing problem.  Last completed imaging in July 2020 with cervical spine x-rays.  She met with Dr. Carbone at that time.  She developed sudden onset of severe bilateral arm pain in mid August 2021 after exercise.  She is very worried that there could be hardware malrotation or other abnormalities contributing to the radiculopathy.  She has not noticed any weakness per se.  Pain is described as burning and intense and made it hard for her to even clench her fist.  Will proceed with cervical MRI and I will message her physiatrist as I will be going out for 2 weeks on vacation to see if they can follow-up with her.  Also refer her to neurosurgery who she has not yet established with locally.  She is status post 2 part fusion procedure in 2007 at [facility].         Relevant Medications    traMADol (ULTRAM) 50 MG Tab    Other Relevant Orders    Controlled Substance Treatment Agreement    Pain Management Screen    Chronic use of opiate for therapeutic purpose     Chronic and ongoing problem.  Continues to benefit from use of tramadol in conjunction with Advil and physical therapy for her chronic  "neck pain.  Tries to limit use to 2 pills/day but will go up to 4 pills/day with deterioration of pain.    Obtained and reviewed patient utilization report from [facility] pharmacy database on 8/26/2021 5:42 PM  prior to writing prescription for controlled substance II, III or IV per [place] bill . Based on assessment of the report, the prescription is medically necessary.     Patient understands this prescription is a controlled substance which is potentially habit-forming and its use is regulated by the [employer]. We also discussed the new \"black box\" warning regarding the lethal combination of opioids and benzodiazepines. Refills are subject to terms of a controlled substance agreement and patient has an updated one on file. Most recent UDS is appropriate. Any refill requires an office visit. Narcotics have may adverse effects and the risks of addiction, accidental overdose and death were emphasized. Provided prescriptions for the next one months.         Relevant Medications    traMADol (ULTRAM) 50 MG Tab    Other Relevant Orders    Controlled Substance Treatment Agreement    Pain Management Screen    Bilateral arm pain     New and decompensated problem.  Will reach out to Dr. Carbone of physiatry to update her with what is going on.  Will order a stat MRI of the cervical spine to ensure there is been no abnormal dislocation of her known hardware or effacement of the spinal cord.  Advised her with paralysis or decompensated pain in the arms to proceed for immediate evaluation.  Will set up referral to neurosurgery as well to establish care for additional recommendations.  I will be going out of town for 2 weeks so I will let Dr. Carbone know what is going on so her office can also reach out to her to follow-up.  She was appreciative of the visit and discussion.         Relevant Medications    traMADol (ULTRAM) 50 MG Tab    Other Relevant Orders    REFERRAL TO NEUROSURGERY    MR-CERVICAL SPINE-W/O    Controlled " Substance Treatment Agreement    Pain Management Screen           RTC: Return in about 3 months (around 11/26/2021).    I spent a total of 41 minutes with record review, exam, communication with the patient, communication with other providers, and documentation of this encounter.    PLEASE NOTE: This dictation was created using voice recognition software. I have made every reasonable attempt to correct obvious errors, but I expect that there are errors of grammar and possibly content that I did not discover before finalizing the note.      Shari Dent, DO  Geriatric and Internal Medicine  [facility]

## 2021-08-27 NOTE — ASSESSMENT & PLAN NOTE
Chronic and ongoing problem.  Last completed imaging in July 2020 with cervical spine x-rays.  She met with Dr. Carbone at that time.  She developed sudden onset of severe bilateral arm pain in mid August 2021 after exercise.  She is very worried that there could be hardware malrotation or other abnormalities contributing to the radiculopathy.  She has not noticed any weakness per se.  Pain is described as burning and intense and made it hard for her to even clench her fist.  Will proceed with cervical MRI and I will message her physiatrist as I will be going out for 2 weeks on vacation to see if they can follow-up with her.  Also refer her to neurosurgery who she has not yet established with locally.  She is status post 2 part fusion procedure in 2007 at [facility].

## 2021-08-29 ENCOUNTER — HOSPITAL ENCOUNTER (OUTPATIENT)
Dept: RADIOLOGY | Facility: MEDICAL CENTER | Age: 71
End: 2021-08-29
Attending: INTERNAL MEDICINE
Payer: MEDICARE

## 2021-08-29 DIAGNOSIS — M79.601 BILATERAL ARM PAIN: ICD-10-CM

## 2021-08-29 DIAGNOSIS — M43.22 CERVICAL VERTEBRAL FUSION: ICD-10-CM

## 2021-08-29 DIAGNOSIS — M79.602 BILATERAL ARM PAIN: ICD-10-CM

## 2021-08-30 ENCOUNTER — HOSPITAL ENCOUNTER (OUTPATIENT)
Dept: RADIOLOGY | Facility: MEDICAL CENTER | Age: 71
End: 2021-08-30
Attending: INTERNAL MEDICINE
Payer: MEDICARE

## 2021-08-30 PROCEDURE — 72141 MRI NECK SPINE W/O DYE: CPT | Mod: ME

## 2021-09-10 ENCOUNTER — HOSPITAL ENCOUNTER (OUTPATIENT)
Dept: RADIOLOGY | Facility: MEDICAL CENTER | Age: 71
End: 2021-09-10
Attending: INTERNAL MEDICINE
Payer: MEDICARE

## 2021-09-10 DIAGNOSIS — Z78.0 MENOPAUSE: ICD-10-CM

## 2021-09-10 DIAGNOSIS — Z12.31 ENCOUNTER FOR SCREENING MAMMOGRAM FOR BREAST CANCER: ICD-10-CM

## 2021-09-10 PROCEDURE — 77080 DXA BONE DENSITY AXIAL: CPT

## 2021-09-10 PROCEDURE — 77063 BREAST TOMOSYNTHESIS BI: CPT

## 2021-09-16 ENCOUNTER — HOSPITAL ENCOUNTER (OUTPATIENT)
Dept: LAB | Facility: MEDICAL CENTER | Age: 71
End: 2021-09-16
Attending: INTERNAL MEDICINE
Payer: MEDICARE

## 2021-09-16 LAB
ALBUMIN SERPL BCP-MCNC: 4.4 G/DL (ref 3.2–4.9)
ALBUMIN/GLOB SERPL: 1.6 G/DL
ALP SERPL-CCNC: 85 U/L (ref 30–99)
ALT SERPL-CCNC: 14 U/L (ref 2–50)
ANION GAP SERPL CALC-SCNC: 9 MMOL/L (ref 7–16)
AST SERPL-CCNC: 20 U/L (ref 12–45)
BASOPHILS # BLD AUTO: 1 % (ref 0–1.8)
BASOPHILS # BLD: 0.07 K/UL (ref 0–0.12)
BILIRUB SERPL-MCNC: 0.4 MG/DL (ref 0.1–1.5)
BUN SERPL-MCNC: 15 MG/DL (ref 8–22)
CALCIUM SERPL-MCNC: 9.5 MG/DL (ref 8.4–10.2)
CHLORIDE SERPL-SCNC: 103 MMOL/L (ref 96–112)
CO2 SERPL-SCNC: 29 MMOL/L (ref 20–33)
CREAT SERPL-MCNC: 0.78 MG/DL (ref 0.5–1.4)
CRP SERPL HS-MCNC: <0.3 MG/DL (ref 0–0.75)
EOSINOPHIL # BLD AUTO: 0.19 K/UL (ref 0–0.51)
EOSINOPHIL NFR BLD: 2.6 % (ref 0–6.9)
ERYTHROCYTE [DISTWIDTH] IN BLOOD BY AUTOMATED COUNT: 43.8 FL (ref 35.9–50)
ERYTHROCYTE [SEDIMENTATION RATE] IN BLOOD BY WESTERGREN METHOD: 13 MM/HOUR (ref 0–25)
FASTING STATUS PATIENT QL REPORTED: NORMAL
GLOBULIN SER CALC-MCNC: 2.8 G/DL (ref 1.9–3.5)
GLUCOSE SERPL-MCNC: 97 MG/DL (ref 65–99)
HCT VFR BLD AUTO: 44.5 % (ref 37–47)
HGB BLD-MCNC: 14.7 G/DL (ref 12–16)
IMM GRANULOCYTES # BLD AUTO: 0.01 K/UL (ref 0–0.11)
IMM GRANULOCYTES NFR BLD AUTO: 0.1 % (ref 0–0.9)
LYMPHOCYTES # BLD AUTO: 3.19 K/UL (ref 1–4.8)
LYMPHOCYTES NFR BLD: 43.9 % (ref 22–41)
MCH RBC QN AUTO: 31.5 PG (ref 27–33)
MCHC RBC AUTO-ENTMCNC: 33 G/DL (ref 33.6–35)
MCV RBC AUTO: 95.3 FL (ref 81.4–97.8)
MONOCYTES # BLD AUTO: 0.47 K/UL (ref 0–0.85)
MONOCYTES NFR BLD AUTO: 6.5 % (ref 0–13.4)
NEUTROPHILS # BLD AUTO: 3.34 K/UL (ref 2–7.15)
NEUTROPHILS NFR BLD: 45.9 % (ref 44–72)
NRBC # BLD AUTO: 0 K/UL
NRBC BLD-RTO: 0 /100 WBC
PLATELET # BLD AUTO: 218 K/UL (ref 164–446)
PMV BLD AUTO: 10.4 FL (ref 9–12.9)
POTASSIUM SERPL-SCNC: 4.3 MMOL/L (ref 3.6–5.5)
PROT SERPL-MCNC: 7.2 G/DL (ref 6–8.2)
RBC # BLD AUTO: 4.67 M/UL (ref 4.2–5.4)
SODIUM SERPL-SCNC: 141 MMOL/L (ref 135–145)
WBC # BLD AUTO: 7.3 K/UL (ref 4.8–10.8)

## 2021-09-16 PROCEDURE — 85025 COMPLETE CBC W/AUTO DIFF WBC: CPT

## 2021-09-16 PROCEDURE — 36415 COLL VENOUS BLD VENIPUNCTURE: CPT

## 2021-09-16 PROCEDURE — 86140 C-REACTIVE PROTEIN: CPT

## 2021-09-16 PROCEDURE — 85652 RBC SED RATE AUTOMATED: CPT

## 2021-09-16 PROCEDURE — 80053 COMPREHEN METABOLIC PANEL: CPT

## 2021-09-27 ENCOUNTER — HOSPITAL ENCOUNTER (OUTPATIENT)
Dept: RADIOLOGY | Facility: MEDICAL CENTER | Age: 71
End: 2021-09-27
Attending: PHYSICIAN ASSISTANT
Payer: MEDICARE

## 2021-09-27 DIAGNOSIS — M54.12 CERVICAL RADICULOPATHY: ICD-10-CM

## 2021-09-27 PROCEDURE — 72125 CT NECK SPINE W/O DYE: CPT | Mod: MG

## 2021-10-14 ENCOUNTER — HOSPITAL ENCOUNTER (OUTPATIENT)
Dept: LAB | Facility: MEDICAL CENTER | Age: 71
End: 2021-10-14
Attending: INTERNAL MEDICINE
Payer: MEDICARE

## 2021-10-14 LAB
ALBUMIN SERPL BCP-MCNC: 4.6 G/DL (ref 3.2–4.9)
ALBUMIN/GLOB SERPL: 1.6 G/DL
ALP SERPL-CCNC: 89 U/L (ref 30–99)
ALT SERPL-CCNC: 17 U/L (ref 2–50)
ANION GAP SERPL CALC-SCNC: 10 MMOL/L (ref 7–16)
AST SERPL-CCNC: 21 U/L (ref 12–45)
BASOPHILS # BLD AUTO: 0.4 % (ref 0–1.8)
BASOPHILS # BLD: 0.03 K/UL (ref 0–0.12)
BILIRUB SERPL-MCNC: 0.2 MG/DL (ref 0.1–1.5)
BUN SERPL-MCNC: 19 MG/DL (ref 8–22)
CALCIUM SERPL-MCNC: 9.7 MG/DL (ref 8.4–10.2)
CHLORIDE SERPL-SCNC: 103 MMOL/L (ref 96–112)
CO2 SERPL-SCNC: 29 MMOL/L (ref 20–33)
CREAT SERPL-MCNC: 0.68 MG/DL (ref 0.5–1.4)
CRP SERPL HS-MCNC: <0.3 MG/DL (ref 0–0.75)
EOSINOPHIL # BLD AUTO: 0.14 K/UL (ref 0–0.51)
EOSINOPHIL NFR BLD: 1.9 % (ref 0–6.9)
ERYTHROCYTE [DISTWIDTH] IN BLOOD BY AUTOMATED COUNT: 45 FL (ref 35.9–50)
ERYTHROCYTE [SEDIMENTATION RATE] IN BLOOD BY WESTERGREN METHOD: 23 MM/HOUR (ref 0–25)
GLOBULIN SER CALC-MCNC: 2.8 G/DL (ref 1.9–3.5)
GLUCOSE SERPL-MCNC: 101 MG/DL (ref 65–99)
HCT VFR BLD AUTO: 45.6 % (ref 37–47)
HGB BLD-MCNC: 14.8 G/DL (ref 12–16)
IMM GRANULOCYTES # BLD AUTO: 0.03 K/UL (ref 0–0.11)
IMM GRANULOCYTES NFR BLD AUTO: 0.4 % (ref 0–0.9)
LYMPHOCYTES # BLD AUTO: 3.04 K/UL (ref 1–4.8)
LYMPHOCYTES NFR BLD: 40.6 % (ref 22–41)
MCH RBC QN AUTO: 31.5 PG (ref 27–33)
MCHC RBC AUTO-ENTMCNC: 32.5 G/DL (ref 33.6–35)
MCV RBC AUTO: 97 FL (ref 81.4–97.8)
MONOCYTES # BLD AUTO: 0.47 K/UL (ref 0–0.85)
MONOCYTES NFR BLD AUTO: 6.3 % (ref 0–13.4)
NEUTROPHILS # BLD AUTO: 3.77 K/UL (ref 2–7.15)
NEUTROPHILS NFR BLD: 50.4 % (ref 44–72)
NRBC # BLD AUTO: 0 K/UL
NRBC BLD-RTO: 0 /100 WBC
PLATELET # BLD AUTO: 229 K/UL (ref 164–446)
PMV BLD AUTO: 9.2 FL (ref 9–12.9)
POTASSIUM SERPL-SCNC: 4.4 MMOL/L (ref 3.6–5.5)
PROT SERPL-MCNC: 7.4 G/DL (ref 6–8.2)
RBC # BLD AUTO: 4.7 M/UL (ref 4.2–5.4)
SODIUM SERPL-SCNC: 142 MMOL/L (ref 135–145)
WBC # BLD AUTO: 7.5 K/UL (ref 4.8–10.8)

## 2021-10-14 PROCEDURE — 80053 COMPREHEN METABOLIC PANEL: CPT

## 2021-10-14 PROCEDURE — 86140 C-REACTIVE PROTEIN: CPT

## 2021-10-14 PROCEDURE — 85652 RBC SED RATE AUTOMATED: CPT

## 2021-10-14 PROCEDURE — 85025 COMPLETE CBC W/AUTO DIFF WBC: CPT

## 2021-10-14 PROCEDURE — 36415 COLL VENOUS BLD VENIPUNCTURE: CPT

## 2021-11-11 DIAGNOSIS — Z79.891 CHRONIC USE OF OPIATE FOR THERAPEUTIC PURPOSE: ICD-10-CM

## 2021-11-11 DIAGNOSIS — G89.29 CHRONIC NECK PAIN: ICD-10-CM

## 2021-11-11 DIAGNOSIS — M54.42 CHRONIC BILATERAL LOW BACK PAIN WITH LEFT-SIDED SCIATICA: ICD-10-CM

## 2021-11-11 DIAGNOSIS — M54.2 CHRONIC NECK PAIN: ICD-10-CM

## 2021-11-11 DIAGNOSIS — M43.22 CERVICAL VERTEBRAL FUSION: ICD-10-CM

## 2021-11-11 DIAGNOSIS — G89.29 CHRONIC BILATERAL LOW BACK PAIN WITH LEFT-SIDED SCIATICA: ICD-10-CM

## 2021-11-11 RX ORDER — TRAMADOL HYDROCHLORIDE 50 MG/1
50 TABLET ORAL EVERY 4 HOURS PRN
Qty: 120 TABLET | Refills: 0 | Status: SHIPPED | OUTPATIENT
Start: 2021-11-11 | End: 2021-12-11

## 2021-11-11 RX ORDER — TRAMADOL HYDROCHLORIDE 50 MG/1
50 TABLET ORAL EVERY 4 HOURS PRN
COMMUNITY
End: 2021-11-11 | Stop reason: SDUPTHER

## 2021-11-24 ENCOUNTER — OFFICE VISIT (OUTPATIENT)
Dept: MEDICAL GROUP | Facility: PHYSICIAN GROUP | Age: 71
End: 2021-11-24
Payer: MEDICARE

## 2021-11-24 ENCOUNTER — HOSPITAL ENCOUNTER (OUTPATIENT)
Facility: MEDICAL CENTER | Age: 71
End: 2021-11-24
Attending: INTERNAL MEDICINE
Payer: MEDICARE

## 2021-11-24 VITALS
HEIGHT: 67 IN | OXYGEN SATURATION: 97 % | SYSTOLIC BLOOD PRESSURE: 124 MMHG | RESPIRATION RATE: 12 BRPM | BODY MASS INDEX: 25.31 KG/M2 | DIASTOLIC BLOOD PRESSURE: 72 MMHG | HEART RATE: 62 BPM | WEIGHT: 161.3 LBS | TEMPERATURE: 97.6 F

## 2021-11-24 DIAGNOSIS — A49.8 ENTEROCOCCUS FAECALIS INFECTION: ICD-10-CM

## 2021-11-24 DIAGNOSIS — Z79.891 CHRONIC USE OF OPIATE FOR THERAPEUTIC PURPOSE: ICD-10-CM

## 2021-11-24 DIAGNOSIS — Z79.2 CHRONIC ANTIBIOTIC SUPPRESSION: ICD-10-CM

## 2021-11-24 DIAGNOSIS — M43.22 CERVICAL VERTEBRAL FUSION: ICD-10-CM

## 2021-11-24 PROCEDURE — 99214 OFFICE O/P EST MOD 30 MIN: CPT | Performed by: INTERNAL MEDICINE

## 2021-11-24 PROCEDURE — 80307 DRUG TEST PRSMV CHEM ANLYZR: CPT

## 2021-11-24 RX ORDER — DOXYCYCLINE 100 MG/1
TABLET ORAL
COMMUNITY
End: 2021-11-24

## 2021-11-24 RX ORDER — ATENOLOL 25 MG/1
TABLET ORAL
COMMUNITY
End: 2021-11-24

## 2021-11-24 ASSESSMENT — FIBROSIS 4 INDEX: FIB4 SCORE: 1.62

## 2021-11-24 NOTE — LETTER
[facility]  Shari Dent D.O.  [address]  [place] [address]  Fax: [phone]   Authorization for Release/Disclosure of   Protected Health Information   Name: CARLEE ORR : 1950 SSN: [id number]   Address: [address] [place] [address] [place] [address] Phone:    [phone] (home)    I authorize the entity listed below to release/disclose the PHI below to:   [facility]/Shari Dent D.O. and Shari Dent D.O.   Provider or Entity Name:  [facility] Infectious Disease-- Dr. Flores, Dr. Beth   Address   [facility]   Phone:      Fax:     Reason for request: continuity of care   Information to be released:    [  ] LAST COLONOSCOPY,  including any PATH REPORT and follow-up  [  ] LAST FIT/COLOGUARD RESULT [  ] LAST DEXA  [  ] LAST MAMMOGRAM  [  ] LAST PAP  [  ] LAST LABS [  ] RETINA EXAM REPORT  [  ] IMMUNIZATION RECORDS  [ x ] Release all info      [ x ] Check here and initial the line next to each item to release ALL health information INCLUDING  _____ Care and treatment for drug and / or alcohol abuse  _____ HIV testing, infection status, or AIDS  _____ Genetic Testing    DATES OF SERVICE OR TIME PERIOD TO BE DISCLOSED: __-___________  I understand and acknowledge that:  * This Authorization may be revoked at any time by you in writing, except if your health information has already been used or disclosed.  * Your health information that will be used or disclosed as a result of you signing this authorization could be re-disclosed by the recipient. If this occurs, your re-disclosed health information may no longer be protected by State or Federal laws.  * You may refuse to sign this Authorization. Your refusal will not affect your ability to obtain treatment.  * This Authorization becomes effective upon signing and will  on (date) __________.      If no date is indicated, this Authorization will  one (1) year from the signature date.    Name: Carlee Oro  Gilbert    Signature:   Date:     11/24/2021       PLEASE FAX REQUESTED RECORDS BACK TO: [phone]

## 2021-11-25 NOTE — ASSESSMENT & PLAN NOTE
"Chronic and ongoing problem.  Will update urine drug screen.  Controlled substance agreement is on file.  We will continue to provide tramadol which allows her to remain independent in her ADLs.  No deleterious side effects.    Obtained and reviewed patient utilization report from [facility] pharmacy database on 11/24/2021 4:41 PM  prior to writing prescription for controlled substance II, III or IV per [place] bill . Based on assessment of the report, the prescription is medically necessary.     Patient understands this prescription is a controlled substance which is potentially habit-forming and its use is regulated by the [employer]. We also discussed the new \"black box\" warning regarding the lethal combination of opioids and benzodiazepines. Refills are subject to terms of a controlled substance agreement and patient has an updated one on file. Most recent UDS is appropriate. Any refill requires an office visit. Narcotics have may adverse effects and the risks of addiction, accidental overdose and death were emphasized. Provided prescriptions for the next three months, she will message when she needs her next refill.  "

## 2021-11-25 NOTE — ASSESSMENT & PLAN NOTE
Chronic and ongoing problem.  Will again request records from infectious disease.  She is recently taken off doxycycline which she has been using since 2013 for chronic suppression due to history of hardware infection.  Low threshold to check urinalysis for symptoms of dysuria due to Enterococcus faecalis.  Will be following her inflammation markers and blood counts every 3 months.

## 2021-11-25 NOTE — ASSESSMENT & PLAN NOTE
Chronic and ongoing problem.  She is now established with spine surgery at [facility].  She has updated her cervical x-rays with plans for nerve conduction study on her upper extremities.  She continues to have chronic pain for which she utilizes tramadol and anti-inflammatories.  Continue follow-up with spine surgery as recommended.

## 2021-11-25 NOTE — ASSESSMENT & PLAN NOTE
Recent problem that requires follow-up.  Will request records from infectious diseases they recently took her off suppressive antibiotics which she has been using for the past 8 years.  Will order lab work to follow her blood counts and inflammation markers every 3 months, next due in January 2022.

## 2021-11-25 NOTE — PROGRESS NOTES
Subjective:   Chief Complaint/History of Present Illness:  Carlee Hunt is a 71 y.o. female established patient who presents today to discuss medical problems as listed below. Carlee is unaccompanied for today's visit.    Problem   Chronic antibiotic suppression- doxycycline stopped by ID in 9/2021    Reports that she was placed on doxycycline in 2013 after she experienced a staph epidermidis infection following her cervical fusion surgery on the hardware.  She was recommended to continue on the antibiotics for what sounds to be indefinitely.  She follows with infectious disease on an annual basis, [place] [facility] with either Dr. Argueta or Dr. Epps.  She was unaware that her latest urinalysis showed Enterococcus faecalis which was resistant to tetracyclines.    On most recent follow-up with infectious disease she was recommended to go off the doxycycline, this was in September 2021.  She then had inflammation markers and blood counts checked in September and October which have remained stable.  No follow-up in place with ID at this time.     Prior UTI- Enterococcus faecalis; resistant to tetracyclines        Component  3mo ago   Significant Indicator  POSPositive  (POS)      Source  UR     Site  URINE, CLEAN CATCH     Culture Result  -Abnormal       Culture Result  Abnormal     Enterococcus faecalis   10-50,000 cfu/mL     Resulting Agency  M    Susceptibility      Enterococcus faecalis      HIRAM      Ampicillin  <=2 mcg/mL  Sensitive      Daptomycin  <=1 mcg/mL  Sensitive      Nitrofurantoin  <=32 mcg/mL  Sensitive      Penicillin  2 mcg/mL  Sensitive      Tetracycline  >8 mcg/mL  Resistant      Vancomycin  1 mcg/mL  Sensitive                  Urinalysis in February 2020 was positive for Enterococcus faecalis, resistant to tetracyclines.  Of note, she is on suppressive antibiotics with doxycycline for what sounds to be the past 13 years.  She does follow with ID.  She reports challenges with stool incontinence and had a  pelvic floor stimulator placed by Dr. Goddard.     Chronic Use of Opiate for Therapeutic Purpose    She reports longstanding chronic pain in her neck due to a 2 staged neck fusion surgery in 2013 at [facility].  She also has had issues with intermittent low back/SI joint pain, left greater than right hip pain now radiating into the groin, and left IT band pain.  She uses tramadol 50 mg generally 2 tablets daily for pain control; more recently has required 3 and rarely 4 tablets.  She denies any deleterious side effects from this medicine.     Hypertension       Ref. Range 2/19/2020 07:20   Sodium Latest Ref Range: 135 - 145 mmol/L 139   Potassium Latest Ref Range: 3.6 - 5.5 mmol/L 4.2   Chloride Latest Ref Range: 96 - 112 mmol/L 106   Bun Latest Ref Range: 8 - 22 mg/dL 19   Creatinine Latest Ref Range: 0.50 - 1.40 mg/dL 0.74   GFR If Non  Latest Ref Range: >60 mL/min/1.73 m 2 >60     She has a history of hypertension.  She has taken atenolol for many years but we transition her to metoprolol due to her history of recurrent urinary tract infections and risk of progression of kidney disease. She denies chest pain, palpitations, or dyspnea on exertion. No signs or symptoms of orthostasis.    Current regimen: Metoprolol succinate 25 mg daily  Previous regimen: Atenolol 25 mg daily     Cervical Vertebral Fusion    Cervical MRI (8/2021): Previous extensive cervical laminectomy from C1 through C6. Previous pedicle screw and mary fixation from the occiput to C5 bilaterally. 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  Nonvisualization of the dens which may have been previously resected or congenitally absent.    Cervical Xray (9/2021): extensive posterior cervical instrumentation from occiput through C5.  I do feel that she has some instability at C5-6 between flexion and extension maneuvers.  Otherwise within the construct I see no abnormal motion or evidence of hardware failure or screw pullout.  These appear to be  stable postoperative images.      She reports staged 2 part fusion in 2007 at [facility] at the recommendation of a local orthopedist. She reports an anterior and posterior approach at that time. 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  At that same time she was experiencing left hip pain and was told it was likely related to the left neck pain.  She reports chronic issues with what she thinks is muscle spasms on the left greater than right neck as well as the bilateral trapezius areas which she relates to large amount of hardware adding additional pressure to her muscles.     She is now established with [facility], Dr. Brewster.          Current Medications:  Current Outpatient Medications Ordered in Epic   Medication Sig Dispense Refill   • levothyroxine (SYNTHROID) 100 MCG Tab TAKE ONE TABLET BY MOUTH EVERY MORNING ON AN EMPTY STOMACH 100 Tablet 3   • traMADol (ULTRAM) 50 MG Tab Take 1 Tablet by mouth every four hours as needed for Moderate Pain or Severe Pain for up to 30 days. 120 Tablet 0   • DUREZOL 0.05 % Emulsion      • metoprolol SR (TOPROL XL) 25 MG TABLET SR 24 HR Take 1 tablet by mouth every day. 100 tablet 3   • COMBIGAN 0.2-0.5 % Solution      • Lutein 20 MG Tab Take  by mouth.     • L-Theanine 200 MG Cap Take  by mouth.     • Coenzyme Q10 (COQ10 PO) Take 100 mg by mouth every day. Qunol liquid     • magnesium oxide (MAG-OX) 400 MG Tab Take 400 mg by mouth every evening.     • Calcium 500 MG Chew Tab Take 1 Tab by mouth every evening.     • Biotin 1000 MCG Chew Tab Take 500 mg by mouth every day.     • Milk Thistle 175 MG Cap Take 1 Cap by mouth every day.     • Cyanocobalamin (B-12) 2500 MCG Tab Take 1 Tab by mouth every day.     • Ascorbic Acid (VITAMIN C) 1000 MG Tab Take 1 Tab by mouth every day.     • Tretinoin 0.05 % Gel 1 Application by Apply  "externally route every bedtime. 1 Tube 3   • TURMERIC PO Take 333 mg by mouth every day. Daily (Qunol liquid)     • Probiotic Product (PRO-BIOTIC BLEND) Cap Take 1 Cap by mouth every evening. [place]     • latanoprost (XALATAN) 0.005 % Solution Place 1 Drop in both eyes every evening.     • KRILL OIL PO Take 500 mg by mouth every day.     • Multiple Vitamins-Minerals (CENTRUM PO) Take 1 Tab by mouth every day.       No current [facility]-ordered facility-administered medications on file.          Objective:   Physical Exam:    Vitals: /72 (BP Location: Left arm, Patient Position: Sitting, BP Cuff Size: Adult)   Pulse 62   Temp 36.4 °C (97.6 °F) (Temporal)   Resp 12   Ht 1.702 m (5' 7\")   Wt 73.2 kg (161 lb 4.8 oz)   SpO2 97%    BMI: Body mass index is 25.26 kg/m².  Physical Exam  Constitutional:       General: She is not in acute distress.     Appearance: Normal appearance. She is not ill-appearing.   HENT:      Ears:      Comments: Hearing grossly normal.  Eyes:      General: No scleral icterus.     Conjunctiva/sclera: Conjunctivae normal.   Neck:      Comments: Limited active ROM of cervical spine  Pulmonary:      Effort: Pulmonary effort is normal. No respiratory distress.   Skin:     General: Skin is warm and dry.      Findings: No bruising or rash.   Psychiatric:         Mood and Affect: Mood normal.         Behavior: Behavior normal.         Thought Content: Thought content normal.         Judgment: Judgment normal.          Assessment and Plan:   Carlee is a 71 y.o. female with the following:  Problem List Items Addressed This Visit     Cervical vertebral fusion     Chronic and ongoing problem.  She is now established with spine surgery [facility].  She has updated her cervical x-rays with plans for nerve conduction study on her upper extremities.  She continues to have chronic pain for which she utilizes tramadol and anti-inflammatories.  Continue follow-up with spine surgery as recommended.         " "Chronic use of opiate for therapeutic purpose     Chronic and ongoing problem.  Will update urine drug screen.  Controlled substance agreement is on file.  We will continue to provide tramadol which allows her to remain independent in her ADLs.  No deleterious side effects.    Obtained and reviewed patient utilization report from [facility] [place] pharmacy database on 11/24/2021 4:41 PM  prior to writing prescription for controlled substance II, III or IV per [place] bill . Based on assessment of the report, the prescription is medically necessary.     Patient understands this prescription is a controlled substance which is potentially habit-forming and its use is regulated by the [employer]. We also discussed the new \"black box\" warning regarding the lethal combination of opioids and benzodiazepines. Refills are subject to terms of a controlled substance agreement and patient has an updated one on file. Most recent UDS is appropriate. Any refill requires an office visit. Narcotics have may adverse effects and the risks of addiction, accidental overdose and death were emphasized. Provided prescriptions for the next three months, she will message when she needs her next refill.         Relevant Orders    URINE DRUG SCREEN    Chronic antibiotic suppression- doxycycline stopped by ID in 9/2021     Recent problem that requires follow-up.  Will request records from infectious diseases they recently took her off suppressive antibiotics which she has been using for the past 8 years.  Will order lab work to follow her blood counts and inflammation markers every 3 months, next due in January 2022.         Relevant Orders    Sed Rate    CRP QUANTITIVE (NON-CARDIAC)    Comp Metabolic Panel    CBC WITH DIFFERENTIAL    Prior UTI- Enterococcus faecalis; resistant to tetracyclines     Chronic and ongoing problem.  Will again request records from infectious disease.  She is recently taken off doxycycline which she has been using since 2013 " for chronic suppression due to history of hardware infection.  Low threshold to check urinalysis for symptoms of dysuria due to Enterococcus faecalis.  Will be following her inflammation markers and blood counts every 3 months.         Relevant Orders    Sed Rate    CRP QUANTITIVE (NON-CARDIAC)    Comp Metabolic Panel    CBC WITH DIFFERENTIAL           RTC: Return in about 3 months (around 2/24/2022).    I spent a total of 32 minutes with record review, exam, communication with the patient, communication with other providers, and documentation of this encounter.    PLEASE NOTE: This dictation was created using voice recognition software. I have made every reasonable attempt to correct obvious errors, but I expect that there are errors of grammar and possibly content that I did not discover before finalizing the note.      Shari Dent, DO  Geriatric and Internal Medicine  [facility]

## 2021-11-27 LAB
AMPHETAMINES UR QL: NEGATIVE NG/ML
BARBITURATES UR QL: NEGATIVE NG/ML
BENZODIAZ UR QL: NEGATIVE NG/ML
CANNABINOIDS UR QL SCN: NEGATIVE NG/ML
COCAINE UR QL: NEGATIVE NG/ML
DRUG SCREEN COMMENT UR-IMP: NORMAL
MDMA CTO UR SCN-MCNC: NEGATIVE NG/ML
METHADONE UR QL: NEGATIVE NG/ML
OPIATES UR QL: NEGATIVE NG/ML
OXYCODONE CTO UR SCN-MCNC: NEGATIVE NG/ML
PCP UR QL SCN: NEGATIVE NG/ML
PROPOXYPH UR QL: NEGATIVE NG/ML

## 2021-12-27 ENCOUNTER — HOSPITAL ENCOUNTER (EMERGENCY)
Facility: MEDICAL CENTER | Age: 71
End: 2021-12-27
Attending: EMERGENCY MEDICINE
Payer: MEDICARE

## 2021-12-27 VITALS
HEIGHT: 67 IN | OXYGEN SATURATION: 95 % | DIASTOLIC BLOOD PRESSURE: 85 MMHG | TEMPERATURE: 97.3 F | RESPIRATION RATE: 16 BRPM | SYSTOLIC BLOOD PRESSURE: 185 MMHG | WEIGHT: 169.97 LBS | BODY MASS INDEX: 26.68 KG/M2 | HEART RATE: 67 BPM

## 2021-12-27 DIAGNOSIS — S01.81XA FACIAL LACERATION, INITIAL ENCOUNTER: ICD-10-CM
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
## 2021-12-27 PROCEDURE — 700102 HCHG RX REV CODE 250 W/ 637 OVERRIDE(OP): Performed by: EMERGENCY MEDICINE

## 2021-12-27 PROCEDURE — A9270 NON-COVERED ITEM OR SERVICE: HCPCS | Performed by: EMERGENCY MEDICINE

## 2021-12-27 PROCEDURE — 304999 HCHG REPAIR-SIMPLE/INTERMED LEVEL 1

## 2021-12-27 PROCEDURE — 304217 HCHG IRRIGATION SYSTEM

## 2021-12-27 PROCEDURE — 303353 HCHG DERMABOND SKIN ADHESIVE

## 2021-12-27 PROCEDURE — 99283 EMERGENCY DEPT VISIT LOW MDM: CPT

## 2021-12-27 RX ORDER — AMOXICILLIN AND CLAVULANATE POTASSIUM 875; 125 MG/1; MG/1
1 TABLET, FILM COATED ORAL 2 TIMES DAILY
Qty: 10 TABLET | Refills: 0 | Status: SHIPPED | OUTPATIENT
Start: 2021-12-27 | End: 2022-01-01

## 2021-12-27 RX ORDER — AMOXICILLIN AND CLAVULANATE POTASSIUM 875; 125 MG/1; MG/1
1 TABLET, FILM COATED ORAL ONCE
Status: COMPLETED | OUTPATIENT
Start: 2021-12-27 | End: 2021-12-27

## 2021-12-27 RX ADMIN — AMOXICILLIN AND CLAVULANATE POTASSIUM 1 TABLET: 875; 125 TABLET, FILM COATED ORAL at 20:13

## 2021-12-27 ASSESSMENT — FIBROSIS 4 INDEX: FIB4 SCORE: 1.62

## 2021-12-28 NOTE — ED NOTES
Patient is stable for discharge at this time, anticipatory guidance provided, close follow-up is encouraged, and ED return instructions have been detailed. Patient is agreeable to the disposition and plan and discharged home in ambulatory state and in good condition.     Rx education provided, Pt verbalized understanding;

## 2021-12-28 NOTE — ED PROVIDER NOTES
"ED Provider Note    ED Provider Note    Scribed for Trudy De Leon MD by Trudy De Leon M.D.. 2021, 8:00 PM.    Primary care provider: Shari Dent D.O.  Means of arrival: Private  History obtained from: Patient  History limited by: None    CHIEF COMPLAINT  Chief Complaint   Patient presents with   • Cat Scratch     Patient report her cat scratched her left side of the face near the eye approximately 24 hours ago.        HPI  Carlee Hunt is a 71 y.o. female who presents to the Emergency Department for evaluation of wound to the left side of her face.  She notes this happened more than 24 hours ago, yesterday afternoon.  She was scratched by her cat.  Thankfully no involvement of the eye.  Wound is localized to the left cheek superior aspect.  She notes significant bleeding yesterday, improved today with topical Neosporin.  Patient notes she noted what looked to be a \"flap\", so she came to be assessed.  She is not anticoagulated and nondiabetic.  No injury is noted to the eyes nor elsewhere.    REVIEW OF SYSTEMS  Pertinent positives include cat scratches laceration to left side of face. Pertinent negatives include no involvement of the eye, no anticoagulation, no other distracting injury, no active bleeding.      PAST MEDICAL HISTORY   has a past medical history of Cataract, Elevated fasting glucose (2019), Glaucoma, Hypertension, Pain, and Thyroid disease.    SURGICAL HISTORY   has a past surgical history that includes eye surgery; cervical fusion posterior; bowel stimulator insertion (1/15/2020); other (); and bowel stimulator insertion (2020).    SOCIAL HISTORY  Social History     Tobacco Use   • Smoking status: Former Smoker     Packs/day: 1.00     Years: 16.00     Pack years: 16.00     Types: Cigarettes     Quit date: 1982     Years since quittin.8   • Smokeless tobacco: Never Used   • Tobacco comment: continued abstinence   Vaping Use   • Vaping Use: Never " used   Substance Use Topics   • Alcohol use: Yes     Alcohol/week: 4.2 oz     Types: 7 Glasses of wine per week     Comment: one daily   • Drug use: No     Comment: CBD cream last used 12/2019      Social History     Substance and Sexual Activity   Drug Use No    Comment: CBD cream last used 12/2019       FAMILY HISTORY  Family History   Problem Relation Age of Onset   • Hypertension Mother    • Other Father         accident   • Other Sister         pneumoccocal pneumonia   • Other Brother         glaucoma   • Heart Attack Paternal Grandfather    • Cancer Neg Hx    • Diabetes Neg Hx    • Heart Disease Neg Hx        CURRENT MEDICATIONS  Home Medications     Reviewed by Abida Steinberg R.N. (Registered Nurse) on 12/27/21 at 1843  Med List Status: Complete   Medication Last Dose Status   Ascorbic Acid (VITAMIN C) 1000 MG Tab 12/27/2021 Active   Biotin 1000 MCG Chew Tab 12/27/2021 Active   Calcium 500 MG Chew Tab 12/26/2021 Active   Coenzyme Q10 (COQ10 PO) 12/27/2021 Active   COMBIGAN 0.2-0.5 % Solution 12/27/2021 Active   Cyanocobalamin (B-12) 2500 MCG Tab 12/27/2021 Active   KRILL OIL PO 12/27/2021 Active   L-Theanine 200 MG Cap 12/26/2021 Active   latanoprost (XALATAN) 0.005 % Solution 12/26/2021 Active   levothyroxine (SYNTHROID) 100 MCG Tab 12/27/2021 Active   Lutein 20 MG Tab 12/27/2021 Active   magnesium oxide (MAG-OX) 400 MG Tab 12/27/2021 Active   metoprolol SR (TOPROL XL) 25 MG TABLET SR 24 HR 12/27/2021 Active   Milk Thistle 175 MG Cap 12/27/2021 Active   Multiple Vitamins-Minerals (CENTRUM PO) 12/26/2021 Active   Probiotic Product (PRO-BIOTIC BLEND) Cap 12/26/2021 Active   Tretinoin 0.05 % Gel 12/25/2021 Active   TURMERIC PO 12/27/2021 Active                ALLERGIES  Allergies   Allergen Reactions   • Rifampin Rash     Rash, fever   • Vancomycin Rash     Rash, fever   • Sulfa Drugs Rash     Per pt         PHYSICAL EXAM  VITAL SIGNS: BP (!) 185/85   Pulse 67   Temp 36.3 °C (97.3 °F) (Temporal)   Resp 16   " Ht 1.702 m (5' 7\")   Wt 77.1 kg (169 lb 15.6 oz)   SpO2 95%   BMI 26.62 kg/m²     General: Alert, no acute distress  Skin: Warm, dry, normal for ethnicity.  2.5 cm curvilinear laceration with flap noted to the left superior cheek, no involvement of the eyelid or eye, extending to subcutaneous tissue.  Head: Normocephalic, laceration as described above, otherwise atraumatic  Neck: Trachea midline, no tenderness  Eye: PERRL, normal conjunctiva, conjunctive and sclera not injected, no injury to the eyelid, corneas clear.  Cardiovascular:  Normal peripheral perfusion  Respiratory: respirations are non-labored  Musculoskeletal: No swelling, no deformity  Neurological: Alert and oriented to person, place, time, and situation  Psychiatric: Cooperative, appropriate mood & affect      COURSE & MEDICAL DECISION MAKING  Pertinent Labs & Imaging studies reviewed. (See chart for details)    1950 - Patient seen and examined at bedside. Patient will be treated with Augmentin,  approximation with Dermabond.  The differential diagnoses include but are not limited to: Cat scratch, facial laceration    2010: Laceration Repair Procedure Note    Indication: Laceration    Procedure: The patient was placed in the appropriate position and anesthesia around the laceration was not indicated. The area was then cleansed using chlorhexidine, irrigated with normal saline and explored with no foreign bodies discovered. The laceration was closed with Dermabond. There were no additional lacerations requiring repair.     Total repaired wound length: 2.5 cm.     Other Items: None    The patient tolerated the procedure well.    Complications: None        2039: Patient tolerates laceration repair well.    Patient Vitals for the past 24 hrs:   BP Temp Temp src Pulse Resp SpO2 Height Weight   12/27/21 2115 (!) 185/85 36.3 °C (97.3 °F) Temporal 67 16 95 % -- --   12/27/21 1839 (!) 163/90 36.8 °C (98.3 °F) Temporal 69 16 96 % -- --   12/27/21 1838 -- " "-- -- -- -- -- 1.702 m (5' 7\") 77.1 kg (169 lb 15.6 oz)     HTN/IDDM FOLLOW UP:  The patient is referred to a primary physician for blood pressure management, diabetic screening, and for all other preventive health concerns    Decision Making:  This is a 71 y.o. year old female who presents with laceration to the face.  This occurred over 24 hours ago, as such I do not feel suture repair is indicated.  However given this is a woman's face and given there is a flap and gaping lacerations will need to be approximated.  I have used a small amount of Dermabond after irrigation and chlorhexidine with good cosmetic results.  Clear indication for antibiotics, high risk for infection.  Augmentin initiated here in the ED and written for home for the same.     The patient will return for new or worsening symptoms and is stable at the time of discharge.    Patient has had high blood pressure while in the emergency department, felt likely secondary to medical condition. Counseled patient to monitor blood pressure at home and follow up with primary care physician.     DISPOSITION:  Patient will be discharged home in stable condition.    FOLLOW UP:  Your Doctor    OUTPATIENT MEDICATIONS:  Discharge Medication List as of 12/27/2021  9:15 PM      START taking these medications    Details   amoxicillin-clavulanate (AUGMENTIN) 875-125 MG Tab Take 1 Tablet by mouth 2 times a day for 5 days., Disp-10 Tablet, R-0, Normal               FINAL IMPRESSION  1. Facial laceration, initial encounter    2. Cat scratch          Trudy FOX M.D. (Scribe), am scribing for, and in the presence of, Trudy De Leon MD.    Electronically signed by: Trudy De Leon M.D. (Scribe), 12/27/2021    Trudy FOX MD personally performed the services described in this documentation, as scribed by Trudy De Leon M.D. in my presence, and it is both accurate and complete    The note accurately reflects work and " decisions made by me.  Trudy De Leon M.D.  12/27/2021  11:15 PM

## 2021-12-28 NOTE — ED TRIAGE NOTES
"71 yr old female to triage  Chief Complaint   Patient presents with   • Cat Scratch     Patient report her cat scratched her left side of the face near the eye approximately 24 hours ago.      BP (!) 163/90   Pulse 69   Temp 36.8 °C (98.3 °F) (Temporal)   Resp 16   Ht 1.702 m (5' 7\")   Wt 77.1 kg (169 lb 15.6 oz)   SpO2 96%   BMI 26.62 kg/m²     Has this patient been vaccinated for COVID Yes   If not, would they like to be vaccinated while in the ER if eligible?  No   Would the patient like to speak with the ERP about the possibility of receiving the COVID vaccine today before making a decision? NO     " Azithromycin Counseling:  I discussed with the patient the risks of azithromycin including but not limited to GI upset, allergic reaction, drug rash, diarrhea, and yeast infections.

## 2021-12-29 ENCOUNTER — TELEPHONE (OUTPATIENT)
Dept: MEDICAL GROUP | Facility: PHYSICIAN GROUP | Age: 71
End: 2021-12-29

## 2021-12-29 NOTE — TELEPHONE ENCOUNTER
Called pt in response to ED visit on 12/27/21 for cat scratch on her face.   Pt was prescribed oral Augmentin, pt started oral abx yesterday evening.     Discussed s/s of infection to monitor for. Pt has my direct extension if sx occur or pt has questions. Scheduled appt for 1/3/22. Pt thankful for call.

## 2021-12-30 ENCOUNTER — TELEPHONE (OUTPATIENT)
Dept: MEDICAL GROUP | Facility: PHYSICIAN GROUP | Age: 71
End: 2021-12-30

## 2021-12-30 NOTE — TELEPHONE ENCOUNTER
ESTABLISHED PATIENT PRE-VISIT PLANNING     Patient was NOT contacted to complete PVP.     Note: Patient will not be contacted if there is no indication to call.     1.  Reviewed notes from the last few office visits within the medical group: Yes    2.  If any orders were placed at last visit or intended to be done for this visit (i.e. 6 mos follow-up), do we have Results/Consult Notes?         •  Labs - Labs ordered, NOT completed. Patient advised to complete prior to next appointment.  Note: If patient appointment is for lab review and patient did not complete labs, check with provider if OK to reschedule patient until labs completed.       •  Imaging - Imaging was not ordered at last office visit.       •  Referrals - No referrals were ordered at last office visit.    3. Is this appointment scheduled as a Hospital Follow-Up? Yes, visit was at [facility].     4.  Immunizations were updated in Epic using Reconcile Outside Information activity? Yes    5.  Patient is due for the following Health Maintenance Topics:   There are no preventive care reminders to display for this patient.      6.  AHA (Pulse8) form printed for Provider? No, already completed

## 2022-01-03 ENCOUNTER — OFFICE VISIT (OUTPATIENT)
Dept: MEDICAL GROUP | Facility: PHYSICIAN GROUP | Age: 72
End: 2022-01-03
Payer: MEDICARE

## 2022-01-03 VITALS
RESPIRATION RATE: 12 BRPM | HEART RATE: 69 BPM | BODY MASS INDEX: 25.88 KG/M2 | DIASTOLIC BLOOD PRESSURE: 80 MMHG | WEIGHT: 164.9 LBS | TEMPERATURE: 97.4 F | OXYGEN SATURATION: 96 % | HEIGHT: 67 IN | SYSTOLIC BLOOD PRESSURE: 122 MMHG
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
## 2022-01-03 DIAGNOSIS — S00.81XD CAT SCRATCH OF FACE, SUBSEQUENT ENCOUNTER: ICD-10-CM

## 2022-01-03 DIAGNOSIS — M43.22 CERVICAL VERTEBRAL FUSION: ICD-10-CM

## 2022-01-03 DIAGNOSIS — M54.2 CHRONIC NECK PAIN: ICD-10-CM

## 2022-01-03 DIAGNOSIS — G89.29 CHRONIC NECK PAIN: ICD-10-CM

## 2022-01-03 DIAGNOSIS — Z79.891 CHRONIC USE OF OPIATE FOR THERAPEUTIC PURPOSE: ICD-10-CM

## 2022-01-03 PROBLEM — S00.81XA CAT SCRATCH OF FACE: Status: ACTIVE | Noted: 2022-01-03
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
## 2022-01-03 PROCEDURE — 99214 OFFICE O/P EST MOD 30 MIN: CPT | Performed by: INTERNAL MEDICINE

## 2022-01-03 RX ORDER — TRAMADOL HYDROCHLORIDE 50 MG/1
50 TABLET ORAL EVERY 8 HOURS PRN
Qty: 80 TABLET | Refills: 0 | Status: SHIPPED | OUTPATIENT
Start: 2022-01-03 | End: 2022-02-02

## 2022-01-03 RX ORDER — TRAMADOL HYDROCHLORIDE 50 MG/1
50 TABLET ORAL EVERY 8 HOURS PRN
Qty: 80 TABLET | Refills: 0 | Status: SHIPPED | OUTPATIENT
Start: 2022-02-02 | End: 2022-03-04

## 2022-01-03 RX ORDER — TRAMADOL HYDROCHLORIDE 50 MG/1
50 TABLET ORAL EVERY 8 HOURS PRN
Qty: 80 TABLET | Refills: 0 | Status: SHIPPED | OUTPATIENT
Start: 2022-03-04 | End: 2022-04-03

## 2022-01-03 ASSESSMENT — PATIENT HEALTH QUESTIONNAIRE - PHQ9: CLINICAL INTERPRETATION OF PHQ2 SCORE: 0

## 2022-01-03 ASSESSMENT — FIBROSIS 4 INDEX: FIB4 SCORE: 1.62

## 2022-01-03 NOTE — PROGRESS NOTES
Subjective:   Chief Complaint/History of Present Illness:  Carlee Hunt is a 71 y.o. female established patient who presents today to discuss medical problems as listed below. Carlee is unaccompanied for today's visit.    Problem   Cat Scratch of Face    She sustained a scratch on her face under her left eye the medial aspect secondary to a cat claw.  When she noticed that there was a flap of skin pulled down she went to urgent care who then referred her to the emergency department.  This was closed with Dermabond and she was prescribed Augmentin for prophylaxis.  She did not have any involvement of the left eye such as alterations to vision or pain.     Chronic Use of Opiate for Therapeutic Purpose    She reports longstanding chronic pain in her neck due to a 2 staged neck fusion surgery in 2013 at [facility].  She also has had issues with intermittent low back/SI joint pain, left greater than right hip pain now radiating into the groin, and left IT band pain.  She uses tramadol 50 mg generally 2 tablets daily for pain control; more recently has required 3 and rarely 4 tablets.  She denies any deleterious side effects from this medicine.     Cervical Vertebral Fusion    Cervical MRI (8/2021): Previous extensive cervical laminectomy from C1 through C6. Previous pedicle screw and mary fixation from the occiput to C5 bilaterally. 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  Nonvisualization of the dens which may have been previously resected or congenitally absent.    Cervical Xray (9/2021): extensive posterior cervical instrumentation from occiput through C5.  I do feel that she has some instability at C5-6 between flexion and extension maneuvers.  Otherwise within the construct I see no abnormal motion or evidence of hardware failure or screw pullout.  These appear to be stable postoperative images.      She reports staged 2 part fusion in 2007 at [facility] at the recommendation of a local orthopedist. She reports an anterior and  posterior approach at that time. 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  At that same time she was experiencing left hip pain and was told it was likely related to the left neck pain.  She reports chronic issues with what she thinks is muscle spasms on the left greater than right neck as well as the bilateral trapezius areas which she relates to large amount of hardware adding additional pressure to her muscles.     She is now established with [facility], Dr. Brewster.     Chronic Neck Pain    She reports staged 2 part fusion in 2007 at [facility] at the recommendation of a local orthopedist. She reports an anterior and posterior approach at that time. 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  At that same time she was experiencing left hip pain and was told it was likely related to the left neck pain.  She reports chronic issues with what she thinks is muscle spasms on the left greater than right neck as well as the bilateral trapezius areas which she relates to large amount of hardware adding additional pressure to her muscles.  It does not sound like she is followed up with anyone locally for this previous neck problem.    For chronic pain in the neck she utilizes tramadol 50 mg up to 2 tablets daily.  She is also started addingadvil usually once at night.  She is on turmeric.  She also is working with physical therapy.          Current Medications:  Current Outpatient Medications Ordered in Epic   Medication Sig Dispense Refill   • traMADol (ULTRAM) 50 MG Tab Take 1 Tablet by mouth every 8 hours as needed for Moderate Pain or Severe Pain for up to 30 days. 80 Tablet 0   • [START ON 2/2/2022] traMADol  "(ULTRAM) 50 MG Tab Take 1 Tablet by mouth every 8 hours as needed for Moderate Pain or Severe Pain for up to 30 days. 80 Tablet 0   • [START ON 3/4/2022] traMADol (ULTRAM) 50 MG Tab Take 1 Tablet by mouth every 8 hours as needed for Moderate Pain or Severe Pain for up to 30 days. 80 Tablet 0   • levothyroxine (SYNTHROID) 100 MCG Tab TAKE ONE TABLET BY MOUTH EVERY MORNING ON AN EMPTY STOMACH 100 Tablet 3   • metoprolol SR (TOPROL XL) 25 MG TABLET SR 24 HR Take 1 tablet by mouth every day. 100 tablet 3   • COMBIGAN 0.2-0.5 % Solution      • Lutein 20 MG Tab Take  by mouth.     • Coenzyme Q10 (COQ10 PO) Take 100 mg by mouth every day. Qunol liquid     • magnesium oxide (MAG-OX) 400 MG Tab Take 400 mg by mouth every evening.     • Calcium 500 MG Chew Tab Take 1 Tab by mouth every evening.     • Biotin 1000 MCG Chew Tab Take 500 mg by mouth every day.     • Milk Thistle 175 MG Cap Take 1 Cap by mouth every day.     • Cyanocobalamin (B-12) 2500 MCG Tab Take 1 Tab by mouth every day.     • Ascorbic Acid (VITAMIN C) 1000 MG Tab Take 1 Tab by mouth every day.     • Tretinoin 0.05 % Gel 1 Application by Apply externally route every bedtime. 1 Tube 3   • TURMERIC PO Take 333 mg by mouth every day. Daily (Qunol liquid)     • Probiotic Product (PRO-BIOTIC BLEND) Cap Take 1 Cap by mouth every evening. Floristan     • latanoprost (XALATAN) 0.005 % Solution Place 1 Drop in both eyes every evening.     • KRILL OIL PO Take 500 mg by mouth every day.     • Multiple Vitamins-Minerals (CENTRUM PO) Take 1 Tab by mouth every day.     • L-Theanine 200 MG Cap Take  by mouth.       No current Epic-ordered facility-administered medications on file.          Objective:   Physical Exam:    Vitals: /80 (BP Location: Left arm, Patient Position: Sitting, BP Cuff Size: Adult)   Pulse 69   Temp 36.3 °C (97.4 °F) (Temporal)   Resp 12   Ht 1.702 m (5' 7\")   Wt 74.8 kg (164 lb 14.4 oz)   SpO2 96%    BMI: Body mass index is 25.83 " kg/m².  Physical Exam  Constitutional:       General: She is not in acute distress.     Appearance: Normal appearance. She is not ill-appearing.   HENT:      Ears:      Comments: Hearing grossly normal  Cardiovascular:      Pulses: Normal pulses.   Pulmonary:      Effort: Pulmonary effort is normal. No respiratory distress.   Skin:     General: Skin is warm and dry.      Comments: Healing abrasion under left eye with residual ecchymoses   Psychiatric:         Mood and Affect: Mood normal.         Behavior: Behavior normal.         Thought Content: Thought content normal.         Judgment: Judgment normal.          Assessment and Plan:   Carlee is a 71 y.o. female with the following:  Problem List Items Addressed This Visit     Cervical vertebral fusion     Chronic and ongoing problem.  Continue follow-up with Dr. June on of neurosurgery/spine surgery at [facility] as recommended.  Continue pain control for chronic pain using tramadol and anti-inflammatories due to extensive neck surgery in the past.         Relevant Medications    traMADol (ULTRAM) 50 MG Tab    traMADol (ULTRAM) 50 MG Tab (Start on 2/2/2022)    traMADol (ULTRAM) 50 MG Tab (Start on 3/4/2022)    Chronic neck pain     Chronic and ongoing problem.  She has met with Dr. Carbone as well as Dr. Morris.  Continue pain control with anti-inflammatories and tramadol.  Typically utilizes tramadol 50 mg twice daily but occasionally has to take 1/3 tablet if her neck pain is decompensated and very rarely fourth tablet.  Will provide 80 tablets per 30 days and continue reassessing each follow-up to reduce the quantity as able.  No deleterious side effects to her opiates.    Obtained and reviewed patient utilization report from [facility] pharmacy database on 1/3/2022 2:15 PM  prior to writing prescription for controlled substance II, III or IV per [place] bill . Based on assessment of the report, the prescription is medically necessary.     Patient understands this  "prescription is a controlled substance which is potentially habit-forming and its use is regulated by the [place]. We also discussed the new \"black box\" warning regarding the lethal combination of opioids and benzodiazepines. Refills are subject to terms of a controlled substance agreement and patient has an updated one on file. Most recent UDS is appropriate. Any refill requires an office visit. Narcotics have may adverse effects and the risks of addiction, accidental overdose and death were emphasized. Provided prescriptions for the next three months.         Relevant Medications    traMADol (ULTRAM) 50 MG Tab    traMADol (ULTRAM) 50 MG Tab (Start on 2/2/2022)    traMADol (ULTRAM) 50 MG Tab (Start on 3/4/2022)    Chronic use of opiate for therapeutic purpose     Chronic and ongoing problem.  She has met with Dr. Carbone as well as Dr. Morris.  Continue pain control with anti-inflammatories and tramadol.  Typically utilizes tramadol 50 mg twice daily but occasionally has to take 1/3 tablet if her neck pain is decompensated and very rarely fourth tablet.  Will provide 80 tablets per 30 days and continue reassessing each follow-up to reduce the quantity as able.  No deleterious side effects to her opiates.    Obtained and reviewed patient utilization report from [facility] pharmacy database on 1/3/2022 2:15 PM  prior to writing prescription for controlled substance II, III or IV per [place] bill . Based on assessment of the report, the prescription is medically necessary.     Patient understands this prescription is a controlled substance which is potentially habit-forming and its use is regulated by the [place]. We also discussed the new \"black box\" warning regarding the lethal combination of opioids and benzodiazepines. Refills are subject to terms of a controlled substance agreement and patient has an updated one on file. Most recent UDS is appropriate. Any refill requires an office visit. Narcotics have may adverse " effects and the risks of addiction, accidental overdose and death were emphasized. Provided prescriptions for the next three months.           Relevant Medications    traMADol (ULTRAM) 50 MG Tab    traMADol (ULTRAM) 50 MG Tab (Start on 2/2/2022)    traMADol (ULTRAM) 50 MG Tab (Start on 3/4/2022)    Cat scratch of face     New problem, improving as expected.  She has completed antibiotics.  No signs or symptoms of residual infection.  Incision appears well approximated with use of Dermabond.  She will continue to observe at home and notify me if she develops any erythema, edema, pain, or other new changes or concerns.  She was appreciative of the evaluation.                RTC: Return in about 3 months (around 4/3/2022).    I spent a total of 30 minutes with record review, exam, communication with the patient, communication with other providers, and documentation of this encounter.    PLEASE NOTE: This dictation was created using voice recognition software. I have made every reasonable attempt to correct obvious errors, but I expect that there are errors of grammar and possibly content that I did not discover before finalizing the note.      Shari Dent, DO  Geriatric and Internal Medicine  [facility]

## 2022-01-03 NOTE — ASSESSMENT & PLAN NOTE
New problem, improving as expected.  She has completed antibiotics.  No signs or symptoms of residual infection.  Incision appears well approximated with use of Dermabond.  She will continue to observe at home and notify me if she develops any erythema, edema, pain, or other new changes or concerns.  She was appreciative of the evaluation.

## 2022-01-03 NOTE — ASSESSMENT & PLAN NOTE
"Chronic and ongoing problem.  She has met with Dr. Carbone as well as Dr. Morris.  Continue pain control with anti-inflammatories and tramadol.  Typically utilizes tramadol 50 mg twice daily but occasionally has to take 1/3 tablet if her neck pain is decompensated and very rarely fourth tablet.  Will provide 80 tablets per 30 days and continue reassessing each follow-up to reduce the quantity as able.  No deleterious side effects to her opiates.    Obtained and reviewed patient utilization report from [facility] pharmacy database on 1/3/2022 2:15 PM  prior to writing prescription for controlled substance II, III or IV per [place] bill . Based on assessment of the report, the prescription is medically necessary.     Patient understands this prescription is a controlled substance which is potentially habit-forming and its use is regulated by the [place]. We also discussed the new \"black box\" warning regarding the lethal combination of opioids and benzodiazepines. Refills are subject to terms of a controlled substance agreement and patient has an updated one on file. Most recent UDS is appropriate. Any refill requires an office visit. Narcotics have may adverse effects and the risks of addiction, accidental overdose and death were emphasized. Provided prescriptions for the next three months.  "

## 2022-01-03 NOTE — ASSESSMENT & PLAN NOTE
Chronic and ongoing problem.  Continue follow-up with Dr. June on of neurosurgery/spine surgery at [facility] as recommended.  Continue pain control for chronic pain using tramadol and anti-inflammatories due to extensive neck surgery in the past.

## 2022-01-03 NOTE — ASSESSMENT & PLAN NOTE
"Chronic and ongoing problem.  She has met with Dr. Carbone as well as Dr. Morris.  Continue pain control with anti-inflammatories and tramadol.  Typically utilizes tramadol 50 mg twice daily but occasionally has to take 1/3 tablet if her neck pain is decompensated and very rarely fourth tablet.  Will provide 80 tablets per 30 days and continue reassessing each follow-up to reduce the quantity as able.  No deleterious side effects to her opiates.    Obtained and reviewed patient utilization report from [facility] pharmacy database on 1/3/2022 2:15 PM  prior to writing prescription for controlled substance II, III or IV per [place] bill . Based on assessment of the report, the prescription is medically necessary.     Patient understands this prescription is a controlled substance which is potentially habit-forming and its use is regulated by the [place]. We also discussed the new \"black box\" warning regarding the lethal combination of opioids and benzodiazepines. Refills are subject to terms of a controlled substance agreement and patient has an updated one on file. Most recent UDS is appropriate. Any refill requires an office visit. Narcotics have may adverse effects and the risks of addiction, accidental overdose and death were emphasized. Provided prescriptions for the next three months.    "

## 2022-01-04 DIAGNOSIS — M25.552 BILATERAL HIP PAIN: ICD-10-CM

## 2022-01-04 DIAGNOSIS — M54.2 CHRONIC NECK PAIN: ICD-10-CM

## 2022-01-04 DIAGNOSIS — M25.551 BILATERAL HIP PAIN: ICD-10-CM

## 2022-01-04 DIAGNOSIS — G89.29 CHRONIC NECK PAIN: ICD-10-CM

## 2022-01-04 DIAGNOSIS — G89.29 CHRONIC BILATERAL LOW BACK PAIN WITH LEFT-SIDED SCIATICA: ICD-10-CM

## 2022-01-04 DIAGNOSIS — M43.22 CERVICAL VERTEBRAL FUSION: ICD-10-CM

## 2022-01-04 DIAGNOSIS — M54.42 CHRONIC BILATERAL LOW BACK PAIN WITH LEFT-SIDED SCIATICA: ICD-10-CM

## 2022-01-20 ENCOUNTER — APPOINTMENT (RX ONLY)
Dept: URBAN - METROPOLITAN AREA CLINIC 4 | Facility: CLINIC | Age: 72
Setting detail: DERMATOLOGY
End: 2022-01-20

## 2022-01-20 DIAGNOSIS — D18.0 HEMANGIOMA: ICD-10-CM

## 2022-01-20 DIAGNOSIS — L81.4 OTHER MELANIN HYPERPIGMENTATION: ICD-10-CM

## 2022-01-20 DIAGNOSIS — D22 MELANOCYTIC NEVI: ICD-10-CM

## 2022-01-20 DIAGNOSIS — L82.1 OTHER SEBORRHEIC KERATOSIS: ICD-10-CM

## 2022-01-20 PROBLEM — D22.62 MELANOCYTIC NEVI OF LEFT UPPER LIMB, INCLUDING SHOULDER: Status: ACTIVE | Noted: 2022-01-20

## 2022-01-20 PROBLEM — D22.71 MELANOCYTIC NEVI OF RIGHT LOWER LIMB, INCLUDING HIP: Status: ACTIVE | Noted: 2022-01-20

## 2022-01-20 PROBLEM — D22.72 MELANOCYTIC NEVI OF LEFT LOWER LIMB, INCLUDING HIP: Status: ACTIVE | Noted: 2022-01-20

## 2022-01-20 PROBLEM — D22.5 MELANOCYTIC NEVI OF TRUNK: Status: ACTIVE | Noted: 2022-01-20

## 2022-01-20 PROBLEM — D48.5 NEOPLASM OF UNCERTAIN BEHAVIOR OF SKIN: Status: ACTIVE | Noted: 2022-01-20

## 2022-01-20 PROBLEM — D22.61 MELANOCYTIC NEVI OF RIGHT UPPER LIMB, INCLUDING SHOULDER: Status: ACTIVE | Noted: 2022-01-20

## 2022-01-20 PROBLEM — D18.01 HEMANGIOMA OF SKIN AND SUBCUTANEOUS TISSUE: Status: ACTIVE | Noted: 2022-01-20

## 2022-01-20 PROCEDURE — 11102 TANGNTL BX SKIN SINGLE LES: CPT

## 2022-01-20 PROCEDURE — 99203 OFFICE O/P NEW LOW 30 MIN: CPT | Mod: 25

## 2022-01-20 PROCEDURE — ? COUNSELING

## 2022-01-20 PROCEDURE — ? LIQUID NITROGEN

## 2022-01-20 PROCEDURE — ? BIOPSY BY SHAVE METHOD

## 2022-01-20 ASSESSMENT — LOCATION SIMPLE DESCRIPTION DERM
LOCATION SIMPLE: RIGHT EYEBROW
LOCATION SIMPLE: ABDOMEN
LOCATION SIMPLE: RIGHT PRETIBIAL REGION
LOCATION SIMPLE: LEFT CHEEK
LOCATION SIMPLE: RIGHT POPLITEAL SKIN
LOCATION SIMPLE: RIGHT ANTERIOR NECK
LOCATION SIMPLE: LEFT FOREHEAD
LOCATION SIMPLE: RIGHT POSTERIOR THIGH
LOCATION SIMPLE: RIGHT UPPER BACK
LOCATION SIMPLE: LEFT PRETIBIAL REGION
LOCATION SIMPLE: LEFT POSTERIOR THIGH
LOCATION SIMPLE: LEFT SHOULDER
LOCATION SIMPLE: RIGHT THIGH
LOCATION SIMPLE: LEFT POPLITEAL SKIN
LOCATION SIMPLE: LEFT ELBOW
LOCATION SIMPLE: RIGHT CLAVICULAR SKIN
LOCATION SIMPLE: CHEST
LOCATION SIMPLE: LEFT POSTERIOR UPPER ARM
LOCATION SIMPLE: RIGHT POSTERIOR UPPER ARM
LOCATION SIMPLE: UPPER BACK
LOCATION SIMPLE: LEFT FOREARM
LOCATION SIMPLE: RIGHT SHOULDER
LOCATION SIMPLE: LEFT ANTERIOR NECK
LOCATION SIMPLE: RIGHT FOREARM

## 2022-01-20 ASSESSMENT — LOCATION DETAILED DESCRIPTION DERM
LOCATION DETAILED: RIGHT DISTAL POSTERIOR THIGH
LOCATION DETAILED: SUBXIPHOID
LOCATION DETAILED: RIGHT INFERIOR ANTERIOR NECK
LOCATION DETAILED: LEFT SUPERIOR NASAL CHEEK
LOCATION DETAILED: RIGHT POPLITEAL SKIN
LOCATION DETAILED: LEFT DISTAL DORSAL FOREARM
LOCATION DETAILED: RIGHT ANTERIOR SHOULDER
LOCATION DETAILED: RIGHT CLAVICULAR SKIN
LOCATION DETAILED: RIGHT ANTERIOR DISTAL THIGH
LOCATION DETAILED: LEFT CLAVICULAR NECK
LOCATION DETAILED: RIGHT PROXIMAL POSTERIOR UPPER ARM
LOCATION DETAILED: LOWER STERNUM
LOCATION DETAILED: LEFT ANTERIOR SHOULDER
LOCATION DETAILED: RIGHT INFERIOR LATERAL NECK
LOCATION DETAILED: LEFT VENTRAL LATERAL PROXIMAL FOREARM
LOCATION DETAILED: LEFT MEDIAL SUPERIOR CHEST
LOCATION DETAILED: RIGHT VENTRAL PROXIMAL FOREARM
LOCATION DETAILED: RIGHT CENTRAL EYEBROW
LOCATION DETAILED: RIGHT PROXIMAL DORSAL FOREARM
LOCATION DETAILED: MIDDLE STERNUM
LOCATION DETAILED: LEFT DISTAL POSTERIOR THIGH
LOCATION DETAILED: RIGHT LATERAL SUPERIOR CHEST
LOCATION DETAILED: INFERIOR THORACIC SPINE
LOCATION DETAILED: LEFT LATERAL ELBOW
LOCATION DETAILED: LEFT PROXIMAL PRETIBIAL REGION
LOCATION DETAILED: LEFT INFERIOR LATERAL NECK
LOCATION DETAILED: LEFT INFERIOR FOREHEAD
LOCATION DETAILED: RIGHT SUPERIOR UPPER BACK
LOCATION DETAILED: RIGHT PROXIMAL PRETIBIAL REGION
LOCATION DETAILED: RIGHT DISTAL POSTERIOR UPPER ARM
LOCATION DETAILED: LEFT POPLITEAL SKIN
LOCATION DETAILED: RIGHT MEDIAL SUPERIOR CHEST
LOCATION DETAILED: LEFT DISTAL POSTERIOR UPPER ARM

## 2022-01-20 ASSESSMENT — LOCATION ZONE DERM
LOCATION ZONE: LEG
LOCATION ZONE: NECK
LOCATION ZONE: FACE
LOCATION ZONE: TRUNK
LOCATION ZONE: ARM

## 2022-01-20 NOTE — PROCEDURE: LIQUID NITROGEN
Consent: The patient's consent was obtained including but not limited to risks of crusting, scabbing, blistering, scarring, darker or lighter pigmentary change, recurrence, incomplete removal and infection.
Render Post-Care Instructions In Note?: no
Post-Care Instructions: I reviewed with the patient in detail post-care instructions. Patient is to wear sunprotection, and avoid picking at any of the treated lesions. Pt may apply Vaseline to crusted or scabbing areas.
Medical Necessity Clause: This procedure was medically necessary because the lesions that were treated were:  If lesion does not resolve, bx is needed.
Spray Paint Text: The liquid nitrogen was applied to the skin utilizing a spray paint frosting technique.
Show Spray Paint Technique Variable?: Yes
Medical Necessity Information: It is in your best interest to select a reason for this procedure from the list below. All of these items fulfill various CMS LCD requirements except the new and changing color options.
Detail Level: Detailed
Duration Of Freeze Thaw-Cycle (Seconds): 0

## 2022-03-18 ENCOUNTER — HOSPITAL ENCOUNTER (OUTPATIENT)
Dept: LAB | Facility: MEDICAL CENTER | Age: 72
End: 2022-03-18
Attending: INTERNAL MEDICINE
Payer: MEDICARE

## 2022-03-18 DIAGNOSIS — A49.8 ENTEROCOCCUS FAECALIS INFECTION: ICD-10-CM

## 2022-03-18 DIAGNOSIS — Z79.2 CHRONIC ANTIBIOTIC SUPPRESSION: ICD-10-CM

## 2022-03-18 LAB
ALBUMIN SERPL BCP-MCNC: 4.4 G/DL (ref 3.2–4.9)
ALBUMIN/GLOB SERPL: 1.8 G/DL
ALP SERPL-CCNC: 85 U/L (ref 30–99)
ALT SERPL-CCNC: 18 U/L (ref 2–50)
ANION GAP SERPL CALC-SCNC: 11 MMOL/L (ref 7–16)
AST SERPL-CCNC: 22 U/L (ref 12–45)
BASOPHILS # BLD AUTO: 0.6 % (ref 0–1.8)
BASOPHILS # BLD: 0.05 K/UL (ref 0–0.12)
BILIRUB SERPL-MCNC: 0.2 MG/DL (ref 0.1–1.5)
BUN SERPL-MCNC: 17 MG/DL (ref 8–22)
CALCIUM SERPL-MCNC: 9.5 MG/DL (ref 8.4–10.2)
CHLORIDE SERPL-SCNC: 103 MMOL/L (ref 96–112)
CO2 SERPL-SCNC: 26 MMOL/L (ref 20–33)
CREAT SERPL-MCNC: 0.65 MG/DL (ref 0.5–1.4)
CRP SERPL HS-MCNC: <0.3 MG/DL (ref 0–0.75)
EOSINOPHIL # BLD AUTO: 0.14 K/UL (ref 0–0.51)
EOSINOPHIL NFR BLD: 1.7 % (ref 0–6.9)
ERYTHROCYTE [DISTWIDTH] IN BLOOD BY AUTOMATED COUNT: 43.4 FL (ref 35.9–50)
ERYTHROCYTE [SEDIMENTATION RATE] IN BLOOD BY WESTERGREN METHOD: 16 MM/HOUR (ref 0–25)
GFR SERPLBLD CREATININE-BSD FMLA CKD-EPI: 92 ML/MIN/1.73 M 2
GLOBULIN SER CALC-MCNC: 2.4 G/DL (ref 1.9–3.5)
GLUCOSE SERPL-MCNC: 88 MG/DL (ref 65–99)
HCT VFR BLD AUTO: 41.7 % (ref 37–47)
HGB BLD-MCNC: 13.6 G/DL (ref 12–16)
IMM GRANULOCYTES # BLD AUTO: 0.01 K/UL (ref 0–0.11)
IMM GRANULOCYTES NFR BLD AUTO: 0.1 % (ref 0–0.9)
LYMPHOCYTES # BLD AUTO: 3.42 K/UL (ref 1–4.8)
LYMPHOCYTES NFR BLD: 42.4 % (ref 22–41)
MCH RBC QN AUTO: 31.1 PG (ref 27–33)
MCHC RBC AUTO-ENTMCNC: 32.6 G/DL (ref 33.6–35)
MCV RBC AUTO: 95.2 FL (ref 81.4–97.8)
MONOCYTES # BLD AUTO: 0.46 K/UL (ref 0–0.85)
MONOCYTES NFR BLD AUTO: 5.7 % (ref 0–13.4)
NEUTROPHILS # BLD AUTO: 3.99 K/UL (ref 2–7.15)
NEUTROPHILS NFR BLD: 49.5 % (ref 44–72)
NRBC # BLD AUTO: 0 K/UL
NRBC BLD-RTO: 0 /100 WBC
PLATELET # BLD AUTO: 210 K/UL (ref 164–446)
PMV BLD AUTO: 10.2 FL (ref 9–12.9)
POTASSIUM SERPL-SCNC: 4.3 MMOL/L (ref 3.6–5.5)
PROT SERPL-MCNC: 6.8 G/DL (ref 6–8.2)
RBC # BLD AUTO: 4.38 M/UL (ref 4.2–5.4)
SODIUM SERPL-SCNC: 140 MMOL/L (ref 135–145)
WBC # BLD AUTO: 8.1 K/UL (ref 4.8–10.8)

## 2022-03-18 PROCEDURE — 80053 COMPREHEN METABOLIC PANEL: CPT

## 2022-03-18 PROCEDURE — 36415 COLL VENOUS BLD VENIPUNCTURE: CPT

## 2022-03-18 PROCEDURE — 85652 RBC SED RATE AUTOMATED: CPT

## 2022-03-18 PROCEDURE — 86140 C-REACTIVE PROTEIN: CPT

## 2022-03-18 PROCEDURE — 85025 COMPLETE CBC W/AUTO DIFF WBC: CPT

## 2022-03-21 ENCOUNTER — TELEPHONE (OUTPATIENT)
Dept: MEDICAL GROUP | Facility: PHYSICIAN GROUP | Age: 72
End: 2022-03-21
Payer: MEDICARE

## 2022-03-21 RX ORDER — AMOXICILLIN AND CLAVULANATE POTASSIUM 875; 125 MG/1; MG/1
TABLET, FILM COATED ORAL
COMMUNITY
End: 2022-03-22

## 2022-03-21 NOTE — TELEPHONE ENCOUNTER
ESTABLISHED PATIENT PRE-VISIT PLANNING     Patient was NOT contacted to complete PVP.     Note: Patient will not be contacted if there is no indication to call.     1.  Reviewed notes from the last few office visits within the medical group: Yes    2.  If any orders were placed at last visit or intended to be done for this visit (i.e. 6 mos follow-up), do we have Results/Consult Notes?         •  Labs - Labs ordered, completed on 03/18/22 and results are in chart.  Note: If patient appointment is for lab review and patient did not complete labs, check with provider if OK to reschedule patient until labs completed.       •  Imaging - Imaging was not ordered at last office visit.       •  Referrals - Referral ordered, patient was seen and consult notes are in chart. Care Teams updated  YES.    3. Is this appointment scheduled as a Hospital Follow-Up? No    4.  Immunizations were updated in Epic using Reconcile Outside Information activity? Yes    5.  Patient is due for the following Health Maintenance Topics:   There are no preventive care reminders to display for this patient.    - Patient is up-to-date on all Health Maintenance topics. No records have been requested at this time.    6.  AHA (Pulse8) form printed for Provider? Yes

## 2022-03-22 ENCOUNTER — HOSPITAL ENCOUNTER (OUTPATIENT)
Facility: MEDICAL CENTER | Age: 72
End: 2022-03-22
Attending: INTERNAL MEDICINE
Payer: MEDICARE

## 2022-03-22 ENCOUNTER — OFFICE VISIT (OUTPATIENT)
Dept: MEDICAL GROUP | Facility: PHYSICIAN GROUP | Age: 72
End: 2022-03-22
Payer: MEDICARE

## 2022-03-22 VITALS
DIASTOLIC BLOOD PRESSURE: 82 MMHG | WEIGHT: 166.3 LBS | OXYGEN SATURATION: 97 % | HEIGHT: 67 IN | HEART RATE: 70 BPM | BODY MASS INDEX: 26.1 KG/M2 | TEMPERATURE: 97.3 F | RESPIRATION RATE: 12 BRPM | SYSTOLIC BLOOD PRESSURE: 130 MMHG

## 2022-03-22 DIAGNOSIS — M25.552 BILATERAL HIP PAIN: ICD-10-CM

## 2022-03-22 DIAGNOSIS — M25.551 BILATERAL HIP PAIN: ICD-10-CM

## 2022-03-22 DIAGNOSIS — R30.0 DYSURIA: ICD-10-CM

## 2022-03-22 DIAGNOSIS — L71.9 ROSACEA: ICD-10-CM

## 2022-03-22 DIAGNOSIS — A49.8 ENTEROCOCCUS FAECALIS INFECTION: ICD-10-CM

## 2022-03-22 PROBLEM — I77.9 DISORDER OF ARTERIES AND ARTERIOLES (HCC): Status: RESOLVED | Noted: 2021-08-24 | Resolved: 2022-03-22

## 2022-03-22 LAB
APPEARANCE UR: NORMAL
BILIRUB UR STRIP-MCNC: NEGATIVE MG/DL
COLOR UR AUTO: YELLOW
GLUCOSE UR STRIP.AUTO-MCNC: NEGATIVE MG/DL
KETONES UR STRIP.AUTO-MCNC: NEGATIVE MG/DL
LEUKOCYTE ESTERASE UR QL STRIP.AUTO: NORMAL
NITRITE UR QL STRIP.AUTO: POSITIVE
PH UR STRIP.AUTO: 7 [PH] (ref 5–8)
PROT UR QL STRIP: NEGATIVE MG/DL
RBC UR QL AUTO: NEGATIVE
SP GR UR STRIP.AUTO: 1.02
UROBILINOGEN UR STRIP-MCNC: 0.2 MG/DL

## 2022-03-22 PROCEDURE — 99214 OFFICE O/P EST MOD 30 MIN: CPT | Performed by: INTERNAL MEDICINE

## 2022-03-22 PROCEDURE — 81002 URINALYSIS NONAUTO W/O SCOPE: CPT | Performed by: INTERNAL MEDICINE

## 2022-03-22 RX ORDER — CELECOXIB 100 MG/1
100 CAPSULE ORAL 2 TIMES DAILY
Qty: 60 CAPSULE | Refills: 1 | Status: SHIPPED
Start: 2022-03-22 | End: 2022-05-17

## 2022-03-22 RX ORDER — METRONIDAZOLE 7.5 MG/G
1 GEL TOPICAL 2 TIMES DAILY
COMMUNITY
End: 2022-07-07 | Stop reason: SDUPTHER

## 2022-03-22 RX ORDER — NITROFURANTOIN 25; 75 MG/1; MG/1
100 CAPSULE ORAL 2 TIMES DAILY
Qty: 10 CAPSULE | Refills: 0 | Status: SHIPPED
Start: 2022-03-22 | End: 2022-04-07

## 2022-03-22 ASSESSMENT — FIBROSIS 4 INDEX: FIB4 SCORE: 1.83

## 2022-03-22 NOTE — ASSESSMENT & PLAN NOTE
Chronic and ongoing problem.  She also me my chart message with the details of her topical metronidazole gel prescription started by dermatology so we can continue it.

## 2022-03-22 NOTE — PROGRESS NOTES
Subjective:   Chief Complaint/History of Present Illness:  Carlee Hunt is a 72 y.o. female established patient who presents today to discuss medical problems as listed below. Carlee is unaccompanied for today's visit.    Problem   Dysuria    She reports 2 to 3 weeks of intermittent dysuria with suprapubic pressure and frequency.  She has history of tetracycline resistant Enterococcus faecalis UTI.  No overt fevers or chills.  Renal function is stable from last week.  She has not noticed any hematuria.     Rosacea    She reports past history of rosacea, she met with the cosmetic dermatologist and was started on topical MetroGel.  She like me to continue this prescription.     Right > left hip and anterior thigh pain    She reports new onset pain in her bilateral groin.  She is tracing the pattern that is very typical for radiation from primary hip osteoarthritis.  Her left side does been worse than the right but both are quite bothersome at this time.  She thought it would be due to her back and she has a history of low back pain and SI joint dysfunction.  She has worked with PT off and on for years.  She also utilizes tramadol for chronic neck pain following a fusion surgery in the cervical spine in 2013.  She has started using ibuprofen more recently x14 days with improvement in discomfort.  No known traumas but pain in right anterior thigh started after she walked down 7 flights of stairs ~ 6 months ago (late Sept 2021).  Xray imaging shows left > right hip osteoarthritis with bone spurs.     Prior UTI- Enterococcus faecalis; resistant to tetracyclines        Component  3mo ago   Significant Indicator  POSPositive  (POS)      Source  UR     Site  URINE, CLEAN CATCH     Culture Result  -Abnormal       Culture Result  Abnormal     Enterococcus faecalis   10-50,000 cfu/mL     Resulting Agency  [facility]    Susceptibility      Enterococcus faecalis      [facility]AM      Ampicillin  <=2 mcg/mL  Sensitive      Daptomycin  <=1 mcg/mL   Sensitive      Nitrofurantoin  <=32 mcg/mL  Sensitive      Penicillin  2 mcg/mL  Sensitive      Tetracycline  >8 mcg/mL  Resistant      Vancomycin  1 mcg/mL  Sensitive                  Urinalysis in February 2020 was positive for Enterococcus faecalis, resistant to tetracyclines.  Of note, she is on suppressive antibiotics with doxycycline for what sounds to be the past 13 years.  She does follow with ID.  She reports challenges with stool incontinence and had a pelvic floor stimulator placed by Dr. Goddard.       Current Medications:  Current Outpatient Medications Ordered in Epic   Medication Sig Dispense Refill   • metronidazole (METROGEL) 0.75 % gel Apply 1 Application topically 2 times a day.     • nitrofurantoin (MACROBID) 100 MG Cap Take 1 Capsule by mouth 2 times a day. 10 Capsule 0   • celecoxib (CELEBREX) 100 MG Cap Take 1 Capsule by mouth 2 times a day. 60 Capsule 1   • traMADol (ULTRAM) 50 MG Tab Take 1 Tablet by mouth every 8 hours as needed for Moderate Pain or Severe Pain for up to 30 days. 80 Tablet 0   • levothyroxine (SYNTHROID) 100 MCG Tab TAKE ONE TABLET BY MOUTH EVERY MORNING ON AN EMPTY STOMACH 100 Tablet 3   • metoprolol SR (TOPROL XL) 25 MG TABLET SR 24 HR Take 1 tablet by mouth every day. 100 tablet 3   • COMBIGAN 0.2-0.5 % Solution      • Lutein 20 MG Tab Take  by mouth.     • L-Theanine 200 MG Cap Take  by mouth.     • Coenzyme Q10 (COQ10 PO) Take 100 mg by mouth every day. Qunol liquid     • magnesium oxide (MAG-OX) 400 MG Tab Take 400 mg by mouth every evening.     • Calcium 500 MG Chew Tab Take 1 Tab by mouth every evening.     • Biotin 1000 MCG Chew Tab Take 500 mg by mouth every day.     • Milk Thistle 175 MG Cap Take 1 Cap by mouth every day.     • Cyanocobalamin (B-12) 2500 MCG Tab Take 1 Tab by mouth every day.     • Ascorbic Acid (VITAMIN C) 1000 MG Tab Take 1 Tab by mouth every day.     • Tretinoin 0.05 % Gel 1 Application by Apply externally route every bedtime. 1 Tube 3   •  "TURMERIC PO Take 333 mg by mouth every day. Daily (Qunol liquid)     • Probiotic Product (PRO-BIOTIC BLEND) Cap Take 1 Cap by mouth every evening. [place]     • latanoprost (XALATAN) 0.005 % Solution Place 1 Drop in both eyes every evening.     • KRILL OIL PO Take 500 mg by mouth every day.     • Multiple Vitamins-Minerals (CENTRUM PO) Take 1 Tab by mouth every day.       No current [facility]-ordered facility-administered medications on file.          Objective:   Physical Exam:    Vitals: /82 (BP Location: Left arm, Patient Position: Sitting, BP Cuff Size: Adult)   Pulse 70   Temp 36.3 °C (97.3 °F) (Temporal)   Resp 12   Ht 1.702 m (5' 7\")   Wt 75.4 kg (166 lb 4.8 oz)   SpO2 97%    BMI: Body mass index is 26.05 kg/m².  Physical Exam  Constitutional:       General: She is not in acute distress.     Appearance: She is not ill-appearing.   Eyes:      Conjunctiva/sclera: Conjunctivae normal.   Neck:      Comments: Limited ROM of cervical spine  Pulmonary:      Effort: Pulmonary effort is normal. No respiratory distress.   Musculoskeletal:      Right lower leg: No edema.      Left lower leg: No edema.      Comments: Subjective pain in right anterior and medial thigh   Skin:     Findings: No bruising.      Comments: Rosacea on face   Psychiatric:         Mood and Affect: Mood normal.         Behavior: Behavior normal.         Thought Content: Thought content normal.         Judgment: Judgment normal.          Assessment and Plan:   Carlee is a 72 y.o. female with the following:  Problem List Items Addressed This Visit     Right > left hip and anterior thigh pain     Previous problem, currently decompensated.  She reported bilateral hip pain when I first met her in June 2020.  X-ray imaging showed osteoarthritis and she proceeded with anti-inflammatories and physical therapy.  Then in late September 2021 she walked around approximately 7 flights of steps that time she has had significant pain in the right anterior " thigh musculature as well as the medial right thigh and bilateral groin.  She has significant orthopedic history including IT band issues, scoliosis, SI joint arthropathy, and cervical spine disease with prior surgery.  This new pain causes significant discomfort when changing positions to sitting or standing, she now finds it very hard to even play cards for an hour.  It feels a little better when she is up and walking.  It is initially uncomfortable when she is supine to sleep but it does not wake her up at night.  She would like to meet with a new physiatrist to evaluate this issue.  She is open to trialing Celebrex in the meantime to see if this is driven by inflammation.         Relevant Medications    celecoxib (CELEBREX) 100 MG Cap    Prior UTI- Enterococcus faecalis; resistant to tetracyclines     Previous problem, now with point of care urine dipstick consistent with urinary tract infection.  Will cover with nitrofurantoin while awaiting final culture due to history of tetracycline resistant Enterococcus faecalis.         Dysuria     New problem.  Point-of-care urinalysis consistent with urine infection. Reviewed prior resistance patterns, she had enterococcus faecalis resistant to tetracyclines in the past. Will start macrobid 100 mg BID x5 days while awaiting culture. She has remote history of sulfa allergy.         Relevant Medications    nitrofurantoin (MACROBID) 100 MG Cap    Other Relevant Orders    POCT Urinalysis (Completed)    URINALYSIS,CULTURE IF INDICATED    Rosacea     Chronic and ongoing problem.  She also me my chart message with the details of her topical metronidazole gel prescription started by dermatology so we can continue it.                Annual Health Assessment Questions:    1.  Are you currently engaging in any exercise or physical activity? No    2.  How would you describe your mood or emotional well-being today? good    3.  Have you had any falls in the last year? No    4.  Have  you noticed any problems with your balance or had difficulty walking? Yes back and leg pain   Neck issues     5.  In the last six months have you experienced any leakage of urine? No    6. DPA/Advanced Directive: Patient has Advanced Directive, but it is not on file. Instructed to bring in a copy to scan into their chart.         RTC: Return in about 5 weeks (around 4/26/2022).    I spent a total of 35 minutes with record review, exam, communication with the patient, communication with other providers, and documentation of this encounter.    PLEASE NOTE: This dictation was created using voice recognition software. I have made every reasonable attempt to correct obvious errors, but I expect that there are errors of grammar and possibly content that I did not discover before finalizing the note.      Shari Dent, DO  Geriatric and Internal Medicine  [facility]

## 2022-03-22 NOTE — ASSESSMENT & PLAN NOTE
Previous problem, currently decompensated.  She reported bilateral hip pain when I first met her in June 2020.  X-ray imaging showed osteoarthritis and she proceeded with anti-inflammatories and physical therapy.  Then in late September 2021 she walked around approximately 7 flights of steps that time she has had significant pain in the right anterior thigh musculature as well as the medial right thigh and bilateral groin.  She has significant orthopedic history including IT band issues, scoliosis, SI joint arthropathy, and cervical spine disease with prior surgery.  This new pain causes significant discomfort when changing positions to sitting or standing, she now finds it very hard to even play cards for an hour.  It feels a little better when she is up and walking.  It is initially uncomfortable when she is supine to sleep but it does not wake her up at night.  She would like to meet with a new physiatrist to evaluate this issue.  She is open to trialing Celebrex in the meantime to see if this is driven by inflammation.

## 2022-03-22 NOTE — ASSESSMENT & PLAN NOTE
Previous problem, now with point of care urine dipstick consistent with urinary tract infection.  Will cover with nitrofurantoin while awaiting final culture due to history of tetracycline resistant Enterococcus faecalis.

## 2022-03-22 NOTE — ASSESSMENT & PLAN NOTE
New problem.  Point-of-care urinalysis consistent with urine infection. Reviewed prior resistance patterns, she had enterococcus faecalis resistant to tetracyclines in the past. Will start macrobid 100 mg BID x5 days while awaiting culture. She has remote history of sulfa allergy.

## 2022-03-24 ENCOUNTER — TELEPHONE (OUTPATIENT)
Dept: MEDICAL GROUP | Facility: PHYSICIAN GROUP | Age: 72
End: 2022-03-24
Payer: MEDICARE

## 2022-03-24 ENCOUNTER — HOSPITAL ENCOUNTER (OUTPATIENT)
Facility: MEDICAL CENTER | Age: 72
End: 2022-03-24
Attending: INTERNAL MEDICINE
Payer: MEDICARE

## 2022-03-24 DIAGNOSIS — R30.0 DYSURIA: ICD-10-CM

## 2022-03-24 LAB
APPEARANCE UR: CLEAR
BILIRUB UR QL STRIP.AUTO: NEGATIVE
COLOR UR: YELLOW
GLUCOSE UR STRIP.AUTO-MCNC: NEGATIVE MG/DL
KETONES UR STRIP.AUTO-MCNC: NEGATIVE MG/DL
LEUKOCYTE ESTERASE UR QL STRIP.AUTO: NEGATIVE
MICRO URNS: NORMAL
NITRITE UR QL STRIP.AUTO: NEGATIVE
PH UR STRIP.AUTO: 7 [PH] (ref 5–8)
PROT UR QL STRIP: NEGATIVE MG/DL
RBC UR QL AUTO: NEGATIVE
SP GR UR STRIP.AUTO: <=1.005

## 2022-03-24 PROCEDURE — 81003 URINALYSIS AUTO W/O SCOPE: CPT

## 2022-03-24 NOTE — TELEPHONE ENCOUNTER
I think the only thing sent would have been her urinalysis, could they not run it? She has an infection on dipstick and history of resistance so would be helpful to have accurate information

## 2022-03-24 NOTE — TELEPHONE ENCOUNTER
Called lab. They said whoever collected it on Tuesday did not include the urine transfer culture tube, just the specimen cup. I will call patient and reschedule if you will place a new order.  Thanks!

## 2022-03-24 NOTE — TELEPHONE ENCOUNTER
1. Caller Name: [facility]  [facility]                        Call Back Number: [phone] opt [phone]      How would the patient prefer to be contacted with a response: Phone call OK to leave a detailed message    Silvana called for recollect on labs from 3/22/2022

## 2022-03-25 NOTE — TELEPHONE ENCOUNTER
1. Caller Name: Carlee Hunt                          Call Back Number: [phone] (home)         How would the patient prefer to be contacted with a response: Phone call OK to leave a detailed message    Spoke to patient. Gave her results.  ASked her to finish antibiotics per RX.  Let us know if symptoms worsen.  Still reports dysuria

## 2022-03-25 NOTE — TELEPHONE ENCOUNTER
----- Message from Shari Dent D.O. sent at 3/24/2022  5:38 PM PDT -----  Repeat UA clear, finish antibiotics, let me know if symptoms come back and we can recheck, likely will not get back an organism

## 2022-04-07 ENCOUNTER — OFFICE VISIT (OUTPATIENT)
Dept: PHYSICAL MEDICINE AND REHAB | Facility: MEDICAL CENTER | Age: 72
End: 2022-04-07
Payer: MEDICARE

## 2022-04-07 VITALS
HEIGHT: 67 IN | WEIGHT: 169.09 LBS | TEMPERATURE: 97.7 F | SYSTOLIC BLOOD PRESSURE: 132 MMHG | HEART RATE: 66 BPM | BODY MASS INDEX: 26.54 KG/M2 | OXYGEN SATURATION: 96 % | DIASTOLIC BLOOD PRESSURE: 80 MMHG

## 2022-04-07 DIAGNOSIS — M79.604 RIGHT LEG PAIN: ICD-10-CM

## 2022-04-07 DIAGNOSIS — R32 BOWEL AND BLADDER INCONTINENCE: ICD-10-CM

## 2022-04-07 DIAGNOSIS — M47.816 LUMBAR SPONDYLOSIS: ICD-10-CM

## 2022-04-07 DIAGNOSIS — R15.9 BOWEL AND BLADDER INCONTINENCE: ICD-10-CM

## 2022-04-07 DIAGNOSIS — M16.10 ARTHRITIS OF HIP: ICD-10-CM

## 2022-04-07 DIAGNOSIS — M51.36 DDD (DEGENERATIVE DISC DISEASE), LUMBAR: ICD-10-CM

## 2022-04-07 PROCEDURE — 99215 OFFICE O/P EST HI 40 MIN: CPT | Performed by: PHYSICAL MEDICINE & REHABILITATION

## 2022-04-07 ASSESSMENT — FIBROSIS 4 INDEX: FIB4 SCORE: 1.83

## 2022-04-07 ASSESSMENT — PATIENT HEALTH QUESTIONNAIRE - PHQ9: CLINICAL INTERPRETATION OF PHQ2 SCORE: 0

## 2022-04-07 ASSESSMENT — PAIN SCALES - GENERAL: PAINLEVEL: 3=SLIGHT PAIN

## 2022-04-07 NOTE — PROGRESS NOTES
New patient note (the patient is new to me but previously seen by Dr. Carbone in our group.  This will be billed as a follow-up visit)    Interventional spine and Pain  Physiatry (physical medicine and  Rehabilitation)     Date of service: See epic    Chief complaint:   Chief Complaint   Patient presents with   • Follow-Up     Hip pain        Referring provider: Shari Dent D.O.     HISTORY    HPI: Carlee Hunt 72 y.o.  who presents today with Diagnoses of Arthritis of hip, DDD (degenerative disc disease), lumbar, Right leg pain, and Bowel and bladder incontinence, with stimulator were pertinent to this visit.    HPI    She has bilateral low back pain however the back pain is typically mild.  The majority the patient's pain is in the right anterior leg mostly around the anterior hip which is intermittent worse with standing, worse with rising from a chair causing functional deficits moderate in intensity aching in quality.  She does get some radiating pain down the anterior thigh in the lateral leg however there is no numbness or tingling.  She denies weakness.  She has chronic bowel and bladder incontinence and has a stimulator for treatment for this.       Medical records review:  I reviewed the note from the PCP Shari Dent D.O. 3/24/2022, 3/22/2022..           ROS:   Red Flags ROS:   Fever, Chills, Sweats: Denies  Involuntary Weight Loss: Denies  Saddle Anesthesia: Denies    All other systems reviewed and negative.       PMHx:   Past Medical History:   Diagnosis Date   • Cataract     bilateral surgery complete   • Disorder of arteries and arterioles (HCC) 8/24/2021   • Elevated fasting glucose 1/17/2019   • Glaucoma    • Hypertension    • Pain     chronic neck and shoulders   • Thyroid disease          Current Outpatient Medications on File Prior to Visit   Medication Sig Dispense Refill   • metronidazole (METROGEL) 0.75 % gel Apply 1 Application topically 2 times a day.     • celecoxib (CELEBREX)  100 MG Cap Take 1 Capsule by mouth 2 times a day. 60 Capsule 1   • levothyroxine (SYNTHROID) 100 MCG Tab TAKE ONE TABLET BY MOUTH EVERY MORNING ON AN EMPTY STOMACH 100 Tablet 3   • metoprolol SR (TOPROL XL) 25 MG TABLET SR 24 HR Take 1 tablet by mouth every day. 100 tablet 3   • COMBIGAN 0.2-0.5 % Solution      • Lutein 20 MG Tab Take  by mouth.     • L-Theanine 200 MG Cap Take  by mouth.     • Coenzyme Q10 (COQ10 PO) Take 100 mg by mouth every day. Qunol liquid     • magnesium oxide (MAG-OX) 400 MG Tab Take 400 mg by mouth every evening.     • Calcium 500 MG Chew Tab Take 1 Tab by mouth every evening.     • Biotin 1000 MCG Chew Tab Take 500 mg by mouth every day.     • Milk Thistle 175 MG Cap Take 1 Cap by mouth every day.     • Cyanocobalamin (B-12) 2500 MCG Tab Take 1 Tab by mouth every day.     • Ascorbic Acid (VITAMIN C) 1000 MG Tab Take 1 Tab by mouth every day.     • Tretinoin 0.05 % Gel 1 Application by Apply externally route every bedtime. 1 Tube 3   • TURMERIC PO Take 333 mg by mouth every day. Daily (Qunol liquid)     • Probiotic Product (PRO-BIOTIC BLEND) Cap Take 1 Cap by mouth every evening. Floristan     • latanoprost (XALATAN) 0.005 % Solution Place 1 Drop in both eyes every evening.     • KRILL OIL PO Take 500 mg by mouth every day.     • Multiple Vitamins-Minerals (CENTRUM PO) Take 1 Tab by mouth every day.       No current facility-administered medications on file prior to visit.        PSHx:   Past Surgical History:   Procedure Laterality Date   • BOWEL STIMULATOR INSERTION  1/29/2020    Procedure: INSERTION, ELECTRODE LEADS AND PULSE GENERATOR, NEUROSTIMULATOR, SACRAL- FOR PERMANENT IMPLANTATION;  Surgeon: Ishmael Goddard M.D.;  Location: SURGERY [facility];  Service: General   • BOWEL STIMULATOR INSERTION  1/15/2020    Procedure: INSERTION, ELECTRODE LEADS AND PULSE GENERATOR, NEUROSTIMULATOR, SACRAL-NEUROMODULATION TRIAL;  Surgeon: Ishmael Goddard M.D.;  Location: SURGERY [facility];   "Service: General   • OTHER  2007    throat surgery after cervical surgery, adjusted Mercedes Messi   • CERVICAL FUSION POSTERIOR      Basilar \"invagination\"   • EYE SURGERY      cataracts - Dr. Martinez       Family history   Family History   Problem Relation Age of Onset   • Hypertension Mother    • Other Father         accident   • Other Sister         pneumoccocal pneumonia   • Other Brother         glaucoma   • Heart Attack Paternal Grandfather    • Cancer Neg Hx    • Diabetes Neg Hx    • Heart Disease Neg Hx          Medications: reviewed on epic.   Outpatient Medications Marked as Taking for the 4/7/22 encounter (Office Visit) with Rolf Hernandez M.D.   Medication Sig Dispense Refill   • metronidazole (METROGEL) 0.75 % gel Apply 1 Application topically 2 times a day.     • celecoxib (CELEBREX) 100 MG Cap Take 1 Capsule by mouth 2 times a day. 60 Capsule 1   • levothyroxine (SYNTHROID) 100 MCG Tab TAKE ONE TABLET BY MOUTH EVERY MORNING ON AN EMPTY STOMACH 100 Tablet 3   • metoprolol SR (TOPROL XL) 25 MG TABLET SR 24 HR Take 1 tablet by mouth every day. 100 tablet 3   • COMBIGAN 0.2-0.5 % Solution      • Lutein 20 MG Tab Take  by mouth.     • L-Theanine 200 MG Cap Take  by mouth.     • Coenzyme Q10 (COQ10 PO) Take 100 mg by mouth every day. Qunol liquid     • magnesium oxide (MAG-OX) 400 MG Tab Take 400 mg by mouth every evening.     • Calcium 500 MG Chew Tab Take 1 Tab by mouth every evening.     • Biotin 1000 MCG Chew Tab Take 500 mg by mouth every day.     • Milk Thistle 175 MG Cap Take 1 Cap by mouth every day.     • Cyanocobalamin (B-12) 2500 MCG Tab Take 1 Tab by mouth every day.     • Ascorbic Acid (VITAMIN C) 1000 MG Tab Take 1 Tab by mouth every day.     • Tretinoin 0.05 % Gel 1 Application by Apply externally route every bedtime. 1 Tube 3   • TURMERIC PO Take 333 mg by mouth every day. Daily (Qunol liquid)     • Probiotic Product (PRO-BIOTIC BLEND) Cap Take 1 Cap by mouth every evening. Leilani     • " latanoprost (XALATAN) 0.005 % Solution Place 1 Drop in both eyes every evening.     • KRILL OIL PO Take 500 mg by mouth every day.     • Multiple Vitamins-Minerals (CENTRUM PO) Take 1 Tab by mouth every day.          Allergies:   Allergies   Allergen Reactions   • Rifampin Rash     Rash, fever   • Vancomycin Rash     Rash, fever   • Sulfa Drugs Rash     Per pt         Social Hx:   Social History     Socioeconomic History   • Marital status:      Spouse name: Not on file   • Number of children: Not on file   • Years of education: Not on file   • Highest education level: Not on file   Occupational History   • Not on file   Tobacco Use   • Smoking status: Former Smoker     Packs/day: 1.00     Years: 16.00     Pack years: 16.00     Types: Cigarettes     Quit date: 1982     Years since quittin.1   • Smokeless tobacco: Never Used   • Tobacco comment: continued abstinence   Vaping Use   • Vaping Use: Never used   Substance and Sexual Activity   • Alcohol use: Yes     Alcohol/week: 4.2 oz     Types: 7 Glasses of wine per week     Comment: one daily   • Drug use: No     Comment: CBD cream last used 2019   • Sexual activity: Not Currently     Birth control/protection: Post-Menopausal   Other Topics Concern   •  Service No   • Blood Transfusions No   • Caffeine Concern No   • Occupational Exposure No   • Hobby Hazards No   • Sleep Concern No   • Stress Concern Yes   • Weight Concern No   • Special Diet No   • Back Care Yes   • Exercise No   • Bike Helmet No   • Seat Belt Yes   • Self-Exams No   Social History Narrative    In [place] since ; previously lived in [place], [place], and [place]. Carlee was born and raised in [facility] [place] She has been in [place] since . Previous to that she has lived in [place], [place], and [place] all for her ex-'s job. She worked as a  and retired at age 65. She is currently single. She has 3 cats and a dog. She has a son who is currently incarcerated in [place] after a DUI death  "which has been a big stressor on her.      Social Determinants of Health     Financial Resource Strain: Not on file   Food Insecurity: Not on file   Transportation Needs: Not on file   Physical Activity: Not on file   Stress: Not on file   Social Connections: Not on file   Intimate Partner Violence: Not on file   Housing Stability: Not on file         EXAMINATION     Physical Exam:   Vitals: /80 (BP Location: Right arm, Patient Position: Sitting, BP Cuff Size: Adult)   Pulse 66   Temp 36.5 °C (97.7 °F) (Temporal)   Ht 1.702 m (5' 7\")   Wt 76.7 kg (169 lb 1.5 oz)   SpO2 96%     Constitutional:   Body Habitus: Body mass index is 26.48 kg/m².  Cooperation: Fully cooperates with exam  Appearance: Well-groomed, well-nourished, not disheveled     Eyes: No scleral icterus to suggest severe liver disease, no proptosis to suggest severe hyperthyroid    ENT -no obvious auditory deficits, no obvious tongue lesions, tongue midline, no facial droop     Skin -no rashes or lesions noted     Respiratory-  breathing comfortable on room air, no audible wheezing    Cardiovascular- capillary refills less than 2 seconds.     Psychiatric- alert and oriented ×3. Normal affect.       Musculoskeletal and Neuro -     Thoracic/Lumbar Spine/Sacral Spine/Hips   Inspection: No evidence of atrophy in bilateral lower extremities throughout     ROM: full  active range of motion with flexion, lateral flexion, and rotation bilaterally.   There is decreased active range of motion with lumbar extension with mild pain.    There is mild pain with facet loading maneuver (extension rotation) with axial low back pain on the BILATERAL side(s)    Palpation:   No tenderness to palpation in midline at T1-T12 levels. No tenderness to palpation in the left and right of the midline T1-L5, NEGATIVE for tenderness to palpation to the para-midline region in the lower lumbar levels.  palpation over SI joint: negative bilaterally    palpation in hip or over " the gluteus medius tendon insertion: negative bilaterally      Lumbar spine Special tests  Neuro tension  Straight leg test negative bilaterally    Slump test negative bilaterally      HIP  FAIR test positive right, negative left    Range of motion in the RIGHT hip is decreased in flexion, extension, abduction, internal rotation, external rotation.  Range of motion in the LEFT hip is full  in flexion, extension, abduction, internal rotation, external rotation.      SI joint tests  Observation patient sits on one buttocks: Negative  SI joint compression negative bilaterally    SI joint distraction negative bilaterally    Thigh thrust test negative bilaterally    RAJENDRA test negative bilaterally                 Key points for the international standards for neurological classification of spinal cord injury (ISNCSCI) to light touch.     Dermatome R L                                      L2 2 2   L3 2 2   L4 2 2   L5 2 2   S1 2 2   S2 2 2       Motor Exam Lower Extremities    ? Myotome R L   Hip flexion L2 5 5   Knee extension L3 5 5   Ankle dorsiflexion L4 5 5   Toe extension L5 5 5   Ankle plantarflexion S1 5 5         MEDICAL DECISION MAKING    Medical records review: see under HPI section.     DATA    Labs:   Lab Results   Component Value Date/Time    SODIUM 140 03/18/2022 04:19 PM    POTASSIUM 4.3 03/18/2022 04:19 PM    CHLORIDE 103 03/18/2022 04:19 PM    CO2 26 03/18/2022 04:19 PM    ANION 11.0 03/18/2022 04:19 PM    GLUCOSE 88 03/18/2022 04:19 PM    BUN 17 03/18/2022 04:19 PM    CREATININE 0.65 03/18/2022 04:19 PM    CALCIUM 9.5 03/18/2022 04:19 PM    ASTSGOT 22 03/18/2022 04:19 PM    ALTSGPT 18 03/18/2022 04:19 PM    TBILIRUBIN 0.2 03/18/2022 04:19 PM    ALBUMIN 4.4 03/18/2022 04:19 PM    TOTPROTEIN 6.8 03/18/2022 04:19 PM    GLOBULIN 2.4 03/18/2022 04:19 PM    AGRATIO 1.8 03/18/2022 04:19 PM   ]    No results found for: PROTHROMBTM, INR     Lab Results   Component Value Date/Time    WBC 8.1 03/18/2022 04:19 PM     RBC 4.38 03/18/2022 04:19 PM    HEMOGLOBIN 13.6 03/18/2022 04:19 PM    HEMATOCRIT 41.7 03/18/2022 04:19 PM    MCV 95.2 03/18/2022 04:19 PM    MCH 31.1 03/18/2022 04:19 PM    MCHC 32.6 (L) 03/18/2022 04:19 PM    MPV 10.2 03/18/2022 04:19 PM    NEUTSPOLYS 49.50 03/18/2022 04:19 PM    LYMPHOCYTES 42.40 (H) 03/18/2022 04:19 PM    MONOCYTES 5.70 03/18/2022 04:19 PM    EOSINOPHILS 1.70 03/18/2022 04:19 PM    BASOPHILS 0.60 03/18/2022 04:19 PM        Lab Results   Component Value Date/Time    HBA1C 5.6 02/20/2019 08:20 AM        Imaging:   I personally reviewed following images, these are my reads  X-ray hip 7/21/2020  Degenerative change of the bilateral hips.      IMAGING radiology reads. I reviewed the following radiology reads     X-ray hip 7/21/2020  FINDINGS:  There is an implanted electronic device within the left buttock with wires extending into the sacrum. There is mild degenerative change of the left greater than right hip joint characterized by joint space loss and osteophytic spurring. There is pelvic   calcification.     IMPRESSION:     Degenerative change of the hips bilaterally, left greater than right.                   Results for orders placed during the hospital encounter of 08/29/21    MR-CERVICAL SPINE-W/O    Impression  1.  Previous extensive cervical laminectomy from C1 through C6.    2.  Previous pedicle screw and mary fixation from the occiput to C5 bilaterally.    3.  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    4.  Nonvisualization of the dens which may have been previously resected or congenitally absent.                                                  Results for orders placed in visit on 09/29/21    DX-CERVICAL SPINE-4+ VIEWS                     Results for orders placed during the hospital encounter of 02/25/16    DX-KNEE 3 VIEWS LEFT    Impression  1.  Mild medial femorotibial component osteoarthritis    2.  Probable 5 mm intra-articular ossific body     Results for orders placed during  the hospital encounter of 07/21/20    DX-LUMBAR SPINE-2 OR 3 VIEWS    Impression  1.  Multilevel degenerative disc disease and facet degeneration.    2.  Scoliotic deformity.    3.  Implanted electronic device overlying the left sacrum.                         Diagnosis   Visit Diagnoses     ICD-10-CM   1. Arthritis of hip  M16.10   2. DDD (degenerative disc disease), lumbar  M51.36   3. Right leg pain  M79.604   4. Bowel and bladder incontinence, with stimulator  R32    R15.9           ASSESSMENT AND PLAN:  Carlee Hunt 72 y.o. female      Carlee was seen today for follow-up.    Diagnoses and all orders for this visit:    Arthritis of hip  -     Referral to Physical Medicine Rehab    DDD (degenerative disc disease), lumbar    Right leg pain    Bowel and bladder incontinence, with stimulator        I believe the majority the patient's symptoms are coming from right hip arthritis which is consistent with her exam and imaging.  She likely has some pain coming from lumbar spondylosis however this is not the majority of her pain.  Lumbar radiculopathy is on the differential but less likely.  The patient has done physical therapy, home exercise program, medication management with NSAIDs, Tylenol, tramadol.  It is reasonable for the patient's PCP to continue tramadol as long as there is compliance with .    I have ordered a right hip injection for diagnostic and therapeutic purposes with fluoroscopic guidance for accuracy.    The risks benefits and alternatives to this procedure were discussed and the patient wishes to proceed with the procedure. Risks include but are not limited to damage to surrounding structures, infection, bleeding, worsening of pain which can be permanent, weakness which can be permanent. Benefits include pain relief, improved function. Alternatives includes not doing the procedure.        Follow-up: After the above diagnostic studies     Total time spent on the day of the encounter: 40 minutes.   This includes counseling, care coordination, medical records review.          Please note that this dictation was created using voice recognition software. I have made every reasonable attempt to correct obvious errors but there may be errors of grammar and content that I may have overlooked prior to finalization of this note.      Rolf Hernandez MD  Physical Medicine and Rehabilitation  Interventional Spine and Sports Physiatry  [facility] Shari Dent D.O. .

## 2022-04-07 NOTE — Clinical Note
Miranda,   I think the majority of her pain is coming from her hip arthritis.  I will perform a right hip injection for diagnostic and therapeutic purposes.  I will continue to follow-up with Ms. Hunt.  Rolf

## 2022-04-07 NOTE — PATIENT INSTRUCTIONS
Your procedure will be at the [facility] special procedure suite.    [facility] [place] [address]       PRE-PROCEDURE INSTRUCTIONS  You may take your regular medications except:   · No Anti-inflammatories 5 days prior to your procedure. Anti-inflammatories include medicines such as  ibuprofen (Motrin, Advil), Excedrin, Naproxen (Aleve, Anaprox, Naprelan, Naprosyn), Celecoxib (Celebrex), Diclofenac (Voltaren-XR tab), and Meloxicam (Mobic).   · You can take the remainder of your pain medications as prescribed.   · If you are having a diagnostic procedure such as a medial branch block, do not use her pain meds on the day of the procedure  · No Glucophage or Metformin 24 hours before your procedure. You may resume next day after your procedure.  · Call the physiatry office if you are taking or prescribed anti-biotics within five days of procedure.  · Please ask provider if you are taking any new diabetes medication.  · CONTINUE TAKING BLOOD PRESSURE MEDICATIONS AS PRESCRIBED.  · Pain medications will not be prescribed on the procedure day. Procedural pain medication may be used by your provider   · Call your doctor's office performing the procedure if you have a fever, chills, rash or new illness prior to your procedure    Anticoagulation/antiplatelet medications  No Blood thinning medications such as Coumadin, Xarelto, aspirin or Plavix 5 days prior to procedure unless your doctor said to continue these medications. Call your doctor if a new medication is prescribed in this class.     Restrictions for eating before procedure:   · If you are getting procedural sedation, then do not eat to for 8 hours prior to procedure appointment time. Do not drink fluids for four hours prior to your procedure time.   · If you are not having procedural sedation, then Skip the meal prior to your procedure. If you have a morning procedure then skip breakfast. If you have an afternoon procedure then skip lunch.   · You  may drink clear liquids up to 2 hours prior to your procedure  · You must have a  the day of procedure to accompany you home.      POST PROCEDURE INSTRUCTIONS   · No heavy lifting, strenuous bending or strenuous exercise for 3 days after your procedure.  · No hot tubs, baths, swimming for 3 days after your procedure  · You can remove the bandage the day after the procedure.  · IF YOU RECEIVED A STEROID INJECTION. PLEASE NOTE THAT THERE MAY BE A DELAY FOR THE INJECTION TO START WORKING, THE DELAY MAY BE UP TO TWO WEEKS. IF YOU HAVE DIABETES, PLEASE NOTE THAT YOUR SUGAR LEVELS MAY BE ELEVATED FOR 1-2 DAYS AFTER A STEROID INJECTION.  THE STEROID MAY CAUSE TEMPORARY SYMPTOMS WHICH USUALLY RESOLVE ON THEIR OWN WITHIN 1 TO 2 DAYS INCLUDING FACIAL FLUSHING OR A FEELING OF WARMTH ON THE FACE, TEMPORARY INCREASES IN BLOOD SUGAR, INSOMNIA, INCREASED HUNGER  · IF YOU EXPERIENCE PROLONGED WEAKNESS LONGER THAN ONE DAY, BOWEL OR BLADDER INCONTINENCE THEN PLEASE CALL THE PHYSIATRY OFFICE.  · Your leg may feel heavy, weak and numb for up to 1-2 days. Be very careful walking.   ·  You may resume normal activities 3 days after procedure.

## 2022-04-07 NOTE — H&P (VIEW-ONLY)
New patient note (the patient is new to me but previously seen by Dr. Carbone in our group.  This will be billed as a follow-up visit)    Interventional spine and Pain  Physiatry (physical medicine and  Rehabilitation)     Date of service: See epic    Chief complaint:   Chief Complaint   Patient presents with   • Follow-Up     Hip pain        Referring provider: Shari Dent D.O.     HISTORY    HPI: Carlee Hunt 72 y.o.  who presents today with Diagnoses of Arthritis of hip, DDD (degenerative disc disease), lumbar, Right leg pain, and Bowel and bladder incontinence, with stimulator were pertinent to this visit.    HPI    She has bilateral low back pain however the back pain is typically mild.  The majority the patient's pain is in the right anterior leg mostly around the anterior hip which is intermittent worse with standing, worse with rising from a chair causing functional deficits moderate in intensity aching in quality.  She does get some radiating pain down the anterior thigh in the lateral leg however there is no numbness or tingling.  She denies weakness.  She has chronic bowel and bladder incontinence and has a stimulator for treatment for this.       Medical records review:  I reviewed the note from the PCP Shari Dent D.O. 3/24/2022, 3/22/2022..           ROS:   Red Flags ROS:   Fever, Chills, Sweats: Denies  Involuntary Weight Loss: Denies  Saddle Anesthesia: Denies    All other systems reviewed and negative.       PMHx:   Past Medical History:   Diagnosis Date   • Cataract     bilateral surgery complete   • Disorder of arteries and arterioles (HCC) 8/24/2021   • Elevated fasting glucose 1/17/2019   • Glaucoma    • Hypertension    • Pain     chronic neck and shoulders   • Thyroid disease          Current Outpatient Medications on File Prior to Visit   Medication Sig Dispense Refill   • metronidazole (METROGEL) 0.75 % gel Apply 1 Application topically 2 times a day.     • celecoxib (CELEBREX)  100 MG Cap Take 1 Capsule by mouth 2 times a day. 60 Capsule 1   • levothyroxine (SYNTHROID) 100 MCG Tab TAKE ONE TABLET BY MOUTH EVERY MORNING ON AN EMPTY STOMACH 100 Tablet 3   • metoprolol SR (TOPROL XL) 25 MG TABLET SR 24 HR Take 1 tablet by mouth every day. 100 tablet 3   • COMBIGAN 0.2-0.5 % Solution      • Lutein 20 MG Tab Take  by mouth.     • L-Theanine 200 MG Cap Take  by mouth.     • Coenzyme Q10 (COQ10 PO) Take 100 mg by mouth every day. Qunol liquid     • magnesium oxide (MAG-OX) 400 MG Tab Take 400 mg by mouth every evening.     • Calcium 500 MG Chew Tab Take 1 Tab by mouth every evening.     • Biotin 1000 MCG Chew Tab Take 500 mg by mouth every day.     • Milk Thistle 175 MG Cap Take 1 Cap by mouth every day.     • Cyanocobalamin (B-12) 2500 MCG Tab Take 1 Tab by mouth every day.     • Ascorbic Acid (VITAMIN C) 1000 MG Tab Take 1 Tab by mouth every day.     • Tretinoin 0.05 % Gel 1 Application by Apply externally route every bedtime. 1 Tube 3   • TURMERIC PO Take 333 mg by mouth every day. Daily (Qunol liquid)     • Probiotic Product (PRO-BIOTIC BLEND) Cap Take 1 Cap by mouth every evening. Floristan     • latanoprost (XALATAN) 0.005 % Solution Place 1 Drop in both eyes every evening.     • KRILL OIL PO Take 500 mg by mouth every day.     • Multiple Vitamins-Minerals (CENTRUM PO) Take 1 Tab by mouth every day.       No current facility-administered medications on file prior to visit.        PSHx:   Past Surgical History:   Procedure Laterality Date   • BOWEL STIMULATOR INSERTION  1/29/2020    Procedure: INSERTION, ELECTRODE LEADS AND PULSE GENERATOR, NEUROSTIMULATOR, SACRAL- FOR PERMANENT IMPLANTATION;  Surgeon: Ishmael Goddard M.D.;  Location: SURGERY [facility];  Service: General   • BOWEL STIMULATOR INSERTION  1/15/2020    Procedure: INSERTION, ELECTRODE LEADS AND PULSE GENERATOR, NEUROSTIMULATOR, SACRAL-NEUROMODULATION TRIAL;  Surgeon: Ishmael Goddard M.D.;  Location: SURGERY [facility];   "Service: General   • OTHER  2007    throat surgery after cervical surgery, adjusted Mercedes Messi   • CERVICAL FUSION POSTERIOR      Basilar \"invagination\"   • EYE SURGERY      cataracts - Dr. Martinez       Family history   Family History   Problem Relation Age of Onset   • Hypertension Mother    • Other Father         accident   • Other Sister         pneumoccocal pneumonia   • Other Brother         glaucoma   • Heart Attack Paternal Grandfather    • Cancer Neg Hx    • Diabetes Neg Hx    • Heart Disease Neg Hx          Medications: reviewed on epic.   Outpatient Medications Marked as Taking for the 4/7/22 encounter (Office Visit) with Rolf Hernandez M.D.   Medication Sig Dispense Refill   • metronidazole (METROGEL) 0.75 % gel Apply 1 Application topically 2 times a day.     • celecoxib (CELEBREX) 100 MG Cap Take 1 Capsule by mouth 2 times a day. 60 Capsule 1   • levothyroxine (SYNTHROID) 100 MCG Tab TAKE ONE TABLET BY MOUTH EVERY MORNING ON AN EMPTY STOMACH 100 Tablet 3   • metoprolol SR (TOPROL XL) 25 MG TABLET SR 24 HR Take 1 tablet by mouth every day. 100 tablet 3   • COMBIGAN 0.2-0.5 % Solution      • Lutein 20 MG Tab Take  by mouth.     • L-Theanine 200 MG Cap Take  by mouth.     • Coenzyme Q10 (COQ10 PO) Take 100 mg by mouth every day. Qunol liquid     • magnesium oxide (MAG-OX) 400 MG Tab Take 400 mg by mouth every evening.     • Calcium 500 MG Chew Tab Take 1 Tab by mouth every evening.     • Biotin 1000 MCG Chew Tab Take 500 mg by mouth every day.     • Milk Thistle 175 MG Cap Take 1 Cap by mouth every day.     • Cyanocobalamin (B-12) 2500 MCG Tab Take 1 Tab by mouth every day.     • Ascorbic Acid (VITAMIN C) 1000 MG Tab Take 1 Tab by mouth every day.     • Tretinoin 0.05 % Gel 1 Application by Apply externally route every bedtime. 1 Tube 3   • TURMERIC PO Take 333 mg by mouth every day. Daily (Qunol liquid)     • Probiotic Product (PRO-BIOTIC BLEND) Cap Take 1 Cap by mouth every evening. Leilani     • " latanoprost (XALATAN) 0.005 % Solution Place 1 Drop in both eyes every evening.     • KRILL OIL PO Take 500 mg by mouth every day.     • Multiple Vitamins-Minerals (CENTRUM PO) Take 1 Tab by mouth every day.          Allergies:   Allergies   Allergen Reactions   • Rifampin Rash     Rash, fever   • Vancomycin Rash     Rash, fever   • Sulfa Drugs Rash     Per pt         Social Hx:   Social History     Socioeconomic History   • Marital status:      Spouse name: Not on file   • Number of children: Not on file   • Years of education: Not on file   • Highest education level: Not on file   Occupational History   • Not on file   Tobacco Use   • Smoking status: Former Smoker     Packs/day: 1.00     Years: 16.00     Pack years: 16.00     Types: Cigarettes     Quit date: 1982     Years since quittin.1   • Smokeless tobacco: Never Used   • Tobacco comment: continued abstinence   Vaping Use   • Vaping Use: Never used   Substance and Sexual Activity   • Alcohol use: Yes     Alcohol/week: 4.2 oz     Types: 7 Glasses of wine per week     Comment: one daily   • Drug use: No     Comment: CBD cream last used 2019   • Sexual activity: Not Currently     Birth control/protection: Post-Menopausal   Other Topics Concern   •  Service No   • Blood Transfusions No   • Caffeine Concern No   • Occupational Exposure No   • Hobby Hazards No   • Sleep Concern No   • Stress Concern Yes   • Weight Concern No   • Special Diet No   • Back Care Yes   • Exercise No   • Bike Helmet No   • Seat Belt Yes   • Self-Exams No   Social History Narrative    In [place] since ; previously lived in [place], [place], and [place]. Carlee was born and raised in [place] [facility] [place]. She has been in [place] since . Previous to that she has lived in [place], [place], and [place] all for her ex-'s job. She worked as a  and retired at age 65. She is currently single. She has 3 cats and a dog. She has a son who is currently incarcerated in [place] after a DUI death  "which has been a big stressor on her.      Social Determinants of Health     Financial Resource Strain: Not on file   Food Insecurity: Not on file   Transportation Needs: Not on file   Physical Activity: Not on file   Stress: Not on file   Social Connections: Not on file   Intimate Partner Violence: Not on file   Housing Stability: Not on file         EXAMINATION     Physical Exam:   Vitals: /80 (BP Location: Right arm, Patient Position: Sitting, BP Cuff Size: Adult)   Pulse 66   Temp 36.5 °C (97.7 °F) (Temporal)   Ht 1.702 m (5' 7\")   Wt 76.7 kg (169 lb 1.5 oz)   SpO2 96%     Constitutional:   Body Habitus: Body mass index is 26.48 kg/m².  Cooperation: Fully cooperates with exam  Appearance: Well-groomed, well-nourished, not disheveled     Eyes: No scleral icterus to suggest severe liver disease, no proptosis to suggest severe hyperthyroid    ENT -no obvious auditory deficits, no obvious tongue lesions, tongue midline, no facial droop     Skin -no rashes or lesions noted     Respiratory-  breathing comfortable on room air, no audible wheezing    Cardiovascular- capillary refills less than 2 seconds.     Psychiatric- alert and oriented ×3. Normal affect.       Musculoskeletal and Neuro -     Thoracic/Lumbar Spine/Sacral Spine/Hips   Inspection: No evidence of atrophy in bilateral lower extremities throughout     ROM: full  active range of motion with flexion, lateral flexion, and rotation bilaterally.   There is decreased active range of motion with lumbar extension with mild pain.    There is mild pain with facet loading maneuver (extension rotation) with axial low back pain on the BILATERAL side(s)    Palpation:   No tenderness to palpation in midline at T1-T12 levels. No tenderness to palpation in the left and right of the midline T1-L5, NEGATIVE for tenderness to palpation to the para-midline region in the lower lumbar levels.  palpation over SI joint: negative bilaterally    palpation in hip or over " the gluteus medius tendon insertion: negative bilaterally      Lumbar spine Special tests  Neuro tension  Straight leg test negative bilaterally    Slump test negative bilaterally      HIP  FAIR test positive right, negative left    Range of motion in the RIGHT hip is decreased in flexion, extension, abduction, internal rotation, external rotation.  Range of motion in the LEFT hip is full  in flexion, extension, abduction, internal rotation, external rotation.      SI joint tests  Observation patient sits on one buttocks: Negative  SI joint compression negative bilaterally    SI joint distraction negative bilaterally    Thigh thrust test negative bilaterally    RAJENDRA test negative bilaterally                 Key points for the international standards for neurological classification of spinal cord injury (ISNCSCI) to light touch.     Dermatome R L                                      L2 2 2   L3 2 2   L4 2 2   L5 2 2   S1 2 2   S2 2 2       Motor Exam Lower Extremities    ? Myotome R L   Hip flexion L2 5 5   Knee extension L3 5 5   Ankle dorsiflexion L4 5 5   Toe extension L5 5 5   Ankle plantarflexion S1 5 5         MEDICAL DECISION MAKING    Medical records review: see under HPI section.     DATA    Labs:   Lab Results   Component Value Date/Time    SODIUM 140 03/18/2022 04:19 PM    POTASSIUM 4.3 03/18/2022 04:19 PM    CHLORIDE 103 03/18/2022 04:19 PM    CO2 26 03/18/2022 04:19 PM    ANION 11.0 03/18/2022 04:19 PM    GLUCOSE 88 03/18/2022 04:19 PM    BUN 17 03/18/2022 04:19 PM    CREATININE 0.65 03/18/2022 04:19 PM    CALCIUM 9.5 03/18/2022 04:19 PM    ASTSGOT 22 03/18/2022 04:19 PM    ALTSGPT 18 03/18/2022 04:19 PM    TBILIRUBIN 0.2 03/18/2022 04:19 PM    ALBUMIN 4.4 03/18/2022 04:19 PM    TOTPROTEIN 6.8 03/18/2022 04:19 PM    GLOBULIN 2.4 03/18/2022 04:19 PM    AGRATIO 1.8 03/18/2022 04:19 PM   ]    No results found for: PROTHROMBTM, INR     Lab Results   Component Value Date/Time    WBC 8.1 03/18/2022 04:19 PM     RBC 4.38 03/18/2022 04:19 PM    HEMOGLOBIN 13.6 03/18/2022 04:19 PM    HEMATOCRIT 41.7 03/18/2022 04:19 PM    MCV 95.2 03/18/2022 04:19 PM    MCH 31.1 03/18/2022 04:19 PM    MCHC 32.6 (L) 03/18/2022 04:19 PM    MPV 10.2 03/18/2022 04:19 PM    NEUTSPOLYS 49.50 03/18/2022 04:19 PM    LYMPHOCYTES 42.40 (H) 03/18/2022 04:19 PM    MONOCYTES 5.70 03/18/2022 04:19 PM    EOSINOPHILS 1.70 03/18/2022 04:19 PM    BASOPHILS 0.60 03/18/2022 04:19 PM        Lab Results   Component Value Date/Time    HBA1C 5.6 02/20/2019 08:20 AM        Imaging:   I personally reviewed following images, these are my reads  X-ray hip 7/21/2020  Degenerative change of the bilateral hips.      IMAGING radiology reads. I reviewed the following radiology reads     X-ray hip 7/21/2020  FINDINGS:  There is an implanted electronic device within the left buttock with wires extending into the sacrum. There is mild degenerative change of the left greater than right hip joint characterized by joint space loss and osteophytic spurring. There is pelvic   calcification.     IMPRESSION:     Degenerative change of the hips bilaterally, left greater than right.                   Results for orders placed during the hospital encounter of 08/29/21    MR-CERVICAL SPINE-W/O    Impression  1.  Previous extensive cervical laminectomy from C1 through C6.    2.  Previous pedicle screw and mary fixation from the occiput to C5 bilaterally.    3.  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    4.  Nonvisualization of the dens which may have been previously resected or congenitally absent.                                                  Results for orders placed in visit on 09/29/21    DX-CERVICAL SPINE-4+ VIEWS                     Results for orders placed during the hospital encounter of 02/25/16    DX-KNEE 3 VIEWS LEFT    Impression  1.  Mild medial femorotibial component osteoarthritis    2.  Probable 5 mm intra-articular ossific body     Results for orders placed during  the hospital encounter of 07/21/20    DX-LUMBAR SPINE-2 OR 3 VIEWS    Impression  1.  Multilevel degenerative disc disease and facet degeneration.    2.  Scoliotic deformity.    3.  Implanted electronic device overlying the left sacrum.                         Diagnosis   Visit Diagnoses     ICD-10-CM   1. Arthritis of hip  M16.10   2. DDD (degenerative disc disease), lumbar  M51.36   3. Right leg pain  M79.604   4. Bowel and bladder incontinence, with stimulator  R32    R15.9           ASSESSMENT AND PLAN:  Carlee Hunt 72 y.o. female      Carlee was seen today for follow-up.    Diagnoses and all orders for this visit:    Arthritis of hip  -     Referral to Physical Medicine Rehab    DDD (degenerative disc disease), lumbar    Right leg pain    Bowel and bladder incontinence, with stimulator        I believe the majority the patient's symptoms are coming from right hip arthritis which is consistent with her exam and imaging.  She likely has some pain coming from lumbar spondylosis however this is not the majority of her pain.  Lumbar radiculopathy is on the differential but less likely.  The patient has done physical therapy, home exercise program, medication management with NSAIDs, Tylenol, tramadol.  It is reasonable for the patient's PCP to continue tramadol as long as there is compliance with .    I have ordered a right hip injection for diagnostic and therapeutic purposes with fluoroscopic guidance for accuracy.    The risks benefits and alternatives to this procedure were discussed and the patient wishes to proceed with the procedure. Risks include but are not limited to damage to surrounding structures, infection, bleeding, worsening of pain which can be permanent, weakness which can be permanent. Benefits include pain relief, improved function. Alternatives includes not doing the procedure.        Follow-up: After the above diagnostic studies     Total time spent on the day of the encounter: 40 minutes.   This includes counseling, care coordination, medical records review.          Please note that this dictation was created using voice recognition software. I have made every reasonable attempt to correct obvious errors but there may be errors of grammar and content that I may have overlooked prior to finalization of this note.      Rolf Hernandez MD  Physical Medicine and Rehabilitation  Interventional Spine and Sports Physiatry  [facility] Shari Dent D.O. .

## 2022-04-13 ENCOUNTER — TELEPHONE (OUTPATIENT)
Dept: PHYSICAL MEDICINE AND REHAB | Facility: MEDICAL CENTER | Age: 72
End: 2022-04-13
Payer: MEDICARE

## 2022-04-19 ENCOUNTER — APPOINTMENT (OUTPATIENT)
Dept: RADIOLOGY | Facility: REHABILITATION | Age: 72
End: 2022-04-19
Attending: PHYSICAL MEDICINE & REHABILITATION
Payer: COMMERCIAL

## 2022-04-19 ENCOUNTER — HOSPITAL ENCOUNTER (OUTPATIENT)
Facility: REHABILITATION | Age: 72
End: 2022-04-19
Attending: PHYSICAL MEDICINE & REHABILITATION | Admitting: PHYSICAL MEDICINE & REHABILITATION
Payer: COMMERCIAL

## 2022-04-19 VITALS
DIASTOLIC BLOOD PRESSURE: 92 MMHG | SYSTOLIC BLOOD PRESSURE: 148 MMHG | BODY MASS INDEX: 25.95 KG/M2 | HEIGHT: 67 IN | TEMPERATURE: 97.1 F | OXYGEN SATURATION: 95 % | HEART RATE: 67 BPM | RESPIRATION RATE: 16 BRPM | WEIGHT: 165.34 LBS

## 2022-04-19 PROCEDURE — 77002 NEEDLE LOCALIZATION BY XRAY: CPT

## 2022-04-19 PROCEDURE — 700117 HCHG RX CONTRAST REV CODE 255

## 2022-04-19 PROCEDURE — 20610 DRAIN/INJ JOINT/BURSA W/O US: CPT

## 2022-04-19 PROCEDURE — 700111 HCHG RX REV CODE 636 W/ 250 OVERRIDE (IP)

## 2022-04-19 RX ORDER — LIDOCAINE HYDROCHLORIDE 10 MG/ML
INJECTION, SOLUTION EPIDURAL; INFILTRATION; INTRACAUDAL; PERINEURAL
Status: COMPLETED
Start: 2022-04-19 | End: 2022-04-19

## 2022-04-19 RX ORDER — DEXAMETHASONE SODIUM PHOSPHATE 10 MG/ML
INJECTION, SOLUTION INTRAMUSCULAR; INTRAVENOUS
Status: COMPLETED
Start: 2022-04-19 | End: 2022-04-19

## 2022-04-19 RX ADMIN — LIDOCAINE HYDROCHLORIDE 10 ML: 10 INJECTION, SOLUTION EPIDURAL; INFILTRATION; INTRACAUDAL; PERINEURAL at 08:21

## 2022-04-19 RX ADMIN — DEXAMETHASONE SODIUM PHOSPHATE 10 MG: 10 INJECTION, SOLUTION INTRAMUSCULAR; INTRAVENOUS at 08:21

## 2022-04-19 RX ADMIN — IOHEXOL 10 ML: 240 INJECTION, SOLUTION INTRATHECAL; INTRAVASCULAR; INTRAVENOUS; ORAL at 08:21

## 2022-04-19 ASSESSMENT — PAIN DESCRIPTION - PAIN TYPE
TYPE: CHRONIC PAIN

## 2022-04-19 ASSESSMENT — FIBROSIS 4 INDEX: FIB4 SCORE: 1.83

## 2022-04-19 NOTE — PROGRESS NOTES
Preprocedure assessment completed.  Pertinent health information(HTN, CAD) communicated to physician and staff prior to time out.  Patient positioned preprocedure by RN, CSTand XRAY.  Foam wedge under ankles for support.

## 2022-04-19 NOTE — PROGRESS NOTES
0740 Pt admitted to Pre Procedure area.  Consent signed and post op instructions reviewed with pt.  Medical hx and allergies reviewed.  Hx HTN, Bowel stimulator in lower back.  Pt turned off on 4/17/22.   Hand off given to procedural RN prior to procedure.     0757  Dr. Hernandez in to see pt.    0866  Pt received to Post Procedure area, report received from RN.  Vital signs taken, pt provided with snack.  Bandage intact, no drainage.  Ice pack offered.    0828 Pt seen by Dr. Hernandez post procedure, orders received for discharge.    0849 Pt ambulated without difficulty, accompanied to car.  Discharged to [employer] .

## 2022-04-19 NOTE — OP REPORT
Date of Service: 4/19/2022     Patient: Carlee Hunt 72 y.o. female     MRN: [id number]     Physician/s: Rolf Hernandez MD    Pre-operative Diagnosis: right  hip osteoarthritis    Post-operative Diagnosis: right hip osteoarthritis    Procedure: right diagnostic and  therapeutic intra-articular Hip injection with fluoroscopic guidance    Description of procedure:    The risks, benefits, and alternatives of the procedure were reviewed and discussed with the patient.  Written informed consent was freely obtained. A pre-procedural time-out was conducted by the physician verifying patient’s identity, procedure to be performed, procedure site and side, and allergy verification. Appropriate equipment was determined to be in place for the procedure.         The femoral artery nerve and vein as well as the inguinal ligament were identified with ultrasound and marked with a marking pen.  The entire procedure took place lateral to the femoral vessels and nerve and inferior to the inguinal ligament.    In the procedure suite the patient was placed in a supine position with and the skin was prepped and draped in the usual sterile fashion. A 27-gauge 1.5 inch needle was used with approximately 2 mL of 1% lidocaine for local anesthesia at the junction of the RIGHT femoral head and neck.  A 25g 3.5 inch needle was placed into skin and advanced under fluoroscopic guidance into the joint space. 4 mL of contrast was used to clearly demonstrate the outlining of the joint capsule. Following negative aspiration, 5mL of 1% lidocaine with 1mL of 10mg/mL dexamethasone was then injected into the joint, and the needle was subsequently removed intact after restyleted. The patient's hip was wiped with a 4x4 gauze, the area was cleansed with alcohol prep, and a bandaid was applied. There were no complications noted.       The patient had 100% pain relief postprocedure.  She was more easily able to rise from a chair and to walk.    Follow-up as  scheduled      Rolf Hernandez MD  Interventional Spine and Pain  Physical Medicine and Rehabilitation  [facility]          CPT  Major joint/bursa:  20610  Fluoroscopic  needle guidance [id number]

## 2022-04-19 NOTE — INTERVAL H&P NOTE
H&P reviewed. The patient was examined and there are no changes to the H&P     Lungs clear to auscultation bilaterally.  No abdominal tenderness.  Heart regular rate and rhythm.  Vitals reviewed.    Proceed with the originally scheduled procedure.  The risks, benefits and alternatives were discussed.  The patient understands these.    Pre-Op Diagnosis Codes:     * Arthritis of hip [M16.10]  Procedure(s):  Diagnostic and therapeutic fluoroscopically guided intra-articular RIGHT hip injection  .    Rolf Hernandez MD  Physical Medicine and Rehabilitation  Interventional Spine and Sports Physiatry  [facility]

## 2022-04-21 ENCOUNTER — TELEPHONE (OUTPATIENT)
Dept: PHYSICAL MEDICINE AND REHAB | Facility: MEDICAL CENTER | Age: 72
End: 2022-04-21
Payer: MEDICARE

## 2022-04-21 NOTE — TELEPHONE ENCOUNTER
"Spoke to patient post procedure, Right hip intra-articular injection. Patient states she is doing well with no pain but she is noticing she is having urinary frequency and noticed the past couple time she urinated today that she has \"weird shooting nerve pains\" in her hands. I advised patient I will follow up with  and touch base with her.  "

## 2022-04-21 NOTE — TELEPHONE ENCOUNTER
Ms Gilbert,     There is nothing that we did with the procedure yesterday as it would have to do with urinary frequency or issues with with the hands.  If symptoms are severe then you can go to urgent care.  Otherwise we can move up her follow-up ointment early next week.    Dr. Hernandez

## 2022-05-02 ENCOUNTER — OFFICE VISIT (OUTPATIENT)
Dept: MEDICAL GROUP | Facility: PHYSICIAN GROUP | Age: 72
End: 2022-05-02
Payer: MEDICARE

## 2022-05-02 VITALS
HEIGHT: 67 IN | HEART RATE: 60 BPM | DIASTOLIC BLOOD PRESSURE: 58 MMHG | OXYGEN SATURATION: 93 % | TEMPERATURE: 97.5 F | BODY MASS INDEX: 26.26 KG/M2 | RESPIRATION RATE: 12 BRPM | WEIGHT: 167.3 LBS | SYSTOLIC BLOOD PRESSURE: 118 MMHG

## 2022-05-02 DIAGNOSIS — G89.29 CHRONIC NECK PAIN: ICD-10-CM

## 2022-05-02 DIAGNOSIS — M54.2 CHRONIC NECK PAIN: ICD-10-CM

## 2022-05-02 DIAGNOSIS — Z00.00 ENCOUNTER FOR SUBSEQUENT ANNUAL WELLNESS VISIT (AWV) IN MEDICARE PATIENT: Primary | ICD-10-CM

## 2022-05-02 DIAGNOSIS — Z79.891 CHRONIC USE OF OPIATE FOR THERAPEUTIC PURPOSE: ICD-10-CM

## 2022-05-02 DIAGNOSIS — G89.29 CHRONIC BILATERAL LOW BACK PAIN WITH LEFT-SIDED SCIATICA: ICD-10-CM

## 2022-05-02 DIAGNOSIS — M25.551 BILATERAL HIP PAIN: ICD-10-CM

## 2022-05-02 DIAGNOSIS — L71.9 ROSACEA: ICD-10-CM

## 2022-05-02 DIAGNOSIS — M54.42 CHRONIC BILATERAL LOW BACK PAIN WITH LEFT-SIDED SCIATICA: ICD-10-CM

## 2022-05-02 DIAGNOSIS — M25.552 BILATERAL HIP PAIN: ICD-10-CM
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
## 2022-05-02 PROBLEM — S00.81XA CAT SCRATCH OF FACE: Status: RESOLVED | Noted: 2022-01-03 | Resolved: 2022-05-02

## 2022-05-02 PROCEDURE — G0439 PPPS, SUBSEQ VISIT: HCPCS | Performed by: INTERNAL MEDICINE

## 2022-05-02 RX ORDER — TRAMADOL HYDROCHLORIDE 50 MG/1
50 TABLET ORAL EVERY 8 HOURS PRN
Qty: 80 TABLET | Refills: 0 | Status: SHIPPED | OUTPATIENT
Start: 2022-07-01 | End: 2022-07-07 | Stop reason: SDUPTHER

## 2022-05-02 RX ORDER — TRAMADOL HYDROCHLORIDE 50 MG/1
50 TABLET ORAL EVERY 8 HOURS PRN
Qty: 80 TABLET | Refills: 0 | Status: SHIPPED | OUTPATIENT
Start: 2022-05-02 | End: 2022-06-01

## 2022-05-02 RX ORDER — TRAMADOL HYDROCHLORIDE 50 MG/1
50 TABLET ORAL EVERY 4 HOURS PRN
COMMUNITY
End: 2022-05-02 | Stop reason: SDUPTHER

## 2022-05-02 RX ORDER — TRAMADOL HYDROCHLORIDE 50 MG/1
50 TABLET ORAL EVERY 8 HOURS PRN
Qty: 80 TABLET | Refills: 0 | Status: SHIPPED | OUTPATIENT
Start: 2022-06-01 | End: 2022-07-01

## 2022-05-02 ASSESSMENT — ACTIVITIES OF DAILY LIVING (ADL): BATHING_REQUIRES_ASSISTANCE: 0

## 2022-05-02 ASSESSMENT — FIBROSIS 4 INDEX: FIB4 SCORE: 1.83

## 2022-05-02 ASSESSMENT — PATIENT HEALTH QUESTIONNAIRE - PHQ9: CLINICAL INTERPRETATION OF PHQ2 SCORE: 0

## 2022-05-02 ASSESSMENT — ENCOUNTER SYMPTOMS: GENERAL WELL-BEING: FAIR

## 2022-05-02 NOTE — ASSESSMENT & PLAN NOTE
"Chronic and ongoing problem.  Related to prior neck surgery in 2007.  Appropriate continue on current analgesia including tramadol 50 mg twice daily as well as Celebrex 100 mg twice daily.  No negative sequelae from her tramadol.    Obtained and reviewed patient utilization report from [facility] pharmacy database on 5/2/2022 3:12 PM  prior to writing prescription for controlled substance II, III or IV per [place] bill . Based on assessment of the report, the prescription is medically necessary.     Patient understands this prescription is a controlled substance which is potentially habit-forming and its use is regulated by the [place]. We also discussed the new \"black box\" warning regarding the lethal combination of opioids and benzodiazepines. Refills are subject to terms of a controlled substance agreement and patient has an updated one on file. Most recent UDS is appropriate. Any refill requires an office visit. Narcotics have may adverse effects and the risks of addiction, accidental overdose and death were emphasized. Provided prescriptions for the next three months.  "
"Chronic and ongoing problem.  We have reduced her tramadol from 120 to 80 tablets for 30-day supply.  She is consistently using 2 tablets daily but sometimes must adjust based on pain levels.    Obtained and reviewed patient utilization report from [facility] [place] pharmacy database on 5/2/2022 3:13 PM  prior to writing prescription for controlled substance II, III or IV per [place] bill . Based on assessment of the report, the prescription is medically necessary.     Patient understands this prescription is a controlled substance which is potentially habit-forming and its use is regulated by the [place]. We also discussed the new \"black box\" warning regarding the lethal combination of opioids and benzodiazepines. Refills are subject to terms of a controlled substance agreement and patient has an updated one on file. Most recent UDS is appropriate. Any refill requires an office visit. Narcotics have may adverse effects and the risks of addiction, accidental overdose and death were emphasized. Provided prescriptions for the next three months.  "
Chronic and ongoing problem.  Continue follow-up with physiatry as recommended.  Continue tramadol as needed with celebrex and ongoing follow-up with physiatry.  
Chronic and ongoing problem.  She went to her dermatologist who recommended a modified formulation of doxycycline however this was exorbitant in cost.  She will continue to use the topical metronidazole gel and also would like to consider trial with azelaic acid, she will check with her pharmacy to see what the preferred formulation would be with her plan.  
Chronic and ongoing problem.  Will update Dr. Hernandez that the hip area feels better but the thigh pain is ongoing.  Question whether she benefit from any targeted trigger point injections, she has follow-up with him in about 2 weeks.  
none

## 2022-05-02 NOTE — PROGRESS NOTES
Chief Complaint   Patient presents with   • Annual Exam     Annual   got an injection 2-3 weeks ago   Refill tramadol          HPI:  Carlee Hunt is a 72 y.o. female here for Medicare Annual Wellness Visit     Problem   Rosacea    She reports past history of rosacea, she met with the cosmetic dermatologist and was started on topical MetroGel.  She like me to continue this prescription.  She also would like to consider trial with azelaic acid.     Chronic Bilateral Low Back Pain With Left-Sided Sciatica    Hip Xray (7/2020):  There is an implanted electronic device within the left buttock with wires extending into the sacrum. There is mild degenerative change of the left greater than right hip joint characterized by joint space loss and osteophytic spurring. There is pelvic calcification.     IMPRESSION: Degenerative change of the hips bilaterally, left greater than right.    Lumbar Xray (7/2020):  There is convex right scoliotic curvature. There is multilevel decreased disc height and endplate spurring. There is prominent multilevel facet degeneration. There is grade 1 anterior subluxation at L3-4 and L4-5. There is vascular calcification. There is an implanted electronic device overlying the left sacrum posteriorly.     IMPRESSION:  1.  Multilevel degenerative disc disease and facet degeneration.  2.  Scoliotic deformity.  3.  Implanted electronic device overlying the left sacrum.      She reports longstanding problems in the low back.  She recalls SI joint disease and has been working with physical therapy in 2019. She denies any recent imaging of her low back. She also has pain along the left lateral thigh, unclear if that is IT band pain or radicular from the lower back. More recently with right thigh pain, both anterior and posterior, s/p right hip injection with Dr. Hernandez in April 2022.    Current regimen: Tramadol 100 mg 1-2 times daily, baclofen 5 mg at night     Right > left hip and anterior thigh pain     She reports new onset pain in her bilateral groin.  She is tracing the pattern that is very typical for radiation from primary hip osteoarthritis.  Her left side does been worse than the right but both are quite bothersome at this time.  She thought it would be due to her back and she has a history of low back pain and SI joint dysfunction.  She has worked with PT off and on for years.  She also utilizes tramadol for chronic neck pain following a fusion surgery in the cervical spine in 2013.  She has started using ibuprofen more recently x14 days with improvement in discomfort.  No known traumas but pain in right anterior thigh started after she walked down 7 flights of stairs ~ 6 months ago (late Sept 2021).  Xray imaging shows left > right hip osteoarthritis with bone spurs.    She completed right hip injection with Dr. Hernandez in April 2022 with improvement of the right hip pain but the thigh pain is largely unchanged and still occurs sporadically and both anterior and posterior aspects and described as a sensation of the muscles being twisted.     Chronic Use of Opiate for Therapeutic Purpose    She reports longstanding chronic pain in her neck due to a 2 staged neck fusion surgery in 2013 at [facility].  She also has had issues with intermittent low back/SI joint pain, left greater than right hip pain now radiating into the groin, and left IT band pain.  She uses tramadol 50 mg generally 2 tablets daily for pain control; more recently has required 3 and rarely 4 tablets.  She denies any deleterious side effects from this medicine.     Chronic Neck Pain    She reports staged 2 part fusion in 2007 at [facility] at the recommendation of a local orthopedist. She reports an anterior and posterior approach at that time. 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  At  that same time she was experiencing left hip pain and was told it was likely related to the left neck pain.  She reports chronic issues with what she thinks is muscle spasms on the left greater than right neck as well as the bilateral trapezius areas which she relates to large amount of hardware adding additional pressure to her muscles.  It does not sound like she is followed up with anyone locally for this previous neck problem.    For chronic pain in the neck she utilizes tramadol 50 mg up to 2 tablets daily.  She is also using celebrex 100 mg twice daily.  She is on turmeric.  She also is working with physical therapy and physiatry.     Cat Scratch of Face (Resolved)    She sustained a scratch on her face under her left eye the medial aspect secondary to a cat claw.  When she noticed that there was a flap of skin pulled down she went to urgent care who then referred her to the emergency department.  This was closed with Dermabond and she was prescribed Augmentin for prophylaxis.  She did not have any involvement of the left eye such as alterations to vision or pain.              Current supplements as per medication list.       Allergies: Rifampin, Vancomycin, and Sulfa drugs    Current social contact/activities:      She  reports that she quit smoking about 40 years ago. Her smoking use included cigarettes. She has a 16.00 pack-year smoking history. She has never used smokeless tobacco. She reports current alcohol use of about 4.2 oz of alcohol per week. She reports that she does not use drugs.  Counseling given: Not Answered  Comment: continued abstinence      DPA/Advanced Directive:  Patient has Advanced Directive, but it is not on file. Instructed to bring in a copy to scan into their chart.    ROS:    Gait: Uses no assistive device  Ostomy: No  Other tubes: No  Amputations: No  Chronic oxygen use: No  Last eye exam: Jan 2022 Goes every 3 months for glaucoma  Wears hearing aids: No   : Reports urinary  leakage during the last 6 months that has somewhat interfered with their daily activities or sleep.    Screening:    Depression Screening  Little interest or pleasure in doing things?  0 - not at all  Feeling down, depressed , or hopeless? 0 - not at all  Trouble falling or staying asleep, or sleeping too much?     Feeling tired or having little energy?     Poor appetite or overeating?     Feeling bad about yourself - or that you are a failure or have let yourself or your family down?    Trouble concentrating on things, such as reading the newspaper or watching television?    Moving or speaking so slowly that other people could have noticed.  Or the opposite - being so fidgety or restless that you have been moving around a lot more than usual?     Thoughts that you would be better off dead, or of hurting yourself?     Patient Health Questionnaire Score:      If depressive symptoms identified deferred to follow up visit unless specifically addressed in assessment and plan.    Interpretation of PHQ-9 Total Score   Score Severity   1-4 No Depression   5-9 Mild Depression   10-14 Moderate Depression   15-19 Moderately Severe Depression   20-27 Severe Depression    Screening for Cognitive Impairment  Three Minute Recall (daughter, heaven, mountain) 3/3    Ankit clock face with all 12 numbers and set the hands to show 10 past 11.  Yes    Cognitive concerns identified deferred for follow up unless specifically addressed in assessment and plan.    Fall Risk Assessment  Has the patient had two or more falls in the last year or any fall with injury in the last year?  No    Safety Assessment  Throw rugs on floor.  No  Handrails on all stairs.  No  Good lighting in all hallways.  Yes  Difficulty hearing.  No  Patient counseled about all safety risks that were identified.    Functional Assessment ADLs  Are there any barriers preventing you from cooking for yourself or meeting nutritional needs?  No.    Are there any barriers  preventing you from driving safely or obtaining transportation?  No.    Are there any barriers preventing you from using a telephone or calling for help?  No.    Are there any barriers preventing you from shopping?  No.    Are there any barriers preventing you from taking care of your own finances?  No.    Are there any barriers preventing you from managing your medications?  No.    Are there any barriers preventing you from showering, bathing or dressing yourself? No.    Are you currently engaging in any exercise or physical activity?  Yes.  Walk and floor exercises   What is your perception of your health? Fair.    Health Maintenance Summary          MAMMOGRAM (Yearly) Next due on 9/10/2022    09/10/2021  MA-SCREENING MAMMO BILAT W/TOMOSYNTHESIS W/CAD    07/24/2020  MA-SCREENING MAMMO BILAT W/TOMOSYNTHESIS W/CAD    04/23/2019  MA-SCREENING MAMMO BILAT W/TOMOSYNTHESIS W/CAD    04/17/2018  MA-MAMMO SCREENING BILAT W/DONAVON W/CAD    04/13/2017  MA-MAMMO SCREENING BILAT W/DONAVON W/CAD    Only the first 5 history entries have been loaded, but more history exists.          URINE DRUG SCREEN (Every 360 Days) Next due on 11/19/2022 11/24/2021  URINE DRUG SCREEN W/O CONF (AR)    06/11/2020  Pain Management Screen    07/05/2018  [facility] PAIN MANAGEMENT SCREEN          COLORECTAL CANCER SCREENING (COLOGUARD STOOL DNA - Every 3 Years) Next due on 3/9/2023    03/09/2020  COLOGUARD COLON CANCER SCREENING    05/31/2012  OCCULT BLOOD FECES IMMUNOASSAY          BONE DENSITY (Every 5 Years) Next due on 9/10/2026    09/10/2021  DS-BONE DENSITY STUDY (DEXA)    10/01/2019  DS-BONE DENSITY STUDY (DEXA)    11/06/2018  DS-BONE DENSITY STUDY (DEXA)          IMM DTaP/Tdap/Td Vaccine (2 - Td or Tdap) Next due on 1/17/2029 01/17/2019  Imm Admin: Tdap Vaccine          IMM PNEUMOCOCCAL VACCINE: 65+ Years (Series Information) Completed    09/20/2016  Imm Admin: Pneumococcal Conjugate Vaccine (Prevnar/PCV-13)    07/15/2014  Imm Admin:  Pneumococcal polysaccharide vaccine (PPSV-23)          HEPATITIS C SCREENING  Completed    05/04/2018  HEP C VIRUS ANTIBODY          IMM ZOSTER VACCINES (Series Information) Completed    07/02/2019  Imm Admin: Zoster Vaccine Recombinant (RZV) (SHINGRIX)    05/01/2019  Imm Admin: Zoster Vaccine Recombinant (RZV) (SHINGRIX)    01/30/2009  Imm Admin: Zoster Vaccine Live (ZVL) (Zostavax) - HISTORICAL DATA          IMM INFLUENZA (Series Information) Completed    09/30/2021  Imm Admin: Influenza Vaccine Adult HD    09/22/2020  Imm Admin: Influenza Vaccine Adult HD    10/01/2019  Imm Admin: Influenza Vaccine Adult HD    10/24/2018  Imm Admin: Influenza Vaccine Adult HD    10/12/2017  Imm Admin: Influenza Vaccine Adult HD    Only the first 5 history entries have been loaded, but more history exists.          COVID-19 Vaccine (Series Information) Completed    10/30/2021  Imm Admin: Moderna SARS-CoV-2 Vaccine    02/23/2021  Imm Admin: Moderna SARS-CoV-2 Vaccine    01/25/2021  Imm Admin: Moderna SARS-CoV-2 Vaccine          IMM HEP B VACCINE (Series Information) Aged Out    No completion history exists for this topic.          IMM MENINGOCOCCAL VACCINE (MCV4) (Series Information) Aged Out    No completion history exists for this topic.          Discontinued - PAP SMEAR  Discontinued    12/05/2018  THINPREP PAP WITH HPV    12/05/2018  PATHOLOGY GYN SPECIMEN                Patient Care Team:  Shari Dent D.O. as PCP - General (Internal Medicine)  Shari Dent D.O. as PCP - [facility] Paneled  [facility] as Consulting Physician  Stu Santos M.D. as Consulting Physician (Ophthalmology)  Denice Roblero M.D. as Consulting Physician (Obstetrics & Gynecology)  [facility] (Inactive) as Consulting Physician (Gastroenterology)  Jami Gaxiola [facility] as        Social History     Tobacco Use   • Smoking status: Former Smoker     Packs/day: 1.00     Years: 16.00      Pack years: 16.00     Types: Cigarettes     Quit date: 1982     Years since quittin.2   • Smokeless tobacco: Never Used   • Tobacco comment: continued abstinence   Vaping Use   • Vaping Use: Never used   Substance Use Topics   • Alcohol use: Yes     Alcohol/week: 4.2 oz     Types: 7 Glasses of wine per week     Comment: one daily   • Drug use: No     Comment: CBD cream last used 2019     Family History   Problem Relation Age of Onset   • Hypertension Mother    • Other Father         accident   • Other Sister         pneumoccocal pneumonia   • Other Brother         glaucoma   • Heart Attack Paternal Grandfather    • Cancer Neg Hx    • Diabetes Neg Hx    • Heart Disease Neg Hx      She  has a past medical history of Cat scratch of face (1/3/2022), Cataract, Disorder of arteries and arterioles (HCC) (2021), Elevated fasting glucose (2019), Glaucoma, Hypertension, Pain, and Thyroid disease.   Past Surgical History:   Procedure Laterality Date   • NH FLUOROSCOPIC GUIDANCE NEEDLE PLACEMENT Right 2022    Procedure: .;  Surgeon: Rolf Hernandez M.D.;  [facility];  Service: Pain Management   • INJ,SACROILIAC,ANES/STEROID Right 2022    Procedure: Diagnostic and therapeutic fluoroscopically guided intra-articular RIGHT hip injection;  Surgeon: Rolf Hernandez M.D.;  Location[facility];  Service: Pain Management   • BOWEL STIMULATOR INSERTION  2020    Procedure: INSERTION, ELECTRODE LEADS AND PULSE GENERATOR, NEUROSTIMULATOR, SACRAL- FOR PERMANENT IMPLANTATION;  Surgeon: Ishmael Goddard M.D.;  Location: SURGERY [facility];  Service: General   • BOWEL STIMULATOR INSERTION  1/15/2020    Procedure: INSERTION, ELECTRODE LEADS AND PULSE GENERATOR, NEUROSTIMULATOR, SACRAL-NEUROMODULATION TRIAL;  Surgeon: Ishmael Goddard M.D.;  Location: SURGERY [facility];  Service: General   • OTHER  2007    throat surgery after cervical surgery, adjusted  "Daren Swenson   • CERVICAL FUSION POSTERIOR      Basilar \"invagination\"   • EYE SURGERY      cataracts - Dr. Martinez       Exam:   /58 (BP Location: Right arm, Patient Position: Sitting, BP Cuff Size: Adult)   Pulse 60   Temp 36.4 °C (97.5 °F) (Temporal)   Resp 12   Ht 1.702 m (5' 7\")   Wt 75.9 kg (167 lb 4.8 oz)   SpO2 93%  Body mass index is 26.2 kg/m².    Physical Exam  Constitutional:       General: She is not in acute distress.     Appearance: She is not ill-appearing.   HENT:      Right Ear: There is no impacted cerumen.      Left Ear: There is no impacted cerumen.   Eyes:      General: No scleral icterus.     Conjunctiva/sclera: Conjunctivae normal.      Comments: Glasses in place   Neck:      Comments: Chronic with decreased ROM s/p prior cervical fusion  Cardiovascular:      Rate and Rhythm: Normal rate and regular rhythm.      Pulses: Normal pulses.   Pulmonary:      Effort: Pulmonary effort is normal. No respiratory distress.      Breath sounds: Normal breath sounds. No wheezing or rhonchi.   Musculoskeletal:      Cervical back: Rigidity present.      Right lower leg: No edema.      Left lower leg: No edema.      Comments: Pain in right thigh both anterior and posterior aspect with active ROM   Skin:     General: Skin is warm and dry.      Findings: No bruising or rash.   Psychiatric:         Mood and Affect: Mood normal.         Behavior: Behavior normal.         Thought Content: Thought content normal.         Judgment: Judgment normal.          Assessment and Plan. The following treatment and monitoring plan is recommended:       Carlee is a 72 y.o. female with the following:  Problem List Items Addressed This Visit     Chronic neck pain     Chronic and ongoing problem.  Related to prior neck surgery in 2007.  Appropriate continue on current analgesia including tramadol 50 mg twice daily as well as Celebrex 100 mg twice daily.  No negative sequelae from her tramadol.    Obtained and reviewed patient " "utilization report from [facility] pharmacy database on 5/2/2022 3:12 PM  prior to writing prescription for controlled substance II, III or IV per [place] bill . Based on assessment of the report, the prescription is medically necessary.     Patient understands this prescription is a controlled substance which is potentially habit-forming and its use is regulated by the [place]. We also discussed the new \"black box\" warning regarding the lethal combination of opioids and benzodiazepines. Refills are subject to terms of a controlled substance agreement and patient has an updated one on file. Most recent UDS is appropriate. Any refill requires an office visit. Narcotics have may adverse effects and the risks of addiction, accidental overdose and death were emphasized. Provided prescriptions for the next three months.         Relevant Medications    traMADol (ULTRAM) 50 MG Tab    traMADol (ULTRAM) 50 MG Tab (Start on 6/1/2022)    traMADol (ULTRAM) 50 MG Tab (Start on 7/1/2022)    Chronic use of opiate for therapeutic purpose     Chronic and ongoing problem.  We have reduced her tramadol from 120 to 80 tablets for 30-day supply.  She is consistently using 2 tablets daily but sometimes must adjust based on pain levels.    Obtained and reviewed patient utilization report from [facility] pharmacy database on 5/2/2022 3:13 PM  prior to writing prescription for controlled substance II, III or IV per [place] bill . Based on assessment of the report, the prescription is medically necessary.     Patient understands this prescription is a controlled substance which is potentially habit-forming and its use is regulated by the [place]. We also discussed the new \"black box\" warning regarding the lethal combination of opioids and benzodiazepines. Refills are subject to terms of a controlled substance agreement and patient has an updated one on file. Most recent UDS is appropriate. Any refill requires an office visit. Narcotics " have may adverse effects and the risks of addiction, accidental overdose and death were emphasized. Provided prescriptions for the next three months.         Relevant Medications    traMADol (ULTRAM) 50 MG Tab    traMADol (ULTRAM) 50 MG Tab (Start on 6/1/2022)    traMADol (ULTRAM) 50 MG Tab (Start on 7/1/2022)    Chronic bilateral low back pain with left-sided sciatica     Chronic and ongoing problem.  Continue follow-up with physiatry as recommended.  Continue tramadol as needed with celebrex and ongoing follow-up with physiatry.         Relevant Medications    traMADol (ULTRAM) 50 MG Tab    traMADol (ULTRAM) 50 MG Tab (Start on 6/1/2022)    traMADol (ULTRAM) 50 MG Tab (Start on 7/1/2022)    Right > left hip and anterior thigh pain     Chronic and ongoing problem.  Will update Dr. Hernandez that the hip area feels better but the thigh pain is ongoing.  Question whether she benefit from any targeted trigger point injections, she has follow-up with him in about 2 weeks.         Rosacea     Chronic and ongoing problem.  She went to her dermatologist who recommended a modified formulation of doxycycline however this was exorbitant in cost.  She will continue to use the topical metronidazole gel and also would like to consider trial with azelaic acid, she will check with her pharmacy to see what the preferred formulation would be with her plan.           Other Visit Diagnoses     Encounter for subsequent annual wellness visit (AWV) in Medicare patient    -  Primary           Services suggested: No services needed at this time  Health Care Screening: Age-appropriate preventive services recommended by USPTF and ACIP covered by Medicare were discussed today. Services ordered if indicated and agreed upon by the patient.  Referrals offered: Community-based lifestyle interventions to reduce health risks and promote self-management and wellness, fall prevention, nutrition, physical activity, tobacco-use cessation, weight loss,  and mental health services as per orders if indicated.    Discussion today about general wellness and lifestyle habits:    · Prevent falls and reduce trip hazards; Cautioned about securing or removing rugs.  · Have a working fire alarm and carbon monoxide detector;   · Engage in regular physical activity and social activities     Follow-up: Return in about 3 months (around 8/2/2022).    I spent a total of 34 minutes with record review, exam, communication with the patient, communication with other providers, and documentation of this encounter.       PLEASE NOTE: This dictation was created using voice recognition software. I have made every reasonable attempt to correct obvious errors, but I expect that there are errors of grammar and possibly content that I did not discover before finalizing the note.      Shari Dent, DO  Geriatric and Internal Medicine  [facility]   Yes

## 2022-05-17 ENCOUNTER — OFFICE VISIT (OUTPATIENT)
Dept: PHYSICAL MEDICINE AND REHAB | Facility: MEDICAL CENTER | Age: 72
End: 2022-05-17
Payer: MEDICARE

## 2022-05-17 VITALS
BODY MASS INDEX: 26.26 KG/M2 | TEMPERATURE: 97.6 F | SYSTOLIC BLOOD PRESSURE: 120 MMHG | WEIGHT: 167.33 LBS | OXYGEN SATURATION: 96 % | DIASTOLIC BLOOD PRESSURE: 62 MMHG | HEART RATE: 56 BPM | RESPIRATION RATE: 18 BRPM | HEIGHT: 67 IN

## 2022-05-17 DIAGNOSIS — M25.552 BILATERAL HIP PAIN: ICD-10-CM

## 2022-05-17 DIAGNOSIS — M16.10 ARTHRITIS OF HIP: ICD-10-CM

## 2022-05-17 DIAGNOSIS — M25.551 BILATERAL HIP PAIN: ICD-10-CM

## 2022-05-17 DIAGNOSIS — M47.816 LUMBAR SPONDYLOSIS: ICD-10-CM

## 2022-05-17 DIAGNOSIS — M51.36 DDD (DEGENERATIVE DISC DISEASE), LUMBAR: ICD-10-CM

## 2022-05-17 PROCEDURE — 99214 OFFICE O/P EST MOD 30 MIN: CPT | Performed by: PHYSICAL MEDICINE & REHABILITATION

## 2022-05-17 RX ORDER — CELECOXIB 100 MG/1
100 CAPSULE ORAL 2 TIMES DAILY
Qty: 60 CAPSULE | Refills: 3 | Status: SHIPPED | OUTPATIENT
Start: 2022-05-17 | End: 2022-07-07 | Stop reason: SDUPTHER

## 2022-05-17 ASSESSMENT — FIBROSIS 4 INDEX: FIB4 SCORE: 1.83

## 2022-05-17 NOTE — PROGRESS NOTES
Follow up patient note  Interventional spine and Pain  Physiatry (physical medicine and  Rehabilitation)       Chief complaint:   Chief Complaint   Patient presents with   • Pain     Arthritis of hip         HISTORY    Please see new patient note by Dr Hernandez,  for more details.     HPI  Patient identification: Carlee Hunt ,  1950,   With Diagnoses of Bilateral hip pain, Arthritis of hip, DDD (degenerative disc disease), lumbar, and Lumbar spondylosis were pertinent to this visit.     Procedures  2022 right diagnostic and  therapeutic intra-articular Hip injection with fluoroscopic guidance 50% improved    Overall the patient is happy and has had a significant improvement in her pain and function after the intra-articular hip injection.  Baseline pain is now minimal.  1 out of 10 in intensity.  She does have pain with prolonged standing worse in the anterior hip and in the lateral hip.  With prolonged standing of greater than 20 minutes or walking greater than 20 minutes she does have 5 out of 10 in intensity pain however this is improved.  She has improved range of motion.  She can more easily accomplish lower body dressing and other ADLs.  Back pain is improved.       ROS Red Flags :   Fever, Chills, Sweats: Denies  Involuntary Weight Loss: Denies  Bowel/Bladder Incontinence: Denies  Saddle Anesthesia: Denies        PMHx:   Past Medical History:   Diagnosis Date   • Cat scratch of face 1/3/2022    She sustained a scratch on her face under her left eye the medial aspect secondary to a cat claw.  When she noticed that there was a flap of skin pulled down she went to urgent care who then referred her to the emergency department.  This was closed with Dermabond and she was prescribed Augmentin for prophylaxis.  She did not have any involvement of the left eye such as alterations to vision or pa   • Cataract     bilateral surgery complete   • Disorder of arteries and arterioles (HCC) 2021   • Elevated  "fasting glucose 1/17/2019   • Glaucoma    • Hypertension    • Pain     chronic neck and shoulders   • Thyroid disease        PSHx:   Past Surgical History:   Procedure Laterality Date   • WI FLUOROSCOPIC GUIDANCE NEEDLE PLACEMENT Right 4/19/2022    Procedure: .;  Surgeon: Rolf Hernandez M.D.;  [facility];  Service: Pain Management   • INJ,SACROILIAC,ANES/STEROID Right 4/19/2022    Procedure: Diagnostic and therapeutic fluoroscopically guided intra-articular RIGHT hip injection;  Surgeon: Rolf Hernandez M.D.;  Location: [facility];  Service: Pain Management   • BOWEL STIMULATOR INSERTION  1/29/2020    Procedure: INSERTION, ELECTRODE LEADS AND PULSE GENERATOR, NEUROSTIMULATOR, SACRAL- FOR PERMANENT IMPLANTATION;  Surgeon: Ishmael Goddard M.D.;  Location: [facility];  Service: General   • BOWEL STIMULATOR INSERTION  1/15/2020    Procedure: INSERTION, ELECTRODE LEADS AND PULSE GENERATOR, NEUROSTIMULATOR, SACRAL-NEUROMODULATION TRIAL;  Surgeon: Ishmael Goddard M.D.;  Location: SUR[facility]ERY [facility];  Service: General   • OTHER  2007    throat surgery after cervical surgery, adjusted Mercedes Messi   • CERVICAL FUSION POSTERIOR      Basilar \"invagination\"   • EYE SURGERY      cataracts - Dr. Martinez       Family history   Family History   Problem Relation Age of Onset   • Hypertension Mother    • Other Father         accident   • Other Sister         pneumoccocal pneumonia   • Other Brother         glaucoma   • Heart Attack Paternal Grandfather    • Cancer Neg Hx    • Diabetes Neg Hx    • Heart Disease Neg Hx          Medications:   Outpatient Medications Marked as Taking for the 5/17/22 encounter (Office Visit) with Rolf Hernandez M.D.   Medication Sig Dispense Refill   • celecoxib (CELEBREX) 100 MG Cap Take 1 Capsule by mouth 2 times a day. 60 Capsule 3   • [START ON 7/1/2022] traMADol (ULTRAM) 50 MG Tab Take 1 Tablet by mouth every 8 hours as needed for Severe " Pain for up to 30 days. 80 Tablet 0   • AZELAIC ACID EX Apply  topically.     • metronidazole (METROGEL) 0.75 % gel Apply 1 Application topically 2 times a day.     • levothyroxine (SYNTHROID) 100 MCG Tab TAKE ONE TABLET BY MOUTH EVERY MORNING ON AN EMPTY STOMACH 100 Tablet 3   • metoprolol SR (TOPROL XL) 25 MG TABLET SR 24 HR Take 1 tablet by mouth every day. 100 tablet 3   • COMBIGAN 0.2-0.5 % Solution      • Lutein 20 MG Tab Take  by mouth.     • L-Theanine 200 MG Cap Take  by mouth.     • Coenzyme Q10 (COQ10 PO) Take 100 mg by mouth every day. Qunol liquid     • magnesium oxide (MAG-OX) 400 MG Tab Take 400 mg by mouth every evening.     • Calcium 500 MG Chew Tab Take 1 Tab by mouth every evening.     • Biotin 1000 MCG Chew Tab Take 500 mg by mouth every day.     • Milk Thistle 175 MG Cap Take 1 Cap by mouth every day.     • Cyanocobalamin (B-12) 2500 MCG Tab Take 1 Tab by mouth every day.     • Ascorbic Acid (VITAMIN C) 1000 MG Tab Take 1 Tab by mouth every day.     • TURMERIC PO Take 333 mg by mouth every day. Daily (Qunol liquid)     • Probiotic Product (PRO-BIOTIC BLEND) Cap Take 1 Cap by mouth every evening. Flor[place]     • latanoprost (XALATAN) 0.005 % Solution Place 1 Drop in both eyes every evening.     • KRILL OIL PO Take 500 mg by mouth every day.     • Multiple Vitamins-Minerals (CENTRUM PO) Take 1 Tab by mouth every day.          Current Outpatient Medications on File Prior to Visit   Medication Sig Dispense Refill   • [START ON 7/1/2022] traMADol (ULTRAM) 50 MG Tab Take 1 Tablet by mouth every 8 hours as needed for Severe Pain for up to 30 days. 80 Tablet 0   • AZELAIC ACID EX Apply  topically.     • metronidazole (METROGEL) 0.75 % gel Apply 1 Application topically 2 times a day.     • levothyroxine (SYNTHROID) 100 MCG Tab TAKE ONE TABLET BY MOUTH EVERY MORNING ON AN EMPTY STOMACH 100 Tablet 3   • metoprolol SR (TOPROL XL) 25 MG TABLET SR 24 HR Take 1 tablet by mouth every day. 100 tablet 3   •  COMBIGAN 0.2-0.5 % Solution      • Lutein 20 MG Tab Take  by mouth.     • L-Theanine 200 MG Cap Take  by mouth.     • Coenzyme Q10 (COQ10 PO) Take 100 mg by mouth every day. Qunol liquid     • magnesium oxide (MAG-OX) 400 MG Tab Take 400 mg by mouth every evening.     • Calcium 500 MG Chew Tab Take 1 Tab by mouth every evening.     • Biotin 1000 MCG Chew Tab Take 500 mg by mouth every day.     • Milk Thistle 175 MG Cap Take 1 Cap by mouth every day.     • Cyanocobalamin (B-12) 2500 MCG Tab Take 1 Tab by mouth every day.     • Ascorbic Acid (VITAMIN C) 1000 MG Tab Take 1 Tab by mouth every day.     • TURMERIC PO Take 333 mg by mouth every day. Daily (Qunol liquid)     • Probiotic Product (PRO-BIOTIC BLEND) Cap Take 1 Cap by mouth every evening. Floristan     • latanoprost (XALATAN) 0.005 % Solution Place 1 Drop in both eyes every evening.     • KRILL OIL PO Take 500 mg by mouth every day.     • Multiple Vitamins-Minerals (CENTRUM PO) Take 1 Tab by mouth every day.     • traMADol (ULTRAM) 50 MG Tab Take 1 Tablet by mouth every 8 hours as needed for Severe Pain for up to 30 days. (Patient not taking: Reported on 5/17/2022) 80 Tablet 0   • [START ON 6/1/2022] traMADol (ULTRAM) 50 MG Tab Take 1 Tablet by mouth every 8 hours as needed for Severe Pain for up to 30 days. 80 Tablet 0   • Tretinoin 0.05 % Gel 1 Application by Apply externally route every bedtime. (Patient not taking: Reported on 5/17/2022) 1 Tube 3     No current facility-administered medications on file prior to visit.         Allergies:   Allergies   Allergen Reactions   • Rifampin Rash     Rash, fever   • Vancomycin Rash     Rash, fever   • Sulfa Drugs Rash     Per pt         Social Hx:   Social History     Socioeconomic History   • Marital status:      Spouse name: Not on file   • Number of children: Not on file   • Years of education: Not on file   • Highest education level: Not on file   Occupational History   • Not on file   Tobacco Use   •  "Smoking status: Former Smoker     Packs/day: 1.00     Years: 16.00     Pack years: 16.00     Types: Cigarettes     Quit date: 1982     Years since quittin.2   • Smokeless tobacco: Never Used   • Tobacco comment: continued abstinence   Vaping Use   • Vaping Use: Never used   Substance and Sexual Activity   • Alcohol use: Yes     Alcohol/week: 4.2 oz     Types: 7 Glasses of wine per week     Comment: one daily   • Drug use: No     Comment: CBD cream last used 2019   • Sexual activity: Not Currently     Birth control/protection: Post-Menopausal   Other Topics Concern   •  Service No   • Blood Transfusions No   • Caffeine Concern No   • Occupational Exposure No   • Hobby Hazards No   • Sleep Concern No   • Stress Concern Yes   • Weight Concern No   • Special Diet No   • Back Care Yes   • Exercise No   • Bike Helmet No   • Seat Belt Yes   • Self-Exams No   Social History Narrative    In [place] since ; previously lived in [place], [place], and [place]. Carlee was born and raised in [place] [place]. She has been in [place] since . Previous to that she has lived in [place], [place], and [place] all for her ex-'s job. She worked as a  and retired at age 65. She is currently single. She has 3 cats and a dog. She has a son who is currently incarcerated in [place] after a DUI death which has been a big stressor on her.      Social Determinants of Health     Financial Resource Strain: Not on file   Food Insecurity: Not on file   Transportation Needs: Not on file   Physical Activity: Not on file   Stress: Not on file   Social Connections: Not on file   Intimate Partner Violence: Not on file   Housing Stability: Not on file         EXAMINATION     Physical Exam:   Vitals: /62 (BP Location: Left arm, Patient Position: Sitting, BP Cuff Size: Adult)   Pulse (!) 56   Temp 36.4 °C (97.6 °F) (Temporal)   Resp 18   Ht 1.702 m (5' 7\")   Wt 75.9 kg (167 lb 5.3 oz)   SpO2 96%     Constitutional:   Body Habitus: Body mass index " is 26.21 kg/m².  Cooperation: Fully cooperates with exam  Appearance: Well-groomed no disheveled    Respiratory-  breathing comfortable on room air, no audible wheezing  Cardiovascular- capillary refills less than 2 seconds. No lower extremity edema is noted.   Psychiatric- alert and oriented ×3. Normal affect.    MSK and Neuro: -  FAIRs maneuver and Glo's maneuver negative.  No tenderness palpation throughout the hip.  Near full range of motion of the right hip.      MEDICAL DECISION MAKING    DATA    Labs:   Lab Results   Component Value Date/Time    SODIUM 140 03/18/2022 04:19 PM    POTASSIUM 4.3 03/18/2022 04:19 PM    CHLORIDE 103 03/18/2022 04:19 PM    CO2 26 03/18/2022 04:19 PM    GLUCOSE 88 03/18/2022 04:19 PM    BUN 17 03/18/2022 04:19 PM    CREATININE 0.65 03/18/2022 04:19 PM        No results found for: PROTHROMBTM, INR     Lab Results   Component Value Date/Time    WBC 8.1 03/18/2022 04:19 PM    RBC 4.38 03/18/2022 04:19 PM    HEMOGLOBIN 13.6 03/18/2022 04:19 PM    HEMATOCRIT 41.7 03/18/2022 04:19 PM    MCV 95.2 03/18/2022 04:19 PM    MCH 31.1 03/18/2022 04:19 PM    MCHC 32.6 (L) 03/18/2022 04:19 PM    MPV 10.2 03/18/2022 04:19 PM    NEUTSPOLYS 49.50 03/18/2022 04:19 PM    LYMPHOCYTES 42.40 (H) 03/18/2022 04:19 PM    MONOCYTES 5.70 03/18/2022 04:19 PM    EOSINOPHILS 1.70 03/18/2022 04:19 PM    BASOPHILS 0.60 03/18/2022 04:19 PM        Lab Results   Component Value Date/Time    HBA1C 5.6 02/20/2019 08:20 AM          Imaging:   I personally reviewed following images    X-ray hip 7/21/2020  Degenerative change of the bilateral hips.        I reviewed the following radiology reports    X-ray hip 7/21/2020  FINDINGS:  There is an implanted electronic device within the left buttock with wires extending into the sacrum. There is mild degenerative change of the left greater than right hip joint characterized by joint space loss and osteophytic spurring. There is pelvic    calcification.     IMPRESSION:     Degenerative change of the hips bilaterally, left greater than right.                 Results for orders placed during the hospital encounter of 21    MR-CERVICAL SPINE-W/O    Impression  1.  Previous extensive cervical laminectomy from C1 through C6.    2.  Previous pedicle screw and mary fixation from the occiput to C5 bilaterally.    3.  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    4.  Nonvisualization of the dens which may have been previously resected or congenitally absent.                                                                                                    Results for orders placed in visit on 21    DX-CERVICAL SPINE-4+ VIEWS                     Results for orders placed during the hospital encounter of 16    DX-KNEE 3 VIEWS LEFT    Impression  1.  Mild medial femorotibial component osteoarthritis    2.  Probable 5 mm intra-articular ossific body     Results for orders placed during the hospital encounter of 20    DX-LUMBAR SPINE-2 OR 3 VIEWS    Impression  1.  Multilevel degenerative disc disease and facet degeneration.    2.  Scoliotic deformity.    3.  Implanted electronic device overlying the left sacrum.                         DIAGNOSIS   Visit Diagnoses     ICD-10-CM   1. Bilateral hip pain  M25.551    M25.552   2. Arthritis of hip  M16.10   3. DDD (degenerative disc disease), lumbar  M51.36   4. Lumbar spondylosis  M47.816         ASSESSMENT and PLAN:     Carlee Hunt  1950 female      Carlee was seen today for pain.    Diagnoses and all orders for this visit:    Bilateral hip pain  -     celecoxib (CELEBREX) 100 MG Cap; Take 1 Capsule by mouth 2 times a day.    Arthritis of hip  -     celecoxib (CELEBREX) 100 MG Cap; Take 1 Capsule by mouth 2 times a day.    DDD (degenerative disc disease), lumbar  -     celecoxib (CELEBREX) 100 MG Cap; Take 1 Capsule by mouth 2 times a day.    Lumbar spondylosis  -     celecoxib  (CELEBREX) 100 MG Cap; Take 1 Capsule by mouth 2 times a day.        The above issues are now stable.  We discussed the patient's home exercise program and continuing with physical therapy.    If she has worsening of pain I would consider her for diagnostic sensory nerve blocks of the acetabular branches of the femoral and obturator nerves.  If positive then I would consider the patient for radiofrequency neurotomy of these nerves.  We discussed this procedure as well today.    Follow up: 6 months or sooner as needed    Thank you for allowing me to participate in the care of this patient. If you have any questions please not hesitate to contact me.             Please note that this dictation was created using voice recognition software. I have made every reasonable attempt to correct obvious errors but there may be errors of grammar and content that I may have overlooked prior to finalization of this note.      Rolf Hernandez MD  Interventional Spine and Sports Physiatry  Physical Medicine and Rehabilitation  [facility]

## 2022-06-01 ENCOUNTER — HOSPITAL ENCOUNTER (OUTPATIENT)
Dept: LAB | Facility: MEDICAL CENTER | Age: 72
End: 2022-06-01
Attending: OBSTETRICS & GYNECOLOGY
Payer: MEDICARE

## 2022-06-01 PROCEDURE — 88175 CYTOPATH C/V AUTO FLUID REDO: CPT

## 2022-06-01 PROCEDURE — 87624 HPV HI-RISK TYP POOLED RSLT: CPT

## 2022-06-24 DIAGNOSIS — I10 ESSENTIAL HYPERTENSION: ICD-10-CM

## 2022-06-24 RX ORDER — METOPROLOL SUCCINATE 25 MG/1
TABLET, EXTENDED RELEASE ORAL
Qty: 90 TABLET | Refills: 0 | Status: SHIPPED | OUTPATIENT
Start: 2022-06-24 | End: 2022-07-07 | Stop reason: SDUPTHER

## 2022-07-07 ENCOUNTER — OFFICE VISIT (OUTPATIENT)
Dept: MEDICAL GROUP | Facility: PHYSICIAN GROUP | Age: 72
End: 2022-07-07
Payer: MEDICARE

## 2022-07-07 VITALS
DIASTOLIC BLOOD PRESSURE: 60 MMHG | RESPIRATION RATE: 12 BRPM | WEIGHT: 166.8 LBS | HEIGHT: 67 IN | HEART RATE: 66 BPM | TEMPERATURE: 97.4 F | SYSTOLIC BLOOD PRESSURE: 118 MMHG | BODY MASS INDEX: 26.18 KG/M2 | OXYGEN SATURATION: 94 %

## 2022-07-07 DIAGNOSIS — M25.551 BILATERAL HIP PAIN: ICD-10-CM

## 2022-07-07 DIAGNOSIS — I10 ESSENTIAL HYPERTENSION: ICD-10-CM

## 2022-07-07 DIAGNOSIS — L71.9 ROSACEA: ICD-10-CM

## 2022-07-07 DIAGNOSIS — G89.29 CHRONIC NECK PAIN: ICD-10-CM

## 2022-07-07 DIAGNOSIS — Z79.891 CHRONIC USE OF OPIATE FOR THERAPEUTIC PURPOSE: ICD-10-CM

## 2022-07-07 DIAGNOSIS — E78.5 DYSLIPIDEMIA: ICD-10-CM

## 2022-07-07 DIAGNOSIS — M25.552 BILATERAL HIP PAIN: ICD-10-CM

## 2022-07-07 DIAGNOSIS — E03.9 ACQUIRED HYPOTHYROIDISM: ICD-10-CM

## 2022-07-07 DIAGNOSIS — M54.2 CHRONIC NECK PAIN: ICD-10-CM

## 2022-07-07 PROCEDURE — 99214 OFFICE O/P EST MOD 30 MIN: CPT | Performed by: INTERNAL MEDICINE

## 2022-07-07 RX ORDER — TRAMADOL HYDROCHLORIDE 50 MG/1
50 TABLET ORAL EVERY 8 HOURS PRN
Qty: 80 TABLET | Refills: 0 | Status: SHIPPED | OUTPATIENT
Start: 2022-09-20 | End: 2022-10-20 | Stop reason: SDUPTHER

## 2022-07-07 RX ORDER — TRAMADOL HYDROCHLORIDE 50 MG/1
50 TABLET ORAL EVERY 8 HOURS PRN
Qty: 80 TABLET | Refills: 0 | Status: SHIPPED | OUTPATIENT
Start: 2022-08-21 | End: 2022-09-20

## 2022-07-07 RX ORDER — METOPROLOL SUCCINATE 25 MG/1
25 TABLET, EXTENDED RELEASE ORAL DAILY
Qty: 100 TABLET | Refills: 3 | Status: SHIPPED | OUTPATIENT
Start: 2022-07-07 | End: 2023-07-24

## 2022-07-07 RX ORDER — AZELAIC ACID 0.15 G/G
1 GEL TOPICAL 2 TIMES DAILY
Qty: 50 G | Refills: 11 | Status: SHIPPED
Start: 2022-07-07 | End: 2023-02-24

## 2022-07-07 RX ORDER — CIPROFLOXACIN 500 MG/1
TABLET, FILM COATED ORAL
COMMUNITY
Start: 2022-06-29 | End: 2022-10-20

## 2022-07-07 RX ORDER — TRAMADOL HYDROCHLORIDE 50 MG/1
50 TABLET ORAL EVERY 8 HOURS PRN
Qty: 80 TABLET | Refills: 0 | Status: SHIPPED | OUTPATIENT
Start: 2022-07-22 | End: 2022-08-21

## 2022-07-07 RX ORDER — METRONIDAZOLE 7.5 MG/G
1 GEL TOPICAL 2 TIMES DAILY
Qty: 45 G | Refills: 11 | Status: SHIPPED
Start: 2022-07-07 | End: 2023-01-12

## 2022-07-07 RX ORDER — CELECOXIB 100 MG/1
100 CAPSULE ORAL 2 TIMES DAILY
Qty: 60 CAPSULE | Refills: 3 | Status: SHIPPED | OUTPATIENT
Start: 2022-07-07 | End: 2023-04-10 | Stop reason: SDUPTHER

## 2022-07-07 RX ORDER — AZELAIC ACID 0.15 G/G
1 GEL TOPICAL 2 TIMES DAILY
Qty: 50 G | Refills: 11 | Status: SHIPPED | OUTPATIENT
Start: 2022-07-07 | End: 2022-07-07 | Stop reason: SDUPTHER

## 2022-07-07 ASSESSMENT — FIBROSIS 4 INDEX: FIB4 SCORE: 1.83

## 2022-07-07 NOTE — PROGRESS NOTES
Subjective:   Chief Complaint/History of Present Illness:  Carlee Hunt is a 72 y.o. female established patient who presents today to discuss medical problems as listed below. Carlee is unaccompanied for today's visit.    Problem   Rosacea    She reports past history of rosacea, she met with the cosmetic dermatologist and was started on topical MetroGel.  She like me to continue this prescription.  She also would like to consider trial with azelaic acid.     Right > left hip and anterior thigh pain    She reports new onset pain in her bilateral groin.  She is tracing the pattern that is very typical for radiation from primary hip osteoarthritis.  Her left side does been worse than the right but both are quite bothersome at this time.  She thought it would be due to her back and she has a history of low back pain and SI joint dysfunction.  She has worked with PT off and on for years.  She also utilizes tramadol for chronic neck pain following a fusion surgery in the cervical spine in 2013.  She has started using ibuprofen more recently x14 days with improvement in discomfort.  No known traumas but pain in right anterior thigh started after she walked down 7 flights of stairs ~ 6 months ago (late Sept 2021).  Xray imaging shows left > right hip osteoarthritis with bone spurs.    She completed right hip injection with Dr. Hernandez in April 2022 with improvement of the right hip pain but the thigh pain is largely unchanged and still occurs sporadically and both anterior and posterior aspects and described as a sensation of the muscles being twisted.    On follow up in July 2022 this discomfort has improved, she has follow up planned with Dr. Hernandez in November 2022.     Dyslipidemia     Latest Reference Range & Units 04/26/21 07:46   Cholesterol,Tot 100 - 199 mg/dL 203 (H)   Triglycerides 0 - 149 mg/dL 164 (H)   HDL >=40 mg/dL 61   LDL <100 mg/dL 109 (H)     She has history of hyperlipidemia.  Unclear if she has ever  taken dedicated lipid-lowering medications.  She is on coenzyme Q 10.  She had a carotid ultrasound in 2017 that was negative for any blockages.  Unclear if she is ever been evaluated for coronary artery disease.  She did have a Holter study for palpitations in the past.     Chronic Use of Opiate for Therapeutic Purpose    She reports longstanding chronic pain in her neck due to a 2 staged neck fusion surgery in 2013 at [facility].  She also has had issues with intermittent low back/SI joint pain, left greater than right hip pain now radiating into the groin, and left IT band pain.  She uses tramadol 50 mg generally 2 tablets daily for pain control.  She denies any deleterious side effects from this medicine.     Acquired Hypothyroidism       Latest Reference Range & Units 04/26/21 07:46   TSH 0.380 - 5.330 uIU/mL 2.210     She has history of hypothyroidism.  She is currently utilizing levothyroxine.  No current signs or symptoms of overtreatment or under treatment.    Current regimen: Levothyroxine 100 mcg daily     Chronic Neck Pain    She reports staged 2 part fusion in 2007 at [facility] at the recommendation of a local orthopedist. She reports an anterior and posterior approach at that time. 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  At that same time she was experiencing left hip pain and was told it was likely related to the left neck pain.  She reports chronic issues with what she thinks is muscle spasms on the left greater than right neck as well as the bilateral trapezius areas which she relates to large amount of hardware adding additional pressure to her muscles.  It does not sound like she is followed up with anyone locally for this previous neck problem.    For chronic pain in the neck she utilizes tramadol 50 mg up to 2 tablets daily.  She is also using  celebrex 100 mg twice daily.  She is on turmeric.  She also is working with physical therapy and physiatry.          Current Medications:  Current Outpatient Medications Ordered in Epic   Medication Sig Dispense Refill   • Latanoprost 0.005 % Emulsion latanoprost   .005% 1/day     • Azelaic Acid 15 % Gel Apply 1 Application topically 2 times a day. 50 g 11   • metronidazole (METROGEL) 0.75 % gel Apply 1 Application topically 2 times a day. 45 g 11   • celecoxib (CELEBREX) 100 MG Cap Take 1 Capsule by mouth 2 times a day. 60 Capsule 3   • metoprolol SR (TOPROL XL) 25 MG TABLET SR 24 HR Take 1 Tablet by mouth every day. 100 Tablet 3   • [START ON 7/22/2022] traMADol (ULTRAM) 50 MG Tab Take 1 Tablet by mouth every 8 hours as needed for Severe Pain for up to 30 days. 80 Tablet 0   • [START ON 8/21/2022] traMADol (ULTRAM) 50 MG Tab Take 1 Tablet by mouth every 8 hours as needed for Severe Pain for up to 30 days. 80 Tablet 0   • [START ON 9/20/2022] traMADol (ULTRAM) 50 MG Tab Take 1 Tablet by mouth every 8 hours as needed for Severe Pain for up to 30 days. 80 Tablet 0   • levothyroxine (SYNTHROID) 100 MCG Tab TAKE ONE TABLET BY MOUTH EVERY MORNING ON AN EMPTY STOMACH 100 Tablet 3   • Lutein 20 MG Tab Take  by mouth.     • L-Theanine 200 MG Cap Take  by mouth.     • Coenzyme Q10 (COQ10 PO) Take 100 mg by mouth every day. Qunol liquid     • magnesium oxide (MAG-OX) 400 MG Tab Take 400 mg by mouth every evening.     • Calcium 500 MG Chew Tab Take 1 Tab by mouth every evening.     • Biotin 1000 MCG Chew Tab Take 500 mg by mouth every day.     • Milk Thistle 175 MG Cap Take 1 Cap by mouth every day.     • Cyanocobalamin (B-12) 2500 MCG Tab Take 1 Tab by mouth every day.     • Ascorbic Acid (VITAMIN C) 1000 MG Tab Take 1 Tab by mouth every day.     • Tretinoin 0.05 % Gel 1 Application by Apply externally route every bedtime. 1 Tube 3   • TURMERIC PO Take 333 mg by mouth every day. Daily (Qunol liquid)     • Probiotic Product  "(PRO-BIOTIC BLEND) Cap Take 1 Cap by mouth every evening. Floristan     • KRILL OIL PO Take 500 mg by mouth every day.     • Multiple Vitamins-Minerals (CENTRUM PO) Take 1 Tab by mouth every day.     • ciprofloxacin (CIPRO) 500 MG Tab      • COMBIGAN 0.2-0.5 % Solution      • latanoprost (XALATAN) 0.005 % Solution Place 1 Drop in both eyes every evening.       No current Epic-ordered facility-administered medications on file.          Objective:   Physical Exam:    Vitals: /60 (BP Location: Right arm, Patient Position: Sitting, BP Cuff Size: Adult)   Pulse 66   Temp 36.3 °C (97.4 °F) (Temporal)   Resp 12   Ht 1.702 m (5' 7\")   Wt 75.7 kg (166 lb 12.8 oz)   SpO2 94%    BMI: Body mass index is 26.12 kg/m².  Physical Exam  Constitutional:       General: She is not in acute distress.     Appearance: Normal appearance. She is not ill-appearing.   HENT:      Right Ear: Ear canal normal. There is no impacted cerumen.      Left Ear: Ear canal normal. There is no impacted cerumen.   Eyes:      General: No scleral icterus.     Conjunctiva/sclera: Conjunctivae normal.   Cardiovascular:      Rate and Rhythm: Normal rate and regular rhythm.      Pulses: Normal pulses.   Pulmonary:      Effort: Pulmonary effort is normal. No respiratory distress.      Breath sounds: Normal breath sounds. No wheezing or rhonchi.   Musculoskeletal:         General: Tenderness and deformity present.      Right lower leg: No edema.      Left lower leg: No edema.      Comments: Limited ROM of cervical spine   Skin:     Findings: No bruising.   Neurological:      Gait: Gait abnormal.      Comments: Ambulates without a gait aid   Psychiatric:         Mood and Affect: Mood normal.         Behavior: Behavior normal.         Thought Content: Thought content normal.         Judgment: Judgment normal.          Assessment and Plan:   Carlee is a 72 y.o. female with the following:  Problem List Items Addressed This Visit     Acquired hypothyroidism " "    Chronic and stable problem.  Update thyroid panel to ensure values are at goal, continue levothyroxine 100 mcg daily.           Chronic neck pain     Chronic and ongoing problem, secondary to prior neck surgery in 2007.  Continue on current analgesia approach with tramadol 50 mg twice daily and Celebrex 100 mg 1-2 times daily.  Continue follow-up with physiatry.  No negative sequelae from the tramadol.  Controlled substance agreement updated in clinic today.    Obtained and reviewed patient utilization report from [facility] pharmacy database on 7/7/2022 4:14 PM  prior to writing prescription for controlled substance II, III or IV per [place] bill . Based on assessment of the report, the prescription is medically necessary.     Patient understands this prescription is a controlled substance which is potentially habit-forming and its use is regulated by the [employer]. We also discussed the new \"black box\" warning regarding the lethal combination of opioids and benzodiazepines. Refills are subject to terms of a controlled substance agreement and patient has an updated one on file. Most recent UDS is appropriate. Any refill requires an office visit. Narcotics have may adverse effects and the risks of addiction, accidental overdose and death were emphasized. Provided prescriptions for the next three months.           Relevant Medications    celecoxib (CELEBREX) 100 MG Cap    traMADol (ULTRAM) 50 MG Tab (Start on 7/22/2022)    traMADol (ULTRAM) 50 MG Tab (Start on 8/21/2022)    traMADol (ULTRAM) 50 MG Tab (Start on 9/20/2022)    Other Relevant Orders    Sed Rate    CRP QUANTITIVE (NON-CARDIAC)    Controlled Substance Treatment Agreement    Dyslipidemia     Chronic and ongoing problem.  We will update her lipid panel, could consider pharmacologic lipid-lowering medicine if values remain elevated or CT cardiac score for additional risk stratification.           Relevant Orders    Lipid Profile    TSH WITH REFLEX TO FT4 " "   Chronic use of opiate for therapeutic purpose     Chronic and ongoing problem.  She is doing well with reduction of her 30-day supply of tramadol to 80 tablets, consistently uses 2 tabs daily but sometimes more if she has breakthrough pain.    Obtained and reviewed patient utilization report from [facility] pharmacy database on 7/7/2022 4:18 PM  prior to writing prescription for controlled substance II, III or IV per [place] bill . Based on assessment of the report, the prescription is medically necessary.     Patient understands this prescription is a controlled substance which is potentially habit-forming and its use is regulated by the [place]. We also discussed the new \"black box\" warning regarding the lethal combination of opioids and benzodiazepines. Refills are subject to terms of a controlled substance agreement and patient has an updated one on file. Most recent UDS is appropriate. Any refill requires an office visit. Narcotics have may adverse effects and the risks of addiction, accidental overdose and death were emphasized. Provided prescriptions for the next three months.           Relevant Medications    traMADol (ULTRAM) 50 MG Tab (Start on 7/22/2022)    traMADol (ULTRAM) 50 MG Tab (Start on 8/21/2022)    traMADol (ULTRAM) 50 MG Tab (Start on 9/20/2022)    Right > left hip and anterior thigh pain     Previous problem, improved, continue follow up with physiatry and current analgesia regimen.           Rosacea     Chronic and ongoing issue, will renew metrogel and she has a coupon to also try azelaic acid.           Relevant Medications    Azelaic Acid 15 % Gel    metronidazole (METROGEL) 0.75 % gel      Other Visit Diagnoses     Essential hypertension        Relevant Medications    metoprolol SR (TOPROL XL) 25 MG TABLET SR 24 HR    Other Relevant Orders    CBC WITH DIFFERENTIAL    Comp Metabolic Panel    VITAMIN D,25 HYDROXY    VITAMIN B12             RTC: Return in about 3 months (around " 10/7/2022).    I spent a total of 34 minutes with record review, exam, communication with the patient, communication with other providers, and documentation of this encounter.    PLEASE NOTE: This dictation was created using voice recognition software. I have made every reasonable attempt to correct obvious errors, but I expect that there are errors of grammar and possibly content that I did not discover before finalizing the note.      Shari Dent, DO  Geriatric and Internal Medicine  [facility]

## 2022-07-07 NOTE — ASSESSMENT & PLAN NOTE
Chronic and ongoing problem.  We will update her lipid panel, could consider pharmacologic lipid-lowering medicine if values remain elevated or CT cardiac score for additional risk stratification.

## 2022-07-07 NOTE — LETTER
[employer]  Shari Dent D.O.  [address]  [place] [address]  Fax: [phone]   Authorization for Release/Disclosure of   Protected Health Information   Name: CARLEE HUNT : 1950 SSN: [id number]   Address: [facility] [place] [address] Phone:    [phone] (home)    I authorize the entity listed below to release/disclose the PHI below to:   [employer]/Shari Dent D.O. and Shari Dent D.O.   Provider or Entity Name:  [facility]       Address   City, State, Zip   Phone:      Fax:     Reason for request: continuity of care   Information to be released:    [  ] LAST COLONOSCOPY,  including any PATH REPORT and follow-up  [  ] LAST FIT/COLOGUARD RESULT [  ] LAST DEXA  [  ] LAST MAMMOGRAM  [  ] LAST PAP  [  ] LAST LABS [  ] RETINA EXAM REPORT  [  ] IMMUNIZATION RECORDS  [  ] Release all info      [  ] Check here and initial the line next to each item to release ALL health information INCLUDING  _____ Care and treatment for drug and / or alcohol abuse  _____ HIV testing, infection status, or AIDS  _____ Genetic Testing    DATES OF SERVICE OR TIME PERIOD TO BE DISCLOSED: _____________  I understand and acknowledge that:  * This Authorization may be revoked at any time by you in writing, except if your health information has already been used or disclosed.  * Your health information that will be used or disclosed as a result of you signing this authorization could be re-disclosed by the recipient. If this occurs, your re-disclosed health information may no longer be protected by State or Federal laws.  * You may refuse to sign this Authorization. Your refusal will not affect your ability to obtain treatment.  * This Authorization becomes effective upon signing and will  on (date) __________.      If no date is indicated, this Authorization will  one (1) year from the signature date.    Name: Carlee Hunt    Signature:   Date:     2022       PLEASE  FAX REQUESTED RECORDS BACK TO: [phone]

## 2022-07-07 NOTE — ASSESSMENT & PLAN NOTE
Chronic and stable problem.  Update thyroid panel to ensure values are at goal, continue levothyroxine 100 mcg daily.

## 2022-07-07 NOTE — ASSESSMENT & PLAN NOTE
"Chronic and ongoing problem, secondary to prior neck surgery in 2007.  Continue on current analgesia approach with tramadol 50 mg twice daily and Celebrex 100 mg 1-2 times daily.  Continue follow-up with physiatry.  No negative sequelae from the tramadol.  Controlled substance agreement updated in clinic today.    Obtained and reviewed patient utilization report from [facility] pharmacy database on 7/7/2022 4:14 PM  prior to writing prescription for controlled substance II, III or IV per [place] bill . Based on assessment of the report, the prescription is medically necessary.     Patient understands this prescription is a controlled substance which is potentially habit-forming and its use is regulated by the [employer]. We also discussed the new \"black box\" warning regarding the lethal combination of opioids and benzodiazepines. Refills are subject to terms of a controlled substance agreement and patient has an updated one on file. Most recent UDS is appropriate. Any refill requires an office visit. Narcotics have may adverse effects and the risks of addiction, accidental overdose and death were emphasized. Provided prescriptions for the next three months.  "

## 2022-09-29 ENCOUNTER — HOSPITAL ENCOUNTER (OUTPATIENT)
Dept: RADIOLOGY | Facility: MEDICAL CENTER | Age: 72
End: 2022-09-29
Attending: INTERNAL MEDICINE
Payer: MEDICARE

## 2022-09-29 DIAGNOSIS — Z12.31 VISIT FOR SCREENING MAMMOGRAM: ICD-10-CM

## 2022-09-29 PROCEDURE — 77063 BREAST TOMOSYNTHESIS BI: CPT

## 2022-10-17 ENCOUNTER — HOSPITAL ENCOUNTER (OUTPATIENT)
Dept: LAB | Facility: MEDICAL CENTER | Age: 72
End: 2022-10-17
Attending: INTERNAL MEDICINE
Payer: MEDICARE

## 2022-10-17 DIAGNOSIS — M54.2 CHRONIC NECK PAIN: ICD-10-CM

## 2022-10-17 DIAGNOSIS — E78.5 DYSLIPIDEMIA: ICD-10-CM

## 2022-10-17 DIAGNOSIS — G89.29 CHRONIC NECK PAIN: ICD-10-CM

## 2022-10-17 DIAGNOSIS — R30.0 DYSURIA: ICD-10-CM

## 2022-10-17 DIAGNOSIS — I10 ESSENTIAL HYPERTENSION: ICD-10-CM

## 2022-10-17 LAB
25(OH)D3 SERPL-MCNC: 38 NG/ML (ref 30–100)
ALBUMIN SERPL BCP-MCNC: 4.4 G/DL (ref 3.2–4.9)
ALBUMIN/GLOB SERPL: 1.5 G/DL
ALP SERPL-CCNC: 94 U/L (ref 30–99)
ALT SERPL-CCNC: 22 U/L (ref 2–50)
ANION GAP SERPL CALC-SCNC: 8 MMOL/L (ref 7–16)
APPEARANCE UR: ABNORMAL
AST SERPL-CCNC: 23 U/L (ref 12–45)
BACTERIA #/AREA URNS HPF: ABNORMAL /HPF
BASOPHILS # BLD AUTO: 0.4 % (ref 0–1.8)
BASOPHILS # BLD: 0.05 K/UL (ref 0–0.12)
BILIRUB SERPL-MCNC: 0.4 MG/DL (ref 0.1–1.5)
BILIRUB UR QL STRIP.AUTO: NEGATIVE
BUN SERPL-MCNC: 16 MG/DL (ref 8–22)
CALCIUM SERPL-MCNC: 9.6 MG/DL (ref 8.4–10.2)
CHLORIDE SERPL-SCNC: 104 MMOL/L (ref 96–112)
CHOLEST SERPL-MCNC: 211 MG/DL (ref 100–199)
CO2 SERPL-SCNC: 29 MMOL/L (ref 20–33)
COLOR UR: YELLOW
CREAT SERPL-MCNC: 0.65 MG/DL (ref 0.5–1.4)
CRP SERPL HS-MCNC: 0.64 MG/DL (ref 0–0.75)
EOSINOPHIL # BLD AUTO: 0.18 K/UL (ref 0–0.51)
EOSINOPHIL NFR BLD: 1.5 % (ref 0–6.9)
EPI CELLS #/AREA URNS HPF: ABNORMAL /HPF
EPITHELIAL CELLS RENAL  1715R: ABNORMAL /HPF
ERYTHROCYTE [DISTWIDTH] IN BLOOD BY AUTOMATED COUNT: 43.5 FL (ref 35.9–50)
ERYTHROCYTE [SEDIMENTATION RATE] IN BLOOD BY WESTERGREN METHOD: 5 MM/HOUR (ref 0–25)
FASTING STATUS PATIENT QL REPORTED: NORMAL
GFR SERPLBLD CREATININE-BSD FMLA CKD-EPI: 92 ML/MIN/1.73 M 2
GLOBULIN SER CALC-MCNC: 3 G/DL (ref 1.9–3.5)
GLUCOSE SERPL-MCNC: 99 MG/DL (ref 65–99)
GLUCOSE UR STRIP.AUTO-MCNC: NEGATIVE MG/DL
HCT VFR BLD AUTO: 45.8 % (ref 37–47)
HDLC SERPL-MCNC: 56 MG/DL
HGB BLD-MCNC: 15.4 G/DL (ref 12–16)
IMM GRANULOCYTES # BLD AUTO: 0.03 K/UL (ref 0–0.11)
IMM GRANULOCYTES NFR BLD AUTO: 0.3 % (ref 0–0.9)
KETONES UR STRIP.AUTO-MCNC: NEGATIVE MG/DL
LDLC SERPL CALC-MCNC: 112 MG/DL
LEUKOCYTE ESTERASE UR QL STRIP.AUTO: ABNORMAL
LYMPHOCYTES # BLD AUTO: 3 K/UL (ref 1–4.8)
LYMPHOCYTES NFR BLD: 25.3 % (ref 22–41)
MCH RBC QN AUTO: 31.8 PG (ref 27–33)
MCHC RBC AUTO-ENTMCNC: 33.6 G/DL (ref 33.6–35)
MCV RBC AUTO: 94.4 FL (ref 81.4–97.8)
MICRO URNS: ABNORMAL
MONOCYTES # BLD AUTO: 0.66 K/UL (ref 0–0.85)
MONOCYTES NFR BLD AUTO: 5.6 % (ref 0–13.4)
MUCOUS THREADS #/AREA URNS HPF: ABNORMAL /HPF
NEUTROPHILS # BLD AUTO: 7.93 K/UL (ref 2–7.15)
NEUTROPHILS NFR BLD: 66.9 % (ref 44–72)
NITRITE UR QL STRIP.AUTO: NEGATIVE
NRBC # BLD AUTO: 0 K/UL
NRBC BLD-RTO: 0 /100 WBC
PH UR STRIP.AUTO: 7.5 [PH] (ref 5–8)
PLATELET # BLD AUTO: 219 K/UL (ref 164–446)
PMV BLD AUTO: 10 FL (ref 9–12.9)
POTASSIUM SERPL-SCNC: 4.5 MMOL/L (ref 3.6–5.5)
PROT SERPL-MCNC: 7.4 G/DL (ref 6–8.2)
PROT UR QL STRIP: NEGATIVE MG/DL
RBC # BLD AUTO: 4.85 M/UL (ref 4.2–5.4)
RBC # URNS HPF: ABNORMAL /HPF
RBC UR QL AUTO: ABNORMAL
SODIUM SERPL-SCNC: 141 MMOL/L (ref 135–145)
SP GR UR STRIP.AUTO: 1.01
TRIGL SERPL-MCNC: 216 MG/DL (ref 0–149)
TSH SERPL DL<=0.005 MIU/L-ACNC: 2.11 UIU/ML (ref 0.38–5.33)
VIT B12 SERPL-MCNC: 1260 PG/ML (ref 211–911)
WBC # BLD AUTO: 11.9 K/UL (ref 4.8–10.8)
WBC #/AREA URNS HPF: ABNORMAL /HPF

## 2022-10-17 PROCEDURE — 85025 COMPLETE CBC W/AUTO DIFF WBC: CPT

## 2022-10-17 PROCEDURE — 81001 URINALYSIS AUTO W/SCOPE: CPT

## 2022-10-17 PROCEDURE — 84443 ASSAY THYROID STIM HORMONE: CPT

## 2022-10-17 PROCEDURE — 87186 SC STD MICRODIL/AGAR DIL: CPT

## 2022-10-17 PROCEDURE — 87077 CULTURE AEROBIC IDENTIFY: CPT

## 2022-10-17 PROCEDURE — 82306 VITAMIN D 25 HYDROXY: CPT

## 2022-10-17 PROCEDURE — 36415 COLL VENOUS BLD VENIPUNCTURE: CPT

## 2022-10-17 PROCEDURE — 86140 C-REACTIVE PROTEIN: CPT

## 2022-10-17 PROCEDURE — 85652 RBC SED RATE AUTOMATED: CPT

## 2022-10-17 PROCEDURE — 87086 URINE CULTURE/COLONY COUNT: CPT

## 2022-10-17 PROCEDURE — 82607 VITAMIN B-12: CPT

## 2022-10-17 PROCEDURE — 80053 COMPREHEN METABOLIC PANEL: CPT

## 2022-10-17 PROCEDURE — 80061 LIPID PANEL: CPT

## 2022-10-20 ENCOUNTER — HOSPITAL ENCOUNTER (OUTPATIENT)
Facility: MEDICAL CENTER | Age: 72
End: 2022-10-20
Attending: INTERNAL MEDICINE
Payer: MEDICARE

## 2022-10-20 ENCOUNTER — OFFICE VISIT (OUTPATIENT)
Dept: MEDICAL GROUP | Facility: PHYSICIAN GROUP | Age: 72
End: 2022-10-20
Payer: MEDICARE

## 2022-10-20 VITALS
OXYGEN SATURATION: 91 % | HEART RATE: 62 BPM | SYSTOLIC BLOOD PRESSURE: 116 MMHG | TEMPERATURE: 97.5 F | RESPIRATION RATE: 12 BRPM | BODY MASS INDEX: 26.37 KG/M2 | HEIGHT: 67 IN | DIASTOLIC BLOOD PRESSURE: 70 MMHG | WEIGHT: 168 LBS

## 2022-10-20 DIAGNOSIS — G89.29 CHRONIC NECK PAIN: ICD-10-CM

## 2022-10-20 DIAGNOSIS — E03.9 ACQUIRED HYPOTHYROIDISM: ICD-10-CM

## 2022-10-20 DIAGNOSIS — Z79.891 CHRONIC USE OF OPIATE FOR THERAPEUTIC PURPOSE: ICD-10-CM

## 2022-10-20 DIAGNOSIS — M54.2 CHRONIC NECK PAIN: ICD-10-CM

## 2022-10-20 DIAGNOSIS — A49.8 ENTEROCOCCUS FAECALIS INFECTION: ICD-10-CM

## 2022-10-20 DIAGNOSIS — R31.1 BENIGN ESSENTIAL MICROSCOPIC HEMATURIA: ICD-10-CM

## 2022-10-20 DIAGNOSIS — E78.5 DYSLIPIDEMIA: ICD-10-CM

## 2022-10-20 DIAGNOSIS — N95.8 GENITOURINARY SYNDROME OF MENOPAUSE: ICD-10-CM

## 2022-10-20 PROCEDURE — 80307 DRUG TEST PRSMV CHEM ANLYZR: CPT

## 2022-10-20 PROCEDURE — 99214 OFFICE O/P EST MOD 30 MIN: CPT | Performed by: INTERNAL MEDICINE

## 2022-10-20 RX ORDER — TRAMADOL HYDROCHLORIDE 50 MG/1
50-100 TABLET ORAL 2 TIMES DAILY
Qty: 90 TABLET | Refills: 0 | Status: SHIPPED | OUTPATIENT
Start: 2022-11-19 | End: 2022-12-19

## 2022-10-20 RX ORDER — HYDROXYZINE HYDROCHLORIDE 25 MG/1
TABLET, FILM COATED ORAL
COMMUNITY
Start: 2022-10-10 | End: 2024-02-01

## 2022-10-20 RX ORDER — TRAMADOL HYDROCHLORIDE 50 MG/1
50-100 TABLET ORAL 2 TIMES DAILY
Qty: 90 TABLET | Refills: 0 | Status: SHIPPED | OUTPATIENT
Start: 2022-10-20 | End: 2022-11-19

## 2022-10-20 RX ORDER — HYDROXYZINE HYDROCHLORIDE 25 MG/1
TABLET, FILM COATED ORAL
COMMUNITY
End: 2023-01-12

## 2022-10-20 RX ORDER — TRAMADOL HYDROCHLORIDE 50 MG/1
50-100 TABLET ORAL 2 TIMES DAILY
Qty: 90 TABLET | Refills: 0 | Status: SHIPPED | OUTPATIENT
Start: 2022-12-19 | End: 2023-01-12 | Stop reason: SDUPTHER

## 2022-10-20 RX ORDER — ESTRADIOL 0.1 MG/G
0.4 CREAM VAGINAL
Qty: 42.5 G | Refills: 3 | Status: SHIPPED | OUTPATIENT
Start: 2022-10-20

## 2022-10-20 ASSESSMENT — FIBROSIS 4 INDEX: FIB4 SCORE: 1.66

## 2022-10-21 NOTE — ASSESSMENT & PLAN NOTE
Chronic and ongoing issue.  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  Previously resistant to tetracyclines which coincides with urinary analysis done at [place] [facility].  She felt better after treatment with ciprofloxacin by their group in the summer 2022.  Urinalysis a few days ago shows Enterococcus faecalis again, this time pansensitive.  She has been recommended to try hydroxyzine and follow-up in 1 month if her bladder sensation symptoms are no better.  She is open to adding topical estrogen to see if we can strengthen the tissue around her urethra to limit recurrent urinary tract infections. No vielka dysuria at this time.

## 2022-10-21 NOTE — ASSESSMENT & PLAN NOTE
Chronic and stable problem.  Thyroid appears appropriately dosed with problem of levothyroxine 100 mcg daily.  No dose adjustment needed at this time.

## 2022-10-21 NOTE — ASSESSMENT & PLAN NOTE
Chronic and ongoing problem, she has hematuria when she has urine infections.  She recently followed with urology, will request latest records from her cystoscopy completed in October 2022.

## 2022-10-21 NOTE — ASSESSMENT & PLAN NOTE
Chronic and ongoing problem.  Triglycerides are elevated which she relates to worsening diet eating caramel apples during this past month.  Could consider CT cardiac score for additional risk stratification in the future.

## 2022-10-21 NOTE — PROGRESS NOTES
Subjective:   Chief Complaint/History of Present Illness:  Carlee Hunt is a 72 y.o. female established patient who presents today to discuss medical problems as listed below. Carlee is unaccompanied for today's visit.    Problem   Benign Essential Microscopic Hematuria    She has history of hematuria.  She did meet with [place] [facility] this past summer.  They completed a cystoscopy in early October 2022 which did not show any concern for malignancy of the genitourinary system.  Bilateral knee     Prior UTI- Enterococcus faecalis; resistant to tetracyclines    Enterococcus faecalis   >100,000 cfu/mL      Resulting Agency [facility]        Susceptibility     Enterococcus faecalis     HIRAM     Ampicillin <=2 mcg/mL Sensitive     Daptomycin 2 mcg/mL Sensitive     Nitrofurantoin <=32 mcg/mL Sensitive     Penicillin 2 mcg/mL Sensitive     Tetracycline <=4 mcg/mL Sensitive     Vancomycin 1 mcg/mL Sensitive                 Urinalysis in February 2020 was positive for Enterococcus faecalis, resistant to tetracyclines.  Of note, she is on suppressive antibiotics with doxycycline for what sounds to be the past 13 years.  She does follow with ID.  She reports challenges with stool incontinence and had a pelvic floor stimulator placed by Dr. Goddard.     Dyslipidemia     Latest Reference Range & Units 10/17/22 08:21   Cholesterol,Tot 100 - 199 mg/dL 211 (H)   Triglycerides 0 - 149 mg/dL 216 (H)   HDL >=40 mg/dL 56   LDL <100 mg/dL 112 (H)     She has history of hyperlipidemia.  Unclear if she has ever taken dedicated lipid-lowering medications.  She is on coenzyme Q 10.  She had a carotid ultrasound in 2017 that was negative for any blockages.  Unclear if she is ever been evaluated for coronary artery disease.  She did have a Holter study for palpitations in the past.     Chronic Use of Opiate for Therapeutic Purpose    She reports longstanding chronic pain in her neck due to a 2 staged neck fusion surgery in 2013 at [facility].  She also has had  issues with intermittent low back/SI joint pain, left greater than right hip pain now radiating into the groin, and left IT band pain.  She uses tramadol 50 mg generally 2 tablets daily for pain control which is no longer sufficient as of October 2022, will plan to increase to 3 tablets daily.  She denies any deleterious side effects from this medicine.     Acquired Hypothyroidism       Latest Reference Range & Units 10/17/22 08:21   TSH 0.380 - 5.330 uIU/mL 2.110     She has history of hypothyroidism.  She is currently utilizing levothyroxine.  No current signs or symptoms of overtreatment or under treatment.    Current regimen: Levothyroxine 100 mcg daily     Chronic Neck Pain    She reports staged 2 part fusion in 2007 at [facility] at the recommendation of a local orthopedist. She reports an anterior and posterior approach at that time. 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  At that same time she was experiencing left hip pain and was told it was likely related to the left neck pain.  She reports chronic issues with what she thinks is muscle spasms on the left greater than right neck as well as the bilateral trapezius areas which she relates to large amount of hardware adding additional pressure to her muscles.  It does not sound like she is followed up with anyone locally for this previous neck problem.    For chronic pain in the neck she utilizes tramadol 50 mg up to 3 tablets daily, 2 in the AM and 1 in the PM.  She is also using celebrex 100 mg twice daily.  She is on turmeric.  She also is working with physical therapy and physiatry.          Current Medications:  Current Outpatient Medications Ordered in Epic   Medication Sig Dispense Refill    hydrOXYzine HCl (ATARAX) 25 MG Tab hydroxyzine HCl 25 mg tablet   Take 1 tablet every day by oral route  at bedtime for 90 days.      estradiol (ESTRACE) 0.1 MG/GM vaginal cream Insert 0.4 g into the vagina every 72 hours. 42.5 g 3    traMADol (ULTRAM) 50 MG Tab Take 1-2 Tablets by mouth 2 times a day for 30 days. Take 2 tabs in AM and 1 tab in PM 90 Tablet 0    [START ON 11/19/2022] traMADol (ULTRAM) 50 MG Tab Take 1-2 Tablets by mouth 2 times a day for 30 days. Take 2 tabs in AM and 1 tab in PM 90 Tablet 0    [START ON 12/19/2022] traMADol (ULTRAM) 50 MG Tab Take 1-2 Tablets by mouth 2 times a day for 30 days. Take 2 tabs in AM and 1 tab in PM 90 Tablet 0    Azelaic Acid 15 % Gel Apply 1 Application topically 2 times a day. 50 g 11    metronidazole (METROGEL) 0.75 % gel Apply 1 Application topically 2 times a day. 45 g 11    celecoxib (CELEBREX) 100 MG Cap Take 1 Capsule by mouth 2 times a day. 60 Capsule 3    metoprolol SR (TOPROL XL) 25 MG TABLET SR 24 HR Take 1 Tablet by mouth every day. 100 Tablet 3    levothyroxine (SYNTHROID) 100 MCG Tab TAKE ONE TABLET BY MOUTH EVERY MORNING ON AN EMPTY STOMACH 100 Tablet 3    COMBIGAN 0.2-0.5 % Solution       Lutein 20 MG Tab Take  by mouth.      L-Theanine 200 MG Cap Take  by mouth.      Coenzyme Q10 (COQ10 PO) Take 100 mg by mouth every day. Qunol liquid      magnesium oxide (MAG-OX) 400 MG Tab Take 400 mg by mouth every evening.      Calcium 500 MG Chew Tab Take 1 Tab by mouth every evening.      Biotin 1000 MCG Chew Tab Take 500 mg by mouth every day.      Milk Thistle 175 MG Cap Take 1 Cap by mouth every day.      Cyanocobalamin (B-12) 2500 MCG Tab Take 1 Tab by mouth every day.      Tretinoin 0.05 % Gel 1 Application by Apply externally route every bedtime. 1 Tube 3    TURMERIC PO Take 333 mg by mouth every day. Daily (Qunol liquid)      Probiotic Product (PRO-BIOTIC BLEND) Cap Take 1 Cap by mouth every evening. Floristan      latanoprost (XALATAN) 0.005 % Solution Place 1 Drop in both eyes every evening.      KRILL OIL PO Take 500 mg by mouth every day.      Multiple  "Vitamins-Minerals (CENTRUM PO) Take 1 Tab by mouth every day.      hydrOXYzine HCl (ATARAX) 25 MG Tab        No current Epic-ordered facility-administered medications on file.          Objective:   Physical Exam:    Vitals: /70 (BP Location: Right arm, Patient Position: Sitting, BP Cuff Size: Adult)   Pulse 62   Temp 36.4 °C (97.5 °F) (Temporal)   Resp 12   Ht 1.702 m (5' 7\")   Wt 76.2 kg (168 lb)   SpO2 91%    BMI: Body mass index is 26.31 kg/m².  Physical Exam  Constitutional:       General: She is not in acute distress.     Appearance: Normal appearance. She is not ill-appearing.   Eyes:      General: No scleral icterus.     Conjunctiva/sclera: Conjunctivae normal.   Neck:      Comments: Decreased active ROM of neck  Cardiovascular:      Rate and Rhythm: Normal rate and regular rhythm.      Pulses: Normal pulses.      Heart sounds: No murmur heard.  Pulmonary:      Effort: Pulmonary effort is normal. No respiratory distress.      Breath sounds: No wheezing or rhonchi.   Musculoskeletal:         General: Tenderness present.      Cervical back: Rigidity present.      Right lower leg: No edema.      Left lower leg: No edema.      Comments: Low back, neck   Skin:     General: Skin is dry.      Findings: No bruising or rash.   Psychiatric:         Mood and Affect: Mood normal.         Behavior: Behavior normal.         Thought Content: Thought content normal.         Judgment: Judgment normal.             Assessment and Plan:   Carlee is a 72 y.o. female with the following:  Problem List Items Addressed This Visit       Acquired hypothyroidism     Chronic and stable problem.  Thyroid appears appropriately dosed with problem of levothyroxine 100 mcg daily.  No dose adjustment needed at this time.         Chronic neck pain     Chronic and ongoing problem.  She continues to experience significant pain and limitation in her neck secondary to prior fusion dating back to 2007.  She continues to find excellent " "pain relief with use of tramadol 100 mg in the morning 50 mg at bedtime.  She previously used up to 4 tablets daily and we were able to titrate her down to 2 tablets daily but that is no longer sufficient for the degree of pain that she experiences.  We will go back to tramadol 100 mg in the morning and 50 mg in the evening along with her other analgesics including Celebrex 100 mg twice daily and turmeric.  She will continue to follow-up with physical therapy and physiatry, no longer following with neurosurgery.  Urine drug screen will be updated in clinic today.    Obtained and reviewed patient utilization report from [facility] pharmacy database on 10/20/2022 6:23 PM  prior to writing prescription for controlled substance II, III or IV per [place] bill . Based on assessment of the report, the prescription is medically necessary.     Patient understands this prescription is a controlled substance which is potentially habit-forming and its use is regulated by the [place]. We also discussed the new \"black box\" warning regarding the lethal combination of opioids and benzodiazepines. Refills are subject to terms of a controlled substance agreement and patient has an updated one on file. Most recent UDS is appropriate. Any refill requires an office visit. Narcotics have may adverse effects and the risks of addiction, accidental overdose and death were emphasized. Provided prescriptions for the next three months.         Relevant Medications    traMADol (ULTRAM) 50 MG Tab    traMADol (ULTRAM) 50 MG Tab (Start on 11/19/2022)    traMADol (ULTRAM) 50 MG Tab (Start on 12/19/2022)    Other Relevant Orders    URINE DRUG SCREEN    Dyslipidemia     Chronic and ongoing problem.  Triglycerides are elevated which she relates to worsening diet eating caramel apples during this past month.  Could consider CT cardiac score for additional risk stratification in the future.         Chronic use of opiate for therapeutic purpose     " "Chronic and ongoing problem.  She continues to experience significant pain and limitation in her neck secondary to prior fusion dating back to 2007.  She continues to find excellent pain relief with use of tramadol 100 mg in the morning 50 mg at bedtime.  She previously used up to 4 tablets daily and we were able to titrate her down to 2 tablets daily but that is no longer sufficient for the degree of pain that she experiences.  We will go back to tramadol 100 mg in the morning and 50 mg in the evening along with her other analgesics including Celebrex 100 mg twice daily and turmeric.  She will continue to follow-up with physical therapy and physiatry, no longer following with neurosurgery.  Urine drug screen will be updated in clinic today.    Obtained and reviewed patient utilization report from [facility] pharmacy database on 10/20/2022 6:23 PM  prior to writing prescription for controlled substance II, III or IV per [place] bill . Based on assessment of the report, the prescription is medically necessary.     Patient understands this prescription is a controlled substance which is potentially habit-forming and its use is regulated by the [place]. We also discussed the new \"black box\" warning regarding the lethal combination of opioids and benzodiazepines. Refills are subject to terms of a controlled substance agreement and patient has an updated one on file. Most recent UDS is appropriate. Any refill requires an office visit. Narcotics have may adverse effects and the risks of addiction, accidental overdose and death were emphasized. Provided prescriptions for the next three months.           Relevant Medications    traMADol (ULTRAM) 50 MG Tab    traMADol (ULTRAM) 50 MG Tab (Start on 11/19/2022)    traMADol (ULTRAM) 50 MG Tab (Start on 12/19/2022)    Other Relevant Orders    URINE DRUG SCREEN    Prior UTI- Enterococcus faecalis; resistant to tetracyclines     Chronic and ongoing issue.  She is now working " closely with urology due to her history of recurrent urinary tract infections with Enterococcus faecalis.  Previously resistant to tetracyclines which coincides with urinary analysis done at [place] [facility].  She felt better after treatment with ciprofloxacin by their group in the summer 2022.  Urinalysis a few days ago shows Enterococcus faecalis again, this time pansensitive.  She has been recommended to try hydroxyzine and follow-up in 1 month if her bladder sensation symptoms are no better.  She is open to adding topical estrogen to see if we can strengthen the tissue around her urethra to limit recurrent urinary tract infections. No vielka dysuria at this time.         Benign essential microscopic hematuria     Chronic and ongoing problem, she has hematuria when she has urine infections.  She recently followed with urology, will request latest records from her cystoscopy completed in October 2022.          Other Visit Diagnoses       Genitourinary syndrome of menopause        Relevant Medications    estradiol (ESTRACE) 0.1 MG/GM vaginal cream             RTC: Return in about 3 months (around 1/20/2023).    I spent a total of 38 minutes with record review, exam, communication with the patient, communication with other providers, and documentation of this encounter.    PLEASE NOTE: This dictation was created using voice recognition software. I have made every reasonable attempt to correct obvious errors, but I expect that there are errors of grammar and possibly content that I did not discover before finalizing the note.      Shari Dent, DO  Geriatric and Internal Medicine  [facility]

## 2022-10-21 NOTE — ASSESSMENT & PLAN NOTE
"Chronic and ongoing problem.  She continues to experience significant pain and limitation in her neck secondary to prior fusion dating back to 2007.  She continues to find excellent pain relief with use of tramadol 100 mg in the morning 50 mg at bedtime.  She previously used up to 4 tablets daily and we were able to titrate her down to 2 tablets daily but that is no longer sufficient for the degree of pain that she experiences.  We will go back to tramadol 100 mg in the morning and 50 mg in the evening along with her other analgesics including Celebrex 100 mg twice daily and turmeric.  She will continue to follow-up with physical therapy and physiatry, no longer following with neurosurgery.  Urine drug screen will be updated in clinic today.    Obtained and reviewed patient utilization report from [facility] pharmacy database on 10/20/2022 6:23 PM  prior to writing prescription for controlled substance II, III or IV per [place] bill . Based on assessment of the report, the prescription is medically necessary.     Patient understands this prescription is a controlled substance which is potentially habit-forming and its use is regulated by the [place]. We also discussed the new \"black box\" warning regarding the lethal combination of opioids and benzodiazepines. Refills are subject to terms of a controlled substance agreement and patient has an updated one on file. Most recent UDS is appropriate. Any refill requires an office visit. Narcotics have may adverse effects and the risks of addiction, accidental overdose and death were emphasized. Provided prescriptions for the next three months.  "

## 2022-10-21 NOTE — ASSESSMENT & PLAN NOTE
"Chronic and ongoing problem.  She continues to experience significant pain and limitation in her neck secondary to prior fusion dating back to 2007.  She continues to find excellent pain relief with use of tramadol 100 mg in the morning 50 mg at bedtime.  She previously used up to 4 tablets daily and we were able to titrate her down to 2 tablets daily but that is no longer sufficient for the degree of pain that she experiences.  We will go back to tramadol 100 mg in the morning and 50 mg in the evening along with her other analgesics including Celebrex 100 mg twice daily and turmeric.  She will continue to follow-up with physical therapy and physiatry, no longer following with neurosurgery.  Urine drug screen will be updated in clinic today.    Obtained and reviewed patient utilization report from [facility] pharmacy database on 10/20/2022 6:23 PM  prior to writing prescription for controlled substance II, III or IV per [place] bill . Based on assessment of the report, the prescription is medically necessary.     Patient understands this prescription is a controlled substance which is potentially habit-forming and its use is regulated by the [place]. We also discussed the new \"black box\" warning regarding the lethal combination of opioids and benzodiazepines. Refills are subject to terms of a controlled substance agreement and patient has an updated one on file. Most recent UDS is appropriate. Any refill requires an office visit. Narcotics have may adverse effects and the risks of addiction, accidental overdose and death were emphasized. Provided prescriptions for the next three months.    "

## 2022-11-20 DIAGNOSIS — E03.9 ACQUIRED HYPOTHYROIDISM: ICD-10-CM

## 2022-11-21 RX ORDER — LEVOTHYROXINE SODIUM 0.1 MG/1
TABLET ORAL
Qty: 100 TABLET | Refills: 0 | Status: SHIPPED | OUTPATIENT
Start: 2022-11-21 | End: 2023-03-14 | Stop reason: SDUPTHER

## 2022-11-21 NOTE — TELEPHONE ENCOUNTER
Requested Prescriptions     Pending Prescriptions Disp Refills   • levothyroxine (SYNTHROID) 100 MCG Tab [Pharmacy Med Name: LEVOTHYROXINE 100 MCG TABLET] 100 Tablet 0     Sig: TAKE ONE TABLET BY MOUTH EVERY MORNING ON AN EMPTY STOMACH   Princess Ray M.D.

## 2022-11-21 NOTE — TELEPHONE ENCOUNTER
Received request via: Pharmacy    Was the patient seen in the last year in this department? Yes    Does the patient have an active prescription (recently filled or refills available) for medication(s) requested? No    Does the patient have [facility] Plus and need 100 day supply (blood pressure, diabetes and cholesterol meds only)? Yes, quantity updated to 100 days

## 2022-12-06 ENCOUNTER — HOSPITAL ENCOUNTER (OUTPATIENT)
Facility: MEDICAL CENTER | Age: 72
End: 2022-12-06
Attending: PHYSICIAN ASSISTANT
Payer: MEDICARE

## 2022-12-06 PROCEDURE — 87186 SC STD MICRODIL/AGAR DIL: CPT

## 2022-12-06 PROCEDURE — 87077 CULTURE AEROBIC IDENTIFY: CPT

## 2022-12-06 PROCEDURE — 87086 URINE CULTURE/COLONY COUNT: CPT

## 2022-12-22 ENCOUNTER — DOCUMENTATION (OUTPATIENT)
Dept: HEALTH INFORMATION MANAGEMENT | Facility: OTHER | Age: 72
End: 2022-12-22
Payer: MEDICARE

## 2023-01-04 DIAGNOSIS — G89.29 CHRONIC NECK PAIN: ICD-10-CM

## 2023-01-04 DIAGNOSIS — M54.42 CHRONIC BILATERAL LOW BACK PAIN WITH LEFT-SIDED SCIATICA: ICD-10-CM

## 2023-01-04 DIAGNOSIS — M54.2 CHRONIC NECK PAIN: ICD-10-CM

## 2023-01-04 DIAGNOSIS — G89.29 CHRONIC BILATERAL LOW BACK PAIN WITH LEFT-SIDED SCIATICA: ICD-10-CM

## 2023-01-12 ENCOUNTER — OFFICE VISIT (OUTPATIENT)
Dept: DERMATOLOGY | Facility: IMAGING CENTER | Age: 73
End: 2023-01-12
Payer: MEDICARE

## 2023-01-12 ENCOUNTER — OFFICE VISIT (OUTPATIENT)
Dept: MEDICAL GROUP | Facility: PHYSICIAN GROUP | Age: 73
End: 2023-01-12
Payer: MEDICARE

## 2023-01-12 VITALS
OXYGEN SATURATION: 94 % | TEMPERATURE: 97.7 F | RESPIRATION RATE: 12 BRPM | HEIGHT: 67 IN | WEIGHT: 172.8 LBS | DIASTOLIC BLOOD PRESSURE: 74 MMHG | BODY MASS INDEX: 27.12 KG/M2 | SYSTOLIC BLOOD PRESSURE: 120 MMHG | HEART RATE: 56 BPM

## 2023-01-12 DIAGNOSIS — Z79.891 CHRONIC USE OF OPIATE FOR THERAPEUTIC PURPOSE: ICD-10-CM

## 2023-01-12 DIAGNOSIS — L71.9 ROSACEA: ICD-10-CM

## 2023-01-12 DIAGNOSIS — G89.29 CHRONIC NECK PAIN: ICD-10-CM

## 2023-01-12 DIAGNOSIS — E03.9 ACQUIRED HYPOTHYROIDISM: ICD-10-CM

## 2023-01-12 DIAGNOSIS — Z91.89 OTHER SPECIFIED PERSONAL RISK FACTORS, NOT ELSEWHERE CLASSIFIED: ICD-10-CM

## 2023-01-12 DIAGNOSIS — E78.5 DYSLIPIDEMIA: ICD-10-CM

## 2023-01-12 DIAGNOSIS — M54.2 CHRONIC NECK PAIN: ICD-10-CM

## 2023-01-12 PROCEDURE — 99213 OFFICE O/P EST LOW 20 MIN: CPT | Performed by: DERMATOLOGY

## 2023-01-12 PROCEDURE — 99214 OFFICE O/P EST MOD 30 MIN: CPT | Performed by: INTERNAL MEDICINE

## 2023-01-12 RX ORDER — TRAMADOL HYDROCHLORIDE 50 MG/1
50-100 TABLET ORAL 2 TIMES DAILY
Qty: 90 TABLET | Refills: 0 | Status: SHIPPED | OUTPATIENT
Start: 2023-03-08 | End: 2023-04-07

## 2023-01-12 RX ORDER — TRAMADOL HYDROCHLORIDE 50 MG/1
50-100 TABLET ORAL 2 TIMES DAILY
Qty: 90 TABLET | Refills: 0 | Status: SHIPPED | OUTPATIENT
Start: 2023-02-06 | End: 2023-03-08

## 2023-01-12 RX ORDER — TRAMADOL HYDROCHLORIDE 50 MG/1
50-100 TABLET ORAL 2 TIMES DAILY
Qty: 90 TABLET | Refills: 0 | Status: SHIPPED | OUTPATIENT
Start: 2023-04-07 | End: 2023-04-10 | Stop reason: SDUPTHER

## 2023-01-12 RX ORDER — CLINDAMYCIN PHOSPHATE 11.9 MG/ML
SOLUTION TOPICAL
Qty: 60 ML | Refills: 2 | Status: SHIPPED
Start: 2023-01-12 | End: 2023-02-24

## 2023-01-12 ASSESSMENT — FIBROSIS 4 INDEX: FIB4 SCORE: 1.66

## 2023-01-12 ASSESSMENT — PATIENT HEALTH QUESTIONNAIRE - PHQ9: CLINICAL INTERPRETATION OF PHQ2 SCORE: 0

## 2023-01-12 NOTE — PROGRESS NOTES
CC: Rosacea    Subjective: Previously seen patient here for Rosacea and Sebaceous Hyperplasia by Kristina Dobbins on 12/2019  Pt is here today for Rosacea flare up on face. Using metrogel am and azelaic acid Qpm. Hx of trx with Janiga - 3000 laser without notable sustained effect.     May worsen with EtOH - 1 wine / day vs citrus - consumes regularly.   Makeup products aggravate - sit on skin   Using Obagi products, as previous toner not well tolerated    Hx of accutane in past  Hx of tetracyclines in past    ROS: no fevers/chills. No itch.  No cough  Relevant PMH: thyroid dz; denies IBD  Social: former smoker    PE: Gen:WDWN female in NAD. Skin: prominent glands on nose without notable telang/midface erythema. No papules/pustules noted but some white-hued papules perioral    A/P:mild rhinophymatous rosacea? Vs chronic acne:  -cont azelaic acid Qam/clindamycin solution Qpm (would avoid sulfacetamide product due to med allergy)  -reviewed ABX, defer today  -reviewed accutane, defer today  -could consider spironolactone to see if tolerated/effectiveness preferred  -consider future cosmetics, laser/peel    F/u 6 weeks      I have reviewed medications relevant to my specialty.

## 2023-01-13 NOTE — ASSESSMENT & PLAN NOTE
Chronic ongoing problem.  In discussion with her today she reports she was always told her cholesterol was borderline and has not been suggested to go on medication.  She has not had any cardiac evaluation in recent time to assess for coronary artery disease.  She smoked for about 15 years and quit in the mid 1980s.  Will assess with CT cardiac score and recheck lipids and can decide on treatment options at that time.  Follow-up in 3 months.

## 2023-01-13 NOTE — ASSESSMENT & PLAN NOTE
Chronic ongoing problem, she met with dermatology today, continue azelaic acid and add topical clindamycin, discontinue topical metronidazole.

## 2023-01-13 NOTE — ASSESSMENT & PLAN NOTE
Chronic stable problem.  Recheck thyroid labs to ensure stability.  Continue current regiment with levothyroxine 100 mcg daily.

## 2023-01-13 NOTE — PROGRESS NOTES
Subjective:   Chief Complaint/History of Present Illness:  Carlee Hunt is a 72 y.o. female established patient who presents today to discuss medical problems as listed below. Carlee is unaccompanied for today's visit.    Problem   Rosacea    She reports past history of rosacea, she met with the cosmetic dermatologist and was started on topical MetroGel.  She like me to continue this prescription.  She also would like to consider trial with azelaic acid.    On follow-up in January 2022 through dermatology she was recommend continue on azelaic acid and add topical clindamycin at night and stop the metronidazole.     Dyslipidemia     Latest Reference Range & Units 10/17/22 08:21   Cholesterol,Tot 100 - 199 mg/dL 211 (H)   Triglycerides 0 - 149 mg/dL 216 (H)   HDL >=40 mg/dL 56   LDL <100 mg/dL 112 (H)     She has history of hyperlipidemia.  Unclear if she has ever taken dedicated lipid-lowering medications.  She is on coenzyme Q 10.  She had a carotid ultrasound in 2017 that was negative for any blockages.  Unclear if she is ever been evaluated for coronary artery disease.  She did have a Holter study for palpitations in the past.     Acquired Hypothyroidism       Latest Reference Range & Units 10/17/22 08:21   TSH 0.380 - 5.330 uIU/mL 2.110     She has history of hypothyroidism.  She is currently utilizing levothyroxine.  No current signs or symptoms of overtreatment or under treatment.    Current regimen: Levothyroxine 100 mcg daily     Chronic Neck Pain    She reports staged 2 part fusion in 2007 at [facility] at the recommendation of a local orthopedist. She reports an anterior and posterior approach at that time. 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  At that same time she was experiencing left hip pain and was told it was likely related to the left neck  pain.  She reports chronic issues with what she thinks is muscle spasms on the left greater than right neck as well as the bilateral trapezius areas which she relates to large amount of hardware adding additional pressure to her muscles.  It does not sound like she is followed up with anyone locally for this previous neck problem.    For chronic pain in the neck she utilizes tramadol 50 mg up to 3 tablets daily, 2 in the AM and 1 in the PM.  She is also using celebrex 100 mg twice daily.  She is on turmeric.  She also is working with physical therapy and physiatry.          Current Medications:  Current Outpatient Medications Ordered in Epic   Medication Sig Dispense Refill    clindamycin (CLEOCIN) 1 % Solution Use once daily on face 60 mL 2    [START ON 2/6/2023] traMADol (ULTRAM) 50 MG Tab Take 1-2 Tablets by mouth 2 times a day for 30 days. Take 2 tabs in AM and 1 tab in PM 90 Tablet 0    [START ON 3/8/2023] traMADol (ULTRAM) 50 MG Tab Take 1-2 Tablets by mouth 2 times a day for 30 days. Take 2 tabs in AM and 1 tab in PM 90 Tablet 0    [START ON 4/7/2023] traMADol (ULTRAM) 50 MG Tab Take 1-2 Tablets by mouth 2 times a day for 30 days. Take 2 tabs in AM and 1 tab in PM 90 Tablet 0    levothyroxine (SYNTHROID) 100 MCG Tab TAKE ONE TABLET BY MOUTH EVERY MORNING ON AN EMPTY STOMACH 100 Tablet 0    hydrOXYzine HCl (ATARAX) 25 MG Tab       estradiol (ESTRACE) 0.1 MG/GM vaginal cream Insert 0.4 g into the vagina every 72 hours. 42.5 g 3    Azelaic Acid 15 % Gel Apply 1 Application topically 2 times a day. 50 g 11    celecoxib (CELEBREX) 100 MG Cap Take 1 Capsule by mouth 2 times a day. 60 Capsule 3    metoprolol SR (TOPROL XL) 25 MG TABLET SR 24 HR Take 1 Tablet by mouth every day. 100 Tablet 3    COMBIGAN 0.2-0.5 % Solution       Lutein 20 MG Tab Take  by mouth.      L-Theanine 200 MG Cap Take  by mouth.      Coenzyme Q10 (COQ10 PO) Take 100 mg by mouth every day. Qunol liquid      magnesium oxide (MAG-OX) 400 MG Tab  "Take 400 mg by mouth every evening.      Calcium 500 MG Chew Tab Take 1 Tab by mouth every evening.      Biotin 1000 MCG Chew Tab Take 500 mg by mouth every day.      Milk Thistle 175 MG Cap Take 1 Cap by mouth every day.      Cyanocobalamin (B-12) 2500 MCG Tab Take 1 Tab by mouth every day.      Tretinoin 0.05 % Gel 1 Application by Apply externally route every bedtime. 1 Tube 3    TURMERIC PO Take 333 mg by mouth every day. Daily (Qunol liquid)      Probiotic Product (PRO-BIOTIC BLEND) Cap Take 1 Cap by mouth every evening. Floristan      latanoprost (XALATAN) 0.005 % Solution Place 1 Drop in both eyes every evening.      KRILL OIL PO Take 500 mg by mouth every day.      Multiple Vitamins-Minerals (CENTRUM PO) Take 1 Tab by mouth every day.       No current [facility]-ordered facility-administered medications on file.          Objective:   Physical Exam:    Vitals: /74   Pulse (!) 56   Temp 36.5 °C (97.7 °F) (Temporal)   Resp 12   Ht 1.702 m (5' 7\")   Wt 78.4 kg (172 lb 12.8 oz)   SpO2 94%    BMI: Body mass index is 27.06 kg/m².  Physical Exam  Constitutional:       General: She is not in acute distress.     Appearance: Normal appearance. She is not ill-appearing.   Eyes:      General: No scleral icterus.     Conjunctiva/sclera: Conjunctivae normal.   Cardiovascular:      Rate and Rhythm: Normal rate and regular rhythm.      Pulses: Normal pulses.   Pulmonary:      Effort: Pulmonary effort is normal. No respiratory distress.      Breath sounds: No wheezing, rhonchi or rales.   Musculoskeletal:      Cervical back: Rigidity present.   Skin:     Findings: No rash.      Comments: rosacea   Neurological:      Gait: Gait normal.   Psychiatric:         Mood and Affect: Mood normal.         Behavior: Behavior normal.         Thought Content: Thought content normal.         Judgment: Judgment normal.          Assessment and Plan:   Carlee is a 72 y.o. female with the following:  Problem List Items Addressed This Visit " "      Acquired hypothyroidism     Chronic stable problem.  Recheck thyroid labs to ensure stability.  Continue current regiment with levothyroxine 100 mcg daily.         Chronic neck pain     Chronic ongoing problem.  Current medical regiment is working well for her chronic neck pain secondary to cervical fusion in 2007.  Continue tramadol 100 mg in the morning 50 mg at bedtime, this is been titrated down from 4 tablets daily to 3 tablets daily, we were those 2 tablets daily but her pain deteriorated.  Continue Celebrex 100 mg twice daily and turmeric.  Continue working with physical therapy, renewed her orders last week.  We will aberrant behavior or deleterious side effects at this time.    Obtained and reviewed patient utilization report from [facility] pharmacy database on 1/12/2023 4:53 PM  prior to writing prescription for controlled substance II, III or IV per [place] bill . Based on assessment of the report, the prescription is medically necessary.     Patient understands this prescription is a controlled substance which is potentially habit-forming and its use is regulated by the [place]. We also discussed the new \"black box\" warning regarding the lethal combination of opioids and benzodiazepines. Refills are subject to terms of a controlled substance agreement and patient has an updated one on file. Most recent UDS is appropriate. Any refill requires an office visit. Narcotics have may adverse effects and the risks of addiction, accidental overdose and death were emphasized. Provided prescriptions for the next three months.         Relevant Medications    traMADol (ULTRAM) 50 MG Tab (Start on 2/6/2023)    traMADol (ULTRAM) 50 MG Tab (Start on 3/8/2023)    traMADol (ULTRAM) 50 MG Tab (Start on 4/7/2023)    Dyslipidemia     Chronic ongoing problem.  In discussion with her today she reports she was always told her cholesterol was borderline and has not been suggested to go on medication.  She has not had " any cardiac evaluation in recent time to assess for coronary artery disease.  She smoked for about 15 years and quit in the mid 1980s.  Will assess with CT cardiac score and recheck lipids and can decide on treatment options at that time.  Follow-up in 3 months.         Relevant Orders    CBC WITH DIFFERENTIAL    Comp Metabolic Panel    Lipid Profile    VITAMIN D,25 HYDROXY (DEFICIENCY)    VITAMIN B12    TSH WITH REFLEX TO FT4    Chronic use of opiate for therapeutic purpose    Relevant Medications    traMADol (ULTRAM) 50 MG Tab (Start on 2/6/2023)    traMADol (ULTRAM) 50 MG Tab (Start on 3/8/2023)    traMADol (ULTRAM) 50 MG Tab (Start on 4/7/2023)    Rosacea     Chronic ongoing problem, she met with dermatology today, continue azelaic acid and add topical clindamycin, discontinue topical metronidazole.          Other Visit Diagnoses       Other specified personal risk factors, not elsewhere classified        Relevant Orders    CT-CARDIAC SCORING              RTC: Return in about 3 months (around 4/12/2023).    I spent a total of 32 minutes with record review, exam, communication with the patient, communication with other providers, and documentation of this encounter.    PLEASE NOTE: This dictation was created using voice recognition software. I have made every reasonable attempt to correct obvious errors, but I expect that there are errors of grammar and possibly content that I did not discover before finalizing the note.      Shari Dent, DO  Geriatric and Internal Medicine  [facility]

## 2023-01-13 NOTE — ASSESSMENT & PLAN NOTE
"Chronic ongoing problem.  Current medical regiment is working well for her chronic neck pain secondary to cervical fusion in 2007.  Continue tramadol 100 mg in the morning 50 mg at bedtime, this is been titrated down from 4 tablets daily to 3 tablets daily, we were those 2 tablets daily but her pain deteriorated.  Continue Celebrex 100 mg twice daily and turmeric.  Continue working with physical therapy, renewed her orders last week.  We will aberrant behavior or deleterious side effects at this time.    Obtained and reviewed patient utilization report from [facility] pharmacy database on 1/12/2023 4:53 PM  prior to writing prescription for controlled substance II, III or IV per [place] bill . Based on assessment of the report, the prescription is medically necessary.     Patient understands this prescription is a controlled substance which is potentially habit-forming and its use is regulated by the [place]. We also discussed the new \"black box\" warning regarding the lethal combination of opioids and benzodiazepines. Refills are subject to terms of a controlled substance agreement and patient has an updated one on file. Most recent UDS is appropriate. Any refill requires an office visit. Narcotics have may adverse effects and the risks of addiction, accidental overdose and death were emphasized. Provided prescriptions for the next three months.  "

## 2023-02-02 ENCOUNTER — HOSPITAL ENCOUNTER (OUTPATIENT)
Dept: RADIOLOGY | Facility: MEDICAL CENTER | Age: 73
End: 2023-02-02
Attending: INTERNAL MEDICINE
Payer: COMMERCIAL

## 2023-02-02 DIAGNOSIS — Z91.89 OTHER SPECIFIED PERSONAL RISK FACTORS, NOT ELSEWHERE CLASSIFIED: ICD-10-CM

## 2023-02-02 PROCEDURE — 4410556 CT-CARDIAC SCORING (SELF PAY ONLY)

## 2023-02-23 ENCOUNTER — APPOINTMENT (RX ONLY)
Dept: URBAN - METROPOLITAN AREA CLINIC 4 | Facility: CLINIC | Age: 73
Setting detail: DERMATOLOGY
End: 2023-02-23

## 2023-02-23 DIAGNOSIS — L82.1 OTHER SEBORRHEIC KERATOSIS: ICD-10-CM

## 2023-02-23 DIAGNOSIS — D22 MELANOCYTIC NEVI: ICD-10-CM

## 2023-02-23 DIAGNOSIS — L81.4 OTHER MELANIN HYPERPIGMENTATION: ICD-10-CM

## 2023-02-23 DIAGNOSIS — D18.0 HEMANGIOMA: ICD-10-CM

## 2023-02-23 PROBLEM — D18.01 HEMANGIOMA OF SKIN AND SUBCUTANEOUS TISSUE: Status: ACTIVE | Noted: 2023-02-23

## 2023-02-23 PROBLEM — D22.62 MELANOCYTIC NEVI OF LEFT UPPER LIMB, INCLUDING SHOULDER: Status: ACTIVE | Noted: 2023-02-23

## 2023-02-23 PROBLEM — D48.5 NEOPLASM OF UNCERTAIN BEHAVIOR OF SKIN: Status: ACTIVE | Noted: 2023-02-23

## 2023-02-23 PROBLEM — D22.5 MELANOCYTIC NEVI OF TRUNK: Status: ACTIVE | Noted: 2023-02-23

## 2023-02-23 PROBLEM — D22.61 MELANOCYTIC NEVI OF RIGHT UPPER LIMB, INCLUDING SHOULDER: Status: ACTIVE | Noted: 2023-02-23

## 2023-02-23 PROCEDURE — ? COUNSELING

## 2023-02-23 PROCEDURE — ? LIQUID NITROGEN

## 2023-02-23 PROCEDURE — ? BIOPSY BY SHAVE METHOD

## 2023-02-23 PROCEDURE — 99213 OFFICE O/P EST LOW 20 MIN: CPT | Mod: 25

## 2023-02-23 PROCEDURE — 11102 TANGNTL BX SKIN SINGLE LES: CPT

## 2023-02-23 ASSESSMENT — LOCATION DETAILED DESCRIPTION DERM
LOCATION DETAILED: LOWER STERNUM
LOCATION DETAILED: RIGHT MID-UPPER BACK
LOCATION DETAILED: LEFT VENTRAL DISTAL FOREARM
LOCATION DETAILED: LEFT INFERIOR UPPER BACK
LOCATION DETAILED: RIGHT LATERAL ABDOMEN
LOCATION DETAILED: RIGHT LATERAL UPPER BACK
LOCATION DETAILED: RIGHT INFERIOR MEDIAL FOREHEAD
LOCATION DETAILED: LEFT INFERIOR MEDIAL FOREHEAD
LOCATION DETAILED: LEFT SUPERIOR LATERAL MIDBACK
LOCATION DETAILED: LEFT SUPERIOR MEDIAL UPPER BACK
LOCATION DETAILED: RIGHT SUPERIOR UPPER BACK
LOCATION DETAILED: RIGHT ANTERIOR DISTAL UPPER ARM
LOCATION DETAILED: RIGHT INFERIOR UPPER BACK
LOCATION DETAILED: LEFT SUPERIOR PARIETAL SCALP
LOCATION DETAILED: LEFT INFERIOR MEDIAL MIDBACK
LOCATION DETAILED: SUPERIOR LUMBAR SPINE
LOCATION DETAILED: RIGHT INFERIOR MEDIAL UPPER BACK
LOCATION DETAILED: SUBXIPHOID
LOCATION DETAILED: RIGHT VENTRAL PROXIMAL FOREARM
LOCATION DETAILED: INFERIOR THORACIC SPINE
LOCATION DETAILED: RIGHT SUPERIOR LATERAL LOWER BACK
LOCATION DETAILED: LEFT INFERIOR LATERAL MIDBACK
LOCATION DETAILED: LEFT VENTRAL PROXIMAL FOREARM
LOCATION DETAILED: LEFT MID-UPPER BACK
LOCATION DETAILED: RIGHT SUPERIOR MEDIAL LOWER BACK
LOCATION DETAILED: PERIUMBILICAL SKIN
LOCATION DETAILED: LEFT SUPERIOR UPPER BACK
LOCATION DETAILED: RIGHT ANTECUBITAL SKIN
LOCATION DETAILED: LEFT ANTECUBITAL SKIN
LOCATION DETAILED: LEFT MEDIAL UPPER BACK
LOCATION DETAILED: RIGHT MEDIAL UPPER BACK

## 2023-02-23 ASSESSMENT — LOCATION SIMPLE DESCRIPTION DERM
LOCATION SIMPLE: LEFT FOREHEAD
LOCATION SIMPLE: UPPER BACK
LOCATION SIMPLE: RIGHT FOREHEAD
LOCATION SIMPLE: LEFT LOWER BACK
LOCATION SIMPLE: RIGHT UPPER ARM
LOCATION SIMPLE: LEFT FOREARM
LOCATION SIMPLE: LEFT UPPER ARM
LOCATION SIMPLE: ABDOMEN
LOCATION SIMPLE: LEFT UPPER BACK
LOCATION SIMPLE: RIGHT LOWER BACK
LOCATION SIMPLE: CHEST
LOCATION SIMPLE: RIGHT UPPER BACK
LOCATION SIMPLE: SCALP
LOCATION SIMPLE: RIGHT FOREARM
LOCATION SIMPLE: LOWER BACK

## 2023-02-23 ASSESSMENT — LOCATION ZONE DERM
LOCATION ZONE: SCALP
LOCATION ZONE: ARM
LOCATION ZONE: FACE
LOCATION ZONE: TRUNK

## 2023-02-23 NOTE — PROCEDURE: BIOPSY BY SHAVE METHOD
Detail Level: Detailed
Depth Of Biopsy: dermis
Was A Bandage Applied: Yes
Size Of Lesion In Cm: 0.4
X Size Of Lesion In Cm: 0
Biopsy Type: H and E
Biopsy Method: Personna blade
Anesthesia Type: 0.5% lidocaine without epinephrine
Anesthesia Volume In Cc: 1.5
Hemostasis: Electrocautery
Wound Care: Vaseline
Dressing: Band-Aid
Destruction After The Procedure: No
Type Of Destruction Used: Electrodesiccation
Curettage Text: The wound bed was treated with curettage after the biopsy was performed.
Cryotherapy Text: The wound bed was treated with cryotherapy after the biopsy was performed.
Electrodesiccation Text: The wound bed was treated with electrodesiccation after the biopsy was performed.
Electrodesiccation And Curettage Text: The wound bed was treated with electrodesiccation and curettage after the biopsy was performed.
Silver Nitrate Text: The wound bed was treated with silver nitrate after the biopsy was performed.
Lab: [id number]
Lab Facility: 
Consent: Verbal consent was obtained and risks were reviewed including but not limited to scarring, infection, bleeding, scabbing, incomplete removal, nerve damage and allergy to anesthesia.
Post-Care Instructions: I reviewed with the patient in detail post-care instructions. Patient is to keep the biopsy site dry overnight, and then apply bacitracin/petroleum  twice daily until healed. Patient may apply hydrogen peroxide soaks to remove any crusting.
Notification Instructions: Patient will be notified of biopsy results. However, patient instructed to call the office if not contacted within 2 weeks.
Billing Type: Third-Party Bill
Information: Selecting Yes will display possible errors in your note based on the variables you have selected. This validation is only offered as a suggestion for you. PLEASE NOTE THAT THE VALIDATION TEXT WILL BE REMOVED WHEN YOU FINALIZE YOUR NOTE. IF YOU WANT TO FAX A PRELIMINARY NOTE YOU WILL NEED TO TOGGLE THIS TO 'NO' IF YOU DO NOT WANT IT IN YOUR FAXED NOTE.

## 2023-02-23 NOTE — PROCEDURE: LIQUID NITROGEN
Consent: The patient's consent was obtained including but not limited to risks of crusting, scabbing, blistering, scarring, darker or lighter pigmentary change, recurrence, incomplete removal and infection.
Post-Care Instructions: I reviewed with the patient in detail post-care instructions. Patient is to wear sunprotection, and avoid picking at any of the treated lesions. Pt may apply Vaseline to crusted or scabbing areas.
Duration Of Freeze Thaw-Cycle (Seconds): 0
Render Note In Bullet Format When Appropriate: No
Spray Paint Text: The liquid nitrogen was applied to the skin utilizing a spray paint frosting technique.
Medical Necessity Information: It is in your best interest to select a reason for this procedure from the list below. All of these items fulfill various CMS LCD requirements except the new and changing color options.
Show Spray Paint Technique Variable?: Yes
Medical Necessity Clause: This procedure was medically necessary because the lesions that were treated were:  If lesion does not resolve, bx is needed.
Detail Level: Detailed

## 2023-02-24 ENCOUNTER — OFFICE VISIT (OUTPATIENT)
Dept: DERMATOLOGY | Facility: IMAGING CENTER | Age: 73
End: 2023-02-24
Payer: MEDICARE

## 2023-02-24 DIAGNOSIS — L73.8 SEBACEOUS HYPERPLASIA OF FACE: ICD-10-CM

## 2023-02-24 DIAGNOSIS — L71.9 ROSACEA: ICD-10-CM

## 2023-02-24 PROCEDURE — 99213 OFFICE O/P EST LOW 20 MIN: CPT | Performed by: DERMATOLOGY

## 2023-02-24 RX ORDER — TRETINOIN 0.05 G/100G
1 GEL TOPICAL
Qty: 1 EACH | Refills: 3 | Status: SHIPPED | OUTPATIENT
Start: 2023-02-24

## 2023-02-24 NOTE — PROGRESS NOTES
"CC: Rosacea    Subjective: Previously seen patient here for Rosacea    Pt states rosacea not better but not any worse,     Using metrogel am and azelaic acid Qpm. Hx of trx with Janiga - 3000 laser without notable sustained effect.   Has found Elta MD products to be tolerated well, but worries that they may be contributing to skin effects noted.     Still squeezes oil from skin, interested to use drying agent.    From prior notes:  \"May worsen with EtOH - 1 wine / day vs citrus - consumes regularly.   Makeup products aggravate - sit on skin   Using Obagi products, as previous toner not well tolerated    Hx of accutane in past  Hx of tetracyclines in past\"    ROS: no fevers/chills. No itch.  No cough  Relevant PMH: thyroid dz; denies IBD  Social: former smoker    PE: Gen:WDWN female in NAD. Skin: prominent glands on nose without notable telang/midface erythema. No papules/pustules noted but some white-hued papules perioral    A/P:mild rhinophymatous rosacea? Vs chronic acne vs component of sebaceous hyperplasia more evident today:  -treat 1/2 face with Salicycic acid OTC, treat 1/2 face with tretinoin Rx to see if preferred method of treatment  -consider future cosmetics, laser/peel; advised this is likely to achieve results most likely seeking    Offered samples wash/moisturizer acne line - Cerave/Cetaphil. May d/c Elta MD to see if that helps    Prev tx reviewed:  -reviewed ABX, defer today  -reviewed accutane, defer today  -could consider spironolactone to see if tolerated/effectiveness preferred      F/u 6-8 weeks      I have reviewed medications relevant to my specialty.          "

## 2023-03-14 DIAGNOSIS — E03.9 ACQUIRED HYPOTHYROIDISM: ICD-10-CM

## 2023-03-14 RX ORDER — LEVOTHYROXINE SODIUM 0.1 MG/1
100 TABLET ORAL
Qty: 100 TABLET | Refills: 3 | Status: SHIPPED | OUTPATIENT
Start: 2023-03-14

## 2023-04-05 ENCOUNTER — HOSPITAL ENCOUNTER (OUTPATIENT)
Dept: LAB | Facility: MEDICAL CENTER | Age: 73
End: 2023-04-05
Attending: INTERNAL MEDICINE
Payer: MEDICARE

## 2023-04-05 DIAGNOSIS — E78.5 DYSLIPIDEMIA: ICD-10-CM

## 2023-04-05 LAB
25(OH)D3 SERPL-MCNC: 35 NG/ML (ref 30–100)
ALBUMIN SERPL BCP-MCNC: 4.1 G/DL (ref 3.2–4.9)
ALBUMIN/GLOB SERPL: 1.5 G/DL
ALP SERPL-CCNC: 86 U/L (ref 30–99)
ALT SERPL-CCNC: 15 U/L (ref 2–50)
ANION GAP SERPL CALC-SCNC: 9 MMOL/L (ref 7–16)
AST SERPL-CCNC: 20 U/L (ref 12–45)
BASOPHILS # BLD AUTO: 0.6 % (ref 0–1.8)
BASOPHILS # BLD: 0.04 K/UL (ref 0–0.12)
BILIRUB SERPL-MCNC: 0.5 MG/DL (ref 0.1–1.5)
BUN SERPL-MCNC: 15 MG/DL (ref 8–22)
CALCIUM ALBUM COR SERPL-MCNC: 9.5 MG/DL (ref 8.5–10.5)
CALCIUM SERPL-MCNC: 9.6 MG/DL (ref 8.4–10.2)
CHLORIDE SERPL-SCNC: 104 MMOL/L (ref 96–112)
CHOLEST SERPL-MCNC: 203 MG/DL (ref 100–199)
CO2 SERPL-SCNC: 28 MMOL/L (ref 20–33)
CREAT SERPL-MCNC: 0.66 MG/DL (ref 0.5–1.4)
EOSINOPHIL # BLD AUTO: 0.15 K/UL (ref 0–0.51)
EOSINOPHIL NFR BLD: 2.1 % (ref 0–6.9)
ERYTHROCYTE [DISTWIDTH] IN BLOOD BY AUTOMATED COUNT: 42.8 FL (ref 35.9–50)
FASTING STATUS PATIENT QL REPORTED: NORMAL
GFR SERPLBLD CREATININE-BSD FMLA CKD-EPI: 91 ML/MIN/1.73 M 2
GLOBULIN SER CALC-MCNC: 2.7 G/DL (ref 1.9–3.5)
GLUCOSE SERPL-MCNC: 108 MG/DL (ref 65–99)
HCT VFR BLD AUTO: 46.7 % (ref 37–47)
HDLC SERPL-MCNC: 56 MG/DL
HGB BLD-MCNC: 15.9 G/DL (ref 12–16)
IMM GRANULOCYTES # BLD AUTO: 0.02 K/UL (ref 0–0.11)
IMM GRANULOCYTES NFR BLD AUTO: 0.3 % (ref 0–0.9)
LDLC SERPL CALC-MCNC: 114 MG/DL
LYMPHOCYTES # BLD AUTO: 2.87 K/UL (ref 1–4.8)
LYMPHOCYTES NFR BLD: 40.1 % (ref 22–41)
MCH RBC QN AUTO: 31.2 PG (ref 27–33)
MCHC RBC AUTO-ENTMCNC: 34 G/DL (ref 33.6–35)
MCV RBC AUTO: 91.6 FL (ref 81.4–97.8)
MONOCYTES # BLD AUTO: 0.4 K/UL (ref 0–0.85)
MONOCYTES NFR BLD AUTO: 5.6 % (ref 0–13.4)
NEUTROPHILS # BLD AUTO: 3.68 K/UL (ref 2–7.15)
NEUTROPHILS NFR BLD: 51.3 % (ref 44–72)
NRBC # BLD AUTO: 0 K/UL
NRBC BLD-RTO: 0 /100 WBC
PLATELET # BLD AUTO: 221 K/UL (ref 164–446)
PMV BLD AUTO: 10.2 FL (ref 9–12.9)
POTASSIUM SERPL-SCNC: 4.6 MMOL/L (ref 3.6–5.5)
PROT SERPL-MCNC: 6.8 G/DL (ref 6–8.2)
RBC # BLD AUTO: 5.1 M/UL (ref 4.2–5.4)
SODIUM SERPL-SCNC: 141 MMOL/L (ref 135–145)
TRIGL SERPL-MCNC: 164 MG/DL (ref 0–149)
TSH SERPL DL<=0.005 MIU/L-ACNC: 1.49 UIU/ML (ref 0.38–5.33)
VIT B12 SERPL-MCNC: 968 PG/ML (ref 211–911)
WBC # BLD AUTO: 7.2 K/UL (ref 4.8–10.8)

## 2023-04-05 PROCEDURE — 82306 VITAMIN D 25 HYDROXY: CPT

## 2023-04-05 PROCEDURE — 82607 VITAMIN B-12: CPT

## 2023-04-05 PROCEDURE — 85025 COMPLETE CBC W/AUTO DIFF WBC: CPT

## 2023-04-05 PROCEDURE — 36415 COLL VENOUS BLD VENIPUNCTURE: CPT

## 2023-04-05 PROCEDURE — 80061 LIPID PANEL: CPT

## 2023-04-05 PROCEDURE — 84443 ASSAY THYROID STIM HORMONE: CPT

## 2023-04-05 PROCEDURE — 80053 COMPREHEN METABOLIC PANEL: CPT

## 2023-04-06 ENCOUNTER — APPOINTMENT (RX ONLY)
Dept: URBAN - METROPOLITAN AREA CLINIC 4 | Facility: CLINIC | Age: 73
Setting detail: DERMATOLOGY
End: 2023-04-06

## 2023-04-06 DIAGNOSIS — D18.0 HEMANGIOMA: ICD-10-CM

## 2023-04-06 PROBLEM — D18.01 HEMANGIOMA OF SKIN AND SUBCUTANEOUS TISSUE: Status: ACTIVE | Noted: 2023-04-06

## 2023-04-06 PROBLEM — C44.319 BASAL CELL CARCINOMA OF SKIN OF OTHER PARTS OF FACE: Status: ACTIVE | Noted: 2023-04-06

## 2023-04-06 PROCEDURE — 17280 DSTR MAL LS F/E/E/N/L/M .5/<: CPT

## 2023-04-06 PROCEDURE — ? CURETTAGE AND DESTRUCTION

## 2023-04-06 PROCEDURE — ? COUNSELING

## 2023-04-06 PROCEDURE — ? PHOTO-DOCUMENTATION

## 2023-04-06 PROCEDURE — 99212 OFFICE O/P EST SF 10 MIN: CPT | Mod: 25

## 2023-04-06 ASSESSMENT — LOCATION DETAILED DESCRIPTION DERM: LOCATION DETAILED: LEFT DORSAL FOOT

## 2023-04-06 ASSESSMENT — LOCATION ZONE DERM: LOCATION ZONE: FEET

## 2023-04-06 ASSESSMENT — LOCATION SIMPLE DESCRIPTION DERM: LOCATION SIMPLE: LEFT FOOT

## 2023-04-06 NOTE — PROCEDURE: CURETTAGE AND DESTRUCTION
Detail Level: Detailed
Accession # (Optional): [id number]
Number Of Curettages: 3
Size Of Lesion After Curettage: 0.5
Add Intralesional Injection: No
Total Volume (Ccs): 1
Anesthesia Type: 1% lidocaine with epinephrine
Cautery Type: electrodesiccation
What Was Performed First?: Curettage
Final Size Statement: The size of the lesion after curettage was
Additional Information: (Optional): The wound was cleaned, and a pressure dressing was applied.  The patient received detailed post-op instructions.
Consent was obtained from the patient. The risks, benefits and alternatives to therapy were discussed in detail. Specifically, the risks of infection, scarring, bleeding, prolonged wound healing, nerve injury, incomplete removal, allergy to anesthesia and recurrence were addressed. Alternatives to ED&C, such as: surgical removal and XRT were also discussed.  Prior to the procedure, the treatment site was clearly identified and confirmed by the patient. All components of Universal Protocol/PAUSE Rule completed.
Post-Care Instructions: I reviewed with the patient in detail post-care instructions. Patient is to keep the area dry for 48 hours, and not to engage in any swimming until the area is healed. Should the patient develop any fevers, chills, bleeding, severe pain patient will contact the office immediately.
Bill As A Line Item Or As Units: Line Item

## 2023-04-10 ENCOUNTER — OFFICE VISIT (OUTPATIENT)
Dept: MEDICAL GROUP | Facility: PHYSICIAN GROUP | Age: 73
End: 2023-04-10
Payer: MEDICARE

## 2023-04-10 VITALS
SYSTOLIC BLOOD PRESSURE: 118 MMHG | OXYGEN SATURATION: 94 % | WEIGHT: 172 LBS | HEIGHT: 67 IN | HEART RATE: 68 BPM | BODY MASS INDEX: 27 KG/M2 | RESPIRATION RATE: 16 BRPM | DIASTOLIC BLOOD PRESSURE: 60 MMHG | TEMPERATURE: 97.7 F

## 2023-04-10 DIAGNOSIS — G89.29 CHRONIC NECK PAIN: ICD-10-CM

## 2023-04-10 DIAGNOSIS — M54.2 CHRONIC NECK PAIN: ICD-10-CM

## 2023-04-10 DIAGNOSIS — I10 PRIMARY HYPERTENSION: ICD-10-CM

## 2023-04-10 DIAGNOSIS — E78.5 DYSLIPIDEMIA: ICD-10-CM

## 2023-04-10 DIAGNOSIS — Z12.11 COLON CANCER SCREENING: ICD-10-CM

## 2023-04-10 DIAGNOSIS — Z79.891 CHRONIC USE OF OPIATE FOR THERAPEUTIC PURPOSE: ICD-10-CM

## 2023-04-10 DIAGNOSIS — E03.9 ACQUIRED HYPOTHYROIDISM: ICD-10-CM

## 2023-04-10 PROCEDURE — 99214 OFFICE O/P EST MOD 30 MIN: CPT | Performed by: INTERNAL MEDICINE

## 2023-04-10 RX ORDER — CELECOXIB 100 MG/1
100 CAPSULE ORAL DAILY
Qty: 100 CAPSULE | Refills: 3 | Status: SHIPPED
Start: 2023-04-10 | End: 2023-07-05

## 2023-04-10 RX ORDER — TRAMADOL HYDROCHLORIDE 50 MG/1
TABLET ORAL
Qty: 270 TABLET | Refills: 0 | Status: SHIPPED | OUTPATIENT
Start: 2023-04-10 | End: 2023-07-05 | Stop reason: SDUPTHER

## 2023-04-10 ASSESSMENT — FIBROSIS 4 INDEX: FIB4 SCORE: 1.75

## 2023-04-10 NOTE — ASSESSMENT & PLAN NOTE
Chronic stable problem, although her lipids are mildly elevated she has a CT cardiac score of 0 and would prefer to monitor off pharmacotherapy which I think is reasonable. No angina or anginal equivalents.

## 2023-04-10 NOTE — PROGRESS NOTES
Subjective:   Chief Complaint/History of Present Illness:  Carlee Hunt is a 73 y.o. female established patient who presents today to discuss medical problems as listed below. Carlee is unaccompanied for today's visit.    Problem   Dyslipidemia     Latest Reference Range & Units 04/05/23 10:01   Cholesterol,Tot 100 - 199 mg/dL 203 (H)   Triglycerides 0 - 149 mg/dL 164 (H)   HDL >=40 mg/dL 56   LDL <100 mg/dL 114 (H)     CT cardiac score (2/2023):  Coronary calcification:  LMA - 0.0  LCX - 0.0  LAD - 0.0  RCA - 0.0        Total Calcium Score: 0.0     Percentile: Calcium score is below the 25th percentile for the patient's age and sex.     She has history of hyperlipidemia.  Unclear if she has ever taken dedicated lipid-lowering medications.  She is on coenzyme Q 10.  She had a carotid ultrasound in 2017 that was negative for any blockages. CT cardiac score of 0.  She did have a Holter study for palpitations in the past.     Chronic Use of Opiate for Therapeutic Purpose    She reports longstanding chronic pain in her neck due to a 2 staged neck fusion surgery in 2013 at [facility].  She also has had issues with intermittent low back/SI joint pain, left greater than right hip pain now radiating into the groin, and left IT band pain.  She uses tramadol 50 mg generally 2 tablets daily for pain control which is no longer sufficient as of October 2022, will plan to increase to 3 tablets daily.  She denies any deleterious side effects from this medicine.     Acquired Hypothyroidism     Latest Reference Range & Units 04/05/23 10:01   TSH 0.380 - 5.330 uIU/mL 1.490     She has history of hypothyroidism.  She is currently utilizing levothyroxine.  No current signs or symptoms of overtreatment or under treatment.    Current regimen: Levothyroxine 100 mcg daily     Hypertension    She has a history of hypertension.  She has taken atenolol for many years but we transition her to metoprolol due to her history of recurrent urinary tract  infections and risk of progression of kidney disease. She denies chest pain, palpitations, or dyspnea on exertion. No signs or symptoms of orthostasis.    Current regimen: Metoprolol succinate 25 mg daily  Previous regimen: Atenolol 25 mg daily     Chronic Neck Pain    She reports staged 2 part fusion in 2007 at [facility] at the recommendation of a local orthopedist. She reports an anterior and posterior approach at that time. 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  At that same time she was experiencing left hip pain and was told it was likely related to the left neck pain.  She reports chronic issues with what she thinks is muscle spasms on the left greater than right neck as well as the bilateral trapezius areas which she relates to large amount of hardware adding additional pressure to her muscles.  It does not sound like she is followed up with anyone locally for this previous neck problem.    For chronic pain in the neck she utilizes tramadol 50 mg up to 3 tablets daily, 2 in the AM and 1 in the PM.  She is also using celebrex 100 mg daily.  She is on turmeric.  She also is working with physical therapy and physiatry.          Current Medications:  Current Outpatient Medications Ordered in Epic   Medication Sig Dispense Refill    celecoxib (CELEBREX) 100 MG Cap Take 1 Capsule by mouth every day. Noon 100 Capsule 3    traMADol (ULTRAM) 50 MG Tab Take 2 tabs in AM and 1 tab in  Tablet 0    levothyroxine (SYNTHROID) 100 MCG Tab Take 1 Tablet by mouth every morning on an empty stomach. 100 Tablet 3    Tretinoin 0.05 % Gel Apply 1 Application topically at bedtime. 1 Each 3    hydrOXYzine HCl (ATARAX) 25 MG Tab       estradiol (ESTRACE) 0.1 MG/GM vaginal cream Insert 0.4 g into the vagina every 72 hours. 42.5 g 3    metoprolol SR (TOPROL XL) 25 MG  "TABLET SR 24 HR Take 1 Tablet by mouth every day. 100 Tablet 3    COMBIGAN 0.2-0.5 % Solution       Lutein 20 MG Tab Take  by mouth.      L-Theanine 200 MG Cap Take  by mouth.      Coenzyme Q10 (COQ10 PO) Take 100 mg by mouth every day. Qunol liquid      magnesium oxide (MAG-OX) 400 MG Tab Take 400 mg by mouth every evening.      Calcium 500 MG Chew Tab Take 1 Tab by mouth every evening.      Milk Thistle 175 MG Cap Take 1 Cap by mouth every day.      Cyanocobalamin (B-12) 2500 MCG Tab Take 1 Tablet by mouth every day. Taking every other day      TURMERIC PO Take 333 mg by mouth every day. Daily (Qunol liquid)      Probiotic Product (PRO-BIOTIC BLEND) Cap Take 1 Cap by mouth every evening. Floristan      latanoprost (XALATAN) 0.005 % Solution Place 1 Drop in both eyes every evening.      KRILL OIL PO Take 500 mg by mouth every day.      Multiple Vitamins-Minerals (CENTRUM PO) Take 1 Tab by mouth every day.       No current [facility]-ordered facility-administered medications on file.          Objective:   Physical Exam:    Vitals: /60 (BP Location: Left arm, Patient Position: Sitting, BP Cuff Size: Adult)   Pulse 68   Temp 36.5 °C (97.7 °F) (Temporal)   Resp 16   Ht 1.702 m (5' 7\")   Wt 78 kg (172 lb)   SpO2 94%    BMI: Body mass index is 26.94 kg/m².  Physical Exam  Constitutional:       General: She is not in acute distress.     Appearance: Normal appearance. She is not ill-appearing.   HENT:      Right Ear: There is no impacted cerumen.      Ears:      Comments: Mild cerumen left ear canal  Eyes:      General: No scleral icterus.     Conjunctiva/sclera: Conjunctivae normal.   Neck:      Comments: Decreased cervical spine ROM, chronic  Cardiovascular:      Rate and Rhythm: Normal rate and regular rhythm.   Pulmonary:      Effort: Pulmonary effort is normal. No respiratory distress.      Breath sounds: No wheezing or rhonchi.   Musculoskeletal:         General: Swelling, tenderness and deformity present.      " "Right lower leg: No edema.      Left lower leg: No edema.      Comments: Cervical spine and paravertebral muscles, longstanding   Skin:     General: Skin is warm and dry.      Comments: Healing lesion left temple from recent cryotherapy   Psychiatric:         Mood and Affect: Mood normal.         Behavior: Behavior normal.         Thought Content: Thought content normal.         Judgment: Judgment normal.             Assessment and Plan:   Carlee is a 73 y.o. female with the following:  Problem List Items Addressed This Visit       Acquired hypothyroidism     Chronic stable problem, continue levothyroxine 100 mcg daily, labs are stable.         Chronic neck pain     Chronic ongoing problem, using multimodal approach to chronic neck pain following cervical fusion in 2007. She is taking tramadol 100 mg in the AM and 50 mg in the PM with good results and continues on celecoxib 100 mg daily and turmeric. She will continue with PT and physiatry as well. No negative side effects from currently regimen.    Obtained and reviewed patient utilization report from [facility] pharmacy database on 4/10/2023 3:20 PM  prior to writing prescription for controlled substance II, III or IV per [place] bill . Based on assessment of the report, the prescription is medically necessary.     Patient understands this prescription is a controlled substance which is potentially habit-forming and its use is regulated by the [employer]. We also discussed the new \"black box\" warning regarding the lethal combination of opioids and benzodiazepines. Refills are subject to terms of a controlled substance agreement and patient has an updated one on file. Most recent UDS is appropriate. Any refill requires an office visit. Narcotics have may adverse effects and the risks of addiction, accidental overdose and death were emphasized. Provided prescriptions for the next three months.         Relevant Medications    celecoxib (CELEBREX) 100 MG Cap    traMADol " "(ULTRAM) 50 MG Tab    Chronic use of opiate for therapeutic purpose     Chronic ongoing problem, using multimodal approach to chronic neck pain following cervical fusion in 2007. She is taking tramadol 100 mg in the AM and 50 mg in the PM with good results and continues on celecoxib 100 mg daily and turmeric. She will continue with PT and physiatry as well. No negative side effects from currently regimen.    Obtained and reviewed patient utilization report from [facility] [place] [facility] pharmacy database on 4/10/2023 3:20 PM  prior to writing prescription for controlled substance II, III or IV per [place] bill . Based on assessment of the report, the prescription is medically necessary.     Patient understands this prescription is a controlled substance which is potentially habit-forming and its use is regulated by the [place]. We also discussed the new \"black box\" warning regarding the lethal combination of opioids and benzodiazepines. Refills are subject to terms of a controlled substance agreement and patient has an updated one on file. Most recent UDS is appropriate. Any refill requires an office visit. Narcotics have may adverse effects and the risks of addiction, accidental overdose and death were emphasized. Provided prescriptions for the next three months.           Relevant Medications    traMADol (ULTRAM) 50 MG Tab    Dyslipidemia     Chronic stable problem, although her lipids are mildly elevated she has a CT cardiac score of 0 and would prefer to monitor off pharmacotherapy which I think is reasonable. No angina or anginal equivalents.         Hypertension     Chronic stable problem, blood pressure well controlled on monotherapy, continue metoprolol succinate 25 mg daily.          Other Visit Diagnoses       Colon cancer screening        Relevant Orders    Referral to GI for Colonoscopy             RTC: No follow-ups on file.    I spent a total of 32 minutes with record review, exam, communication with the patient, " communication with other providers, and documentation of this encounter.    PLEASE NOTE: This dictation was created using voice recognition software. I have made every reasonable attempt to correct obvious errors, but I expect that there are errors of grammar and possibly content that I did not discover before finalizing the note.      Shari Dent, DO  Geriatric and Internal Medicine  [facility]

## 2023-04-10 NOTE — ASSESSMENT & PLAN NOTE
Chronic stable problem, blood pressure well controlled on monotherapy, continue metoprolol succinate 25 mg daily.

## 2023-04-10 NOTE — ASSESSMENT & PLAN NOTE
"Chronic ongoing problem, using multimodal approach to chronic neck pain following cervical fusion in 2007. She is taking tramadol 100 mg in the AM and 50 mg in the PM with good results and continues on celecoxib 100 mg daily and turmeric. She will continue with PT and physiatry as well. No negative side effects from currently regimen.    Obtained and reviewed patient utilization report from [facility] pharmacy database on 4/10/2023 3:20 PM  prior to writing prescription for controlled substance II, III or IV per [place] bill . Based on assessment of the report, the prescription is medically necessary.     Patient understands this prescription is a controlled substance which is potentially habit-forming and its use is regulated by the [employer]. We also discussed the new \"black box\" warning regarding the lethal combination of opioids and benzodiazepines. Refills are subject to terms of a controlled substance agreement and patient has an updated one on file. Most recent UDS is appropriate. Any refill requires an office visit. Narcotics have may adverse effects and the risks of addiction, accidental overdose and death were emphasized. Provided prescriptions for the next three months.  "

## 2023-04-10 NOTE — ASSESSMENT & PLAN NOTE
"Chronic ongoing problem, using multimodal approach to chronic neck pain following cervical fusion in 2007. She is taking tramadol 100 mg in the AM and 50 mg in the PM with good results and continues on celecoxib 100 mg daily and turmeric. She will continue with PT and physiatry as well. No negative side effects from currently regimen.    Obtained and reviewed patient utilization report from [facility] pharmacy database on 4/10/2023 3:20 PM  prior to writing prescription for controlled substance II, III or IV per [place] bill . Based on assessment of the report, the prescription is medically necessary.     Patient understands this prescription is a controlled substance which is potentially habit-forming and its use is regulated by the [employer]. We also discussed the new \"black box\" warning regarding the lethal combination of opioids and benzodiazepines. Refills are subject to terms of a controlled substance agreement and patient has an updated one on file. Most recent UDS is appropriate. Any refill requires an office visit. Narcotics have may adverse effects and the risks of addiction, accidental overdose and death were emphasized. Provided prescriptions for the next three months.    "

## 2023-04-14 ENCOUNTER — OFFICE VISIT (OUTPATIENT)
Dept: DERMATOLOGY | Facility: IMAGING CENTER | Age: 73
End: 2023-04-14
Payer: MEDICARE

## 2023-04-14 DIAGNOSIS — L02.92 FURUNCLE: ICD-10-CM

## 2023-04-14 DIAGNOSIS — L73.8 SEBACEOUS HYPERPLASIA OF FACE: ICD-10-CM

## 2023-04-14 DIAGNOSIS — L71.9 ROSACEA: ICD-10-CM

## 2023-04-14 PROCEDURE — 99214 OFFICE O/P EST MOD 30 MIN: CPT | Performed by: DERMATOLOGY

## 2023-04-14 RX ORDER — CEPHALEXIN 500 MG/1
500 CAPSULE ORAL 3 TIMES DAILY
Qty: 21 CAPSULE | Refills: 0 | Status: SHIPPED
Start: 2023-04-14 | End: 2023-07-05

## 2023-04-14 NOTE — PROGRESS NOTES
"CC: Rosacea    Subjective: Previously seen patient here for Rosacea    Pt states rosacea not better but not any worse. Pt states she believes tretinoin is making her breakout -one spot on lower face for a week - had come to a head and tried to rupture. Pt not using metrogel anymore. Had split face and treating with 1/2 SA and 1/2 tretinoin. More drying noted with tretinoin but may be slowly appearing better.       PREV VISIT 02/24/2023:  \"Using metrogel am and azelaic acid Qpm. Hx of trx with Janiga - 3000 laser without notable sustained effect.   Has found Elta MD products to be tolerated well, but worries that they may be contributing to skin effects noted.     Still squeezes oil from skin, interested to use drying agent.\"    From prior notes:  \"May worsen with EtOH - 1 wine / day vs citrus - consumes regularly.   Makeup products aggravate - sit on skin   Using Obagi products, as previous toner not well tolerated    Hx of accutane in past  Hx of tetracyclines in past\"    ROS: no fevers/chills. No itch.  No cough  Relevant PMH: thyroid dz; denies IBD  Social: former smoker    PE: Gen:WDWN female in NAD. Skin: prominent glands on nose without notable telang/midface erythema. No papules/pustules noted but some white-hued papules perioral. Plates of yellow-hued papules on cheeks. Erythematous papule lower left face, central pore with overlying honey-crusting    Chol: April 2023:   Chol - 203  TG - 164  HDL - 56  LDL-114    A/P:mild rhinophymatous rosacea? Vs chronic acne vs component of sebaceous hyperplasia - notable on cheeks:  -treat face with tretinoin Rx, as self-reports may be preferred method of treatment. Reviewed strategies to limit drying  -consider future cosmetics, laser/peel; advised this is likely to achieve results most likely seeking    Furuncle, lower face:  -keflex 500mg PO TID X 7 days  -clindamycin topical home supply    Prev tx reviewed:  -reviewed ABX, past deferred  -reviewed accutane, past " deferred  -could consider spironolactone to see if tolerated/effectiveness preferred      F/u Fall 2023 - desires mole check in addition      I have reviewed medications relevant to my specialty.

## 2023-07-05 ENCOUNTER — OFFICE VISIT (OUTPATIENT)
Dept: MEDICAL GROUP | Facility: PHYSICIAN GROUP | Age: 73
End: 2023-07-05
Payer: MEDICARE

## 2023-07-05 ENCOUNTER — HOSPITAL ENCOUNTER (OUTPATIENT)
Facility: MEDICAL CENTER | Age: 73
End: 2023-07-05
Attending: INTERNAL MEDICINE
Payer: MEDICARE

## 2023-07-05 VITALS
BODY MASS INDEX: 26.49 KG/M2 | DIASTOLIC BLOOD PRESSURE: 64 MMHG | HEART RATE: 63 BPM | RESPIRATION RATE: 12 BRPM | HEIGHT: 67 IN | OXYGEN SATURATION: 94 % | WEIGHT: 168.8 LBS | SYSTOLIC BLOOD PRESSURE: 112 MMHG | TEMPERATURE: 96.7 F

## 2023-07-05 DIAGNOSIS — Z79.891 CHRONIC USE OF OPIATE FOR THERAPEUTIC PURPOSE: ICD-10-CM

## 2023-07-05 DIAGNOSIS — M54.2 CHRONIC NECK PAIN: ICD-10-CM

## 2023-07-05 DIAGNOSIS — E78.5 DYSLIPIDEMIA: ICD-10-CM

## 2023-07-05 DIAGNOSIS — G89.29 CHRONIC NECK PAIN: ICD-10-CM

## 2023-07-05 DIAGNOSIS — R73.01 ELEVATED FASTING BLOOD SUGAR: ICD-10-CM

## 2023-07-05 DIAGNOSIS — Z00.00 ENCOUNTER FOR SUBSEQUENT ANNUAL WELLNESS VISIT (AWV) IN MEDICARE PATIENT: Primary | ICD-10-CM

## 2023-07-05 DIAGNOSIS — Z12.11 COLON CANCER SCREENING: ICD-10-CM

## 2023-07-05 PROCEDURE — G0439 PPPS, SUBSEQ VISIT: HCPCS | Performed by: INTERNAL MEDICINE

## 2023-07-05 PROCEDURE — 3074F SYST BP LT 130 MM HG: CPT | Performed by: INTERNAL MEDICINE

## 2023-07-05 PROCEDURE — 3078F DIAST BP <80 MM HG: CPT | Performed by: INTERNAL MEDICINE

## 2023-07-05 PROCEDURE — 80307 DRUG TEST PRSMV CHEM ANLYZR: CPT

## 2023-07-05 RX ORDER — TRAMADOL HYDROCHLORIDE 50 MG/1
50-100 TABLET ORAL
Qty: 270 TABLET | Refills: 0 | Status: SHIPPED | OUTPATIENT
Start: 2023-07-13 | End: 2023-10-11

## 2023-07-05 RX ORDER — TRAMADOL HYDROCHLORIDE 50 MG/1
TABLET ORAL
Qty: 270 TABLET | Refills: 0 | Status: CANCELLED | OUTPATIENT
Start: 2023-07-05 | End: 2023-10-06

## 2023-07-05 RX ORDER — ZOSTER VACCINE RECOMBINANT, ADJUVANTED 50 MCG/0.5
KIT INTRAMUSCULAR
COMMUNITY
End: 2023-07-05

## 2023-07-05 ASSESSMENT — ACTIVITIES OF DAILY LIVING (ADL): BATHING_REQUIRES_ASSISTANCE: 0

## 2023-07-05 ASSESSMENT — ENCOUNTER SYMPTOMS: GENERAL WELL-BEING: FAIR

## 2023-07-05 ASSESSMENT — PATIENT HEALTH QUESTIONNAIRE - PHQ9: CLINICAL INTERPRETATION OF PHQ2 SCORE: 0

## 2023-07-05 ASSESSMENT — FIBROSIS 4 INDEX: FIB4 SCORE: 1.75

## 2023-07-05 NOTE — PROGRESS NOTES
Chief Complaint   Patient presents with    Annual Exam     Annual       HPI:  Carlee is a 73 y.o. here for Medicare Annual Wellness Visit    Problem   Dyslipidemia     Latest Reference Range & Units 04/05/23 10:01   Cholesterol,Tot 100 - 199 mg/dL 203 (H)   Triglycerides 0 - 149 mg/dL 164 (H)   HDL >=40 mg/dL 56   LDL <100 mg/dL 114 (H)     CT cardiac score (2/2023):  Coronary calcification:  LMA - 0.0  LCX - 0.0  LAD - 0.0  RCA - 0.0        Total Calcium Score: 0.0     Percentile: Calcium score is below the 25th percentile for the patient's age and sex.     She has history of hyperlipidemia.  Unclear if she has ever taken dedicated lipid-lowering medications.  She is on coenzyme Q 10.  She had a carotid ultrasound in 2017 that was negative for any blockages. CT cardiac score of 0.  She did have a Holter study for palpitations in the past.     Elevated Fasting Blood Sugar    She has history of elevated fasting blood sugar, no known prediabetes or type 2 diabetes.     Chronic Use of Opiate for Therapeutic Purpose    She reports longstanding chronic pain in her neck due to a 2 staged neck fusion surgery in 2013 at [facility].  She also has had issues with intermittent low back/SI joint pain, left greater than right hip pain now radiating into the groin, and left IT band pain.  She uses tramadol 50 mg generally 2 tablets daily for pain control which is no longer sufficient as of October 2022, will plan to increase to 3 tablets daily.  She denies any deleterious side effects from this medicine.     Chronic Neck Pain    She reports staged 2 part fusion in 2007 at [facility] at the recommendation of a local orthopedist. She reports an anterior and posterior approach at that time. 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  At that same time she was experiencing left hip pain  and was told it was likely related to the left neck pain.  She reports chronic issues with what she thinks is muscle spasms on the left greater than right neck as well as the bilateral trapezius areas which she relates to large amount of hardware adding additional pressure to her muscles.  It does not sound like she is followed up with anyone locally for this previous neck problem.    For chronic pain in the neck she utilizes tramadol 50 mg up to 3 tablets daily, 2 in the AM and 1 in the PM.  She is on turmeric.  She also is working with physical therapy and physiatry.     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    Chronic bilateral low back pain with left-sided sciatica 06/11/2020    Right > left hip and anterior thigh pain 06/11/2020    Chronic antibiotic suppression- doxycycline stopped by ID in 9/2021 06/11/2020    Prior UTI- Enterococcus faecalis; resistant to tetracyclines 06/11/2020    Palpitations 09/17/2019    PND (post-nasal drip) 09/17/2019    Acne vulgaris 07/02/2019    Dyslipidemia 01/17/2019    Elevated fasting blood sugar 01/17/2019    Chronic use of opiate for therapeutic purpose 01/17/2019    Acquired hypothyroidism 05/29/2018    Hypertension 05/29/2018    Primary open angle glaucoma (POAG) of both eyes, moderate stage 05/29/2018    Cervical vertebral fusion 05/29/2018    Chronic neck pain 05/29/2018       Current Outpatient Medications   Medication Sig Dispense Refill    [START ON 7/13/2023] traMADol (ULTRAM) 50 MG Tab Take 1-2 Tablets by mouth 2 times a day for 90 days. Take 2 tabs in AM and 1 tab in  Tablet 0    levothyroxine (SYNTHROID) 100 MCG Tab Take 1 Tablet by mouth every morning on an empty stomach. 100 Tablet 3    Tretinoin 0.05 % Gel Apply 1 Application topically at  bedtime. 1 Each 3    hydrOXYzine HCl (ATARAX) 25 MG Tab       estradiol (ESTRACE) 0.1 MG/GM vaginal cream Insert 0.4 g into the vagina every 72 hours. 42.5 g 3    metoprolol SR (TOPROL XL) 25 MG TABLET SR 24 HR Take 1 Tablet by mouth every day. 100 Tablet 3    COMBIGAN 0.2-0.5 % Solution       Lutein 20 MG Tab Take  by mouth.      L-Theanine 200 MG Cap Take  by mouth.      Coenzyme Q10 (COQ10 PO) Take 100 mg by mouth every day. Qunol liquid    every other day      magnesium oxide (MAG-OX) 400 MG Tab Take 400 mg by mouth every evening.      Calcium 500 MG Chew Tab Take 1 Tab by mouth every evening.      Milk Thistle 175 MG Cap Take 1 Cap by mouth every day.      Cyanocobalamin (B-12) 2500 MCG Tab Take 1 Tablet by mouth every day. Taking every other day      TURMERIC PO Take 333 mg by mouth every day. Daily (Qunol liquid)      Probiotic Product (PRO-BIOTIC BLEND) Cap Take 1 Capsule by mouth every evening. Women's Probiotic      latanoprost (XALATAN) 0.005 % Solution Place 1 Drop in both eyes every evening.      KRILL OIL PO Take 500 mg by mouth every day.      Multiple Vitamins-Minerals (CENTRUM PO) Take 1 Tab by mouth every day.       No current facility-administered medications for this visit.        Patient is taking medications as noted in medication list.  Current supplements as per medication list.     Allergies: Rifampin, Vancomycin, and Sulfa drugs    Current social contact/activities: friends    Is patient current with immunizations? No, due for COVID. Patient is interested in receiving will get Covid shot this Fall today.    She  reports that she quit smoking about 41 years ago. Her smoking use included cigarettes. She has a 16.00 pack-year smoking history. She has never used smokeless tobacco. She reports current alcohol use of about 4.2 oz of alcohol per week. She reports that she does not use drugs.  Counseling given: Not Answered  Tobacco comments: continued abstinence      ROS:    Gait: Uses no  assistive device   Ostomy: No   Other tubes: No   Amputations: No   Chronic oxygen use No   Last eye exam Feb 2023    Wears hearing aids: No   : Denies any urinary leakage during the last 6 months    Screening:    Depression Screening  Little interest or pleasure in doing things?  0 - not at all  Feeling down, depressed, or hopeless? 0 - not at all  Trouble falling or staying asleep, or sleeping too much?     Feeling tired or having little energy?     Poor appetite or overeating?     Feeling bad about yourself - or that you are a failure or have let yourself or your family down?    Trouble concentrating on things, such as reading the newspaper or watching television?    Moving or speaking so slowly that other people could have noticed.  Or the opposite - being so fidgety or restless that you have been moving around a lot more than usual?     Thoughts that you would be better off dead, or of hurting yourself?     Patient Health Questionnaire Score:      If depressive symptoms identified deferred to follow up visit unless specifically addressed in assessment and plan.    Interpretation of PHQ-9 Total Score   Score Severity   1-4 No Depression   5-9 Mild Depression   10-14 Moderate Depression   15-19 Moderately Severe Depression   20-27 Severe Depression    Screening for Cognitive Impairment  Three Minute Recall (Banana, Sunrise, Chair)  3/3    Draw clock face with all 12 numbers and set the hands to show 20 past 8.  Yes    If cognitive concerns identified, deferred for follow up unless specifically addressed in assessment and plan.    Fall Risk Assessment  Has the patient had two or more falls in the last year or any fall with injury in the last year?  No  If fall risk identified, deferred for follow up unless specifically addressed in assessment and plan.    Safety Assessment  Throw rugs on floor.  No  Handrails on all stairs.  No  Good lighting in all hallways.  Yes  Difficulty hearing.  No  Patient counseled  about all safety risks that were identified.    Functional Assessment ADLs  Are there any barriers preventing you from cooking for yourself or meeting nutritional needs?  No.    Are there any barriers preventing you from driving safely or obtaining transportation?  No.    Are there any barriers preventing you from using a telephone or calling for help?  No.    Are there any barriers preventing you from shopping?  No.    Are there any barriers preventing you from taking care of your own finances?  No.    Are there any barriers preventing you from managing your medications?  No.    Are there any barriers preventing you from showering, bathing or dressing yourself?  No.    Are you currently engaging in any exercise or physical activity?  Yes.  Since heat started still walks pilates and exercises at home watching TV   What is your perception of your health?  Fair.    Advance Care Planning  Do you have an Advance Directive, Living Will, Durable Power of , or POLST? Yes  Advance Directive       is not on file - instructed patient to bring in a copy to scan into their chart    Health Maintenance Summary            Overdue - COVID-19 Vaccine (2 - Pfizer series) Overdue since 2/26/2023      10/26/2022  Imm Admin: MODERNA BIVALENT BOOSTER SARS-COV-2 VACCINE (6+)    07/17/2022  Imm Admin: MODERNA SARS-COV-2 VACCINE (12+)    10/30/2021  Imm Admin: MODERNA SARS-COV-2 VACCINE (12+)    02/23/2021  Imm Admin: MODERNA SARS-COV-2 VACCINE (12+)    01/25/2021  Imm Admin: MODERNA SARS-COV-2 VACCINE (12+)              Ordered - COLORECTAL CANCER SCREENING (COLOGUARD STOOL DNA - Every 3 Years) Ordered on 7/5/2023 03/09/2020  COLOGUARD COLON CANCER SCREENING    05/31/2012  OCCULT BLOOD FECES IMMUNOASSAY              IMM INFLUENZA (1) Next due on 9/1/2023      10/22/2022  Imm Admin: Influenza Vaccine Adult HD    09/30/2021  Imm Admin: Influenza Vaccine Adult HD    01/01/2021  Imm Admin: Influenza Vaccine Quad Inj (Preserved)     09/22/2020  Imm Admin: Influenza Vaccine Adult HD    10/01/2019  Imm Admin: Influenza Vaccine Adult HD    Only the first 5 history entries have been loaded, but more history exists.              Ordered - URINE DRUG SCREEN (Every 360 Days) Ordered on 7/5/2023      10/20/2022  URINE DRUG SCREEN W/O CONF (AR)    11/24/2021  URINE DRUG SCREEN W/O CONF (AR)    06/11/2020  Pain Management Screen    07/05/2018  [facility] PAIN MANAGEMENT SCREEN              Annual Wellness Visit (Every 366 Days) Next due on 7/5/2024 07/05/2023  Level of Service: ANNUAL WELLNESS VISIT-INCLUDES PPPS SUBSEQUE*    05/02/2022  Level of Service: [place] ANNUAL WELLNESS VISIT-INCLUDES PPPS SUBSEQUE*    08/24/2021  Level of Service: [place] ANNUAL WELLNESS VISIT-INCLUDES PPPS SUBSEQUE*    02/25/2020  Visit Dx: Medicare annual wellness visit, subsequent    01/17/2019  Initial Annual Wellness Visit - Includes PPPS ()    Only the first 5 history entries have been loaded, but more history exists.              BONE DENSITY (Every 5 Years) Next due on 9/10/2026      09/10/2021  DS-BONE DENSITY STUDY (DEXA)    10/01/2019  DS-BONE DENSITY STUDY (DEXA)    11/06/2018  DS-BONE DENSITY STUDY (DEXA)              IMM DTaP/Tdap/Td Vaccine (2 - Td or Tdap) Next due on 1/17/2029 01/17/2019  Imm Admin: Tdap Vaccine              IMM PNEUMOCOCCAL VACCINE: 65+ Years (Series Information) Completed      09/20/2016  Imm Admin: Pneumococcal Conjugate Vaccine (Prevnar/PCV-13)    09/01/2016  Imm Admin: Pneumococcal Conjugate Vaccine (Prevnar/PCV-13)    07/15/2014  Imm Admin: Pneumococcal polysaccharide vaccine (PPSV-23)    01/01/2014  Imm Admin: Pneumococcal polysaccharide vaccine (PPSV-23)              HEPATITIS C SCREENING  Completed      05/04/2018  Hepatitis C Antibody component of HEP C VIRUS ANTIBODY              IMM ZOSTER VACCINES (Series Information) Completed      07/02/2019  Imm Admin: Zoster Vaccine Recombinant (RZV) (SHINGRIX)    05/01/2019  Imm Admin:  Zoster Vaccine Recombinant (RZV) (SHINGRIX)    2009  Imm Admin: Zoster Vaccine Live (ZVL) (Zostavax) - HISTORICAL DATA              IMM HEP B VACCINE (Series Information) Aged Out      No completion history exists for this topic.              HPV Vaccines (Series Information) Aged Out      No completion history exists for this topic.              IMM MENINGOCOCCAL ACWY VACCINE (Series Information) Aged Out      No completion history exists for this topic.              Discontinued - MAMMOGRAM  Discontinued        Frequency changed to Never automatically (Topic No Longer Applies)    2022  MA-SCREENING MAMMO BILAT W/TOMOSYNTHESIS W/CAD    09/10/2021  MA-SCREENING MAMMO BILAT W/TOMOSYNTHESIS W/CAD    2020  MA-SCREENING MAMMO BILAT W/TOMOSYNTHESIS W/CAD    2019  MA-SCREENING MAMMO BILAT W/TOMOSYNTHESIS W/CAD    Only the first 5 history entries have been loaded, but more history exists.              Discontinued - CERVICAL CANCER SCREENING  Discontinued        Frequency changed to Never automatically (Topic No Longer Applies)    2022  THINPREP PAP WITH HPV    2022  Pathology Gynecology Specimen    2018  THINPREP PAP WITH HPV    2018  PATHOLOGY GYN SPECIMEN                    Patient Care Team:  Shari Dent D.O. as PCP - General (Internal Medicine)  Shari Dent D.O. as PCP - [facility] Paneled  [facility] Infectious Disease as Consulting Physician  Stu Santos M.D. as Consulting Physician (Ophthalmology)  Denice Roblero M.D. as Consulting Physician (Obstetrics & Gynecology)  [facility] (Inactive) as Consulting Physician (Gastroenterology)  Jami Gaxiola [facility] as      Social History     Tobacco Use    Smoking status: Former     Packs/day: 1.00     Years: 16.00     Pack years: 16.00     Types: Cigarettes     Quit date: 1982     Years since quittin.4    Smokeless tobacco: Never    Tobacco comments:      "continued abstinence   Vaping Use    Vaping Use: Never used   Substance Use Topics    Alcohol use: Yes     Alcohol/week: 4.2 oz     Types: 7 Glasses of wine per week     Comment: one daily    Drug use: No     Comment: CBD cream last used 12/2019     Family History   Problem Relation Age of Onset    Hypertension Mother     Other Father         accident    Other Sister         pneumoccocal pneumonia    Other Brother         glaucoma    Heart Attack Paternal Grandfather     Cancer Neg Hx     Diabetes Neg Hx     Heart Disease Neg Hx      She  has a past medical history of Cat scratch of face (1/3/2022), Cataract, 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   Past Surgical History:   Procedure Laterality Date    HI FLUOROSCOPIC GUIDANCE NEEDLE PLACEMENT Right 4/19/2022    Procedure: .;  Surgeon: Rolf Hernandez M.D.;  [facility];  Service: Pain Management    INJ,SACROILIAC,ANES/STEROID Right 4/19/2022    Procedure: Diagnostic and therapeutic fluoroscopically guided intra-articular RIGHT hip injection;  Surgeon: Rolf Hernandez M.D.;  Location: [facility];  Service: Pain Management    BOWEL STIMULATOR INSERTION  1/29/2020    Procedure: INSERTION, ELECTRODE LEADS AND PULSE GENERATOR, NEUROSTIMULATOR, SACRAL- FOR PERMANENT IMPLANTATION;  Surgeon: Ishmael Goddard M.D.;  Location: [facility];  Service: General    BOWEL STIMULATOR INSERTION  1/15/2020    Procedure: INSERTION, ELECTRODE LEADS AND PULSE GENERATOR, NEUROSTIMULATOR, SACRAL-NEUROMODULATION TRIAL;  Surgeon: Ishmael Goddard M.D.;  Location: [facility];  Service: General    OTHER  2007    throat surgery after cervical surgery, adjusted Mercedes Messi    CERVICAL FUSION POSTERIOR      Basilar \"invagination\"    EYE SURGERY      cataracts - Dr. Martinez       Exam:   /64 (BP Location: Left arm, Patient " "Position: Sitting, BP Cuff Size: Adult)   Pulse 63   Temp 35.9 °C (96.7 °F) (Temporal)   Resp 12   Ht 1.702 m (5' 7\")   Wt 76.6 kg (168 lb 12.8 oz)   SpO2 94%  Body mass index is 26.44 kg/m².    Hearing good.    Dentition good  Alert, oriented in no acute distress  Eye contact is good, speech goal directed, affect calm      Assessment and Plan. The following treatment and monitoring plan is recommended:    Problem List Items Addressed This Visit       Chronic neck pain     Chronic ongoing problem, continue current medical approach for treatment of chronic neck pain follow cervical fusion in 2007. continue tramadol 100 mg in AM and 50 mg in PM. Continue follow up with physiatry, PT, and neurosurgery as recommended. No negative side effects. Update controlled substance and urine drug screen in clinic today.    Obtained and reviewed patient utilization report from [facility] pharmacy database on 7/5/2023 5:56 PM  prior to writing prescription for controlled substance II, III or IV per [place] bill . Based on assessment of the report, the prescription is medically necessary.     Patient understands this prescription is a controlled substance which is potentially habit-forming and its use is regulated by the [employer]. We also discussed the new \"black box\" warning regarding the lethal combination of opioids and benzodiazepines. Refills are subject to terms of a controlled substance agreement and patient has an updated one on file. Most recent UDS is appropriate. Any refill requires an office visit. Narcotics have may adverse effects and the risks of addiction, accidental overdose and death were emphasized. Provided prescriptions for the next three months.           Relevant Medications    traMADol (ULTRAM) 50 MG Tab (Start on 7/13/2023)    Other Relevant Orders    CRP QUANTITIVE (NON-CARDIAC)    Sed Rate    Chronic use of opiate for therapeutic purpose     Chronic ongoing problem, continue current medical approach " "for treatment of chronic neck pain follow cervical fusion in 2007. continue tramadol 100 mg in AM and 50 mg in PM. Continue follow up with physiatry, PT, and neurosurgery as recommended. No negative side effects. Update controlled substance and urine drug screen in clinic today.    Obtained and reviewed patient utilization report from [facility] pharmacy database on 7/5/2023 5:56 PM  prior to writing prescription for controlled substance II, III or IV per [place] bill . Based on assessment of the report, the prescription is medically necessary.     Patient understands this prescription is a controlled substance which is potentially habit-forming and its use is regulated by the [place]. We also discussed the new \"black box\" warning regarding the lethal combination of opioids and benzodiazepines. Refills are subject to terms of a controlled substance agreement and patient has an updated one on file. Most recent UDS is appropriate. Any refill requires an office visit. Narcotics have may adverse effects and the risks of addiction, accidental overdose and death were emphasized. Provided prescriptions for the next three months.         Relevant Medications    traMADol (ULTRAM) 50 MG Tab (Start on 7/13/2023)    Other Relevant Orders    URINE DRUG SCREEN    Controlled Substance Treatment Agreement    Dyslipidemia     Chronic ongoing problem, she has been working hard on diet to lose weight and is down about 5 lb. Will update cholesterol to track improvement. CT cardiac score of 0.         Relevant Orders    Lipid Profile    CBC WITH DIFFERENTIAL    Comp Metabolic Panel    VITAMIN B12    TSH    FREE THYROXINE    Elevated fasting blood sugar     Chronic problem, check A1c to ensure no prediabetes or type 2 diabetes due to elevated fasting blood sugar. She is trying to eat healthy and has lost some weight.         Relevant Orders    HEMOGLOBIN A1C     Other Visit Diagnoses       Encounter for subsequent annual wellness visit " (AWV) in Medicare patient    -  Primary    Colon cancer screening        Relevant Orders    Referral to GI for Colonoscopy                   Services suggested: No services needed at this time  Health Care Screening recommendations as per orders if indicated.  Referrals offered: PT/OT/Nutrition counseling/Behavioral Health/Smoking cessation as per orders if indicated.    Discussion today about general wellness and lifestyle habits:    Prevent falls and reduce trip hazards; Cautioned about securing or removing rugs.  Have a working fire alarm and carbon monoxide detector;   Engage in regular physical activity and social activities.     Follow-up: Return in about 3 months (around 10/5/2023).       I spent a total of 32 minutes with record review, exam, communication with the patient, communication with other providers, and documentation of this encounter.       PLEASE NOTE: This dictation was created using voice recognition software. I have made every reasonable attempt to correct obvious errors, but I expect that there are errors of grammar and possibly content that I did not discover before finalizing the note.      Shari Dent, DO  Geriatric and Internal Medicine  [facility]

## 2023-07-05 NOTE — PATIENT INSTRUCTIONS
[facility]  [address] [place] [id number]  Phone: [phone]

## 2023-07-06 NOTE — ASSESSMENT & PLAN NOTE
"Chronic ongoing problem, continue current medical approach for treatment of chronic neck pain follow cervical fusion in 2007. continue tramadol 100 mg in AM and 50 mg in PM. Continue follow up with physiatry, PT, and neurosurgery as recommended. No negative side effects. Update controlled substance and urine drug screen in clinic today.    Obtained and reviewed patient utilization report from [facility] pharmacy database on 7/5/2023 5:56 PM  prior to writing prescription for controlled substance II, III or IV per [place] bill . Based on assessment of the report, the prescription is medically necessary.     Patient understands this prescription is a controlled substance which is potentially habit-forming and its use is regulated by the [employer]. We also discussed the new \"black box\" warning regarding the lethal combination of opioids and benzodiazepines. Refills are subject to terms of a controlled substance agreement and patient has an updated one on file. Most recent UDS is appropriate. Any refill requires an office visit. Narcotics have may adverse effects and the risks of addiction, accidental overdose and death were emphasized. Provided prescriptions for the next three months.    "

## 2023-07-06 NOTE — ASSESSMENT & PLAN NOTE
Chronic ongoing problem, she has been working hard on diet to lose weight and is down about 5 lb. Will update cholesterol to track improvement. CT cardiac score of 0.

## 2023-07-06 NOTE — ASSESSMENT & PLAN NOTE
"Chronic ongoing problem, continue current medical approach for treatment of chronic neck pain follow cervical fusion in 2007. continue tramadol 100 mg in AM and 50 mg in PM. Continue follow up with physiatry, PT, and neurosurgery as recommended. No negative side effects. Update controlled substance and urine drug screen in clinic today.    Obtained and reviewed patient utilization report from [facility] pharmacy database on 7/5/2023 5:56 PM  prior to writing prescription for controlled substance II, III or IV per [place] bill . Based on assessment of the report, the prescription is medically necessary.     Patient understands this prescription is a controlled substance which is potentially habit-forming and its use is regulated by the [employer]. We also discussed the new \"black box\" warning regarding the lethal combination of opioids and benzodiazepines. Refills are subject to terms of a controlled substance agreement and patient has an updated one on file. Most recent UDS is appropriate. Any refill requires an office visit. Narcotics have may adverse effects and the risks of addiction, accidental overdose and death were emphasized. Provided prescriptions for the next three months.  "

## 2023-07-06 NOTE — ASSESSMENT & PLAN NOTE
Chronic problem, check A1c to ensure no prediabetes or type 2 diabetes due to elevated fasting blood sugar. She is trying to eat healthy and has lost some weight.

## 2023-07-23 DIAGNOSIS — I10 ESSENTIAL HYPERTENSION: ICD-10-CM

## 2023-07-24 RX ORDER — METOPROLOL SUCCINATE 25 MG/1
25 TABLET, EXTENDED RELEASE ORAL DAILY
Qty: 100 TABLET | Refills: 3 | Status: SHIPPED | OUTPATIENT
Start: 2023-07-24

## 2023-08-16 ENCOUNTER — APPOINTMENT (RX ONLY)
Dept: URBAN - METROPOLITAN AREA CLINIC 36 | Facility: CLINIC | Age: 73
Setting detail: DERMATOLOGY
End: 2023-08-16

## 2023-08-16 DIAGNOSIS — Z41.9 ENCOUNTER FOR PROCEDURE FOR PURPOSES OTHER THAN REMEDYING HEALTH STATE, UNSPECIFIED: ICD-10-CM

## 2023-08-16 PROCEDURE — ? COSMETIC CONSULTATION: FRACTIONAL RESURFACING

## 2023-08-16 ASSESSMENT — LOCATION DETAILED DESCRIPTION DERM
LOCATION DETAILED: LEFT CENTRAL MALAR CHEEK
LOCATION DETAILED: LEFT MENTAL CREASE
LOCATION DETAILED: RIGHT CENTRAL MALAR CHEEK
LOCATION DETAILED: INFERIOR MID FOREHEAD

## 2023-08-16 ASSESSMENT — LOCATION ZONE DERM: LOCATION ZONE: FACE

## 2023-08-16 ASSESSMENT — LOCATION SIMPLE DESCRIPTION DERM
LOCATION SIMPLE: LEFT CHEEK
LOCATION SIMPLE: RIGHT CHEEK
LOCATION SIMPLE: CHIN
LOCATION SIMPLE: INFERIOR FOREHEAD

## 2023-08-17 ENCOUNTER — APPOINTMENT (RX ONLY)
Dept: URBAN - METROPOLITAN AREA CLINIC 36 | Facility: CLINIC | Age: 73
Setting detail: DERMATOLOGY
End: 2023-08-17

## 2023-08-17 DIAGNOSIS — Z41.9 ENCOUNTER FOR PROCEDURE FOR PURPOSES OTHER THAN REMEDYING HEALTH STATE, UNSPECIFIED: ICD-10-CM

## 2023-08-17 PROCEDURE — ? CHEMICAL PEEL

## 2023-08-17 ASSESSMENT — LOCATION DETAILED DESCRIPTION DERM
LOCATION DETAILED: NASAL SUPRATIP
LOCATION DETAILED: RIGHT INFERIOR CENTRAL MALAR CHEEK
LOCATION DETAILED: LEFT CHIN
LOCATION DETAILED: LEFT MEDIAL FOREHEAD
LOCATION DETAILED: LEFT INFERIOR CENTRAL MALAR CHEEK

## 2023-08-17 ASSESSMENT — LOCATION SIMPLE DESCRIPTION DERM
LOCATION SIMPLE: NOSE
LOCATION SIMPLE: RIGHT CHEEK
LOCATION SIMPLE: CHIN
LOCATION SIMPLE: LEFT CHEEK
LOCATION SIMPLE: LEFT FOREHEAD

## 2023-08-17 ASSESSMENT — LOCATION ZONE DERM
LOCATION ZONE: FACE
LOCATION ZONE: NOSE

## 2023-08-17 NOTE — PROCEDURE: CHEMICAL PEEL
Post-Care Instructions: I reviewed with the patient in detail post-care instructions. Patient should avoid sun exposure and wear sun protection.
Treatment Time (Optional): 5 mins@
Consent: Prior to the procedure, written consent was obtained and risks were reviewed, including but not limited to: redness, peeling, blistering, pigmentary change, scarring, infection, and pain.
Detail Level: Zone
Post Peel Care: After the procedure finishing products & Sun protection and post-care instructions were reviewed with the patient.
Prep: The treated area was degreased with pre-peel cleanser, acetone & Vaseline was applied to protect mucous membranes.
Price (Use Numbers Only, No Special Characters Or $): 236
Chemical Peel: Micropeel Plus 30 Solution
Treatment Number: 1
Comments: Opaque frosting on forehead, nose & chin.  Skin looks pustular under the surface, some milia removal on forehead.  Used high frequency to kill bacteria.  Sampled phys fusion, elta elements& physical.  Probably better candidate for colorescience powder. Rec visit in 2 weeks to treat & implement new skincare products.

## 2023-09-13 ENCOUNTER — APPOINTMENT (RX ONLY)
Dept: URBAN - METROPOLITAN AREA CLINIC 36 | Facility: CLINIC | Age: 73
Setting detail: DERMATOLOGY
End: 2023-09-13

## 2023-09-13 DIAGNOSIS — Z41.9 ENCOUNTER FOR PROCEDURE FOR PURPOSES OTHER THAN REMEDYING HEALTH STATE, UNSPECIFIED: ICD-10-CM

## 2023-09-13 PROCEDURE — ? DERMASWEEP

## 2023-09-13 ASSESSMENT — LOCATION SIMPLE DESCRIPTION DERM
LOCATION SIMPLE: SUPERIOR FOREHEAD
LOCATION SIMPLE: CHIN
LOCATION SIMPLE: LEFT CHEEK
LOCATION SIMPLE: NOSE
LOCATION SIMPLE: RIGHT CHEEK

## 2023-09-13 ASSESSMENT — LOCATION ZONE DERM
LOCATION ZONE: NOSE
LOCATION ZONE: FACE

## 2023-09-13 ASSESSMENT — LOCATION DETAILED DESCRIPTION DERM
LOCATION DETAILED: SUPERIOR MID FOREHEAD
LOCATION DETAILED: RIGHT CENTRAL MALAR CHEEK
LOCATION DETAILED: RIGHT CHIN
LOCATION DETAILED: LEFT CENTRAL MALAR CHEEK
LOCATION DETAILED: NASAL TIP

## 2023-09-13 NOTE — PROCEDURE: DERMASWEEP
Type Of Dermasweep: Dermasweep with Epi Infusion
Consent: Written consent obtained, risks reviewed including but not limited to crusting, scabbing, blistering, scarring, darker or lighter pigmentary change, bruising, and/or incomplete response.
Endpoint: mild erythema
Vacuum Pressure: 15
Detail Level: Zone
Post-Care Instructions: I reviewed with the patient in detail post-care instructions. Patient should stay away from the sun and wear sun protection until treated areas are fully healed.
Infusion Solution: Exfo
Indication: skin texture
Vacuum Pressure Units: inches Hg
Bristle Tip: Yellow
Treatment Number: 1
Price (Use Numbers Only, No Special Characters Or $): 210
Number Of Passes: 4

## 2023-09-28 NOTE — ASSESSMENT & PLAN NOTE
Previous problem.  Patient does not recall why the UA was drawn, she does not recall a specific urinary tract symptoms at that time.  Discussed with her that the organism was resistant to tetracycline so we would have to use a different antibiotic if she would have a recurrence.   no pain, swelling or deformity of joints

## 2023-10-02 ENCOUNTER — APPOINTMENT (OUTPATIENT)
Dept: RADIOLOGY | Facility: MEDICAL CENTER | Age: 73
End: 2023-10-02
Attending: INTERNAL MEDICINE
Payer: MEDICARE

## 2023-10-02 DIAGNOSIS — Z12.31 VISIT FOR SCREENING MAMMOGRAM: ICD-10-CM

## 2023-10-02 PROCEDURE — 77063 BREAST TOMOSYNTHESIS BI: CPT

## 2023-10-09 ENCOUNTER — APPOINTMENT (OUTPATIENT)
Dept: MEDICAL GROUP | Facility: PHYSICIAN GROUP | Age: 73
End: 2023-10-09
Payer: MEDICARE

## 2023-10-16 DIAGNOSIS — G89.29 CHRONIC NECK PAIN: ICD-10-CM

## 2023-10-16 DIAGNOSIS — Z79.891 CHRONIC USE OF OPIATE FOR THERAPEUTIC PURPOSE: ICD-10-CM

## 2023-10-16 DIAGNOSIS — M54.2 CHRONIC NECK PAIN: ICD-10-CM

## 2023-10-16 DIAGNOSIS — M54.42 CHRONIC BILATERAL LOW BACK PAIN WITH LEFT-SIDED SCIATICA: ICD-10-CM

## 2023-10-16 DIAGNOSIS — G89.29 CHRONIC BILATERAL LOW BACK PAIN WITH LEFT-SIDED SCIATICA: ICD-10-CM

## 2023-10-16 RX ORDER — TRAMADOL HYDROCHLORIDE 50 MG/1
50 TABLET ORAL EVERY 8 HOURS
Qty: 57 TABLET | Refills: 0 | Status: SHIPPED | OUTPATIENT
Start: 2023-10-21 | End: 2023-11-09 | Stop reason: SDUPTHER

## 2023-11-01 ENCOUNTER — APPOINTMENT (RX ONLY)
Dept: URBAN - METROPOLITAN AREA CLINIC 36 | Facility: CLINIC | Age: 73
Setting detail: DERMATOLOGY
End: 2023-11-01

## 2023-11-01 DIAGNOSIS — Z41.9 ENCOUNTER FOR PROCEDURE FOR PURPOSES OTHER THAN REMEDYING HEALTH STATE, UNSPECIFIED: ICD-10-CM

## 2023-11-01 PROCEDURE — ? CHEMICAL PEEL

## 2023-11-01 ASSESSMENT — LOCATION ZONE DERM: LOCATION ZONE: FACE

## 2023-11-01 ASSESSMENT — LOCATION SIMPLE DESCRIPTION DERM: LOCATION SIMPLE: INFERIOR FOREHEAD

## 2023-11-01 ASSESSMENT — LOCATION DETAILED DESCRIPTION DERM: LOCATION DETAILED: INFERIOR MID FOREHEAD

## 2023-11-01 NOTE — PROCEDURE: CHEMICAL PEEL
Booster Applied In Office?: 0.1% Retinol
Treatment Time (Optional): 2 mins@
Number Of Layers: 2
Detail Level: Zone
Comments: Opaque frosting on forehead, nose, temples on first pass, localized 2nd pass on cheeks only where no frosting present\\nRec increasing h2o intake from 60oz per day to 120 oz per day\\nRev post care & suggested d/c use of tret 5 days prior to laser unless otherwise directed by Crystal’s team\\nRec shave one day prior to laser tx
Chemical Peel: PCA Peel with Hydroquinone
Post Peel Care: After the procedure Sun protection and post-care instructions were reviewed with the patient.
Consent: Prior to the procedure, written consent was obtained and risks were reviewed, including but not limited to: redness, peeling, blistering, pigmentary change, scarring, infection, and pain.
Treatment Number: 1
Post-Care Instructions: I reviewed with the patient in detail post-care instructions. Patient should avoid sun exposure and wear sun protection.
Price (Use Numbers Only, No Special Characters Or $): 236
Prep: The treated area was degreased with pre-peel cleanser, smoothing toner & Vaseline was applied to protect mucous membranes.

## 2023-11-02 ENCOUNTER — HOSPITAL ENCOUNTER (OUTPATIENT)
Dept: LAB | Facility: MEDICAL CENTER | Age: 73
End: 2023-11-02
Attending: INTERNAL MEDICINE
Payer: MEDICARE

## 2023-11-02 DIAGNOSIS — Z79.891 CHRONIC USE OF OPIATE FOR THERAPEUTIC PURPOSE: ICD-10-CM

## 2023-11-02 DIAGNOSIS — G89.29 CHRONIC NECK PAIN: ICD-10-CM

## 2023-11-02 DIAGNOSIS — E78.5 DYSLIPIDEMIA: ICD-10-CM

## 2023-11-02 DIAGNOSIS — R73.01 ELEVATED FASTING BLOOD SUGAR: ICD-10-CM

## 2023-11-02 DIAGNOSIS — M54.2 CHRONIC NECK PAIN: ICD-10-CM

## 2023-11-02 LAB
ALBUMIN SERPL BCP-MCNC: 4.2 G/DL (ref 3.2–4.9)
ALBUMIN/GLOB SERPL: 1.4 G/DL
ALP SERPL-CCNC: 93 U/L (ref 30–99)
ALT SERPL-CCNC: 17 U/L (ref 2–50)
ANION GAP SERPL CALC-SCNC: 10 MMOL/L (ref 7–16)
AST SERPL-CCNC: 20 U/L (ref 12–45)
BASOPHILS # BLD AUTO: 0.6 % (ref 0–1.8)
BASOPHILS # BLD: 0.04 K/UL (ref 0–0.12)
BILIRUB SERPL-MCNC: 0.4 MG/DL (ref 0.1–1.5)
BUN SERPL-MCNC: 14 MG/DL (ref 8–22)
CALCIUM ALBUM COR SERPL-MCNC: 9.4 MG/DL (ref 8.5–10.5)
CALCIUM SERPL-MCNC: 9.6 MG/DL (ref 8.4–10.2)
CHLORIDE SERPL-SCNC: 107 MMOL/L (ref 96–112)
CHOLEST SERPL-MCNC: 189 MG/DL (ref 100–199)
CO2 SERPL-SCNC: 26 MMOL/L (ref 20–33)
CREAT SERPL-MCNC: 0.75 MG/DL (ref 0.5–1.4)
CRP SERPL HS-MCNC: <0.3 MG/DL (ref 0–0.75)
EOSINOPHIL # BLD AUTO: 0.16 K/UL (ref 0–0.51)
EOSINOPHIL NFR BLD: 2.2 % (ref 0–6.9)
ERYTHROCYTE [DISTWIDTH] IN BLOOD BY AUTOMATED COUNT: 45.3 FL (ref 35.9–50)
ERYTHROCYTE [SEDIMENTATION RATE] IN BLOOD BY WESTERGREN METHOD: 2 MM/HOUR (ref 0–25)
FASTING STATUS PATIENT QL REPORTED: NORMAL
GFR SERPLBLD CREATININE-BSD FMLA CKD-EPI: 83 ML/MIN/1.73 M 2
GLOBULIN SER CALC-MCNC: 2.9 G/DL (ref 1.9–3.5)
GLUCOSE SERPL-MCNC: 100 MG/DL (ref 65–99)
HCT VFR BLD AUTO: 46.4 % (ref 37–47)
HDLC SERPL-MCNC: 60 MG/DL
HGB BLD-MCNC: 15.1 G/DL (ref 12–16)
IMM GRANULOCYTES # BLD AUTO: 0.02 K/UL (ref 0–0.11)
IMM GRANULOCYTES NFR BLD AUTO: 0.3 % (ref 0–0.9)
LDLC SERPL CALC-MCNC: 106 MG/DL
LYMPHOCYTES # BLD AUTO: 2.75 K/UL (ref 1–4.8)
LYMPHOCYTES NFR BLD: 38 % (ref 22–41)
MCH RBC QN AUTO: 31 PG (ref 27–33)
MCHC RBC AUTO-ENTMCNC: 32.5 G/DL (ref 32.2–35.5)
MCV RBC AUTO: 95.3 FL (ref 81.4–97.8)
MONOCYTES # BLD AUTO: 0.48 K/UL (ref 0–0.85)
MONOCYTES NFR BLD AUTO: 6.6 % (ref 0–13.4)
NEUTROPHILS # BLD AUTO: 3.78 K/UL (ref 1.82–7.42)
NEUTROPHILS NFR BLD: 52.3 % (ref 44–72)
NRBC # BLD AUTO: 0 K/UL
NRBC BLD-RTO: 0 /100 WBC (ref 0–0.2)
PLATELET # BLD AUTO: 219 K/UL (ref 164–446)
PMV BLD AUTO: 10.3 FL (ref 9–12.9)
POTASSIUM SERPL-SCNC: 4.6 MMOL/L (ref 3.6–5.5)
PROT SERPL-MCNC: 7.1 G/DL (ref 6–8.2)
RBC # BLD AUTO: 4.87 M/UL (ref 4.2–5.4)
SODIUM SERPL-SCNC: 143 MMOL/L (ref 135–145)
T4 FREE SERPL-MCNC: 1.36 NG/DL (ref 0.93–1.7)
TRIGL SERPL-MCNC: 115 MG/DL (ref 0–149)
TSH SERPL DL<=0.005 MIU/L-ACNC: 1.81 UIU/ML (ref 0.38–5.33)
VIT B12 SERPL-MCNC: 953 PG/ML (ref 211–911)
WBC # BLD AUTO: 7.2 K/UL (ref 4.8–10.8)

## 2023-11-02 PROCEDURE — 36415 COLL VENOUS BLD VENIPUNCTURE: CPT

## 2023-11-02 PROCEDURE — 86140 C-REACTIVE PROTEIN: CPT

## 2023-11-02 PROCEDURE — 84443 ASSAY THYROID STIM HORMONE: CPT

## 2023-11-02 PROCEDURE — 85025 COMPLETE CBC W/AUTO DIFF WBC: CPT

## 2023-11-02 PROCEDURE — 84439 ASSAY OF FREE THYROXINE: CPT

## 2023-11-02 PROCEDURE — 80053 COMPREHEN METABOLIC PANEL: CPT

## 2023-11-02 PROCEDURE — 83036 HEMOGLOBIN GLYCOSYLATED A1C: CPT

## 2023-11-02 PROCEDURE — 85652 RBC SED RATE AUTOMATED: CPT

## 2023-11-02 PROCEDURE — 80061 LIPID PANEL: CPT

## 2023-11-02 PROCEDURE — 82607 VITAMIN B-12: CPT

## 2023-11-03 LAB
EST. AVERAGE GLUCOSE BLD GHB EST-MCNC: 108 MG/DL
HBA1C MFR BLD: 5.4 % (ref 4–5.6)

## 2023-11-09 ENCOUNTER — HOSPITAL ENCOUNTER (OUTPATIENT)
Facility: MEDICAL CENTER | Age: 73
End: 2023-11-09
Attending: INTERNAL MEDICINE
Payer: MEDICARE

## 2023-11-09 ENCOUNTER — OFFICE VISIT (OUTPATIENT)
Dept: MEDICAL GROUP | Facility: PHYSICIAN GROUP | Age: 73
End: 2023-11-09
Payer: MEDICARE

## 2023-11-09 VITALS
BODY MASS INDEX: 25.85 KG/M2 | RESPIRATION RATE: 16 BRPM | OXYGEN SATURATION: 93 % | SYSTOLIC BLOOD PRESSURE: 120 MMHG | HEIGHT: 67 IN | HEART RATE: 74 BPM | TEMPERATURE: 97 F | WEIGHT: 164.7 LBS | DIASTOLIC BLOOD PRESSURE: 70 MMHG

## 2023-11-09 DIAGNOSIS — Z79.891 CHRONIC USE OF OPIATE FOR THERAPEUTIC PURPOSE: ICD-10-CM

## 2023-11-09 DIAGNOSIS — M54.42 CHRONIC BILATERAL LOW BACK PAIN WITH LEFT-SIDED SCIATICA: ICD-10-CM

## 2023-11-09 DIAGNOSIS — M54.2 CHRONIC NECK PAIN: ICD-10-CM

## 2023-11-09 DIAGNOSIS — R49.9 CHANGE IN VOICE: ICD-10-CM

## 2023-11-09 DIAGNOSIS — G89.29 CHRONIC BILATERAL LOW BACK PAIN WITH LEFT-SIDED SCIATICA: ICD-10-CM

## 2023-11-09 DIAGNOSIS — E03.9 ACQUIRED HYPOTHYROIDISM: ICD-10-CM

## 2023-11-09 DIAGNOSIS — G89.29 CHRONIC NECK PAIN: ICD-10-CM

## 2023-11-09 DIAGNOSIS — E78.5 DYSLIPIDEMIA: ICD-10-CM

## 2023-11-09 PROCEDURE — 3078F DIAST BP <80 MM HG: CPT | Performed by: INTERNAL MEDICINE

## 2023-11-09 PROCEDURE — 80307 DRUG TEST PRSMV CHEM ANLYZR: CPT

## 2023-11-09 PROCEDURE — G0480 DRUG TEST DEF 1-7 CLASSES: HCPCS | Mod: XU

## 2023-11-09 PROCEDURE — 99214 OFFICE O/P EST MOD 30 MIN: CPT | Performed by: INTERNAL MEDICINE

## 2023-11-09 PROCEDURE — 3074F SYST BP LT 130 MM HG: CPT | Performed by: INTERNAL MEDICINE

## 2023-11-09 RX ORDER — TRAMADOL HYDROCHLORIDE 50 MG/1
50 TABLET ORAL EVERY 8 HOURS
Qty: 90 TABLET | Refills: 0 | Status: SHIPPED | OUTPATIENT
Start: 2023-11-09 | End: 2023-12-09

## 2023-11-09 RX ORDER — TRAMADOL HYDROCHLORIDE 50 MG/1
50 TABLET ORAL EVERY 8 HOURS
Qty: 90 TABLET | Refills: 0 | Status: SHIPPED | OUTPATIENT
Start: 2023-12-09 | End: 2024-01-08

## 2023-11-09 RX ORDER — TRAMADOL HYDROCHLORIDE 50 MG/1
50 TABLET ORAL EVERY 8 HOURS
Qty: 90 TABLET | Refills: 0 | Status: SHIPPED | OUTPATIENT
Start: 2024-01-08 | End: 2024-02-01 | Stop reason: SDUPTHER

## 2023-11-09 RX ORDER — CLINDAMYCIN PHOSPHATE 11.9 MG/ML
SOLUTION TOPICAL
COMMUNITY
Start: 2023-09-08

## 2023-11-09 ASSESSMENT — FIBROSIS 4 INDEX: FIB4 SCORE: 1.66

## 2023-11-10 NOTE — ASSESSMENT & PLAN NOTE
Chronic ongoing problem, current medical approach has been effective to help treat her chronic neck pain following a cervical fusion in 2007.  She will continue tramadol 50 mg 2 tablets in the morning and 1 tablet in the evening.  She is established with physiatry, physical therapy, neurosurgery.  Previously had issues of chronic hardware infection, inflammatory markers at this time are all normal.  Controlled substance is up-to-date and will also update urine drug screen today.

## 2023-11-10 NOTE — ASSESSMENT & PLAN NOTE
Chronic ongoing problem, continue current medical regiment with tramadol 50 mg 2 tablets in the morning and 1 tablet in the evening for chronic neck pain and chronic low back pain.  No deleterious side effects noted.

## 2023-11-10 NOTE — ASSESSMENT & PLAN NOTE
She reports following anterior approach to cervical fusion 2007 she developed what sounds like a perforation in her larynx.  Unclear if this was traumatic at the time of surgery or is secondary to extrinsic pressure.  She notes that her tonsils were taken and essentially patched up over the hole in the larynx, unclear if this is above or below the level of the vocal cords.  She notes her voice has been changed since that time.  Now she is having difficulty with swallowing where things will regurgitate back up.  She worries that there could be external compression on her esophagus from her larynx from inflammation or issues at the prior surgical site.  She has not had an evaluation with an otolaryngologist in a long time.  She is actually due for her colonoscopy is so we are planning ask for an upper endoscopy at the same time to evaluate for Schatzki ring or primary esophageal cause to her current issues but want to make sure that she would be safe to do so from ENT.  Referral has been placed.

## 2023-11-10 NOTE — ASSESSMENT & PLAN NOTE
Chronic ongoing problem, thyroid levels are stable, continue medical therapy with levothyroxine 100 mcg daily.

## 2023-11-10 NOTE — ASSESSMENT & PLAN NOTE
Chronic improving problem.  She is working very hard at losing weight and eating healthier, her cholesterol reflects this as it has improved across the board.  Commended her on this great improvement and encouraged her to continue with the momentum.

## 2023-11-10 NOTE — ASSESSMENT & PLAN NOTE
Chronic ongoing problem, continue medical regiment with tramadol 100 mg 1-2 times daily.  Other analgesics have been ineffective or contraindicated.  Continue follow-up with physiatry as recommended.

## 2023-11-10 NOTE — PROGRESS NOTES
Subjective:   Chief Complaint/History of Present Illness:  Carlee Hunt is a 73 y.o. female established patient who presents today to discuss medical problems as listed below. Carlee is unaccompanied for today's visit.    Problem   Change in voice, prior laryngeal surgery    She reports following anterior approach to cervical fusion 2007 she developed what sounds like a perforation in her larynx.  Unclear if this was traumatic at the time of surgery or is secondary to extrinsic pressure.  She notes that her tonsils were taken and essentially patched up over the hole in the larynx, unclear if this is above or below the level of the vocal cords.  She notes her voice has been changed since that time.  Now she is having difficulty with swallowing where things will regurgitate back up.  She worries that there could be external compression on her esophagus from her larynx from inflammation or issues at the prior surgical site.  She has not had an evaluation with an otolaryngologist in a long time.  She is actually due for her colonoscopy is so we are planning ask for an upper endoscopy at the same time to evaluate for Schatzki ring or primary esophageal cause to her current issues but want to make sure that she would be safe to do so from ENT.  Referral has been placed.     Chronic Bilateral Low Back Pain With Left-Sided Sciatica    Hip Xray (7/2020):  There is an implanted electronic device within the left buttock with wires extending into the sacrum. There is mild degenerative change of the left greater than right hip joint characterized by joint space loss and osteophytic spurring. There is pelvic calcification.     IMPRESSION: Degenerative change of the hips bilaterally, left greater than right.    Lumbar Xray (7/2020):  There is convex right scoliotic curvature. There is multilevel decreased disc height and endplate spurring. There is prominent multilevel facet degeneration. There is grade 1 anterior subluxation at  L3-4 and L4-5. There is vascular calcification. There is an implanted electronic device overlying the left sacrum posteriorly.     IMPRESSION:  1.  Multilevel degenerative disc disease and facet degeneration.  2.  Scoliotic deformity.  3.  Implanted electronic device overlying the left sacrum.      She reports longstanding problems in the low back.  She recalls SI joint disease and has been working with physical therapy in 2019. She denies any recent imaging of her low back. She also has pain along the left lateral thigh, unclear if that is IT band pain or radicular from the lower back. More recently with right thigh pain, both anterior and posterior, s/p right hip injection with Dr. Hernandez in April 2022.    Current regimen: Tramadol 100 mg 1-2 times daily, baclofen 5 mg at night     Dyslipidemia     Latest Reference Range & Units 04/05/23 10:01 11/02/23 09:42   Cholesterol,Tot 100 - 199 mg/dL 203 (H) 189   Triglycerides 0 - 149 mg/dL 164 (H) 115   HDL >=40 mg/dL 56 60   LDL <100 mg/dL 114 (H) 106 (H)     CT cardiac score (2/2023):  Coronary calcification:  LMA - 0.0  LCX - 0.0  LAD - 0.0  RCA - 0.0        Total Calcium Score: 0.0     Percentile: Calcium score is below the 25th percentile for the patient's age and sex.     She has history of hyperlipidemia.  Unclear if she has ever taken dedicated lipid-lowering medications.  She is on coenzyme Q 10.  She had a carotid ultrasound in 2017 that was negative for any blockages. CT cardiac score of 0.  She did have a Holter study for palpitations in the past.     Chronic Use of Opiate for Therapeutic Purpose    She reports longstanding chronic pain in her neck due to a 2 staged neck fusion surgery in 2013 at [facility].  She also has had issues with intermittent low back/SI joint pain, left greater than right hip pain now radiating into the groin, and left IT band pain.  She uses tramadol 50 mg generally 2 tablets daily for pain control which is no longer sufficient as of  October 2022, will plan to increase to 3 tablets daily.  She denies any deleterious side effects from this medicine.  She has been stable on 3 tablets daily for the past year, continue at this time.     Acquired Hypothyroidism     Latest Reference Range & Units 11/02/23 09:42   TSH 0.380 - 5.330 uIU/mL 1.810   Free T-4 0.93 - 1.70 ng/dL 1.36     She has history of hypothyroidism.  She is currently utilizing levothyroxine.  No current signs or symptoms of overtreatment or under treatment.    Current regimen: Levothyroxine 100 mcg daily     Chronic Neck Pain    She reports staged 2 part fusion in 2007 at [facility] at the recommendation of a local orthopedist. She reports an anterior and posterior approach at that time. 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  At that same time she was experiencing left hip pain and was told it was likely related to the left neck pain.  She reports chronic issues with what she thinks is muscle spasms on the left greater than right neck as well as the bilateral trapezius areas which she relates to large amount of hardware adding additional pressure to her muscles.  It does not sound like she is followed up with anyone locally for this previous neck problem.    For chronic pain in the neck she utilizes tramadol 50 mg up to 3 tablets daily, 2 in the AM and 1 in the PM.  She is on turmeric.  She also is working with physical therapy and physiatry.          Current Medications:  Current Outpatient Medications Ordered in Epic   Medication Sig Dispense Refill    traMADol (ULTRAM) 50 MG Tab Take 1 Tablet by mouth every 8 hours for 30 days. 90 Tablet 0    [START ON 12/9/2023] traMADol (ULTRAM) 50 MG Tab Take 1 Tablet by mouth every 8 hours for 30 days. 90 Tablet 0    [START ON 1/8/2024] traMADol (ULTRAM) 50 MG Tab Take 1 Tablet by mouth  "every 8 hours for 30 days. 90 Tablet 0    metoprolol SR (TOPROL XL) 25 MG TABLET SR 24 HR TAKE ONE TABLET BY MOUTH DAILY 100 Tablet 3    levothyroxine (SYNTHROID) 100 MCG Tab Take 1 Tablet by mouth every morning on an empty stomach. 100 Tablet 3    Tretinoin 0.05 % Gel Apply 1 Application topically at bedtime. 1 Each 3    hydrOXYzine HCl (ATARAX) 25 MG Tab       estradiol (ESTRACE) 0.1 MG/GM vaginal cream Insert 0.4 g into the vagina every 72 hours. 42.5 g 3    COMBIGAN 0.2-0.5 % Solution       Lutein 20 MG Tab Take  by mouth.      L-Theanine 200 MG Cap Take  by mouth.      Coenzyme Q10 (COQ10 PO) Take 100 mg by mouth every day. Qunol liquid    every other day      magnesium oxide (MAG-OX) 400 MG Tab Take 400 mg by mouth every evening.      Calcium 500 MG Chew Tab Take 1 Tab by mouth every evening.      Milk Thistle 175 MG Cap Take 1 Cap by mouth every day.      TURMERIC PO Take 333 mg by mouth every day. Daily (Qunol liquid)      Probiotic Product (PRO-BIOTIC BLEND) Cap Take 1 Capsule by mouth every evening. Women's Probiotic      latanoprost (XALATAN) 0.005 % Solution Place 1 Drop in both eyes every evening.      KRILL OIL PO Take 500 mg by mouth every day.      Multiple Vitamins-Minerals (CENTRUM PO) Take 1 Tab by mouth every day.      clindamycin (CLEOCIN) 1 % Solution        No current Epic-ordered facility-administered medications on file.          Objective:   Physical Exam:    Vitals: /70 (BP Location: Left arm, Patient Position: Sitting, BP Cuff Size: Adult)   Pulse 74   Temp 36.1 °C (97 °F) (Temporal)   Resp 16   Ht 1.702 m (5' 7\")   Wt 74.7 kg (164 lb 11.2 oz)   SpO2 93%    BMI: Body mass index is 25.8 kg/m².  Physical Exam  Constitutional:       General: She is not in acute distress.     Appearance: Normal appearance. She is not ill-appearing.   HENT:      Right Ear: External ear normal.      Left Ear: External ear normal.   Eyes:      General: No scleral icterus.     Conjunctiva/sclera: " Conjunctivae normal.   Cardiovascular:      Rate and Rhythm: Normal rate and regular rhythm.      Pulses: Normal pulses.      Heart sounds: No murmur heard.  Pulmonary:      Effort: Pulmonary effort is normal. No respiratory distress.      Breath sounds: No wheezing, rhonchi or rales.   Abdominal:      General: Bowel sounds are normal.      Palpations: Abdomen is soft.   Musculoskeletal:         General: Tenderness present.      Cervical back: Rigidity present.      Right lower leg: No edema.      Left lower leg: No edema.      Comments: Neck and low back   Skin:     General: Skin is warm and dry.   Psychiatric:         Mood and Affect: Mood normal.         Behavior: Behavior normal.         Thought Content: Thought content normal.         Judgment: Judgment normal.          Assessment and Plan:   Carlee is a 73 y.o. female with the following:  Problem List Items Addressed This Visit       Acquired hypothyroidism     Chronic ongoing problem, thyroid levels are stable, continue medical therapy with levothyroxine 100 mcg daily.         Change in voice, prior laryngeal surgery     She reports following anterior approach to cervical fusion 2007 she developed what sounds like a perforation in her larynx.  Unclear if this was traumatic at the time of surgery or is secondary to extrinsic pressure.  She notes that her tonsils were taken and essentially patched up over the hole in the larynx, unclear if this is above or below the level of the vocal cords.  She notes her voice has been changed since that time.  Now she is having difficulty with swallowing where things will regurgitate back up.  She worries that there could be external compression on her esophagus from her larynx from inflammation or issues at the prior surgical site.  She has not had an evaluation with an otolaryngologist in a long time.  She is actually due for her colonoscopy is so we are planning ask for an upper endoscopy at the same time to evaluate for  Schatzki ring or primary esophageal cause to her current issues but want to make sure that she would be safe to do so from ENT.  Referral has been placed.           Relevant Orders    Referral to ENT    Chronic bilateral low back pain with left-sided sciatica     Chronic ongoing problem, continue medical regiment with tramadol 100 mg 1-2 times daily.  Other analgesics have been ineffective or contraindicated.  Continue follow-up with physiatry as recommended.         Relevant Medications    traMADol (ULTRAM) 50 MG Tab    traMADol (ULTRAM) 50 MG Tab (Start on 12/9/2023)    traMADol (ULTRAM) 50 MG Tab (Start on 1/8/2024)    Chronic neck pain     Chronic ongoing problem, current medical approach has been effective to help treat her chronic neck pain following a cervical fusion in 2007.  She will continue tramadol 50 mg 2 tablets in the morning and 1 tablet in the evening.  She is established with physiatry, physical therapy, neurosurgery.  Previously had issues of chronic hardware infection, inflammatory markers at this time are all normal.  Controlled substance is up-to-date and will also update urine drug screen today.             Relevant Medications    traMADol (ULTRAM) 50 MG Tab    traMADol (ULTRAM) 50 MG Tab (Start on 12/9/2023)    traMADol (ULTRAM) 50 MG Tab (Start on 1/8/2024)    Chronic use of opiate for therapeutic purpose     Chronic ongoing problem, continue current medical regiment with tramadol 50 mg 2 tablets in the morning and 1 tablet in the evening for chronic neck pain and chronic low back pain.  No deleterious side effects noted.         Relevant Medications    traMADol (ULTRAM) 50 MG Tab    traMADol (ULTRAM) 50 MG Tab (Start on 12/9/2023)    traMADol (ULTRAM) 50 MG Tab (Start on 1/8/2024)    Other Relevant Orders    PAIN MANAGEMENT SCRN, W/ RFLX TO QNT    Dyslipidemia     Chronic improving problem.  She is working very hard at losing weight and eating healthier, her cholesterol reflects this as it  has improved across the board.  Commended her on this great improvement and encouraged her to continue with the momentum.               RTC: Return in about 3 months (around 2/9/2024).    I spent a total of 32 minutes with record review, exam, communication with the patient, communication with other providers, and documentation of this encounter.    PLEASE NOTE: This dictation was created using voice recognition software. I have made every reasonable attempt to correct obvious errors, but I expect that there are errors of grammar and possibly content that I did not discover before finalizing the note.      Shari Dent, DO  Geriatric and Internal Medicine  [facility]

## 2023-11-11 LAB

## 2023-11-13 LAB
NORTRAMADOL UR-MCNC: 5025 NG/ML
TRAMADOL UR-MCNC: NORMAL NG/ML

## 2023-11-14 LAB
ETHYL GLUCURONIDE UR CFM-MCNC: 2044 NG/ML
ETHYL SULFATE UR CFM-MCNC: 871 NG/ML

## 2023-12-07 ENCOUNTER — APPOINTMENT (RX ONLY)
Dept: URBAN - METROPOLITAN AREA CLINIC 36 | Facility: CLINIC | Age: 73
Setting detail: DERMATOLOGY
End: 2023-12-07

## 2023-12-07 DIAGNOSIS — Z41.9 ENCOUNTER FOR PROCEDURE FOR PURPOSES OTHER THAN REMEDYING HEALTH STATE, UNSPECIFIED: ICD-10-CM

## 2023-12-07 PROCEDURE — ? SCITON PROFRACTIONAL

## 2023-12-07 PROCEDURE — ? ADDITIONAL NOTES

## 2023-12-07 PROCEDURE — ? FRAXEL

## 2023-12-07 ASSESSMENT — LOCATION ZONE DERM: LOCATION ZONE: FACE

## 2023-12-07 ASSESSMENT — LOCATION SIMPLE DESCRIPTION DERM
LOCATION SIMPLE: RIGHT CHEEK
LOCATION SIMPLE: LEFT CHEEK
LOCATION SIMPLE: LEFT FOREHEAD

## 2023-12-07 ASSESSMENT — LOCATION DETAILED DESCRIPTION DERM
LOCATION DETAILED: LEFT INFERIOR CENTRAL MALAR CHEEK
LOCATION DETAILED: LEFT INFERIOR MEDIAL FOREHEAD
LOCATION DETAILED: RIGHT INFERIOR CENTRAL MALAR CHEEK

## 2023-12-07 NOTE — PROCEDURE: SCITON PROFRACTIONAL
Detail Level: Zone
Coagulation (Optional): 0
Location: cheeks
Preprocedure Text: The treatment areas were thoroughly cleaned. The area was treated with no immediate stacking of pulses.
Number Of Passes (Optional): 2
Topical Anesthesia?: 23% lidocaine, 7% tetracaine
Location: perioral
Post-Care Instructions: I reviewed with the patient in detail post-care instructions. Patient should stay away from the sun and wear sun protection until treated areas are fully healed. Provided Vaseline
Number Of Passes (Optional): 1
Consent: Written consent obtained, risks reviewed including but not limited to crusting, scabbing, blistering, scarring, darker or lighter pigmentary change, bruising, and/or incomplete response.
Length Of Topical Anesthesia Application (Optional): 60 minutes
Post Procedure Text: The patient tolerated the procedure well. Fluocinonide ointment was applied and a dressing . Post care was reviewed with the patient.
Ablation Depth (Optional): 150
Density (Optional): 11
Ablation Depth (Optional): 175
Location: forehead
Density (Optional): 22
Ablation Depth (Optional): 250

## 2023-12-07 NOTE — PROCEDURE: ADDITIONAL NOTES
Additional Notes: Recommend starting Nektar, renewal lotion, and vitamin c. Start normal face routine in one week. Before applying Tretinoin, apply a small amount to test for pain.
Render Risk Assessment In Note?: yes
Detail Level: Zone

## 2023-12-07 NOTE — PROCEDURE: FRAXEL
Number Of Passes: 4
Location: neck
Length Of Topical Anesthesia Application (Optional): 60 minutes
Total Coverage: 14%
Treatment Level: 5
Detail Level: Zone
Was An Eye Shield Used?: No
Post-Care Instructions: I reviewed with the patient in detail post-care instructions and written instructions given.  Apply nectar and moisturizer at least 2 times a day, wash with alastin cleanser and apply alastin  hydratint  sun block and keep skin protected from the sun with hats and clothing.  Patient should avoid sun until area fully healed. Recommend physical blocker sunblock, mild cleanser. Do not pick at areas treated.
Location: decolletage of the chest
Energy(Mj/Cm2): 1
Tip: 15mm
Medium Plastic Eye Shield Text: The ocular mucosa was anesthetized with tetracaine. Once adequate anesthesia was optained, medium plastic eye shields were inserted and remained in place until the procedure was completed.
Small Metal Eye Shield Text: The ocular mucosa was anesthetized with tetracaine. Once adequate anesthesia was optained, small metal eye shields were inserted and remained in place until the procedure was completed.
Wavelength: 1550nm
Indication: resurfacing
Energy(Mj/Cm2): 20
Location: full face except eyelids
Anesthesia Type: 1% lidocaine with epinephrine
Large Plastic Eye Shield Text: The ocular mucosa was anesthetized with tetracaine. Once adequate anesthesia was optained, large plastic eye shields were inserted and remained in place until the procedure was completed.
Number Of Passes: 2
Medium Metal Eye Shield Text: The ocular mucosa was anesthetized with tetracaine. Once adequate anesthesia was optained, medium metal eye shields were inserted and remained in place until the procedure was completed.
Energy(Mj/Cm2): 25
External Cooling: Domenic Cryo 6
Topical Anesthesia Type: 23% lidocaine, 7% tetracaine
Consent: Written consent obtained, risks reviewed including but not limited to pain and incomplete improvement.
Large Metal Eye Shield Text: The ocular mucosa was anesthetized with tetracaine. Once adequate anesthesia was optained, large metal eye shields were inserted and remained in place until the procedure was completed.
Total Coverage: 30%
Add Post-Care Below To The Note: Yes
Small Plastic Eye Shield Text: The ocular mucosa was anesthetized with tetracaine. Once adequate anesthesia was optained, small plastic eye shields were inserted and remained in place until the procedure was completed.

## 2023-12-08 ENCOUNTER — RX ONLY (OUTPATIENT)
Age: 73
Setting detail: RX ONLY
End: 2023-12-08

## 2023-12-08 RX ORDER — DOXYCYCLINE 50 MG/1
CAPSULE ORAL
Qty: 14 | Refills: 0 | Status: ERX | COMMUNITY
Start: 2023-12-08

## 2023-12-20 ENCOUNTER — APPOINTMENT (RX ONLY)
Dept: URBAN - METROPOLITAN AREA CLINIC 36 | Facility: CLINIC | Age: 73
Setting detail: DERMATOLOGY
End: 2023-12-20

## 2023-12-20 DIAGNOSIS — Z41.9 ENCOUNTER FOR PROCEDURE FOR PURPOSES OTHER THAN REMEDYING HEALTH STATE, UNSPECIFIED: ICD-10-CM

## 2023-12-20 PROCEDURE — ? FACIAL

## 2023-12-20 ASSESSMENT — LOCATION DETAILED DESCRIPTION DERM
LOCATION DETAILED: INFERIOR MID FOREHEAD
LOCATION DETAILED: LEFT CENTRAL PARIETAL SCALP

## 2023-12-20 ASSESSMENT — LOCATION SIMPLE DESCRIPTION DERM
LOCATION SIMPLE: INFERIOR FOREHEAD
LOCATION SIMPLE: SCALP

## 2023-12-20 ASSESSMENT — LOCATION ZONE DERM
LOCATION ZONE: SCALP
LOCATION ZONE: FACE

## 2023-12-20 NOTE — PROCEDURE: FACIAL
Purelift (Optional): no
Treatment Type (Optional): Rescue Facial
Mask Type (Optional): sensitive
Exfoliation Type Override: papaya
Price (Use Numbers Only, No Special Characters Or $): 212
Led Light (Optional): red
Exfoliation Type: enzyme
Comments (Non-Sticky): Some cracking & bleeding around mouth from introducing Tretinoin to frequently back into routine. Avoided enzyme on this area.  Tolerated well. Looks good
Treatment Type Override: custom
Detail Level: Generalized
Treatment Serum Override: Alastin nectar
Treatment Serum (Optional): PhytoCorrect

## 2023-12-29 DIAGNOSIS — L71.9 ROSACEA: ICD-10-CM

## 2023-12-29 NOTE — TELEPHONE ENCOUNTER
Received request via: Pharmacy    Was the patient seen in the last year in this department? Yes  11/9/2023  Does the patient have an active prescription (recently filled or refills available) for medication(s) requested? No    Does the patient have alf Plus and need 100 day supply (blood pressure, diabetes and cholesterol meds only)? Yes, quantity updated to 100 days

## 2023-12-30 RX ORDER — AZELAIC ACID 0.15 G/G
GEL TOPICAL
Qty: 50 G | Refills: 11 | Status: SHIPPED | OUTPATIENT
Start: 2023-12-30

## 2024-01-03 ENCOUNTER — HOSPITAL ENCOUNTER (OUTPATIENT)
Dept: RADIOLOGY | Facility: MEDICAL CENTER | Age: 74
End: 2024-01-03
Attending: OTOLARYNGOLOGY
Payer: MEDICARE

## 2024-01-03 DIAGNOSIS — R13.14 PHARYNGOESOPHAGEAL DYSPHAGIA: ICD-10-CM

## 2024-01-03 PROCEDURE — 92611 MOTION FLUOROSCOPY/SWALLOW: CPT | Performed by: SPEECH-LANGUAGE PATHOLOGIST

## 2024-01-03 PROCEDURE — 74230 X-RAY XM SWLNG FUNCJ C+: CPT

## 2024-01-11 DIAGNOSIS — M79.602 BILATERAL ARM PAIN: ICD-10-CM

## 2024-01-11 DIAGNOSIS — M79.601 BILATERAL ARM PAIN: ICD-10-CM

## 2024-01-11 DIAGNOSIS — G89.29 CHRONIC NECK PAIN: ICD-10-CM

## 2024-01-11 DIAGNOSIS — G89.29 CHRONIC BILATERAL LOW BACK PAIN WITH LEFT-SIDED SCIATICA: ICD-10-CM

## 2024-01-11 DIAGNOSIS — M54.42 CHRONIC BILATERAL LOW BACK PAIN WITH LEFT-SIDED SCIATICA: ICD-10-CM

## 2024-01-11 DIAGNOSIS — M54.2 CHRONIC NECK PAIN: ICD-10-CM

## 2024-01-11 NOTE — PROGRESS NOTES
1. Caller Name: Carlee Hunt  [phone] (home)                           Call Back Number: [phone] (home)         How would the patient prefer to be contacted with a response: Phone call OK to leave a detailed message    Referral needed for 2024      PT Referral       Reason: Neck,shoulder,back pain  Requested provider: Jaylyn foss

## 2024-02-01 ENCOUNTER — APPOINTMENT (RX ONLY)
Dept: URBAN - METROPOLITAN AREA CLINIC 36 | Facility: CLINIC | Age: 74
Setting detail: DERMATOLOGY
End: 2024-02-01

## 2024-02-01 ENCOUNTER — OFFICE VISIT (OUTPATIENT)
Dept: MEDICAL GROUP | Facility: PHYSICIAN GROUP | Age: 74
End: 2024-02-01
Payer: MEDICARE

## 2024-02-01 ENCOUNTER — APPOINTMENT (OUTPATIENT)
Dept: MEDICAL GROUP | Facility: PHYSICIAN GROUP | Age: 74
End: 2024-02-01
Payer: MEDICARE

## 2024-02-01 VITALS
TEMPERATURE: 98.2 F | WEIGHT: 163.4 LBS | BODY MASS INDEX: 25.65 KG/M2 | HEIGHT: 67 IN | RESPIRATION RATE: 14 BRPM | SYSTOLIC BLOOD PRESSURE: 120 MMHG | DIASTOLIC BLOOD PRESSURE: 78 MMHG | OXYGEN SATURATION: 96 % | HEART RATE: 64 BPM

## 2024-02-01 DIAGNOSIS — Z41.9 ENCOUNTER FOR PROCEDURE FOR PURPOSES OTHER THAN REMEDYING HEALTH STATE, UNSPECIFIED: ICD-10-CM

## 2024-02-01 DIAGNOSIS — R26.81 GAIT INSTABILITY: ICD-10-CM

## 2024-02-01 DIAGNOSIS — G89.29 CHRONIC NECK PAIN: ICD-10-CM

## 2024-02-01 DIAGNOSIS — E78.5 DYSLIPIDEMIA: ICD-10-CM

## 2024-02-01 DIAGNOSIS — Z79.891 CHRONIC USE OF OPIATE FOR THERAPEUTIC PURPOSE: ICD-10-CM

## 2024-02-01 DIAGNOSIS — M54.2 CHRONIC NECK PAIN: ICD-10-CM

## 2024-02-01 DIAGNOSIS — G89.29 CHRONIC BILATERAL LOW BACK PAIN WITH LEFT-SIDED SCIATICA: ICD-10-CM

## 2024-02-01 DIAGNOSIS — Z79.2 CHRONIC ANTIBIOTIC SUPPRESSION: ICD-10-CM

## 2024-02-01 DIAGNOSIS — M54.42 CHRONIC BILATERAL LOW BACK PAIN WITH LEFT-SIDED SCIATICA: ICD-10-CM

## 2024-02-01 PROCEDURE — 3074F SYST BP LT 130 MM HG: CPT | Performed by: INTERNAL MEDICINE

## 2024-02-01 PROCEDURE — 99214 OFFICE O/P EST MOD 30 MIN: CPT | Performed by: INTERNAL MEDICINE

## 2024-02-01 PROCEDURE — ? FACIAL

## 2024-02-01 PROCEDURE — 3078F DIAST BP <80 MM HG: CPT | Performed by: INTERNAL MEDICINE

## 2024-02-01 RX ORDER — TRAMADOL HYDROCHLORIDE 50 MG/1
50 TABLET ORAL EVERY 8 HOURS
Qty: 90 TABLET | Refills: 0 | Status: SHIPPED | OUTPATIENT
Start: 2024-03-05 | End: 2024-04-04

## 2024-02-01 RX ORDER — TRAMADOL HYDROCHLORIDE 50 MG/1
50 TABLET ORAL EVERY 8 HOURS
Qty: 90 TABLET | Refills: 0 | Status: SHIPPED | OUTPATIENT
Start: 2024-02-04 | End: 2024-03-05

## 2024-02-01 RX ORDER — TRAMADOL HYDROCHLORIDE 50 MG/1
50 TABLET ORAL EVERY 8 HOURS
Qty: 90 TABLET | Refills: 0 | Status: SHIPPED | OUTPATIENT
Start: 2024-04-04 | End: 2024-05-04

## 2024-02-01 ASSESSMENT — LOCATION SIMPLE DESCRIPTION DERM: LOCATION SIMPLE: RIGHT FOREHEAD

## 2024-02-01 ASSESSMENT — PATIENT HEALTH QUESTIONNAIRE - PHQ9: CLINICAL INTERPRETATION OF PHQ2 SCORE: 0

## 2024-02-01 ASSESSMENT — LOCATION ZONE DERM: LOCATION ZONE: FACE

## 2024-02-01 ASSESSMENT — FIBROSIS 4 INDEX: FIB4 SCORE: 1.66

## 2024-02-01 ASSESSMENT — LOCATION DETAILED DESCRIPTION DERM: LOCATION DETAILED: RIGHT INFERIOR MEDIAL FOREHEAD

## 2024-02-01 NOTE — LETTER
[employer]  Shari Dent D.O.  [address]  [place] [address]  Fax: [phone]   Authorization for Release/Disclosure of   Protected Health Information   Name: CARLEE ORR : 1950 SSN: [id number]   Address: [facility] [place] [address] Phone:    [phone] (home)    I authorize the entity listed below to release/disclose the PHI below to:   [facility]/Shari Dent D.O. and Shari Dent D.O.   Provider or Entity Name:  [facility]   City, State, Zip   Phone:      Fax:     Reason for request: continuity of care   Information to be released:    [  ] LAST COLONOSCOPY,  including any PATH REPORT and follow-up  [  ] LAST FIT/COLOGUARD RESULT [  ] LAST DEXA  [  ] LAST MAMMOGRAM  [  ] LAST PAP  [  ] LAST LABS [  ] RETINA EXAM REPORT  [  ] IMMUNIZATION RECORDS  [ x ] Release all info      [ x ] Check here and initial the line next to each item to release ALL health information INCLUDING  _____ Care and treatment for drug and / or alcohol abuse  _____ HIV testing, infection status, or AIDS  _____ Genetic Testing    DATES OF SERVICE OR TIME PERIOD TO BE DISCLOSED: _-___  I understand and acknowledge that:  * This Authorization may be revoked at any time by you in writing, except if your health information has already been used or disclosed.  * Your health information that will be used or disclosed as a result of you signing this authorization could be re-disclosed by the recipient. If this occurs, your re-disclosed health information may no longer be protected by State or Federal laws.  * You may refuse to sign this Authorization. Your refusal will not affect your ability to obtain treatment.  * This Authorization becomes effective upon signing and will  on (date) __________.      If no date is indicated, this Authorization will  one (1) year from the signature date.    Name: Carlee Orr  Signature: Date:   2024     PLEASE FAX REQUESTED  RECORDS BACK TO: [phone]

## 2024-02-01 NOTE — PROCEDURE: FACIAL
Led Light (Optional): red
Treatment Type (Optional): Anti Aging Facial
Purelift (Optional): no
Treatment Type Override: custom
Exfoliation Type Override: retext (Vit A)
Comments (Non-Sticky): Added enzyme & LED ($75 add)
Price (Use Numbers Only, No Special Characters Or $): 327
Extraction Method: extractor
Exfoliation Type: enzyme peel
Treatment Serum (Optional): PhytoCorrect
Mask Type (Optional): gel based
Detail Level: Generalized

## 2024-02-01 NOTE — LETTER
[employer]  Shari Dent D.O.  [address]  [place] [address]  Fax: [phone]   Authorization for Release/Disclosure of   Protected Health Information   Name: CARLEE HUNT : 1950 SSN: [id number]   Address: [facility] [place] [address] Phone:    [phone] (home)    I authorize the entity listed below to release/disclose the PHI below to:   [employer]/Shari Dent D.O. and Shari Dent D.O.   Provider or Entity Name:  [facility]   Address   City, State, Zip   Phone:      Fax:     Reason for request: continuity of care   Information to be released:    [  ] LAST COLONOSCOPY,  including any PATH REPORT and follow-up  [  ] LAST FIT/COLOGUARD RESULT [  ] LAST DEXA  [  ] LAST MAMMOGRAM  [  ] LAST PAP  [  ] LAST LABS [  ] RETINA EXAM REPORT  [  ] IMMUNIZATION RECORDS  [ x ] Release all info      [ x ] Check here and initial the line next to each item to release ALL health information INCLUDING  _____ Care and treatment for drug and / or alcohol abuse  _____ HIV testing, infection status, or AIDS  _____ Genetic Testing    DATES OF SERVICE OR TIME PERIOD TO BE DISCLOSED: __-___  I understand and acknowledge that:  * This Authorization may be revoked at any time by you in writing, except if your health information has already been used or disclosed.  * Your health information that will be used or disclosed as a result of you signing this authorization could be re-disclosed by the recipient. If this occurs, your re-disclosed health information may no longer be protected by State or Federal laws.  * You may refuse to sign this Authorization. Your refusal will not affect your ability to obtain treatment.  * This Authorization becomes effective upon signing and will  on (date) __________.      If no date is indicated, this Authorization will  one (1) year from the signature date.    Name: Carlee Hunt  Signature: Date:   2024     PLEASE FAX  REQUESTED RECORDS BACK TO: [phone]

## 2024-02-02 ENCOUNTER — TELEPHONE (OUTPATIENT)
Dept: RADIOLOGY | Facility: MEDICAL CENTER | Age: 74
End: 2024-02-02
Payer: MEDICARE

## 2024-02-02 NOTE — ASSESSMENT & PLAN NOTE
"Chronic ongoing problem, continues to take tramadol 100 mg in the morning and 50 mg in the evening to help with chronic pain. Continue with follow up with physiatry as needed and current analgesia plan. No deleterious side effects noted.    Obtained and reviewed patient utilization report from [facility] pharmacy database on 2/1/2024 6:19 PM  prior to writing prescription for controlled substance II, III or IV per [place] bill . Based on assessment of the report, the prescription is medically necessary.     Patient understands this prescription is a controlled substance which is potentially habit-forming and its use is regulated by the [place]. We also discussed the new \"black box\" warning regarding the lethal combination of opioids and benzodiazepines. Refills are subject to terms of a controlled substance agreement and patient has an updated one on file. Most recent UDS is appropriate. Any refill requires an office visit. Narcotics have may adverse effects and the risks of addiction, accidental overdose and death were emphasized. Provided prescriptions for the next three months.    "

## 2024-02-02 NOTE — ASSESSMENT & PLAN NOTE
Previous problem, no longer on suppressive antibiotics, continue with inflammation markers every 6-12 months to ensure no recurrence of concerning lab abnormalities.

## 2024-02-02 NOTE — ASSESSMENT & PLAN NOTE
New and decompensated problem, continue with ENT to work out potential inner ear etiologies, will also screen with CT head to ensure no contributing central process.

## 2024-02-02 NOTE — PROGRESS NOTES
Subjective:   Chief Complaint/History of Present Illness:  Carlee Hunt is a 73 y.o. female established patient who presents today to discuss medical problems as listed below. Carlee is unaccompanied for today's visit.    Problem   Gait Instability    She reports worsening of gait instability of unclear etiology. She has tinnitus and is working with ENT to determine other potential etiologies. No obvious weakness but feels like her leg is not listening to what her brain is thinking. No prior or recent brain imaging.     Chronic Bilateral Low Back Pain With Left-Sided Sciatica    Hip Xray (7/2020):  There is an implanted electronic device within the left buttock with wires extending into the sacrum. There is mild degenerative change of the left greater than right hip joint characterized by joint space loss and osteophytic spurring. There is pelvic calcification.     IMPRESSION: Degenerative change of the hips bilaterally, left greater than right.    Lumbar Xray (7/2020):  There is convex right scoliotic curvature. There is multilevel decreased disc height and endplate spurring. There is prominent multilevel facet degeneration. There is grade 1 anterior subluxation at L3-4 and L4-5. There is vascular calcification. There is an implanted electronic device overlying the left sacrum posteriorly.     IMPRESSION:  1.  Multilevel degenerative disc disease and facet degeneration.  2.  Scoliotic deformity.  3.  Implanted electronic device overlying the left sacrum.      She reports longstanding problems in the low back.  She recalls SI joint disease and has been working with physical therapy in 2019. She denies any recent imaging of her low back. She also has pain along the left lateral thigh, unclear if that is IT band pain or radicular from the lower back. More recently with right thigh pain, both anterior and posterior, s/p right hip injection with Dr. Hernandez in April 2022.    Current regimen: Tramadol 100 mg 1-2 times  daily, baclofen 5 mg at night     Chronic antibiotic suppression- doxycycline stopped by ID in 9/2021    Reports that she was placed on doxycycline in 2013 after she experienced a staph epidermidis infection following her cervical fusion surgery on the hardware.  She was recommended to continue on the antibiotics for what sounds to be indefinitely.  She follows with infectious disease on an annual basis, [place] [facility] with either Dr. Argueta or Dr. Epps.  She was unaware that her latest urinalysis showed Enterococcus faecalis which was resistant to tetracyclines.    On most recent follow-up with infectious disease she was recommended to go off the doxycycline, this was in September 2021.  She then had inflammation markers and blood counts checked in September and October which have remained stable.  No follow-up in place with ID at this time.     Dyslipidemia     Latest Reference Range & Units 04/05/23 10:01 11/02/23 09:42   Cholesterol,Tot 100 - 199 mg/dL 203 (H) 189   Triglycerides 0 - 149 mg/dL 164 (H) 115   HDL >=40 mg/dL 56 60   LDL <100 mg/dL 114 (H) 106 (H)     CT cardiac score (2/2023):  Coronary calcification:  LMA - 0.0  LCX - 0.0  LAD - 0.0  RCA - 0.0        Total Calcium Score: 0.0     Percentile: Calcium score is below the 25th percentile for the patient's age and sex.     She has history of hyperlipidemia.  Unclear if she has ever taken dedicated lipid-lowering medications.  She is on coenzyme Q 10.  She had a carotid ultrasound in 2017 that was negative for any blockages. CT cardiac score of 0.  She did have a Holter study for palpitations in the past.     Chronic Use of Opiate for Therapeutic Purpose    She reports longstanding chronic pain in her neck due to a 2 staged neck fusion surgery in 2013 at [facility].  She also has had issues with intermittent low back/SI joint pain, left greater than right hip pain now radiating into the groin, and left IT band pain.  She uses tramadol 50 mg generally 2 tablets daily  for pain control which is no longer sufficient as of October 2022, will plan to increase to 3 tablets daily.  She denies any deleterious side effects from this medicine.  She has been stable on 3 tablets daily for the past year, continue at this time.     Chronic Neck Pain    She reports staged 2 part fusion in 2007 at [facility] at the recommendation of a local orthopedist. She reports an anterior and posterior approach at that time. 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  At that same time she was experiencing left hip pain and was told it was likely related to the left neck pain.  She reports chronic issues with what she thinks is muscle spasms on the left greater than right neck as well as the bilateral trapezius areas which she relates to large amount of hardware adding additional pressure to her muscles.  It does not sound like she is followed up with anyone locally for this previous neck problem.    For chronic pain in the neck she utilizes tramadol 50 mg up to 3 tablets daily, 2 in the AM and 1 in the PM.  She is on turmeric.  She also is working with physical therapy and physiatry.          Current Medications:  Current Outpatient Medications Ordered in Epic   Medication Sig Dispense Refill    [START ON 2/4/2024] traMADol (ULTRAM) 50 MG Tab Take 1 Tablet by mouth every 8 hours for 30 days. 90 Tablet 0    [START ON 3/5/2024] traMADol (ULTRAM) 50 MG Tab Take 1 Tablet by mouth every 8 hours for 30 days. 90 Tablet 0    [START ON 4/4/2024] traMADol (ULTRAM) 50 MG Tab Take 1 Tablet by mouth every 8 hours for 30 days. 90 Tablet 0    Azelaic Acid 15 % Gel APPLY ONE APPLPICATION TOPICALLY TWO TIMES A DAY 50 g 11    clindamycin (CLEOCIN) 1 % Solution       metoprolol SR (TOPROL XL) 25 MG TABLET SR 24 HR TAKE ONE TABLET BY MOUTH DAILY 100 Tablet 3     "levothyroxine (SYNTHROID) 100 MCG Tab Take 1 Tablet by mouth every morning on an empty stomach. 100 Tablet 3    Tretinoin 0.05 % Gel Apply 1 Application topically at bedtime. 1 Each 3    estradiol (ESTRACE) 0.1 MG/GM vaginal cream Insert 0.4 g into the vagina every 72 hours. 42.5 g 3    COMBIGAN 0.2-0.5 % Solution       Lutein 20 MG Tab Take  by mouth.      L-Theanine 200 MG Cap Take  by mouth.      Coenzyme Q10 (COQ10 PO) Take 100 mg by mouth every day. Qunol liquid    every other day      magnesium oxide (MAG-OX) 400 MG Tab Take 400 mg by mouth every evening.      Calcium 500 MG Chew Tab Take 1 Tab by mouth every evening.      Milk Thistle 175 MG Cap Take 1 Cap by mouth every day.      TURMERIC PO Take 333 mg by mouth every day. Daily (Qunol liquid)      Probiotic Product (PRO-BIOTIC BLEND) Cap Take 1 Capsule by mouth every evening. Women's Probiotic      latanoprost (XALATAN) 0.005 % Solution Place 1 Drop in both eyes every evening.      KRILL OIL PO Take 500 mg by mouth every day.      Multiple Vitamins-Minerals (CENTRUM PO) Take 1 Tab by mouth every day.       No current [facility]-ordered facility-administered medications on file.          Objective:   Physical Exam:    Vitals: /78 (BP Location: Left arm, Patient Position: Sitting)   Pulse 64   Temp 36.8 °C (98.2 °F) (Temporal)   Resp 14   Ht 1.702 m (5' 7\")   Wt 74.1 kg (163 lb 6.4 oz)   SpO2 96%    BMI: Body mass index is 25.59 kg/m².  Physical Exam  Constitutional:       General: She is not in acute distress.     Appearance: Normal appearance. She is not ill-appearing.   HENT:      Right Ear: External ear normal.      Left Ear: External ear normal.   Eyes:      General: No scleral icterus.     Conjunctiva/sclera: Conjunctivae normal.   Cardiovascular:      Rate and Rhythm: Normal rate and regular rhythm.   Pulmonary:      Effort: Pulmonary effort is normal. No respiratory distress.   Musculoskeletal:      Right lower leg: No edema.      Left lower " "leg: No edema.   Lymphadenopathy:      Cervical: No cervical adenopathy.   Skin:     General: Skin is warm and dry.      Findings: No rash.   Neurological:      Gait: Gait normal.      Comments: Slightly wide stepped gait, no shuffling, no foot slap, normal gait speed, normal arm swing   Psychiatric:         Mood and Affect: Mood normal.         Behavior: Behavior normal.         Thought Content: Thought content normal.         Judgment: Judgment normal.          Assessment and Plan:   Carlee is a 73 y.o. female with the following:  Problem List Items Addressed This Visit       Chronic antibiotic suppression- doxycycline stopped by ID in 9/2021     Previous problem, no longer on suppressive antibiotics, continue with inflammation markers every 6-12 months to ensure no recurrence of concerning lab abnormalities.         Relevant Orders    CRP QUANTITIVE (NON-CARDIAC)    Sed Rate    Chronic bilateral low back pain with left-sided sciatica     Chronic ongoing problem, continues to take tramadol 100 mg in the morning and 50 mg in the evening to help with chronic pain. Continue with follow up with physiatry as needed and current analgesia plan. No deleterious side effects noted.    Obtained and reviewed patient utilization report from [facility] pharmacy database on 2/1/2024 6:19 PM  prior to writing prescription for controlled substance II, III or IV per [place] bill . Based on assessment of the report, the prescription is medically necessary.     Patient understands this prescription is a controlled substance which is potentially habit-forming and its use is regulated by the [place]. We also discussed the new \"black box\" warning regarding the lethal combination of opioids and benzodiazepines. Refills are subject to terms of a controlled substance agreement and patient has an updated one on file. Most recent UDS is appropriate. Any refill requires an office visit. Narcotics have may adverse effects and the risks of " "addiction, accidental overdose and death were emphasized. Provided prescriptions for the next three months.           Relevant Medications    traMADol (ULTRAM) 50 MG Tab (Start on 2/4/2024)    traMADol (ULTRAM) 50 MG Tab (Start on 3/5/2024)    traMADol (ULTRAM) 50 MG Tab (Start on 4/4/2024)    Chronic neck pain     Chronic ongoing problem, continues to take tramadol 100 mg in the morning and 50 mg in the evening to help with chronic pain. Continue with follow up with physiatry as needed and current analgesia plan. No deleterious side effects noted.    Obtained and reviewed patient utilization report from [facility] pharmacy database on 2/1/2024 6:19 PM  prior to writing prescription for controlled substance II, III or IV per [place] bill . Based on assessment of the report, the prescription is medically necessary.     Patient understands this prescription is a controlled substance which is potentially habit-forming and its use is regulated by the [place]. We also discussed the new \"black box\" warning regarding the lethal combination of opioids and benzodiazepines. Refills are subject to terms of a controlled substance agreement and patient has an updated one on file. Most recent UDS is appropriate. Any refill requires an office visit. Narcotics have may adverse effects and the risks of addiction, accidental overdose and death were emphasized. Provided prescriptions for the next three months.           Relevant Medications    traMADol (ULTRAM) 50 MG Tab (Start on 2/4/2024)    traMADol (ULTRAM) 50 MG Tab (Start on 3/5/2024)    traMADol (ULTRAM) 50 MG Tab (Start on 4/4/2024)    Other Relevant Orders    CRP QUANTITIVE (NON-CARDIAC)    Sed Rate    Chronic use of opiate for therapeutic purpose     Chronic ongoing problem, continues to take tramadol 100 mg in the morning and 50 mg in the evening to help with chronic pain. Continue with follow up with physiatry as needed and current analgesia plan. No deleterious side " "effects noted.    Obtained and reviewed patient utilization report from [facility] pharmacy database on 2/1/2024 6:19 PM  prior to writing prescription for controlled substance II, III or IV per [place] bill . Based on assessment of the report, the prescription is medically necessary.     Patient understands this prescription is a controlled substance which is potentially habit-forming and its use is regulated by the [place]. We also discussed the new \"black box\" warning regarding the lethal combination of opioids and benzodiazepines. Refills are subject to terms of a controlled substance agreement and patient has an updated one on file. Most recent UDS is appropriate. Any refill requires an office visit. Narcotics have may adverse effects and the risks of addiction, accidental overdose and death were emphasized. Provided prescriptions for the next three months.           Relevant Medications    traMADol (ULTRAM) 50 MG Tab (Start on 2/4/2024)    traMADol (ULTRAM) 50 MG Tab (Start on 3/5/2024)    traMADol (ULTRAM) 50 MG Tab (Start on 4/4/2024)    Dyslipidemia     Chronic ongoing problem, continue with healthy diet, she has lost several pounds through lifestyle changes.         Relevant Orders    FREE THYROXINE    TSH    VITAMIN B12    VITAMIN D,25 HYDROXY (DEFICIENCY)    Lipid Profile    Comp Metabolic Panel    CBC WITH DIFFERENTIAL    Gait instability     New and decompensated problem, continue with ENT to work out potential inner ear etiologies, will also screen with CT head to ensure no contributing central process.          Relevant Orders    CT-HEAD W/O          RTC: Return in about 3 months (around 5/1/2024).    I spent a total of 32 minutes with record review, exam, communication with the patient, communication with other providers, and documentation of this encounter.    PLEASE NOTE: This dictation was created using voice recognition software. I have made every reasonable attempt to correct obvious errors, " but I expect that there are errors of grammar and possibly content that I did not discover before finalizing the note.      Shari Dent, DO  Geriatric and Internal Medicine  [facility]

## 2024-02-02 NOTE — ASSESSMENT & PLAN NOTE
"Chronic ongoing problem, continues to take tramadol 100 mg in the morning and 50 mg in the evening to help with chronic pain. Continue with follow up with physiatry as needed and current analgesia plan. No deleterious side effects noted.    Obtained and reviewed patient utilization report from [facility] pharmacy database on 2/1/2024 6:19 PM  prior to writing prescription for controlled substance II, III or IV per [place] bill . Based on assessment of the report, the prescription is medically necessary.     Patient understands this prescription is a controlled substance which is potentially habit-forming and its use is regulated by the [place]. We also discussed the new \"black box\" warning regarding the lethal combination of opioids and benzodiazepines. Refills are subject to terms of a controlled substance agreement and patient has an updated one on file. Most recent UDS is appropriate. Any refill requires an office visit. Narcotics have may adverse effects and the risks of addiction, accidental overdose and death were emphasized. Provided prescriptions for the next three months.    "

## 2024-02-02 NOTE — ASSESSMENT & PLAN NOTE
"Chronic ongoing problem, continues to take tramadol 100 mg in the morning and 50 mg in the evening to help with chronic pain. Continue with follow up with physiatry as needed and current analgesia plan. No deleterious side effects noted.    Obtained and reviewed patient utilization report from [facility] pharmacy database on 2/1/2024 6:19 PM  prior to writing prescription for controlled substance II, III or IV per [place] bill . Based on assessment of the report, the prescription is medically necessary.     Patient understands this prescription is a controlled substance which is potentially habit-forming and its use is regulated by the [place]. We also discussed the new \"black box\" warning regarding the lethal combination of opioids and benzodiazepines. Refills are subject to terms of a controlled substance agreement and patient has an updated one on file. Most recent UDS is appropriate. Any refill requires an office visit. Narcotics have may adverse effects and the risks of addiction, accidental overdose and death were emphasized. Provided prescriptions for the next three months.    "

## 2024-02-02 NOTE — ASSESSMENT & PLAN NOTE
Chronic ongoing problem, continue with healthy diet, she has lost several pounds through lifestyle changes.

## 2024-02-19 ENCOUNTER — HOSPITAL ENCOUNTER (OUTPATIENT)
Dept: RADIOLOGY | Facility: MEDICAL CENTER | Age: 74
End: 2024-02-19
Attending: INTERNAL MEDICINE
Payer: MEDICARE

## 2024-02-19 DIAGNOSIS — R26.81 GAIT INSTABILITY: ICD-10-CM

## 2024-02-19 PROCEDURE — 70450 CT HEAD/BRAIN W/O DYE: CPT

## 2024-02-29 ENCOUNTER — APPOINTMENT (RX ONLY)
Dept: URBAN - METROPOLITAN AREA CLINIC 15 | Facility: CLINIC | Age: 74
Setting detail: DERMATOLOGY
End: 2024-02-29

## 2024-02-29 DIAGNOSIS — D22 MELANOCYTIC NEVI: ICD-10-CM

## 2024-02-29 DIAGNOSIS — D18.0 HEMANGIOMA: ICD-10-CM

## 2024-02-29 DIAGNOSIS — L81.4 OTHER MELANIN HYPERPIGMENTATION: ICD-10-CM

## 2024-02-29 DIAGNOSIS — Z85.828 PERSONAL HISTORY OF OTHER MALIGNANT NEOPLASM OF SKIN: ICD-10-CM

## 2024-02-29 DIAGNOSIS — L82.1 OTHER SEBORRHEIC KERATOSIS: ICD-10-CM

## 2024-02-29 PROBLEM — D22.62 MELANOCYTIC NEVI OF LEFT UPPER LIMB, INCLUDING SHOULDER: Status: ACTIVE | Noted: 2024-02-29

## 2024-02-29 PROBLEM — D48.5 NEOPLASM OF UNCERTAIN BEHAVIOR OF SKIN: Status: ACTIVE | Noted: 2024-02-29

## 2024-02-29 PROBLEM — D22.61 MELANOCYTIC NEVI OF RIGHT UPPER LIMB, INCLUDING SHOULDER: Status: ACTIVE | Noted: 2024-02-29

## 2024-02-29 PROBLEM — D18.01 HEMANGIOMA OF SKIN AND SUBCUTANEOUS TISSUE: Status: ACTIVE | Noted: 2024-02-29

## 2024-02-29 PROBLEM — D22.5 MELANOCYTIC NEVI OF TRUNK: Status: ACTIVE | Noted: 2024-02-29

## 2024-02-29 PROCEDURE — ? LIQUID NITROGEN

## 2024-02-29 PROCEDURE — 11102 TANGNTL BX SKIN SINGLE LES: CPT

## 2024-02-29 PROCEDURE — ? BIOPSY BY SHAVE METHOD

## 2024-02-29 PROCEDURE — 99213 OFFICE O/P EST LOW 20 MIN: CPT | Mod: 25

## 2024-02-29 PROCEDURE — ? COUNSELING

## 2024-02-29 ASSESSMENT — LOCATION SIMPLE DESCRIPTION DERM
LOCATION SIMPLE: LEFT FOREHEAD
LOCATION SIMPLE: RIGHT FOREHEAD
LOCATION SIMPLE: LEFT TEMPLE
LOCATION SIMPLE: LEFT LOWER BACK
LOCATION SIMPLE: LOWER BACK
LOCATION SIMPLE: LEFT ANTERIOR NECK
LOCATION SIMPLE: RIGHT FOREARM
LOCATION SIMPLE: LEFT CLAVICULAR SKIN
LOCATION SIMPLE: RIGHT ANTERIOR NECK
LOCATION SIMPLE: LEFT UPPER BACK
LOCATION SIMPLE: SCALP
LOCATION SIMPLE: ABDOMEN
LOCATION SIMPLE: UPPER BACK
LOCATION SIMPLE: RIGHT UPPER BACK
LOCATION SIMPLE: LEFT SHOULDER
LOCATION SIMPLE: RIGHT SHOULDER
LOCATION SIMPLE: LEFT UPPER ARM
LOCATION SIMPLE: LEFT FOREARM
LOCATION SIMPLE: CHEST
LOCATION SIMPLE: RIGHT LOWER BACK
LOCATION SIMPLE: RIGHT BACK
LOCATION SIMPLE: RIGHT UPPER ARM

## 2024-02-29 ASSESSMENT — LOCATION DETAILED DESCRIPTION DERM
LOCATION DETAILED: LEFT LATERAL TEMPLE
LOCATION DETAILED: LOWER STERNUM
LOCATION DETAILED: RIGHT INFERIOR MEDIAL FOREHEAD
LOCATION DETAILED: INFERIOR THORACIC SPINE
LOCATION DETAILED: RIGHT ANTERIOR DISTAL UPPER ARM
LOCATION DETAILED: LEFT INFERIOR LATERAL UPPER BACK
LOCATION DETAILED: RIGHT INFERIOR MEDIAL UPPER BACK
LOCATION DETAILED: RIGHT SUPERIOR LATERAL UPPER BACK
LOCATION DETAILED: RIGHT MEDIAL UPPER BACK
LOCATION DETAILED: LEFT SUPERIOR LATERAL UPPER BACK
LOCATION DETAILED: LEFT INFERIOR LATERAL NECK
LOCATION DETAILED: RIGHT INFERIOR LATERAL MIDBACK
LOCATION DETAILED: RIGHT CENTRAL FRONTAL SCALP
LOCATION DETAILED: LEFT INFERIOR MEDIAL MIDBACK
LOCATION DETAILED: RIGHT ANTECUBITAL SKIN
LOCATION DETAILED: LEFT VENTRAL PROXIMAL FOREARM
LOCATION DETAILED: RIGHT ANTERIOR SHOULDER
LOCATION DETAILED: LEFT INFERIOR ANTERIOR NECK
LOCATION DETAILED: RIGHT VENTRAL PROXIMAL FOREARM
LOCATION DETAILED: LEFT SUPERIOR FOREHEAD
LOCATION DETAILED: LEFT INFERIOR MEDIAL FOREHEAD
LOCATION DETAILED: RIGHT SUPERIOR UPPER BACK
LOCATION DETAILED: LEFT ANTECUBITAL SKIN
LOCATION DETAILED: RIGHT SUPERIOR MEDIAL UPPER BACK
LOCATION DETAILED: RIGHT CLAVICULAR NECK
LOCATION DETAILED: LEFT SUPERIOR UPPER BACK
LOCATION DETAILED: PERIUMBILICAL SKIN
LOCATION DETAILED: SUPERIOR LUMBAR SPINE
LOCATION DETAILED: RIGHT LATERAL UPPER BACK
LOCATION DETAILED: LEFT LATERAL UPPER BACK
LOCATION DETAILED: SUBXIPHOID
LOCATION DETAILED: RIGHT SUPERIOR LATERAL MIDBACK
LOCATION DETAILED: LEFT MID-UPPER BACK
LOCATION DETAILED: LEFT CLAVICULAR SKIN
LOCATION DETAILED: RIGHT SUPERIOR MEDIAL MIDBACK
LOCATION DETAILED: LEFT SUPERIOR LATERAL MIDBACK
LOCATION DETAILED: RIGHT INFERIOR UPPER BACK
LOCATION DETAILED: RIGHT POSTERIOR SHOULDER
LOCATION DETAILED: LEFT INFERIOR UPPER BACK
LOCATION DETAILED: LEFT CLAVICULAR NECK
LOCATION DETAILED: SUPERIOR THORACIC SPINE
LOCATION DETAILED: RIGHT CENTRAL PARIETAL SCALP
LOCATION DETAILED: RIGHT INFERIOR LATERAL UPPER BACK
LOCATION DETAILED: LEFT ANTERIOR SHOULDER
LOCATION DETAILED: RIGHT MID-UPPER BACK

## 2024-02-29 ASSESSMENT — LOCATION ZONE DERM
LOCATION ZONE: FACE
LOCATION ZONE: NECK
LOCATION ZONE: ARM
LOCATION ZONE: TRUNK
LOCATION ZONE: SCALP

## 2024-02-29 NOTE — PROCEDURE: BIOPSY BY SHAVE METHOD
Detail Level: Detailed
Depth Of Biopsy: dermis
Was A Bandage Applied: Yes
Size Of Lesion In Cm: 0.4
X Size Of Lesion In Cm: 0
Biopsy Type: H and E
Biopsy Method: Personna blade
Anesthesia Type: 0.5% lidocaine without epinephrine
Anesthesia Volume In Cc: 1.5
Hemostasis: Electrocautery
Wound Care: Vaseline
Dressing: Band-Aid
Destruction After The Procedure: No
Type Of Destruction Used: Electrodesiccation
Curettage Text: The wound bed was treated with curettage after the biopsy was performed.
Cryotherapy Text: The wound bed was treated with cryotherapy after the biopsy was performed.
Electrodesiccation Text: The wound bed was treated with electrodesiccation after the biopsy was performed.
Electrodesiccation And Curettage Text: The wound bed was treated with electrodesiccation and curettage after the biopsy was performed.
Silver Nitrate Text: The wound bed was treated with silver nitrate after the biopsy was performed.
Lab: [id number]
Lab Facility: 
Consent: Written consent was obtained and risks were reviewed including but not limited to scarring, infection, bleeding, scabbing, incomplete removal, nerve damage and allergy to anesthesia.
Post-Care Instructions: I reviewed with the patient in detail post-care instructions. Patient is to keep the biopsy site dry overnight, and then apply bacitracin/petroleum  twice daily until healed. Patient may apply hydrogen peroxide soaks to remove any crusting.
Notification Instructions: Patient will be notified of biopsy results. However, patient instructed to call the office if not contacted within 2 weeks.
Billing Type: Third-Party Bill
Information: Selecting Yes will display possible errors in your note based on the variables you have selected. This validation is only offered as a suggestion for you. PLEASE NOTE THAT THE VALIDATION TEXT WILL BE REMOVED WHEN YOU FINALIZE YOUR NOTE. IF YOU WANT TO FAX A PRELIMINARY NOTE YOU WILL NEED TO TOGGLE THIS TO 'NO' IF YOU DO NOT WANT IT IN YOUR FAXED NOTE.

## 2024-02-29 NOTE — PROCEDURE: LIQUID NITROGEN
Post-Care Instructions: I reviewed with the patient in detail post-care instructions. Patient is to wear sunprotection, and avoid picking at any of the treated lesions. Pt may apply Vaseline to crusted or scabbing areas.
Include Z78.9 (Other Specified Conditions Influencing Health Status) As An Associated Diagnosis?: No
Consent: The patient's consent was obtained including but not limited to risks of crusting, scabbing, blistering, scarring, darker or lighter pigmentary change, recurrence, incomplete removal and infection.
Spray Paint Text: The liquid nitrogen was applied to the skin utilizing a spray paint frosting technique.
Duration Of Freeze Thaw-Cycle (Seconds): 0
Detail Level: Detailed
Show Spray Paint Technique Variable?: Yes
Medical Necessity Information: It is in your best interest to select a reason for this procedure from the list below. All of these items fulfill various CMS LCD requirements except the new and changing color options.
Medical Necessity Clause: This procedure was medically necessary because the lesions that were treated were:  If lesion does not resolve, bx is needed.

## 2024-03-20 ENCOUNTER — APPOINTMENT (RX ONLY)
Dept: URBAN - METROPOLITAN AREA CLINIC 36 | Facility: CLINIC | Age: 74
Setting detail: DERMATOLOGY
End: 2024-03-20

## 2024-03-20 DIAGNOSIS — Z41.9 ENCOUNTER FOR PROCEDURE FOR PURPOSES OTHER THAN REMEDYING HEALTH STATE, UNSPECIFIED: ICD-10-CM

## 2024-03-20 PROCEDURE — ? HYDRAFACIAL

## 2024-03-20 ASSESSMENT — LOCATION DETAILED DESCRIPTION DERM
LOCATION DETAILED: LEFT CHIN
LOCATION DETAILED: RIGHT INFERIOR CENTRAL MALAR CHEEK
LOCATION DETAILED: RIGHT MEDIAL FOREHEAD
LOCATION DETAILED: LEFT CENTRAL MALAR CHEEK

## 2024-03-20 ASSESSMENT — LOCATION SIMPLE DESCRIPTION DERM
LOCATION SIMPLE: RIGHT CHEEK
LOCATION SIMPLE: RIGHT FOREHEAD
LOCATION SIMPLE: LEFT CHEEK
LOCATION SIMPLE: CHIN

## 2024-03-20 ASSESSMENT — LOCATION ZONE DERM: LOCATION ZONE: FACE

## 2024-03-20 NOTE — PROCEDURE: HYDRAFACIAL
Vacuum Pressure High Setting (Will Not Render If Set To 0): 16
Vacuum Pressure High Setting (Will Not Render If Set To 0): 22
Solution Override
Tip: Hydropeel Tip, Teal
Solution: Antiox-6
Procedure: Fusion
Procedure: Exfoliation
Number Of Passes Step 2: 1
Vacuum Pressure Low Setting (Will Not Render If Set To 0): 14
Solution: Activ-4
Procedure: Peel
Location: face
Procedure: Extraction
Consent: Written consent obtained, risks reviewed including but not limited to crusting, scabbing, blistering, scarring, darker or lighter pigmentary change, bruising, and/or incomplete response.
Vacuum Pressure Low Setting (Will Not Render If Set To 0): 13
Solution: GlySal 7.5%
Tip: Hydropeel Tip, Clear
Post-Care Instructions: I reviewed with the patient in detail post-care instructions. Patient should stay away from the sun and wear sun protection until treated areas are fully healed.
Comments: Extra gentle on nose & brow
Tip: Hydropeel Tip, Blue
Vacuum Pressure High Setting (Will Not Render If Set To 0): 18
Solution: Beta-HD
Price (Use Numbers Only, No Special Characters Or $): 194
Indication: dehydrated skin
Number Of Passes Step 1: 2

## 2024-04-16 DIAGNOSIS — E03.9 ACQUIRED HYPOTHYROIDISM: ICD-10-CM

## 2024-04-16 RX ORDER — LEVOTHYROXINE SODIUM 0.1 MG/1
100 TABLET ORAL
Qty: 100 TABLET | Refills: 3 | Status: SHIPPED | OUTPATIENT
Start: 2024-04-16

## 2024-04-16 NOTE — TELEPHONE ENCOUNTER
Received request via: Patient    Was the patient seen in the last year in this department? Yes    Does the patient have an active prescription (recently filled or refills available) for medication(s) requested? No    Pharmacy Name: [facility]     Does the patient have senior living Plus and need 100 day supply (blood pressure, diabetes and cholesterol meds only)? Yes, quantity updated to 100 days

## 2024-04-25 ENCOUNTER — RX ONLY (OUTPATIENT)
Age: 74
Setting detail: RX ONLY
End: 2024-04-25

## 2024-04-25 ENCOUNTER — HOSPITAL ENCOUNTER (OUTPATIENT)
Dept: LAB | Facility: MEDICAL CENTER | Age: 74
End: 2024-04-25
Attending: INTERNAL MEDICINE
Payer: MEDICARE

## 2024-04-25 ENCOUNTER — APPOINTMENT (RX ONLY)
Dept: URBAN - METROPOLITAN AREA CLINIC 36 | Facility: CLINIC | Age: 74
Setting detail: DERMATOLOGY
End: 2024-04-25

## 2024-04-25 DIAGNOSIS — Z79.2 CHRONIC ANTIBIOTIC SUPPRESSION: ICD-10-CM

## 2024-04-25 DIAGNOSIS — Z41.9 ENCOUNTER FOR PROCEDURE FOR PURPOSES OTHER THAN REMEDYING HEALTH STATE, UNSPECIFIED: ICD-10-CM

## 2024-04-25 DIAGNOSIS — E78.5 DYSLIPIDEMIA: ICD-10-CM

## 2024-04-25 DIAGNOSIS — M54.2 CHRONIC NECK PAIN: ICD-10-CM

## 2024-04-25 DIAGNOSIS — G89.29 CHRONIC NECK PAIN: ICD-10-CM

## 2024-04-25 LAB
25(OH)D3 SERPL-MCNC: 34 NG/ML (ref 30–100)
ALBUMIN SERPL BCP-MCNC: 4.1 G/DL (ref 3.2–4.9)
ALBUMIN/GLOB SERPL: 1.3 G/DL
ALP SERPL-CCNC: 83 U/L (ref 30–99)
ALT SERPL-CCNC: <5 U/L (ref 2–50)
ANION GAP SERPL CALC-SCNC: 10 MMOL/L (ref 7–16)
AST SERPL-CCNC: 23 U/L (ref 12–45)
BASOPHILS # BLD AUTO: 0.8 % (ref 0–1.8)
BASOPHILS # BLD: 0.05 K/UL (ref 0–0.12)
BILIRUB SERPL-MCNC: 0.3 MG/DL (ref 0.1–1.5)
BUN SERPL-MCNC: 15 MG/DL (ref 8–22)
CALCIUM ALBUM COR SERPL-MCNC: 8.8 MG/DL (ref 8.5–10.5)
CALCIUM SERPL-MCNC: 8.9 MG/DL (ref 8.4–10.2)
CHLORIDE SERPL-SCNC: 106 MMOL/L (ref 96–112)
CHOLEST SERPL-MCNC: 209 MG/DL (ref 100–199)
CO2 SERPL-SCNC: 26 MMOL/L (ref 20–33)
CREAT SERPL-MCNC: 0.73 MG/DL (ref 0.5–1.4)
CRP SERPL HS-MCNC: <0.3 MG/DL (ref 0–0.75)
EOSINOPHIL # BLD AUTO: 0.18 K/UL (ref 0–0.51)
EOSINOPHIL NFR BLD: 2.7 % (ref 0–6.9)
ERYTHROCYTE [DISTWIDTH] IN BLOOD BY AUTOMATED COUNT: 45.1 FL (ref 35.9–50)
ERYTHROCYTE [SEDIMENTATION RATE] IN BLOOD BY WESTERGREN METHOD: 3 MM/HOUR (ref 0–25)
FASTING STATUS PATIENT QL REPORTED: NORMAL
GFR SERPLBLD CREATININE-BSD FMLA CKD-EPI: 85 ML/MIN/1.73 M 2
GLOBULIN SER CALC-MCNC: 3.2 G/DL (ref 1.9–3.5)
GLUCOSE SERPL-MCNC: 105 MG/DL (ref 65–99)
HCT VFR BLD AUTO: 45.6 % (ref 37–47)
HDLC SERPL-MCNC: 66 MG/DL
HGB BLD-MCNC: 15 G/DL (ref 12–16)
IMM GRANULOCYTES # BLD AUTO: 0.01 K/UL (ref 0–0.11)
IMM GRANULOCYTES NFR BLD AUTO: 0.2 % (ref 0–0.9)
LDLC SERPL CALC-MCNC: 119 MG/DL
LYMPHOCYTES # BLD AUTO: 2.68 K/UL (ref 1–4.8)
LYMPHOCYTES NFR BLD: 40.9 % (ref 22–41)
MCH RBC QN AUTO: 31.7 PG (ref 27–33)
MCHC RBC AUTO-ENTMCNC: 32.9 G/DL (ref 32.2–35.5)
MCV RBC AUTO: 96.4 FL (ref 81.4–97.8)
MONOCYTES # BLD AUTO: 0.37 K/UL (ref 0–0.85)
MONOCYTES NFR BLD AUTO: 5.6 % (ref 0–13.4)
NEUTROPHILS # BLD AUTO: 3.26 K/UL (ref 1.82–7.42)
NEUTROPHILS NFR BLD: 49.8 % (ref 44–72)
NRBC # BLD AUTO: 0 K/UL
NRBC BLD-RTO: 0 /100 WBC (ref 0–0.2)
PLATELET # BLD AUTO: 208 K/UL (ref 164–446)
PMV BLD AUTO: 9.5 FL (ref 9–12.9)
POTASSIUM SERPL-SCNC: 4.3 MMOL/L (ref 3.6–5.5)
PROT SERPL-MCNC: 7.3 G/DL (ref 6–8.2)
RBC # BLD AUTO: 4.73 M/UL (ref 4.2–5.4)
SODIUM SERPL-SCNC: 142 MMOL/L (ref 135–145)
T4 FREE SERPL-MCNC: 1.16 NG/DL (ref 0.93–1.7)
TRIGL SERPL-MCNC: 120 MG/DL (ref 0–149)
TSH SERPL DL<=0.005 MIU/L-ACNC: 2.3 UIU/ML (ref 0.38–5.33)
VIT B12 SERPL-MCNC: 803 PG/ML (ref 211–911)
WBC # BLD AUTO: 6.6 K/UL (ref 4.8–10.8)

## 2024-04-25 PROCEDURE — 36415 COLL VENOUS BLD VENIPUNCTURE: CPT

## 2024-04-25 PROCEDURE — ? SCITON TRL RESURFACING

## 2024-04-25 PROCEDURE — 85652 RBC SED RATE AUTOMATED: CPT

## 2024-04-25 PROCEDURE — 86140 C-REACTIVE PROTEIN: CPT

## 2024-04-25 PROCEDURE — 82607 VITAMIN B-12: CPT

## 2024-04-25 PROCEDURE — 80053 COMPREHEN METABOLIC PANEL: CPT

## 2024-04-25 PROCEDURE — 85025 COMPLETE CBC W/AUTO DIFF WBC: CPT

## 2024-04-25 PROCEDURE — 84439 ASSAY OF FREE THYROXINE: CPT

## 2024-04-25 PROCEDURE — 82306 VITAMIN D 25 HYDROXY: CPT

## 2024-04-25 PROCEDURE — 80061 LIPID PANEL: CPT

## 2024-04-25 PROCEDURE — ? SCITON PROFRACTIONAL

## 2024-04-25 PROCEDURE — 84443 ASSAY THYROID STIM HORMONE: CPT

## 2024-04-25 RX ORDER — DOXYCYCLINE 50 MG/1
CAPSULE ORAL
Qty: 14 | Refills: 0 | Status: ERX | COMMUNITY
Start: 2024-04-25

## 2024-04-25 ASSESSMENT — LOCATION SIMPLE DESCRIPTION DERM
LOCATION SIMPLE: RIGHT CHEEK
LOCATION SIMPLE: LEFT CHEEK
LOCATION SIMPLE: LEFT FOREHEAD

## 2024-04-25 ASSESSMENT — LOCATION DETAILED DESCRIPTION DERM
LOCATION DETAILED: LEFT INFERIOR CENTRAL MALAR CHEEK
LOCATION DETAILED: RIGHT INFERIOR CENTRAL MALAR CHEEK
LOCATION DETAILED: LEFT INFERIOR MEDIAL FOREHEAD

## 2024-04-25 ASSESSMENT — LOCATION ZONE DERM: LOCATION ZONE: FACE

## 2024-04-25 NOTE — PROCEDURE: SCITON TRL RESURFACING
Location: perioral
Post-Care Instructions: I reviewed with the patient in detail post-care instructions. Patient should stay away from the sun and wear sun protection until treated areas are fully healed.
Preprocedure Text: The treatment areas were thoroughly cleaned.  The area was treated with no immediate stacking of pulses.
Coagulation (Optional): 0
Number Of Passes (Optional): 2
Treatment Number: 1
Anesthesia Type: 1% lidocaine with epinephrine
Microns (Optional): 100
Consent: Written consent obtained, risks reviewed including but not limited to crusting, scabbing, blistering, scarring, darker or lighter pigmentary change, bruising, and/or incomplete response.
Post Procedure Text: The patient tolerated the procedure well. Ice-chilled washclothes were applied to the treatment area for comfort. Post care was reviewed with the patient.
Detail Level: Zone

## 2024-04-29 ENCOUNTER — APPOINTMENT (OUTPATIENT)
Dept: MEDICAL GROUP | Facility: PHYSICIAN GROUP | Age: 74
End: 2024-04-29
Payer: MEDICARE

## 2024-05-02 ENCOUNTER — APPOINTMENT (RX ONLY)
Dept: URBAN - METROPOLITAN AREA CLINIC 36 | Facility: CLINIC | Age: 74
Setting detail: DERMATOLOGY
End: 2024-05-02

## 2024-05-02 ENCOUNTER — APPOINTMENT (OUTPATIENT)
Dept: MEDICAL GROUP | Facility: PHYSICIAN GROUP | Age: 74
End: 2024-05-02
Payer: MEDICARE

## 2024-05-02 VITALS
OXYGEN SATURATION: 96 % | TEMPERATURE: 97.4 F | HEIGHT: 67 IN | WEIGHT: 162.3 LBS | HEART RATE: 64 BPM | RESPIRATION RATE: 12 BRPM | SYSTOLIC BLOOD PRESSURE: 116 MMHG | DIASTOLIC BLOOD PRESSURE: 80 MMHG | BODY MASS INDEX: 25.47 KG/M2

## 2024-05-02 DIAGNOSIS — F33.1 MODERATE EPISODE OF RECURRENT MAJOR DEPRESSIVE DISORDER (HCC): ICD-10-CM

## 2024-05-02 DIAGNOSIS — G89.29 CHRONIC BILATERAL LOW BACK PAIN WITH LEFT-SIDED SCIATICA: ICD-10-CM

## 2024-05-02 DIAGNOSIS — Z79.891 CHRONIC USE OF OPIATE FOR THERAPEUTIC PURPOSE: ICD-10-CM

## 2024-05-02 DIAGNOSIS — E78.5 DYSLIPIDEMIA: ICD-10-CM

## 2024-05-02 DIAGNOSIS — Z41.9 ENCOUNTER FOR PROCEDURE FOR PURPOSES OTHER THAN REMEDYING HEALTH STATE, UNSPECIFIED: ICD-10-CM

## 2024-05-02 DIAGNOSIS — G89.29 CHRONIC NECK PAIN: ICD-10-CM

## 2024-05-02 DIAGNOSIS — M54.2 CHRONIC NECK PAIN: ICD-10-CM

## 2024-05-02 DIAGNOSIS — R49.9 CHANGE IN VOICE: ICD-10-CM

## 2024-05-02 DIAGNOSIS — E03.9 ACQUIRED HYPOTHYROIDISM: ICD-10-CM

## 2024-05-02 DIAGNOSIS — M54.42 CHRONIC BILATERAL LOW BACK PAIN WITH LEFT-SIDED SCIATICA: ICD-10-CM

## 2024-05-02 PROCEDURE — 3079F DIAST BP 80-89 MM HG: CPT | Performed by: INTERNAL MEDICINE

## 2024-05-02 PROCEDURE — ? COSMETIC FOLLOW-UP

## 2024-05-02 PROCEDURE — 3074F SYST BP LT 130 MM HG: CPT | Performed by: INTERNAL MEDICINE

## 2024-05-02 PROCEDURE — ? ADDITIONAL NOTES

## 2024-05-02 PROCEDURE — 99214 OFFICE O/P EST MOD 30 MIN: CPT | Performed by: INTERNAL MEDICINE

## 2024-05-02 RX ORDER — CITALOPRAM HYDROBROMIDE 10 MG/1
10 TABLET ORAL DAILY
Qty: 100 TABLET | Refills: 3 | Status: SHIPPED | OUTPATIENT
Start: 2024-05-02

## 2024-05-02 RX ORDER — TRAMADOL HYDROCHLORIDE 50 MG/1
50 TABLET ORAL EVERY 8 HOURS
Qty: 90 TABLET | Refills: 0 | Status: SHIPPED | OUTPATIENT
Start: 2024-05-12 | End: 2024-06-11

## 2024-05-02 RX ORDER — TRAMADOL HYDROCHLORIDE 50 MG/1
50 TABLET ORAL EVERY 8 HOURS
Qty: 90 TABLET | Refills: 0 | Status: SHIPPED | OUTPATIENT
Start: 2024-06-11 | End: 2024-07-11

## 2024-05-02 RX ORDER — DOXYCYCLINE 50 MG/1
50 CAPSULE ORAL 2 TIMES DAILY
COMMUNITY
Start: 2024-04-25

## 2024-05-02 RX ORDER — TRAMADOL HYDROCHLORIDE 50 MG/1
50 TABLET ORAL EVERY 8 HOURS
Qty: 90 TABLET | Refills: 0 | Status: SHIPPED | OUTPATIENT
Start: 2024-07-11 | End: 2024-08-10

## 2024-05-02 ASSESSMENT — LOCATION SIMPLE DESCRIPTION DERM
LOCATION SIMPLE: LEFT FOREHEAD
LOCATION SIMPLE: LEFT CHEEK
LOCATION SIMPLE: RIGHT CHEEK

## 2024-05-02 ASSESSMENT — ANXIETY QUESTIONNAIRES
GAD7 TOTAL SCORE: 12
2. NOT BEING ABLE TO STOP OR CONTROL WORRYING: MORE THAN HALF THE DAYS
7. FEELING AFRAID AS IF SOMETHING AWFUL MIGHT HAPPEN: NOT AT ALL
GAD7 TOTAL SCORE: 12
IF YOU CHECKED OFF ANY PROBLEMS ON THIS QUESTIONNAIRE, HOW DIFFICULT HAVE THESE PROBLEMS MADE IT FOR YOU TO DO YOUR WORK, TAKE CARE OF THINGS AT HOME, OR GET ALONG WITH OTHER PEOPLE: SOMEWHAT DIFFICULT
7. FEELING AFRAID AS IF SOMETHING AWFUL MIGHT HAPPEN: NOT AT ALL
4. TROUBLE RELAXING: NEARLY EVERY DAY
3. WORRYING TOO MUCH ABOUT DIFFERENT THINGS: NEARLY EVERY DAY
2. NOT BEING ABLE TO STOP OR CONTROL WORRYING: SEVERAL DAYS
6. BECOMING EASILY ANNOYED OR IRRITABLE: SEVERAL DAYS
5. BEING SO RESTLESS THAT IT IS HARD TO SIT STILL: SEVERAL DAYS
5. BEING SO RESTLESS THAT IT IS HARD TO SIT STILL: SEVERAL DAYS
1. FEELING NERVOUS, ANXIOUS, OR ON EDGE: NEARLY EVERY DAY
1. FEELING NERVOUS, ANXIOUS, OR ON EDGE: NEARLY EVERY DAY
4. TROUBLE RELAXING: MORE THAN HALF THE DAYS
6. BECOMING EASILY ANNOYED OR IRRITABLE: SEVERAL DAYS
3. WORRYING TOO MUCH ABOUT DIFFERENT THINGS: NEARLY EVERY DAY
IF YOU CHECKED OFF ANY PROBLEMS ON THIS QUESTIONNAIRE, HOW DIFFICULT HAVE THESE PROBLEMS MADE IT FOR YOU TO DO YOUR WORK, TAKE CARE OF THINGS AT HOME, OR GET ALONG WITH OTHER PEOPLE: SOMEWHAT DIFFICULT

## 2024-05-02 ASSESSMENT — PATIENT HEALTH QUESTIONNAIRE - PHQ9
SUM OF ALL RESPONSES TO PHQ QUESTIONS 1-9: 6
CLINICAL INTERPRETATION OF PHQ2 SCORE: 2
5. POOR APPETITE OR OVEREATING: 0 - NOT AT ALL

## 2024-05-02 ASSESSMENT — LOCATION ZONE DERM: LOCATION ZONE: FACE

## 2024-05-02 ASSESSMENT — FIBROSIS 4 INDEX: FIB4 SCORE: 3.96

## 2024-05-02 ASSESSMENT — LOCATION DETAILED DESCRIPTION DERM
LOCATION DETAILED: LEFT INFERIOR MEDIAL FOREHEAD
LOCATION DETAILED: LEFT INFERIOR CENTRAL MALAR CHEEK
LOCATION DETAILED: RIGHT INFERIOR CENTRAL MALAR CHEEK

## 2024-05-02 NOTE — ASSESSMENT & PLAN NOTE
"Chronic ongoing problem, continues to find benefit with daily use of tramadol usually 100 mg in the morning and 50 mg in the evening as well as the muscle relaxants.  She had to skip tramadol for a vestibular exam and notes she had immediate decompensation of her pain and notes current medical therapy allows to remain independent with her ADLs.  No deleterious side effects at this time.    Obtained and reviewed patient utilization report from [facility] pharmacy database on 5/2/2024 11:45 AM  prior to writing prescription for controlled substance II, III or IV per [place] bill . Based on assessment of the report, the prescription is medically necessary.     Patient understands this prescription is a controlled substance which is potentially habit-forming and its use is regulated by the [employer]. We also discussed the new \"black box\" warning regarding the lethal combination of opioids and benzodiazepines. Refills are subject to terms of a controlled substance agreement and patient has an updated one on file. Most recent UDS is appropriate. Any refill requires an office visit. Narcotics have may adverse effects and the risks of addiction, accidental overdose and death were emphasized. Provided prescriptions for the next three months.  "

## 2024-05-02 NOTE — ASSESSMENT & PLAN NOTE
New and decompensated issue.  She has several situational events happening in her life that are coming to ahead and causing significant stress and mood lability.  Provided active listening and discussed in detail.  We agreed to initiate citalopram 10 mg daily with consideration to increase dose in 4 weeks and also gave her information on buspirone in case she would like to trial that as an adjunct in the interim.  Could consider talk therapy as well if the medicines are not effective.

## 2024-05-02 NOTE — ASSESSMENT & PLAN NOTE
Chronic and stable problem, thyroid levels well-controlled, no signs of over or under treatment, continue levothyroxine 100 mcg daily.

## 2024-05-02 NOTE — ASSESSMENT & PLAN NOTE
"Chronic ongoing problem, continues to find benefit with daily use of tramadol usually 100 mg in the morning and 50 mg in the evening as well as the muscle relaxants.  She had to skip tramadol for a vestibular exam and notes she had immediate decompensation of her pain and notes current medical therapy allows to remain independent with her ADLs.  No deleterious side effects at this time.    Obtained and reviewed patient utilization report from [facility] pharmacy database on 5/2/2024 11:45 AM  prior to writing prescription for controlled substance II, III or IV per [place] bill . Based on assessment of the report, the prescription is medically necessary.     Patient understands this prescription is a controlled substance which is potentially habit-forming and its use is regulated by the [employer]. We also discussed the new \"black box\" warning regarding the lethal combination of opioids and benzodiazepines. Refills are subject to terms of a controlled substance agreement and patient has an updated one on file. Most recent UDS is appropriate. Any refill requires an office visit. Narcotics have may adverse effects and the risks of addiction, accidental overdose and death were emphasized. Provided prescriptions for the next three months.  "

## 2024-05-02 NOTE — PROCEDURE: ADDITIONAL NOTES
Render Risk Assessment In Note?: yes
Detail Level: Zone
Additional Notes: Pt is healing well. She will continue wound care for 3 more days and then can resume normal moisturizer. We provided her more nectar. Suggested to follow up in one month to recheck.

## 2024-05-02 NOTE — ASSESSMENT & PLAN NOTE
Neck ongoing issue, she is found since switching from her usual and orange today to an apple a day the symptoms have somewhat improved.  She has a message out to GI consultants about getting in and I advised her to let me know when she has the appointment and I can send over the testing done thus far likely needs an upper endoscopy.

## 2024-05-02 NOTE — ASSESSMENT & PLAN NOTE
Chronic ongoing problem, overall lipids are stable, prefers to monitor off pharmacotherapy with reassuring CT cardiac screening in the past.

## 2024-05-02 NOTE — PROGRESS NOTES
Subjective:   Chief Complaint/History of Present Illness:  Carlee Hunt is a 74 y.o. female established patient who presents today to discuss medical problems as listed below. Carlee is unaccompanied for today's visit.    Problem   Moderate Episode of Recurrent Major Depressive Disorder (Hcc)    Depression Screening    Little interest or pleasure in doing things?  1 - several days   Feeling down, depressed , or hopeless? 1 - several days   Trouble falling or staying asleep, or sleeping too much?  3 - nearly every day   Feeling tired or having little energy?  0 - not at all   Poor appetite or overeating?  0 - not at all   Feeling bad about yourself - or that you are a failure or have let yourself or your family down? 0 - not at all   Trouble concentrating on things, such as reading the newspaper or watching television? 1 - several days   Moving or speaking so slowly that other people could have noticed.  Or the opposite - being so fidgety or restless that you have been moving around a lot more than usual?  0 - not at all   Thoughts that you would be better off dead, or of hurting yourself?  0 - not at all   Patient Health Questionnaire Score: 6     CELIA-7 Questionnaire    Feeling nervous, anxious, or on edge: Nearly every day  Not being able to sop or control worrying: Several days  Worrying too much about different things: Nearly every day  Trouble relaxing: Nearly every day  Being so restless that it's hard to sit still: Several days  Becoming easily annoyed or irritable: Several days  Feeling afraid as if something awful might happen: Not at all  Total: 12    She reports since about Feb 2024 she has experiencing significant worsening of stress levels which has been affecting her daily life as well as her sleep. Her son will be released from [employer] this year and will likely live with her as a transition and she is more introverted and worries about sharing her space as well as upcoming needed renovations. She also  has had worsening of her skin which has not responded to initial treatments by dermatology. She is waking up at night and cannot get back to sleep because her mind is racing. Previously on citalopram after her divorce 12 years ago which was helpful.       Change in voice, prior laryngeal surgery    She reports following anterior approach to cervical fusion 2007 she developed what sounds like a perforation in her larynx.  Unclear if this was traumatic at the time of surgery or is secondary to extrinsic pressure.  She notes that her tonsils were taken and essentially patched up over the hole in the larynx, unclear if this is above or below the level of the vocal cords.  She notes her voice has been changed since that time.  Now she is having difficulty with swallowing where things will regurgitate back up.  She worries that there could be external compression on her esophagus from her larynx from inflammation or issues at the prior surgical site.  She has not had an evaluation with an otolaryngologist in a long time.  She is actually due for her colonoscopy is so we are planning ask for an upper endoscopy at the same time to evaluate for Schatzki ring or primary esophageal cause to her current issues but want to make sure that she would be safe to do so from ENT.  Referral has been placed.     Chronic Bilateral Low Back Pain With Left-Sided Sciatica    Hip Xray (7/2020):  There is an implanted electronic device within the left buttock with wires extending into the sacrum. There is mild degenerative change of the left greater than right hip joint characterized by joint space loss and osteophytic spurring. There is pelvic calcification.     IMPRESSION: Degenerative change of the hips bilaterally, left greater than right.    Lumbar Xray (7/2020):  There is convex right scoliotic curvature. There is multilevel decreased disc height and endplate spurring. There is prominent multilevel facet degeneration. There is grade 1  anterior subluxation at L3-4 and L4-5. There is vascular calcification. There is an implanted electronic device overlying the left sacrum posteriorly.     IMPRESSION:  1.  Multilevel degenerative disc disease and facet degeneration.  2.  Scoliotic deformity.  3.  Implanted electronic device overlying the left sacrum.      She reports longstanding problems in the low back.  She recalls SI joint disease and has been working with physical therapy in 2019. She denies any recent imaging of her low back. She also has pain along the left lateral thigh, unclear if that is IT band pain or radicular from the lower back. More recently with right thigh pain, both anterior and posterior, s/p right hip injection with Dr. Hernandez in April 2022.    Current regimen: Tramadol 100 mg 1-2 times daily, baclofen 5 mg at night     Dyslipidemia     Latest Reference Range & Units 04/25/24 08:06   Cholesterol,Tot 100 - 199 mg/dL 209 (H)   Triglycerides 0 - 149 mg/dL 120   HDL >=40 mg/dL 66   LDL <100 mg/dL 119 (H)       CT cardiac score (2/2023):  Coronary calcification:  LMA - 0.0  LCX - 0.0  LAD - 0.0  RCA - 0.0        Total Calcium Score: 0.0     Percentile: Calcium score is below the 25th percentile for the patient's age and sex.     She has history of hyperlipidemia.  Unclear if she has ever taken dedicated lipid-lowering medications.  She is on coenzyme Q 10.  She had a carotid ultrasound in 2017 that was negative for any blockages. CT cardiac score of 0.  She did have a Holter study for palpitations in the past.     Chronic Use of Opiate for Therapeutic Purpose    She reports longstanding chronic pain in her neck due to a 2 staged neck fusion surgery in 2013 at [facility].  She also has had issues with intermittent low back/SI joint pain, left greater than right hip pain now radiating into the groin, and left IT band pain.  She uses tramadol 50 mg generally 2 tablets daily for pain control which is no longer sufficient as of October 2022,  will plan to increase to 3 tablets daily.  She denies any deleterious side effects from this medicine.  She has been stable on 3 tablets daily for the past year, continue at this time.     Acquired Hypothyroidism     Latest Reference Range & Units 04/25/24 08:06   TSH 0.380 - 5.330 uIU/mL 2.300   Free T-4 0.93 - 1.70 ng/dL 1.16     She has history of hypothyroidism.  She is currently utilizing levothyroxine.  No current signs or symptoms of overtreatment or under treatment.    Current regimen: Levothyroxine 100 mcg daily     Chronic Neck Pain    She reports staged 2 part fusion in 2007 at [facility] at the recommendation of a local orthopedist. She reports an anterior and posterior approach at that time. 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  At that same time she was experiencing left hip pain and was told it was likely related to the left neck pain.  She reports chronic issues with what she thinks is muscle spasms on the left greater than right neck as well as the bilateral trapezius areas which she relates to large amount of hardware adding additional pressure to her muscles.  It does not sound like she is followed up with anyone locally for this previous neck problem.    For chronic pain in the neck she utilizes tramadol 50 mg up to 3 tablets daily, 2 in the AM and 1 in the PM.  She is on turmeric.  She also is working with physical therapy and physiatry.          Current Medications:  Current Outpatient Medications Ordered in Epic   Medication Sig Dispense Refill    doxycycline (MONODOX) 50 MG capsule Take 50 mg by mouth 2 times a day.      citalopram (CELEXA) 10 MG tablet Take 1 Tablet by mouth every day. 100 Tablet 3    [START ON 5/12/2024] traMADol (ULTRAM) 50 MG Tab Take 1 Tablet by mouth every 8 hours for 30 days. 90 Tablet 0    [START ON  "6/11/2024] traMADol (ULTRAM) 50 MG Tab Take 1 Tablet by mouth every 8 hours for 30 days. 90 Tablet 0    [START ON 7/11/2024] traMADol (ULTRAM) 50 MG Tab Take 1 Tablet by mouth every 8 hours for 30 days. 90 Tablet 0    levothyroxine (SYNTHROID) 100 MCG Tab TAKE ONE TABLET BY MOUTH EVERY MORNING ON AN EMPTY STOMACH 100 Tablet 3    Azelaic Acid 15 % Gel APPLY ONE APPLPICATION TOPICALLY TWO TIMES A DAY 50 g 11    clindamycin (CLEOCIN) 1 % Solution       metoprolol SR (TOPROL XL) 25 MG TABLET SR 24 HR TAKE ONE TABLET BY MOUTH DAILY 100 Tablet 3    Tretinoin 0.05 % Gel Apply 1 Application topically at bedtime. 1 Each 3    estradiol (ESTRACE) 0.1 MG/GM vaginal cream Insert 0.4 g into the vagina every 72 hours. 42.5 g 3    COMBIGAN 0.2-0.5 % Solution       Lutein 20 MG Tab Take  by mouth.      L-Theanine 200 MG Cap Take  by mouth.      Coenzyme Q10 (COQ10 PO) Take 100 mg by mouth every day. Qunol liquid    every other day      magnesium oxide (MAG-OX) 400 MG Tab Take 400 mg by mouth every evening.      Calcium 500 MG Chew Tab Take 1 Tab by mouth every evening.      Milk Thistle 175 MG Cap Take 1 Cap by mouth every day.      TURMERIC PO Take 333 mg by mouth every day. Daily (Qunol liquid)      Probiotic Product (PRO-BIOTIC BLEND) Cap Take 1 Capsule by mouth every evening. Women's Probiotic      latanoprost (XALATAN) 0.005 % Solution Place 1 Drop in both eyes every evening.      KRILL OIL PO Take 500 mg by mouth every day.      Multiple Vitamins-Minerals (CENTRUM PO) Take 1 Tab by mouth every day.       No current [facility]-ordered facility-administered medications on file.          Objective:   Physical Exam:    Vitals: /80 (BP Location: Left arm, Patient Position: Sitting, BP Cuff Size: Adult)   Pulse 64   Temp 36.3 °C (97.4 °F) (Temporal)   Resp 12   Ht 1.702 m (5' 7\")   Wt 73.6 kg (162 lb 4.8 oz)   SpO2 96%    BMI: Body mass index is 25.42 kg/m².  Physical Exam  Constitutional:       Appearance: She is not " ill-appearing or toxic-appearing.   HENT:      Right Ear: External ear normal.      Left Ear: External ear normal.   Eyes:      General: No scleral icterus.     Conjunctiva/sclera: Conjunctivae normal.   Neck:      Comments: Chronic due to fusion  Cardiovascular:      Rate and Rhythm: Normal rate and regular rhythm.      Pulses: Normal pulses.   Pulmonary:      Effort: Pulmonary effort is normal. No respiratory distress.      Breath sounds: No wheezing or rhonchi.   Musculoskeletal:      Cervical back: Rigidity present.      Right lower leg: No edema.      Left lower leg: No edema.   Skin:     General: Skin is warm and dry.      Comments: Scabs around her chin from recent dermatologic procedure   Psychiatric:         Behavior: Behavior normal.         Thought Content: Thought content normal.         Judgment: Judgment normal.      Comments: Tearful, feeling more stressed and anxious          Assessment and Plan:   Carlee is a 74 y.o. female with the following:  Problem List Items Addressed This Visit       Acquired hypothyroidism     Chronic and stable problem, thyroid levels well-controlled, no signs of over or under treatment, continue levothyroxine 100 mcg daily.         Change in voice, prior laryngeal surgery     Neck ongoing issue, she is found since switching from her usual and orange today to an apple a day the symptoms have somewhat improved.  She has a message out to GI consultants about getting in and I advised her to let me know when she has the appointment and I can send over the testing done thus far likely needs an upper endoscopy.         Chronic bilateral low back pain with left-sided sciatica     Chronic ongoing problem, continues to find benefit with daily use of tramadol usually 100 mg in the morning and 50 mg in the evening as well as the muscle relaxants.  She had to skip tramadol for a vestibular exam and notes she had immediate decompensation of her pain and notes current medical therapy  "allows to remain independent with her ADLs.  No deleterious side effects at this time.    Obtained and reviewed patient utilization report from [facility] pharmacy database on 5/2/2024 11:45 AM  prior to writing prescription for controlled substance II, III or IV per [place] bill . Based on assessment of the report, the prescription is medically necessary.     Patient understands this prescription is a controlled substance which is potentially habit-forming and its use is regulated by the [employer]. We also discussed the new \"black box\" warning regarding the lethal combination of opioids and benzodiazepines. Refills are subject to terms of a controlled substance agreement and patient has an updated one on file. Most recent UDS is appropriate. Any refill requires an office visit. Narcotics have may adverse effects and the risks of addiction, accidental overdose and death were emphasized. Provided prescriptions for the next three months.           Relevant Medications    citalopram (CELEXA) 10 MG tablet    traMADol (ULTRAM) 50 MG Tab (Start on 5/12/2024)    traMADol (ULTRAM) 50 MG Tab (Start on 6/11/2024)    traMADol (ULTRAM) 50 MG Tab (Start on 7/11/2024)    Other Relevant Orders    Controlled Substance Treatment Agreement    Chronic neck pain     Chronic ongoing problem, continues to find benefit with daily use of tramadol usually 100 mg in the morning and 50 mg in the evening as well as the muscle relaxants.  She had to skip tramadol for a vestibular exam and notes she had immediate decompensation of her pain and notes current medical therapy allows to remain independent with her ADLs.  No deleterious side effects at this time.    Obtained and reviewed patient utilization report from [facility] pharmacy database on 5/2/2024 11:45 AM  prior to writing prescription for controlled substance II, III or IV per [place] bill . Based on assessment of the report, the prescription is medically necessary.     Patient " "understands this prescription is a controlled substance which is potentially habit-forming and its use is regulated by the [employer]. We also discussed the new \"black box\" warning regarding the lethal combination of opioids and benzodiazepines. Refills are subject to terms of a controlled substance agreement and patient has an updated one on file. Most recent UDS is appropriate. Any refill requires an office visit. Narcotics have may adverse effects and the risks of addiction, accidental overdose and death were emphasized. Provided prescriptions for the next three months.         Relevant Medications    citalopram (CELEXA) 10 MG tablet    traMADol (ULTRAM) 50 MG Tab (Start on 5/12/2024)    traMADol (ULTRAM) 50 MG Tab (Start on 6/11/2024)    traMADol (ULTRAM) 50 MG Tab (Start on 7/11/2024)    Chronic use of opiate for therapeutic purpose     Chronic ongoing problem, continues to find benefit with daily use of tramadol usually 100 mg in the morning and 50 mg in the evening as well as the muscle relaxants.  She had to skip tramadol for a vestibular exam and notes she had immediate decompensation of her pain and notes current medical therapy allows to remain independent with her ADLs.  No deleterious side effects at this time.    Obtained and reviewed patient utilization report from [facility] pharmacy database on 5/2/2024 11:45 AM  prior to writing prescription for controlled substance II, III or IV per [place] bill . Based on assessment of the report, the prescription is medically necessary.     Patient understands this prescription is a controlled substance which is potentially habit-forming and its use is regulated by the [facility]. We also discussed the new \"black box\" warning regarding the lethal combination of opioids and benzodiazepines. Refills are subject to terms of a controlled substance agreement and patient has an updated one on file. Most recent UDS is appropriate. Any refill requires an office visit. Narcotics " have may adverse effects and the risks of addiction, accidental overdose and death were emphasized. Provided prescriptions for the next three months.         Relevant Medications    traMADol (ULTRAM) 50 MG Tab (Start on 5/12/2024)    traMADol (ULTRAM) 50 MG Tab (Start on 6/11/2024)    traMADol (ULTRAM) 50 MG Tab (Start on 7/11/2024)    Other Relevant Orders    Controlled Substance Treatment Agreement    Dyslipidemia     Chronic ongoing problem, overall lipids are stable, prefers to monitor off pharmacotherapy with reassuring CT cardiac screening in the past.         Moderate episode of recurrent major depressive disorder (HCC)     New and decompensated issue.  She has several situational events happening in her life that are coming to ahead and causing significant stress and mood lability.  Provided active listening and discussed in detail.  We agreed to initiate citalopram 10 mg daily with consideration to increase dose in 4 weeks and also gave her information on buspirone in case she would like to trial that as an adjunct in the interim.  Could consider talk therapy as well if the medicines are not effective.         Relevant Medications    citalopram (CELEXA) 10 MG tablet          RTC: Return in about 3 months (around 8/2/2024).    I spent a total of 36 minutes with record review, exam, communication with the patient, communication with other providers, and documentation of this encounter.    PLEASE NOTE: This dictation was created using voice recognition software. I have made every reasonable attempt to correct obvious errors, but I expect that there are errors of grammar and possibly content that I did not discover before finalizing the note.      Shari Dent, DO  Geriatric and Internal Medicine  [facility]

## 2024-05-02 NOTE — ASSESSMENT & PLAN NOTE
"Chronic ongoing problem, continues to find benefit with daily use of tramadol usually 100 mg in the morning and 50 mg in the evening as well as the muscle relaxants.  She had to skip tramadol for a vestibular exam and notes she had immediate decompensation of her pain and notes current medical therapy allows to remain independent with her ADLs.  No deleterious side effects at this time.    Obtained and reviewed patient utilization report from [facility] pharmacy database on 5/2/2024 11:45 AM  prior to writing prescription for controlled substance II, III or IV per [place] bill . Based on assessment of the report, the prescription is medically necessary.     Patient understands this prescription is a controlled substance which is potentially habit-forming and its use is regulated by the [employer]. We also discussed the new \"black box\" warning regarding the lethal combination of opioids and benzodiazepines. Refills are subject to terms of a controlled substance agreement and patient has an updated one on file. Most recent UDS is appropriate. Any refill requires an office visit. Narcotics have may adverse effects and the risks of addiction, accidental overdose and death were emphasized. Provided prescriptions for the next three months.    "

## 2024-05-29 ENCOUNTER — APPOINTMENT (RX ONLY)
Dept: URBAN - METROPOLITAN AREA CLINIC 36 | Facility: CLINIC | Age: 74
Setting detail: DERMATOLOGY
End: 2024-05-29

## 2024-05-29 DIAGNOSIS — Z41.9 ENCOUNTER FOR PROCEDURE FOR PURPOSES OTHER THAN REMEDYING HEALTH STATE, UNSPECIFIED: ICD-10-CM

## 2024-05-29 PROCEDURE — ? HYDRAFACIAL

## 2024-05-29 ASSESSMENT — LOCATION DETAILED DESCRIPTION DERM
LOCATION DETAILED: RIGHT INFERIOR CENTRAL MALAR CHEEK
LOCATION DETAILED: PHILTRUM
LOCATION DETAILED: LEFT CENTRAL MALAR CHEEK
LOCATION DETAILED: NASAL SUPRATIP
LOCATION DETAILED: RIGHT MEDIAL FOREHEAD
LOCATION DETAILED: LEFT CHIN

## 2024-05-29 ASSESSMENT — LOCATION ZONE DERM
LOCATION ZONE: NOSE
LOCATION ZONE: FACE
LOCATION ZONE: LIP

## 2024-05-29 ASSESSMENT — LOCATION SIMPLE DESCRIPTION DERM
LOCATION SIMPLE: NOSE
LOCATION SIMPLE: CHIN
LOCATION SIMPLE: UPPER LIP
LOCATION SIMPLE: LEFT CHEEK
LOCATION SIMPLE: RIGHT CHEEK
LOCATION SIMPLE: RIGHT FOREHEAD

## 2024-05-29 NOTE — PROCEDURE: HYDRAFACIAL
Tip: Hydropeel Tip, Teal
Vacuum Pressure High Setting (Will Not Render If Set To 0): 16
Consent: Written consent obtained, risks reviewed including but not limited to crusting, scabbing, blistering, scarring, darker or lighter pigmentary change, bruising, and/or incomplete response.
Number Of Passes Step 6: 1
Location: face
Procedure: Peel
Tip: Hydropeel Tip, Clear
Procedure: Boost
Vacuum Pressure Low Setting (Will Not Render If Set To 0): 13
Vacuum Pressure Low Setting (Will Not Render If Set To 0): 14
Procedure: Extraction
Solution: Antiox-6
Post-Care Instructions: I reviewed with the patient in detail post-care instructions. Patient should stay away from the sun and wear sun protection until treated areas are fully healed.
Vacuum Pressure High Setting (Will Not Render If Set To 0): 22
Tip: Hydropeel Tip, Blue
Price (Use Numbers Only, No Special Characters Or $): 911
Solution: Activ-4
Treatment Number: 2
Solution: GlySal 7.5%
Solution Override
Indication: anti-aging
Comments: Still very red from previous laser, reassured pt that this is normal & upped her moisturizer, sampled soothe & protect.\\nRev post care
Procedure: Exfoliation
Procedure: Fusion
Solution: Beta-HD

## 2024-06-04 ENCOUNTER — APPOINTMENT (RX ONLY)
Dept: URBAN - METROPOLITAN AREA CLINIC 36 | Facility: CLINIC | Age: 74
Setting detail: DERMATOLOGY
End: 2024-06-04

## 2024-06-04 DIAGNOSIS — Z41.9 ENCOUNTER FOR PROCEDURE FOR PURPOSES OTHER THAN REMEDYING HEALTH STATE, UNSPECIFIED: ICD-10-CM

## 2024-06-04 PROCEDURE — ? COSMETIC FOLLOW-UP

## 2024-06-04 NOTE — PROCEDURE: COSMETIC FOLLOW-UP
Global Improvement: Very Good
Detail Level: Zone
Patient Satisfaction: Very pleased
Treatment (Optional): Laser Resurfacing
Side Effects Or Complications: None
Treatment Override (Free Text): profrac and micro laser peel

## 2024-07-03 ENCOUNTER — APPOINTMENT (RX ONLY)
Dept: URBAN - METROPOLITAN AREA CLINIC 36 | Facility: CLINIC | Age: 74
Setting detail: DERMATOLOGY
End: 2024-07-03

## 2024-07-03 DIAGNOSIS — Z41.9 ENCOUNTER FOR PROCEDURE FOR PURPOSES OTHER THAN REMEDYING HEALTH STATE, UNSPECIFIED: ICD-10-CM

## 2024-07-03 PROCEDURE — ? DERMASWEEP

## 2024-07-03 ASSESSMENT — LOCATION SIMPLE DESCRIPTION DERM
LOCATION SIMPLE: LEFT FOREHEAD
LOCATION SIMPLE: LEFT CHEEK
LOCATION SIMPLE: CHIN
LOCATION SIMPLE: RIGHT FOREHEAD
LOCATION SIMPLE: RIGHT CHEEK
LOCATION SIMPLE: SUPERIOR FOREHEAD
LOCATION SIMPLE: NOSE
LOCATION SIMPLE: GLABELLA

## 2024-07-03 ASSESSMENT — LOCATION DETAILED DESCRIPTION DERM
LOCATION DETAILED: NASAL TIP
LOCATION DETAILED: RIGHT CENTRAL MALAR CHEEK
LOCATION DETAILED: RIGHT CHIN
LOCATION DETAILED: LEFT CENTRAL MALAR CHEEK
LOCATION DETAILED: LEFT INFERIOR LATERAL FOREHEAD
LOCATION DETAILED: SUPERIOR MID FOREHEAD
LOCATION DETAILED: GLABELLA
LOCATION DETAILED: RIGHT INFERIOR LATERAL FOREHEAD

## 2024-07-03 ASSESSMENT — LOCATION ZONE DERM
LOCATION ZONE: NOSE
LOCATION ZONE: FACE

## 2024-07-03 NOTE — PROCEDURE: DERMASWEEP
Indication: skin texture
Vacuum Pressure Units: inches Hg
Number Of Passes: 4
Vacuum Pressure: 14
Type Of Dermasweep: Dermasweep with Epi Infusion
Bristle Tip: Yellow
Consent: Written consent obtained, risks reviewed including but not limited to crusting, scabbing, blistering, scarring, darker or lighter pigmentary change, bruising, and/or incomplete response.
Endpoint: mild erythema
Treatment Number: 1
Price (Use Numbers Only, No Special Characters Or $): 891
Post-Care Instructions: I reviewed with the patient in detail post-care instructions. Patient should stay away from the sun and wear sun protection until treated areas are fully healed.\\n\\nAdded enzyme peel $50
Infusion Solution: Hydrating
Detail Level: Zone

## 2024-07-31 ENCOUNTER — APPOINTMENT (RX ONLY)
Dept: URBAN - METROPOLITAN AREA CLINIC 36 | Facility: CLINIC | Age: 74
Setting detail: DERMATOLOGY
End: 2024-07-31

## 2024-07-31 DIAGNOSIS — Z41.9 ENCOUNTER FOR PROCEDURE FOR PURPOSES OTHER THAN REMEDYING HEALTH STATE, UNSPECIFIED: ICD-10-CM

## 2024-07-31 PROCEDURE — ? CHEMICAL PEEL

## 2024-07-31 ASSESSMENT — LOCATION DETAILED DESCRIPTION DERM
LOCATION DETAILED: LEFT CENTRAL MALAR CHEEK
LOCATION DETAILED: LEFT MEDIAL FOREHEAD
LOCATION DETAILED: GLABELLA
LOCATION DETAILED: LEFT MENTAL CREASE
LOCATION DETAILED: PHILTRUM
LOCATION DETAILED: NASAL SUPRATIP
LOCATION DETAILED: LEFT INFERIOR FOREHEAD
LOCATION DETAILED: RIGHT SUPERIOR MEDIAL BUCCAL CHEEK
LOCATION DETAILED: RIGHT CENTRAL MALAR CHEEK
LOCATION DETAILED: LEFT SUPERIOR CENTRAL BUCCAL CHEEK
LOCATION DETAILED: RIGHT INFERIOR LATERAL FOREHEAD

## 2024-07-31 ASSESSMENT — LOCATION SIMPLE DESCRIPTION DERM
LOCATION SIMPLE: RIGHT CHEEK
LOCATION SIMPLE: RIGHT FOREHEAD
LOCATION SIMPLE: LEFT CHEEK
LOCATION SIMPLE: NOSE
LOCATION SIMPLE: UPPER LIP
LOCATION SIMPLE: CHIN
LOCATION SIMPLE: LEFT FOREHEAD
LOCATION SIMPLE: GLABELLA

## 2024-07-31 ASSESSMENT — LOCATION ZONE DERM
LOCATION ZONE: LIP
LOCATION ZONE: FACE
LOCATION ZONE: NOSE

## 2024-07-31 NOTE — PROCEDURE: CHEMICAL PEEL
Consent: Prior to the procedure, written consent was obtained and risks were reviewed, including but not limited to: redness, peeling, blistering, pigmentary change, scarring, infection, and pain.
Treatment Number: 1
Chemical Peel: Ultra Peel I
Booster Applied In Office?: 0.1% Retinol
Price (Use Numbers Only, No Special Characters Or $): 234
Prep: The treated area was degreased with pre-peel cleanser, smoothing toner, Vaseline was applied to protect mucous membranes.
Post Peel Care: After the procedure Sun protection and post-care instructions were reviewed with the patient.
Number Of Layers: 2
Comments: Pt complains that her skin keeps peeling. Reviewed cleansing & exfo.\\nTolerated well added custom mix mask (probiotic & phyto $25)), slight frosting on brow, jawline, NLF”s
Booster To Be Used At Home?: 0.03% Retinoic Acid
Post-Care Instructions: I reviewed with the patient in detail post-care instructions. Patient should avoid sun exposure and wear sun protection.
Detail Level: Zone
Treatment Time (Optional): 3 mins@

## 2024-08-01 ENCOUNTER — APPOINTMENT (OUTPATIENT)
Dept: MEDICAL GROUP | Facility: PHYSICIAN GROUP | Age: 74
End: 2024-08-01
Payer: MEDICARE

## 2024-08-01 VITALS
HEIGHT: 67 IN | BODY MASS INDEX: 25.1 KG/M2 | OXYGEN SATURATION: 94 % | SYSTOLIC BLOOD PRESSURE: 110 MMHG | TEMPERATURE: 97.4 F | DIASTOLIC BLOOD PRESSURE: 60 MMHG | HEART RATE: 56 BPM | WEIGHT: 159.9 LBS

## 2024-08-01 DIAGNOSIS — E78.5 DYSLIPIDEMIA: ICD-10-CM

## 2024-08-01 DIAGNOSIS — M54.2 CHRONIC NECK PAIN: ICD-10-CM

## 2024-08-01 DIAGNOSIS — R73.01 ELEVATED FASTING BLOOD SUGAR: ICD-10-CM

## 2024-08-01 DIAGNOSIS — M54.42 CHRONIC BILATERAL LOW BACK PAIN WITH LEFT-SIDED SCIATICA: ICD-10-CM

## 2024-08-01 DIAGNOSIS — R49.9 CHANGE IN VOICE: ICD-10-CM

## 2024-08-01 DIAGNOSIS — Z79.891 CHRONIC USE OF OPIATE FOR THERAPEUTIC PURPOSE: ICD-10-CM

## 2024-08-01 DIAGNOSIS — G89.29 CHRONIC NECK PAIN: ICD-10-CM

## 2024-08-01 DIAGNOSIS — I10 ESSENTIAL HYPERTENSION: ICD-10-CM

## 2024-08-01 DIAGNOSIS — Z12.31 ENCOUNTER FOR SCREENING MAMMOGRAM FOR MALIGNANT NEOPLASM OF BREAST: ICD-10-CM

## 2024-08-01 DIAGNOSIS — E03.9 ACQUIRED HYPOTHYROIDISM: ICD-10-CM

## 2024-08-01 DIAGNOSIS — G89.29 CHRONIC BILATERAL LOW BACK PAIN WITH LEFT-SIDED SCIATICA: ICD-10-CM

## 2024-08-01 DIAGNOSIS — M43.22 CERVICAL VERTEBRAL FUSION: ICD-10-CM

## 2024-08-01 PROCEDURE — 3078F DIAST BP <80 MM HG: CPT | Performed by: INTERNAL MEDICINE

## 2024-08-01 PROCEDURE — 3074F SYST BP LT 130 MM HG: CPT | Performed by: INTERNAL MEDICINE

## 2024-08-01 PROCEDURE — G0439 PPPS, SUBSEQ VISIT: HCPCS | Performed by: INTERNAL MEDICINE

## 2024-08-01 RX ORDER — TRAMADOL HYDROCHLORIDE 50 MG/1
50 TABLET ORAL EVERY 8 HOURS
Qty: 90 TABLET | Refills: 0 | Status: SHIPPED | OUTPATIENT
Start: 2024-10-13 | End: 2024-11-12

## 2024-08-01 RX ORDER — METOPROLOL SUCCINATE 25 MG/1
25 TABLET, EXTENDED RELEASE ORAL DAILY
Qty: 100 TABLET | Refills: 3 | Status: SHIPPED | OUTPATIENT
Start: 2024-08-01

## 2024-08-01 RX ORDER — TRAMADOL HYDROCHLORIDE 50 MG/1
50 TABLET ORAL EVERY 8 HOURS
Qty: 90 TABLET | Refills: 0 | Status: SHIPPED | OUTPATIENT
Start: 2024-09-13 | End: 2024-10-13

## 2024-08-01 RX ORDER — TRAMADOL HYDROCHLORIDE 50 MG/1
50 TABLET ORAL EVERY 8 HOURS
Qty: 90 TABLET | Refills: 0 | Status: SHIPPED | OUTPATIENT
Start: 2024-08-14 | End: 2024-09-13

## 2024-08-01 ASSESSMENT — PATIENT HEALTH QUESTIONNAIRE - PHQ9: CLINICAL INTERPRETATION OF PHQ2 SCORE: 0

## 2024-08-01 ASSESSMENT — ACTIVITIES OF DAILY LIVING (ADL): BATHING_REQUIRES_ASSISTANCE: 0

## 2024-08-01 ASSESSMENT — ENCOUNTER SYMPTOMS: GENERAL WELL-BEING: GOOD

## 2024-08-01 ASSESSMENT — FIBROSIS 4 INDEX: FIB4 SCORE: 3.96

## 2024-08-01 NOTE — PROGRESS NOTES
Chief Complaint   Patient presents with    Annual Exam       HPI:  Carlee Hunt is a 74 y.o. here for Medicare Annual Wellness Visit     Problem   Change in voice, prior laryngeal surgery    She reports following anterior approach to cervical fusion 2007 she developed what sounds like a perforation in her larynx.  Unclear if this was traumatic at the time of surgery or is secondary to extrinsic pressure.  She notes that her tonsils were taken and essentially patched up over the hole in the larynx, unclear if this is above or below the level of the vocal cords.  She notes her voice has been changed since that time.  Now she is having difficulty with swallowing where things will regurgitate back up.  She worries that there could be external compression on her esophagus from her larynx from inflammation or issues at the prior surgical site.  She has not had an evaluation with an otolaryngologist in a long time.  She is actually due for her colonoscopy is so we are planning ask for an upper endoscopy at the same time to evaluate for Schatzki ring or primary esophageal cause to her current issues but want to make sure that she would be safe to do so from ENT.  Referral has been placed.     Chronic Bilateral Low Back Pain With Left-Sided Sciatica    Hip Xray (7/2020):  There is an implanted electronic device within the left buttock with wires extending into the sacrum. There is mild degenerative change of the left greater than right hip joint characterized by joint space loss and osteophytic spurring. There is pelvic calcification.     IMPRESSION: Degenerative change of the hips bilaterally, left greater than right.    Lumbar Xray (7/2020):  There is convex right scoliotic curvature. There is multilevel decreased disc height and endplate spurring. There is prominent multilevel facet degeneration. There is grade 1 anterior subluxation at L3-4 and L4-5. There is vascular calcification. There is an implanted electronic  device overlying the left sacrum posteriorly.     IMPRESSION:  1.  Multilevel degenerative disc disease and facet degeneration.  2.  Scoliotic deformity.  3.  Implanted electronic device overlying the left sacrum.      She reports longstanding problems in the low back.  She recalls SI joint disease and has been working with physical therapy in 2019. She denies any recent imaging of her low back. She also has pain along the left lateral thigh, unclear if that is IT band pain or radicular from the lower back. More recently with right thigh pain, both anterior and posterior, s/p right hip injection with Dr. Hernandez in April 2022.    Current regimen: Tramadol 100 mg 1-2 times daily, baclofen 5 mg at night     Dyslipidemia     Latest Reference Range & Units 04/25/24 08:06   Cholesterol,Tot 100 - 199 mg/dL 209 (H)   Triglycerides 0 - 149 mg/dL 120   HDL >=40 mg/dL 66   LDL <100 mg/dL 119 (H)       CT cardiac score (2/2023):  Coronary calcification:  LMA - 0.0  LCX - 0.0  LAD - 0.0  RCA - 0.0        Total Calcium Score: 0.0     Percentile: Calcium score is below the 25th percentile for the patient's age and sex.     She has history of hyperlipidemia.  Unclear if she has ever taken dedicated lipid-lowering medications.  She is on coenzyme Q 10.  She had a carotid ultrasound in 2017 that was negative for any blockages. CT cardiac score of 0.  She did have a Holter study for palpitations in the past.     Elevated Fasting Blood Sugar    She has history of elevated fasting blood sugar, no known prediabetes or type 2 diabetes.     Chronic Use of Opiate for Therapeutic Purpose    She reports longstanding chronic pain in her neck due to a 2 staged neck fusion surgery in 2013 at [facility].  She also has had issues with intermittent low back/SI joint pain, left greater than right hip pain now radiating into the groin, and left IT band pain.  She uses tramadol 50 mg generally 2 tablets daily for pain control which is no longer sufficient  as of October 2022, will plan to increase to 3 tablets daily.  She denies any deleterious side effects from this medicine.  She has been stable on 3 tablets daily for the past year, continue at this time.     Acquired Hypothyroidism     Latest Reference Range & Units 04/25/24 08:06   TSH 0.380 - 5.330 uIU/mL 2.300   Free T-4 0.93 - 1.70 ng/dL 1.16     She has history of hypothyroidism.  She is currently utilizing levothyroxine.  No current signs or symptoms of overtreatment or under treatment.    Current regimen: Levothyroxine 100 mcg daily     Cervical Vertebral Fusion    Cervical MRI (8/2021): Previous extensive cervical laminectomy from C1 through C6. Previous pedicle screw and mary fixation from the occiput to C5 bilaterally. 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  Nonvisualization of the dens which may have been previously resected or congenitally absent.    Cervical Xray (9/2021): extensive posterior cervical instrumentation from occiput through C5.  I do feel that she has some instability at C5-6 between flexion and extension maneuvers.  Otherwise within the construct I see no abnormal motion or evidence of hardware failure or screw pullout.  These appear to be stable postoperative images.      She reports staged 2 part fusion in 2007 at [facility] at the recommendation of a local orthopedist. She reports an anterior and posterior approach at that time. 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  At that same time she was experiencing left hip pain and was told it was likely related to the left neck pain.  She reports chronic issues with what she thinks is muscle spasms on the left greater than right neck as well as the bilateral trapezius areas which she relates to large amount of hardware adding additional pressure to her muscles.     She is now  established with Dr. Narcisa MA.     Chronic Neck Pain    She reports staged 2 part fusion in 2007 at [facility] at the recommendation of a local orthopedist. She reports an anterior and posterior approach at that time. 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  At that same time she was experiencing left hip pain and was told it was likely related to the left neck pain.  She reports chronic issues with what she thinks is muscle spasms on the left greater than right neck as well as the bilateral trapezius areas which she relates to large amount of hardware adding additional pressure to her muscles.  It does not sound like she is followed up with anyone locally for this previous neck problem.    For chronic pain in the neck she utilizes tramadol 50 mg up to 3 tablets daily, 2 in the AM and 1 in the PM.  She is on turmeric.  She also is working with physical therapy and physiatry.          Patient Active Problem List    Diagnosis Date Noted    Moderate episode of recurrent major depressive disorder (HCC) 05/02/2024    Gait instability 02/01/2024    Change in voice, prior laryngeal surgery 11/09/2023    Dysuria 03/22/2022    Rosacea 03/22/2022    Bilateral arm pain 08/26/2021    Benign essential microscopic hematuria 03/11/2021    Chronic bilateral low back pain with left-sided sciatica 06/11/2020    Right > left hip and anterior thigh pain 06/11/2020    Chronic antibiotic suppression- doxycycline stopped by ID in 9/2021 06/11/2020    Prior UTI- Enterococcus faecalis; resistant to tetracyclines 06/11/2020    Palpitations 09/17/2019    PND (post-nasal drip) 09/17/2019    Acne vulgaris 07/02/2019    Dyslipidemia 01/17/2019    Elevated fasting blood sugar 01/17/2019    Chronic use of opiate for therapeutic purpose 01/17/2019    Acquired hypothyroidism 05/29/2018     Hypertension 05/29/2018    Primary open angle glaucoma (POAG) of both eyes, moderate stage 05/29/2018    Cervical vertebral fusion 05/29/2018    Chronic neck pain 05/29/2018       Current Outpatient Medications   Medication Sig Dispense Refill    [START ON 8/14/2024] traMADol (ULTRAM) 50 MG Tab Take 1 Tablet by mouth every 8 hours for 30 days. 90 Tablet 0    [START ON 9/13/2024] traMADol (ULTRAM) 50 MG Tab Take 1 Tablet by mouth every 8 hours for 30 days. 90 Tablet 0    [START ON 10/13/2024] traMADol (ULTRAM) 50 MG Tab Take 1 Tablet by mouth every 8 hours for 30 days. 90 Tablet 0    metoprolol SR (TOPROL XL) 25 MG TABLET SR 24 HR Take 1 Tablet by mouth every day. 100 Tablet 3    omeprazole (PRILOSEC) 20 MG delayed-release capsule Take 1 Capsule by mouth every day. 100 Capsule 3    citalopram (CELEXA) 10 MG tablet Take 1 Tablet by mouth every day. 100 Tablet 3    levothyroxine (SYNTHROID) 100 MCG Tab TAKE ONE TABLET BY MOUTH EVERY MORNING ON AN EMPTY STOMACH 100 Tablet 3    Azelaic Acid 15 % Gel APPLY ONE APPLPICATION TOPICALLY TWO TIMES A DAY 50 g 11    clindamycin (CLEOCIN) 1 % Solution       Tretinoin 0.05 % Gel Apply 1 Application topically at bedtime. 1 Each 3    estradiol (ESTRACE) 0.1 MG/GM vaginal cream Insert 0.4 g into the vagina every 72 hours. 42.5 g 3    COMBIGAN 0.2-0.5 % Solution       Lutein 20 MG Tab Take  by mouth.      L-Theanine 200 MG Cap Take  by mouth.      Coenzyme Q10 (COQ10 PO) Take 100 mg by mouth every day. Qunol liquid    every other day      magnesium oxide (MAG-OX) 400 MG Tab Take 400 mg by mouth every evening.      Calcium 500 MG Chew Tab Take 1 Tab by mouth every evening.      Milk Thistle 175 MG Cap Take 1 Cap by mouth every day.      TURMERIC PO Take 333 mg by mouth every day. Daily (Qunol liquid)      Probiotic Product (PRO-BIOTIC BLEND) Cap Take 1 Capsule by mouth every evening. Women's Probiotic      latanoprost (XALATAN) 0.005 % Solution Place 1 Drop in both eyes every evening.       KRILL OIL PO Take 500 mg by mouth every day.      Multiple Vitamins-Minerals (CENTRUM PO) Take 1 Tab by mouth every day.       No current facility-administered medications for this visit.          Current supplements as per medication list.     Allergies: Rifampin, Vancomycin, and Sulfa drugs    Current social contact/activities: plays card and goes to [employer] goes to swim      She  reports that she quit smoking about 42 years ago. Her smoking use included cigarettes. She started smoking about 58 years ago. She has a 16 pack-year smoking history. She has never used smokeless tobacco. She reports current alcohol use of about 4.2 oz of alcohol per week. She reports that she does not use drugs.  Counseling given: Not Answered  Tobacco comments: continued abstinence      ROS:    Gait: Uses no assistive device  Ostomy: No  Other tubes: No  Amputations: No  Chronic oxygen use: No  Last eye exam: July 24  Wears hearing aids: No   : Denies any urinary leakage during the last 6 months      Depression Screening  Little interest or pleasure in doing things?  0 - not at all  Feeling down, depressed , or hopeless? 0 - not at all  Trouble falling or staying asleep, or sleeping too much?     Feeling tired or having little energy?     Poor appetite or overeating?     Feeling bad about yourself - or that you are a failure or have let yourself or your family down?    Trouble concentrating on things, such as reading the newspaper or watching television?    Moving or speaking so slowly that other people could have noticed.  Or the opposite - being so fidgety or restless that you have been moving around a lot more than usual?     Thoughts that you would be better off dead, or of hurting yourself?     Patient Health Questionnaire Score:      If depressive symptoms identified deferred to follow up visit unless specifically addressed in assessment and plan.    Interpretation of PHQ-9 Total Score   Score Severity   1-4 No Depression    5-9 Mild Depression   10-14 Moderate Depression   15-19 Moderately Severe Depression   20-27 Severe Depression    Screening for Cognitive Impairment  Do you or any of your friends or family members have any concern about your memory? No  Three Minute Recall (Leader, Season, Table) 3/3    Ankit clock face with all 12 numbers and set the hands to show 10 minutes after 11.  Yes    Cognitive concerns identified deferred for follow up unless specifically addressed in assessment and plan.    Fall Risk Assessment  Has the patient had two or more falls in the last year or any fall with injury in the last year?  No    Safety Assessment  Do you always wear your seatbelt?  Yes  Any changes to home needed to function safely? No  Difficulty hearing.  No  Patient counseled about all safety risks that were identified.    Functional Assessment ADLs  Are there any barriers preventing you from cooking for yourself or meeting nutritional needs?  No.    Are there any barriers preventing you from driving safely or obtaining transportation?  No.    Are there any barriers preventing you from using a telephone or calling for help?  No    Are there any barriers preventing you from shopping?  No.    Are there any barriers preventing you from taking care of your own finances?  No    Are there any barriers preventing you from managing your medications?  No    Are there any barriers preventing you from showering, bathing or dressing yourself? No    Are there any barriers preventing you from doing housework or laundry? No    Are there any barriers preventing you from using the toilet?No    Are you currently engaging in any exercise or physical activity?  Yes.      Self-Assessment of Health  What is your perception of your health? Good    Do you sleep more than six hours a night? Yes    In the past 7 days, how much did pain keep you from doing your normal work? None    Do you spend quality time with family or friends (virtually or in person)?  Yes    Do you usually eat a heart healthy diet that constists of a variety of fruits, vegetables, whole grains and fiber? Yes    Do you eat foods high in fat and/or Fast Food more than three times per week? No    How concerned are you that your medical conditions are not being well managed? Not at all    Are you worried that in the next 2 months, you may not have stable housing that you own, rent, or stay in as part of a household? No        Advance Care Planning  Do you have an Advance Directive, Living Will, Durable Power of , or POLST? Yes  Advance Directive       is not on file - instructed patient to bring in a copy to scan into their chart      Health Maintenance Summary            Influenza Vaccine (1) Next due on 9/1/2024 09/29/2023  Imm Admin: Influenza Vaccine Adult HD    10/22/2022  Imm Admin: Influenza Vaccine Adult HD    09/30/2021  Imm Admin: Influenza Vaccine Adult HD    01/01/2021  Imm Admin: Influenza Vaccine Quad Inj (Preserved)    09/22/2020  Imm Admin: Influenza Vaccine Adult HD    Only the first 5 history entries have been loaded, but more history exists.              Ordered - Mammogram (Yearly) Ordered on 8/1/2024      10/02/2023  MA-SCREENING MAMMO BILAT W/TOMOSYNTHESIS W/CAD    09/29/2022  MA-SCREENING MAMMO BILAT W/TOMOSYNTHESIS W/CAD    09/10/2021  MA-SCREENING MAMMO BILAT W/TOMOSYNTHESIS W/CAD    07/24/2020  MA-SCREENING MAMMO BILAT W/TOMOSYNTHESIS W/CAD    04/23/2019  MA-SCREENING MAMMO BILAT W/TOMOSYNTHESIS W/CAD    Only the first 5 history entries have been loaded, but more history exists.              Urine Drug Screening (Every 360 Days) Next due on 11/3/2024      11/09/2023  PAIN MANAGEMENT SCRN, W/ RFLX TO QNT    07/05/2023  URINE DRUG SCREEN W/O CONF (AR)    10/20/2022  URINE DRUG SCREEN W/O CONF (AR)    11/24/2021  URINE DRUG SCREEN W/O CONF (AR)    06/11/2020  Pain Management Screen    Only the first 5 history entries have been loaded, but more history exists.               Annual Wellness Visit (Yearly) Next due on 8/1/2025 08/01/2024  Level of Service: ANNUAL WELLNESS VISIT-INCLUDES PPPS SUBSEQUE*    07/05/2023  Level of Service: UT ANNUAL WELLNESS VISIT-INCLUDES PPPS SUBSEQUE*    05/02/2022  Level of Service: UT ANNUAL WELLNESS VISIT-INCLUDES PPPS SUBSEQUE*    08/24/2021  Level of Service: UT ANNUAL WELLNESS VISIT-INCLUDES PPPS SUBSEQUE*    02/25/2020  Visit Dx: Medicare annual wellness visit, subsequent    Only the first 5 history entries have been loaded, but more history exists.              Bone Density Scan (Every 5 Years) Next due on 9/10/2026      09/10/2021  DS-BONE DENSITY STUDY (DEXA)    10/01/2019  DS-BONE DENSITY STUDY (DEXA)    11/06/2018  DS-BONE DENSITY STUDY (DEXA)              IMM DTaP/Tdap/Td Vaccine (2 - Td or Tdap) Next due on 1/17/2029 01/17/2019  Imm Admin: Tdap Vaccine              Pneumococcal Vaccine: 65+ Years (Series Information) Completed      09/20/2016  Imm Admin: Pneumococcal Conjugate Vaccine (Prevnar/PCV-13)    09/01/2016  Imm Admin: Pneumococcal Conjugate Vaccine (Prevnar/PCV-13)    07/15/2014  Imm Admin: Pneumococcal polysaccharide vaccine (PPSV-23)    01/01/2014  Imm Admin: Pneumococcal polysaccharide vaccine (PPSV-23)              Hepatitis C Screening  Tentatively Complete      05/04/2018  Hepatitis C Antibody component of HEP C VIRUS ANTIBODY              Zoster (Shingles) Vaccines (Series Information) Completed      07/02/2019  Imm Admin: Zoster Vaccine Recombinant (RZV) (SHINGRIX)    05/01/2019  Imm Admin: Zoster Vaccine Recombinant (RZV) (SHINGRIX)    01/30/2009  Imm Admin: Zoster Vaccine Live (ZVL) (Zostavax) - HISTORICAL DATA              COVID-19 Vaccine (Series Information) Completed      10/12/2023  Imm Admin: Covid-19 Mrna (Spikevax) Moderna 12+ Years    10/26/2022  Imm Admin: MODERNA BIVALENT BOOSTER SARS-COV-2 VACCINE (6+)    07/17/2022  Imm Admin: MODERNA SARS-COV-2 VACCINE (12+)    10/30/2021  Imm Admin: MODERNA  SARS-COV-2 VACCINE (12+)    02/23/2021  Imm Admin: MODERNA SARS-COV-2 VACCINE (12+)    Only the first 5 history entries have been loaded, but more history exists.              Hepatitis A Vaccine (Hep A) (Series Information) Aged Out      No completion history exists for this topic.              Hepatitis B Vaccine (Hep B) (Series Information) Aged Out      No completion history exists for this topic.              HPV Vaccines (Series Information) Aged Out      No completion history exists for this topic.              Polio Vaccine (Inactivated Polio) (Series Information) Aged Out      No completion history exists for this topic.              Meningococcal Immunization (Series Information) Aged Out      No completion history exists for this topic.              Discontinued - Cervical Cancer Screening  Discontinued        Frequency changed to Never automatically (Topic No Longer Applies)    06/01/2022  THINPREP PAP WITH HPV    06/01/2022  Pathology Gynecology Specimen    12/05/2018  THINPREP PAP WITH HPV    12/05/2018  PATHOLOGY GYN SPECIMEN    Only the first 5 history entries have been loaded, but more history exists.              Discontinued - Colorectal Cancer Screening  Discontinued        Frequency changed to Never automatically (Topic No Longer Applies)    03/09/2020  COLOGUARD COLON CANCER SCREENING    05/31/2012  OCCULT BLOOD FECES IMMUNOASSAY                    Patient Care Team:  Shari Dent D.O. as PCP - General (Internal Medicine)  Shari Dent D.O. as PCP - [facility] Paneled  [facility] as Consulting Physician  Stu Santos M.D. as Consulting Physician (Ophthalmology)  Denice Roblero M.D. as Consulting Physician (Obstetrics & Gynecology)  [facility] (Inactive) as Consulting Physician (Gastroenterology)  Jami Gaxiola [facility] as        Social History     Tobacco Use    Smoking status: Former     Current packs/day: 0.00     Average  packs/day: 1 pack/day for 16.0 years (16.0 ttl pk-yrs)     Types: Cigarettes     Start date: 1966     Quit date: 1982     Years since quittin.4    Smokeless tobacco: Never    Tobacco comments:     continued abstinence   Vaping Use    Vaping status: Never Used   Substance Use Topics    Alcohol use: Yes     Alcohol/week: 4.2 oz     Types: 7 Glasses of wine per week     Comment: one daily    Drug use: No     Comment: CBD cream last used 2019     Family History   Problem Relation Age of Onset    Hypertension Mother     Other Father         accident    Other Sister         pneumoccocal pneumonia    Other Brother         glaucoma    Heart Attack Paternal Grandfather     Cancer Neg Hx     Diabetes Neg Hx     Heart Disease Neg Hx      She  has a past medical history of Cat scratch of face (1/3/2022), Cataract, Disorder of arteries and arterioles (HCC) (2021), Elevated fasting glucose (2019), Glaucoma, Hypertension, Pain, and Thyroid disease.   Past Surgical History:   Procedure Laterality Date    UT FLUOROSCOPIC GUIDANCE NEEDLE PLACEMENT Right 2022    Procedure: .;  Surgeon: Rolf Hernandez M.D.;  [facility];  Service: Pain Management    INJ,SACROILIAC,ANES/STEROID Right 2022    Procedure: Diagnostic and therapeutic fluoroscopically guided intra-articular RIGHT hip injection;  Surgeon: Rolf Hernandez M.D.;  Location: [facility];  Service: Pain Management    BOWEL STIMULATOR INSERTION  2020    Procedure: INSERTION, ELECTRODE LEADS AND PULSE GENERATOR, NEUROSTIMULATOR, SACRAL- FOR PERMANENT IMPLANTATION;  Surgeon: Ishmael Goddard M.D.;  Location: SURGERY [facility];  Service: General    BOWEL STIMULATOR INSERTION  1/15/2020    Procedure: INSERTION, ELECTRODE LEADS AND PULSE GENERATOR, NEUROSTIMULATOR, SACRAL-NEUROMODULATION TRIAL;  Surgeon: Ishmael Goddard M.D.;  Location: SURGERY [facility];  Service: General    OTHER  2007     "throat surgery after cervical surgery, adjusted Mercedes Messi    CERVICAL FUSION POSTERIOR      Basilar \"invagination\"    EYE SURGERY      cataracts - Dr. Martinez       Exam:   /60 (BP Location: Left arm, Patient Position: Sitting, BP Cuff Size: Adult)   Pulse (!) 56   Temp 36.3 °C (97.4 °F) (Temporal)   Ht 1.702 m (5' 7\")   Wt 72.5 kg (159 lb 14.4 oz)   SpO2 94%  Body mass index is 25.04 kg/m².    Hearing good.    Dentition good  Alert, oriented in no acute distress.  Eye contact is good, speech goal directed, affect calm    Assessment and Plan. The following treatment and monitoring plan is recommended:    Problem List Items Addressed This Visit       Acquired hypothyroidism     Chronic stable problem, continue levothyroxine 100 mcg daily, update lab work to ensure stability.         Cervical vertebral fusion     Chronic ongoing problem, will continue with ESR and CRP lab draws every 6 months at the recommendation of her previous infectious disease team. Limited mobility of cervical spine and now also with swallowing difficulties concerning for external obstruction from a bone spur or something extrinsic to the esophagus and larynx. Awaiting GI appointment following referral from ENT earlier this year.         Relevant Orders    Sed Rate    CRP QUANTITIVE (NON-CARDIAC)    Change in voice, prior laryngeal surgery     Chronic ongoing problem, will continue with ESR and CRP lab draws every 6 months at the recommendation of her previous infectious disease team. Limited mobility of cervical spine and now also with swallowing difficulties concerning for external obstruction from a bone spur or something extrinsic to the esophagus and larynx. Awaiting GI appointment following referral from ENT earlier this year.         Relevant Medications    omeprazole (PRILOSEC) 20 MG delayed-release capsule    Chronic bilateral low back pain with left-sided sciatica     Chronic ongoing problem, continues to find benefit with daily " "use of tramadol usually 100 mg in the morning and 50 mg in the evening as well as the muscle relaxants.  She had to skip tramadol for a vestibular exam and notes she had immediate decompensation of her pain and notes current medical therapy allows to remain independent with her ADLs.  No deleterious side effects at this time.    Obtained and reviewed patient utilization report from [facility] pharmacy database on 8/1/2024 1:41 PM  prior to writing prescription for controlled substance II, III or IV per [place] bill . Based on assessment of the report, the prescription is medically necessary.     Patient understands this prescription is a controlled substance which is potentially habit-forming and its use is regulated by the [place]. We also discussed the new \"black box\" warning regarding the lethal combination of opioids and benzodiazepines. Refills are subject to terms of a controlled substance agreement and patient has an updated one on file. Most recent UDS is appropriate. Any refill requires an office visit. Narcotics have may adverse effects and the risks of addiction, accidental overdose and death were emphasized. Provided prescriptions for the next three months.           Relevant Medications    traMADol (ULTRAM) 50 MG Tab (Start on 8/14/2024)    traMADol (ULTRAM) 50 MG Tab (Start on 9/13/2024)    traMADol (ULTRAM) 50 MG Tab (Start on 10/13/2024)    Chronic neck pain     Chronic ongoing problem, continues to find benefit with daily use of tramadol usually 100 mg in the morning and 50 mg in the evening as well as the muscle relaxants.  She had to skip tramadol for a vestibular exam and notes she had immediate decompensation of her pain and notes current medical therapy allows to remain independent with her ADLs.  No deleterious side effects at this time.    Obtained and reviewed patient utilization report from [facility] [place] [facility] pharmacy database on 8/1/2024 1:41 PM  prior to writing prescription for controlled " "substance II, III or IV per [place] bill . Based on assessment of the report, the prescription is medically necessary.     Patient understands this prescription is a controlled substance which is potentially habit-forming and its use is regulated by the [place]. We also discussed the new \"black box\" warning regarding the lethal combination of opioids and benzodiazepines. Refills are subject to terms of a controlled substance agreement and patient has an updated one on file. Most recent UDS is appropriate. Any refill requires an office visit. Narcotics have may adverse effects and the risks of addiction, accidental overdose and death were emphasized. Provided prescriptions for the next three months.           Relevant Medications    traMADol (ULTRAM) 50 MG Tab (Start on 8/14/2024)    traMADol (ULTRAM) 50 MG Tab (Start on 9/13/2024)    traMADol (ULTRAM) 50 MG Tab (Start on 10/13/2024)    Chronic use of opiate for therapeutic purpose     Obtained and reviewed patient utilization report from [facility] pharmacy database on 8/1/2024 1:42 PM  prior to writing prescription for controlled substance II, III or IV per [place] bill . Based on assessment of the report, the prescription is medically necessary.     Patient understands this prescription is a controlled substance which is potentially habit-forming and its use is regulated by the [place]. We also discussed the new \"black box\" warning regarding the lethal combination of opioids and benzodiazepines. Refills are subject to terms of a controlled substance agreement and patient has an updated one on file. Most recent UDS is appropriate. Any refill requires an office visit. Narcotics have may adverse effects and the risks of addiction, accidental overdose and death were emphasized. Provided prescriptions for the next three months.           Relevant Medications    traMADol (ULTRAM) 50 MG Tab (Start on 8/14/2024)    traMADol (ULTRAM) 50 MG Tab (Start on 9/13/2024)    " traMADol (ULTRAM) 50 MG Tab (Start on 10/13/2024)    Dyslipidemia     Chronic ongoing problem, continue with periodic observation, reassuring CT cardiac score previously.         Relevant Orders    FREE THYROXINE    TSH    VITAMIN B12    VITAMIN D,25 HYDROXY (DEFICIENCY)    Lipid Profile    Comp Metabolic Panel    CBC WITH DIFFERENTIAL    Elevated fasting blood sugar     Chronic stable problem, continue with periodic A1c to ensure stability.          Relevant Orders    HEMOGLOBIN A1C     Other Visit Diagnoses       Essential hypertension        Relevant Medications    metoprolol SR (TOPROL XL) 25 MG TABLET SR 24 HR    Encounter for screening mammogram for malignant neoplasm of breast        Relevant Orders    MA-SCREENING MAMMO BILAT W/TOMOSYNTHESIS W/CAD              Services suggested: No services needed at this time  Health Care Screening: Age-appropriate preventive services recommended by USPTF and ACIP covered by Medicare were discussed today. Services ordered if indicated and agreed upon by the patient.  Referrals offered: Community-based lifestyle interventions to reduce health risks and promote self-management and wellness, fall prevention, nutrition, physical activity, tobacco-use cessation, weight loss, and mental health services as per orders if indicated.    Discussion today about general wellness and lifestyle habits:    Prevent falls and reduce trip hazards; Cautioned about securing or removing rugs.  Have a working fire alarm and carbon monoxide detector;   Engage in regular physical activity and social activities     Follow-up: Return in about 3 months (around 11/1/2024).    I spent a total of 32 minutes with record review, exam, communication with the patient, communication with other providers, and documentation of this encounter.    Shari Dent, DO  Geriatric and Internal Medicine  [facility]

## 2024-08-01 NOTE — ASSESSMENT & PLAN NOTE
"Obtained and reviewed patient utilization report from [facility] pharmacy database on 8/1/2024 1:42 PM  prior to writing prescription for controlled substance II, III or IV per [place] bill . Based on assessment of the report, the prescription is medically necessary.     Patient understands this prescription is a controlled substance which is potentially habit-forming and its use is regulated by the [place]. We also discussed the new \"black box\" warning regarding the lethal combination of opioids and benzodiazepines. Refills are subject to terms of a controlled substance agreement and patient has an updated one on file. Most recent UDS is appropriate. Any refill requires an office visit. Narcotics have may adverse effects and the risks of addiction, accidental overdose and death were emphasized. Provided prescriptions for the next three months.    "

## 2024-08-01 NOTE — ASSESSMENT & PLAN NOTE
Chronic ongoing problem, continue with periodic observation, reassuring CT cardiac score previously.

## 2024-08-01 NOTE — ASSESSMENT & PLAN NOTE
"Chronic ongoing problem, continues to find benefit with daily use of tramadol usually 100 mg in the morning and 50 mg in the evening as well as the muscle relaxants.  She had to skip tramadol for a vestibular exam and notes she had immediate decompensation of her pain and notes current medical therapy allows to remain independent with her ADLs.  No deleterious side effects at this time.    Obtained and reviewed patient utilization report from [facility] pharmacy database on 8/1/2024 1:41 PM  prior to writing prescription for controlled substance II, III or IV per [place] bill . Based on assessment of the report, the prescription is medically necessary.     Patient understands this prescription is a controlled substance which is potentially habit-forming and its use is regulated by the [place]. We also discussed the new \"black box\" warning regarding the lethal combination of opioids and benzodiazepines. Refills are subject to terms of a controlled substance agreement and patient has an updated one on file. Most recent UDS is appropriate. Any refill requires an office visit. Narcotics have may adverse effects and the risks of addiction, accidental overdose and death were emphasized. Provided prescriptions for the next three months.    "

## 2024-08-01 NOTE — ASSESSMENT & PLAN NOTE
Chronic ongoing problem, will continue with ESR and CRP lab draws every 6 months at the recommendation of her previous infectious disease team. Limited mobility of cervical spine and now also with swallowing difficulties concerning for external obstruction from a bone spur or something extrinsic to the esophagus and larynx. Awaiting GI appointment following referral from ENT earlier this year.

## 2024-08-01 NOTE — ASSESSMENT & PLAN NOTE
Chronic stable problem, continue levothyroxine 100 mcg daily, update lab work to ensure stability.

## 2024-08-01 NOTE — ASSESSMENT & PLAN NOTE
"Chronic ongoing problem, continues to find benefit with daily use of tramadol usually 100 mg in the morning and 50 mg in the evening as well as the muscle relaxants.  She had to skip tramadol for a vestibular exam and notes she had immediate decompensation of her pain and notes current medical therapy allows to remain independent with her ADLs.  No deleterious side effects at this time.    Obtained and reviewed patient utilization report from [facility] pharmacy database on 8/1/2024 1:41 PM  prior to writing prescription for controlled substance II, III or IV per [place] bill . Based on assessment of the report, the prescription is medically necessary.     Patient understands this prescription is a controlled substance which is potentially habit-forming and its use is regulated by the [place]. We also discussed the new \"black box\" warning regarding the lethal combination of opioids and benzodiazepines. Refills are subject to terms of a controlled substance agreement and patient has an updated one on file. Most recent UDS is appropriate. Any refill requires an office visit. Narcotics have may adverse effects and the risks of addiction, accidental overdose and death were emphasized. Provided prescriptions for the next three months.    "

## 2024-08-06 ENCOUNTER — APPOINTMENT (OUTPATIENT)
Dept: MEDICAL GROUP | Facility: PHYSICIAN GROUP | Age: 74
End: 2024-08-06
Payer: MEDICARE

## 2024-10-02 ENCOUNTER — APPOINTMENT (OUTPATIENT)
Dept: RADIOLOGY | Facility: MEDICAL CENTER | Age: 74
End: 2024-10-02
Attending: INTERNAL MEDICINE
Payer: MEDICARE

## 2024-10-02 DIAGNOSIS — Z12.31 ENCOUNTER FOR SCREENING MAMMOGRAM FOR MALIGNANT NEOPLASM OF BREAST: ICD-10-CM

## 2024-10-02 PROCEDURE — 77067 SCR MAMMO BI INCL CAD: CPT

## 2024-10-07 ENCOUNTER — APPOINTMENT (RX ONLY)
Dept: URBAN - METROPOLITAN AREA CLINIC 36 | Facility: CLINIC | Age: 74
Setting detail: DERMATOLOGY
End: 2024-10-07

## 2024-10-07 DIAGNOSIS — Z41.9 ENCOUNTER FOR PROCEDURE FOR PURPOSES OTHER THAN REMEDYING HEALTH STATE, UNSPECIFIED: ICD-10-CM

## 2024-10-07 PROCEDURE — ? FACIAL

## 2024-10-07 PROCEDURE — ? DERMAPLANE

## 2024-10-07 ASSESSMENT — LOCATION SIMPLE DESCRIPTION DERM
LOCATION SIMPLE: LEFT FOREHEAD
LOCATION SIMPLE: GLABELLA
LOCATION SIMPLE: LEFT TEMPLE
LOCATION SIMPLE: RIGHT CHEEK
LOCATION SIMPLE: LEFT LIP
LOCATION SIMPLE: NOSE
LOCATION SIMPLE: RIGHT EYEBROW
LOCATION SIMPLE: UPPER LIP
LOCATION SIMPLE: CHIN
LOCATION SIMPLE: RIGHT TEMPLE
LOCATION SIMPLE: RIGHT FOREHEAD
LOCATION SIMPLE: LEFT EYEBROW
LOCATION SIMPLE: LEFT CHEEK

## 2024-10-07 ASSESSMENT — LOCATION DETAILED DESCRIPTION DERM
LOCATION DETAILED: NASAL SUPRATIP
LOCATION DETAILED: LEFT SUPERIOR CENTRAL MALAR CHEEK
LOCATION DETAILED: RIGHT MEDIAL FOREHEAD
LOCATION DETAILED: LEFT LATERAL EYEBROW
LOCATION DETAILED: RIGHT LATERAL BUCCAL CHEEK
LOCATION DETAILED: LEFT CENTRAL MALAR CHEEK
LOCATION DETAILED: LEFT INFERIOR CENTRAL MALAR CHEEK
LOCATION DETAILED: RIGHT LATERAL FOREHEAD
LOCATION DETAILED: RIGHT MID TEMPLE
LOCATION DETAILED: GLABELLA
LOCATION DETAILED: RIGHT INFERIOR CENTRAL MALAR CHEEK
LOCATION DETAILED: PHILTRUM
LOCATION DETAILED: LEFT INFERIOR TEMPLE
LOCATION DETAILED: LEFT INFERIOR MEDIAL FOREHEAD
LOCATION DETAILED: LEFT UPPER CUTANEOUS LIP
LOCATION DETAILED: LEFT MEDIAL BUCCAL CHEEK
LOCATION DETAILED: RIGHT INFERIOR TEMPLE
LOCATION DETAILED: RIGHT MEDIAL BUCCAL CHEEK
LOCATION DETAILED: RIGHT LATERAL EYEBROW
LOCATION DETAILED: LEFT INFERIOR LATERAL FOREHEAD
LOCATION DETAILED: RIGHT CENTRAL EYEBROW
LOCATION DETAILED: RIGHT CENTRAL MALAR CHEEK
LOCATION DETAILED: LEFT CHIN

## 2024-10-07 ASSESSMENT — LOCATION ZONE DERM
LOCATION ZONE: LIP
LOCATION ZONE: NOSE
LOCATION ZONE: FACE

## 2024-10-07 NOTE — PROCEDURE: DERMAPLANE
Treatment Area Prep Override: pca oil
Blade: 10R blade scalpel
Post-Care Instructions: I reviewed with the patient in detail post-care instructions.
Treatment Areas: face
Pre-Procedure Text: The patient was placed in a recumbant position on the procedure table.
Detail Level: Generalized
Price (Use Numbers Only, No Special Characters Or $): 80
Post-Procedure Instructions: Following the dermaplane procedure, finishing products & SPF were applied

## 2024-10-07 NOTE — PROCEDURE: FACIAL
Treatment Serum (Optional): Jaylene-Antioxidant
Mask Type (Optional): gel based
Price (Use Numbers Only, No Special Characters Or $): 155
Detail Level: Generalized
Exfoliation Type Override: mangosteen
Treatment Type (Optional): ExfoMax Facial
Comments (Non-Sticky): Prepped for possible laser with Dr. Rojas next Thursday.\\nOne pass d/s.\\nTolerated well, Rev post care.  Skin is looking much better better maintained between visits.  Pt is pleased with Alastin skin polish.  Pt inquired & Discussed collagen supplementation for prevention.
Purelift (Optional): no
Exfoliation Type: microdermabrasion
Treatment Type Override: custom

## 2024-10-24 ENCOUNTER — APPOINTMENT (RX ONLY)
Dept: URBAN - METROPOLITAN AREA CLINIC 36 | Facility: CLINIC | Age: 74
Setting detail: DERMATOLOGY
End: 2024-10-24

## 2024-10-24 DIAGNOSIS — Z41.9 ENCOUNTER FOR PROCEDURE FOR PURPOSES OTHER THAN REMEDYING HEALTH STATE, UNSPECIFIED: ICD-10-CM

## 2024-10-24 PROCEDURE — ? COSMETIC FOLLOW-UP

## 2024-10-24 PROCEDURE — ? COSMETIC CONSULTATION: ELLACOR MICRO-CORING

## 2024-10-24 ASSESSMENT — LOCATION ZONE DERM
LOCATION ZONE: FACE
LOCATION ZONE: LIP

## 2024-10-24 ASSESSMENT — LOCATION SIMPLE DESCRIPTION DERM
LOCATION SIMPLE: LEFT CHEEK
LOCATION SIMPLE: RIGHT LIP

## 2024-10-24 ASSESSMENT — LOCATION DETAILED DESCRIPTION DERM
LOCATION DETAILED: RIGHT LOWER CUTANEOUS LIP
LOCATION DETAILED: LEFT INFERIOR CENTRAL MALAR CHEEK

## 2024-10-24 NOTE — PROCEDURE: COSMETIC CONSULTATION: ELLACOR MICRO-CORING
Patient Specific Counseling (Will Not Stick From Patient To Patient): [facility] brochure and price of treatment given to patient.
Detail Level: Zone

## 2024-10-24 NOTE — PROCEDURE: COSMETIC FOLLOW-UP
Treatment (Optional): Laser Resurfacing
Side Effects Or Complications: None
Detail Level: Zone
Treatment Override (Free Text): Sciton TRL and ProFractional in 04/2024

## 2024-11-04 ENCOUNTER — HOSPITAL ENCOUNTER (OUTPATIENT)
Dept: LAB | Facility: MEDICAL CENTER | Age: 74
End: 2024-11-04
Attending: INTERNAL MEDICINE
Payer: MEDICARE

## 2024-11-04 DIAGNOSIS — R73.01 ELEVATED FASTING BLOOD SUGAR: ICD-10-CM

## 2024-11-04 DIAGNOSIS — M43.22 CERVICAL VERTEBRAL FUSION: ICD-10-CM

## 2024-11-04 DIAGNOSIS — E78.5 DYSLIPIDEMIA: ICD-10-CM

## 2024-11-04 LAB
25(OH)D3 SERPL-MCNC: 33 NG/ML (ref 30–100)
ALBUMIN SERPL BCP-MCNC: 4.3 G/DL (ref 3.2–4.9)
ALBUMIN/GLOB SERPL: 1.5 G/DL
ALP SERPL-CCNC: 84 U/L (ref 30–99)
ALT SERPL-CCNC: 16 U/L (ref 2–50)
ANION GAP SERPL CALC-SCNC: 7 MMOL/L (ref 7–16)
AST SERPL-CCNC: 21 U/L (ref 12–45)
BASOPHILS # BLD AUTO: 0.7 % (ref 0–1.8)
BASOPHILS # BLD: 0.04 K/UL (ref 0–0.12)
BILIRUB SERPL-MCNC: 0.6 MG/DL (ref 0.1–1.5)
BUN SERPL-MCNC: 14 MG/DL (ref 8–22)
CALCIUM ALBUM COR SERPL-MCNC: 9.2 MG/DL (ref 8.5–10.5)
CALCIUM SERPL-MCNC: 9.4 MG/DL (ref 8.4–10.2)
CHLORIDE SERPL-SCNC: 108 MMOL/L (ref 96–112)
CHOLEST SERPL-MCNC: 175 MG/DL (ref 100–199)
CO2 SERPL-SCNC: 29 MMOL/L (ref 20–33)
CREAT SERPL-MCNC: 0.68 MG/DL (ref 0.5–1.4)
CRP SERPL HS-MCNC: <0.3 MG/DL (ref 0–0.75)
EOSINOPHIL # BLD AUTO: 0.14 K/UL (ref 0–0.51)
EOSINOPHIL NFR BLD: 2.3 % (ref 0–6.9)
ERYTHROCYTE [DISTWIDTH] IN BLOOD BY AUTOMATED COUNT: 42 FL (ref 35.9–50)
ERYTHROCYTE [SEDIMENTATION RATE] IN BLOOD BY WESTERGREN METHOD: 6 MM/HOUR (ref 0–25)
EST. AVERAGE GLUCOSE BLD GHB EST-MCNC: 111 MG/DL
FASTING STATUS PATIENT QL REPORTED: NORMAL
GFR SERPLBLD CREATININE-BSD FMLA CKD-EPI: 90 ML/MIN/1.73 M 2
GLOBULIN SER CALC-MCNC: 2.8 G/DL (ref 1.9–3.5)
GLUCOSE SERPL-MCNC: 96 MG/DL (ref 65–99)
HBA1C MFR BLD: 5.5 % (ref 4–5.6)
HCT VFR BLD AUTO: 45.8 % (ref 37–47)
HDLC SERPL-MCNC: 54 MG/DL
HGB BLD-MCNC: 15.2 G/DL (ref 12–16)
IMM GRANULOCYTES # BLD AUTO: 0.03 K/UL (ref 0–0.11)
IMM GRANULOCYTES NFR BLD AUTO: 0.5 % (ref 0–0.9)
LDLC SERPL CALC-MCNC: 101 MG/DL
LYMPHOCYTES # BLD AUTO: 2.35 K/UL (ref 1–4.8)
LYMPHOCYTES NFR BLD: 39 % (ref 22–41)
MCH RBC QN AUTO: 30.9 PG (ref 27–33)
MCHC RBC AUTO-ENTMCNC: 33.2 G/DL (ref 32.2–35.5)
MCV RBC AUTO: 93.1 FL (ref 81.4–97.8)
MONOCYTES # BLD AUTO: 0.38 K/UL (ref 0–0.85)
MONOCYTES NFR BLD AUTO: 6.3 % (ref 0–13.4)
NEUTROPHILS # BLD AUTO: 3.09 K/UL (ref 1.82–7.42)
NEUTROPHILS NFR BLD: 51.2 % (ref 44–72)
NRBC # BLD AUTO: 0 K/UL
NRBC BLD-RTO: 0 /100 WBC (ref 0–0.2)
PLATELET # BLD AUTO: 197 K/UL (ref 164–446)
PMV BLD AUTO: 10 FL (ref 9–12.9)
POTASSIUM SERPL-SCNC: 4.6 MMOL/L (ref 3.6–5.5)
PROT SERPL-MCNC: 7.1 G/DL (ref 6–8.2)
RBC # BLD AUTO: 4.92 M/UL (ref 4.2–5.4)
SODIUM SERPL-SCNC: 144 MMOL/L (ref 135–145)
T4 FREE SERPL-MCNC: 1.46 NG/DL (ref 0.93–1.7)
TRIGL SERPL-MCNC: 101 MG/DL (ref 0–149)
TSH SERPL DL<=0.005 MIU/L-ACNC: 0.89 UIU/ML (ref 0.38–5.33)
VIT B12 SERPL-MCNC: 715 PG/ML (ref 211–911)
WBC # BLD AUTO: 6 K/UL (ref 4.8–10.8)

## 2024-11-04 PROCEDURE — 85025 COMPLETE CBC W/AUTO DIFF WBC: CPT

## 2024-11-04 PROCEDURE — 86140 C-REACTIVE PROTEIN: CPT

## 2024-11-04 PROCEDURE — 85652 RBC SED RATE AUTOMATED: CPT

## 2024-11-04 PROCEDURE — 83036 HEMOGLOBIN GLYCOSYLATED A1C: CPT

## 2024-11-04 PROCEDURE — 36415 COLL VENOUS BLD VENIPUNCTURE: CPT

## 2024-11-04 PROCEDURE — 80061 LIPID PANEL: CPT

## 2024-11-04 PROCEDURE — 84439 ASSAY OF FREE THYROXINE: CPT

## 2024-11-04 PROCEDURE — 82607 VITAMIN B-12: CPT

## 2024-11-04 PROCEDURE — 82306 VITAMIN D 25 HYDROXY: CPT

## 2024-11-04 PROCEDURE — 80053 COMPREHEN METABOLIC PANEL: CPT

## 2024-11-04 PROCEDURE — 84443 ASSAY THYROID STIM HORMONE: CPT

## 2024-11-06 ENCOUNTER — APPOINTMENT (RX ONLY)
Dept: URBAN - METROPOLITAN AREA CLINIC 36 | Facility: CLINIC | Age: 74
Setting detail: DERMATOLOGY
End: 2024-11-06

## 2024-11-06 ENCOUNTER — OFFICE VISIT (OUTPATIENT)
Dept: MEDICAL GROUP | Facility: PHYSICIAN GROUP | Age: 74
End: 2024-11-06
Payer: MEDICARE

## 2024-11-06 VITALS
RESPIRATION RATE: 12 BRPM | TEMPERATURE: 97.3 F | SYSTOLIC BLOOD PRESSURE: 112 MMHG | WEIGHT: 152.3 LBS | DIASTOLIC BLOOD PRESSURE: 60 MMHG | HEART RATE: 61 BPM | OXYGEN SATURATION: 95 % | BODY MASS INDEX: 23.9 KG/M2 | HEIGHT: 67 IN

## 2024-11-06 DIAGNOSIS — E03.9 ACQUIRED HYPOTHYROIDISM: ICD-10-CM

## 2024-11-06 DIAGNOSIS — Z41.9 ENCOUNTER FOR PROCEDURE FOR PURPOSES OTHER THAN REMEDYING HEALTH STATE, UNSPECIFIED: ICD-10-CM

## 2024-11-06 DIAGNOSIS — G89.29 CHRONIC BILATERAL LOW BACK PAIN WITH LEFT-SIDED SCIATICA: ICD-10-CM

## 2024-11-06 DIAGNOSIS — R26.81 GAIT INSTABILITY: ICD-10-CM

## 2024-11-06 DIAGNOSIS — M54.42 CHRONIC BILATERAL LOW BACK PAIN WITH LEFT-SIDED SCIATICA: ICD-10-CM

## 2024-11-06 DIAGNOSIS — R13.10 DYSPHAGIA, UNSPECIFIED TYPE: ICD-10-CM

## 2024-11-06 DIAGNOSIS — Z79.891 CHRONIC USE OF OPIATE FOR THERAPEUTIC PURPOSE: ICD-10-CM

## 2024-11-06 DIAGNOSIS — R49.9 CHANGE IN VOICE: ICD-10-CM

## 2024-11-06 PROCEDURE — ? CHEMICAL PEEL

## 2024-11-06 PROCEDURE — 3074F SYST BP LT 130 MM HG: CPT | Performed by: INTERNAL MEDICINE

## 2024-11-06 PROCEDURE — 99214 OFFICE O/P EST MOD 30 MIN: CPT | Performed by: INTERNAL MEDICINE

## 2024-11-06 PROCEDURE — 3078F DIAST BP <80 MM HG: CPT | Performed by: INTERNAL MEDICINE

## 2024-11-06 RX ORDER — TRAMADOL HYDROCHLORIDE 50 MG/1
50 TABLET ORAL EVERY 8 HOURS PRN
Qty: 90 TABLET | Refills: 0 | Status: SHIPPED | OUTPATIENT
Start: 2024-12-17 | End: 2025-01-16

## 2024-11-06 RX ORDER — TRAMADOL HYDROCHLORIDE 50 MG/1
50 TABLET ORAL EVERY 8 HOURS PRN
Qty: 90 TABLET | Refills: 0 | Status: SHIPPED | OUTPATIENT
Start: 2025-01-16 | End: 2025-02-15

## 2024-11-06 RX ORDER — TRAMADOL HYDROCHLORIDE 50 MG/1
50 TABLET ORAL EVERY 8 HOURS PRN
Qty: 90 TABLET | Refills: 0 | Status: SHIPPED | OUTPATIENT
Start: 2024-11-17 | End: 2024-12-17

## 2024-11-06 RX ORDER — LEVOTHYROXINE SODIUM 88 UG/1
88 TABLET ORAL
Qty: 100 TABLET | Refills: 3 | Status: SHIPPED | OUTPATIENT
Start: 2024-11-06

## 2024-11-06 ASSESSMENT — FIBROSIS 4 INDEX: FIB4 SCORE: 2.03

## 2024-11-06 ASSESSMENT — LOCATION DETAILED DESCRIPTION DERM
LOCATION DETAILED: RIGHT MEDIAL FOREHEAD
LOCATION DETAILED: LEFT LATERAL MALAR CHEEK
LOCATION DETAILED: RIGHT MENTAL CREASE
LOCATION DETAILED: RIGHT LATERAL MALAR CHEEK
LOCATION DETAILED: NASAL SUPRATIP

## 2024-11-06 ASSESSMENT — LOCATION SIMPLE DESCRIPTION DERM
LOCATION SIMPLE: NOSE
LOCATION SIMPLE: RIGHT FOREHEAD
LOCATION SIMPLE: CHIN
LOCATION SIMPLE: LEFT CHEEK
LOCATION SIMPLE: RIGHT CHEEK

## 2024-11-06 ASSESSMENT — LOCATION ZONE DERM
LOCATION ZONE: FACE
LOCATION ZONE: NOSE

## 2024-11-06 NOTE — PROCEDURE: CHEMICAL PEEL
Treatment Time (Optional): 3 mins@
Number Of Layers: 2
Prep: The treated area was degreased with pre-peel cleanser, smoothing toner, Vaseline was applied to protect mucous membranes.
Comments: Added facial ($100)\\nTolerated well.  Is happy with textural improvement
Consent: Prior to the procedure, written consent was obtained and risks were reviewed, including but not limited to: redness, peeling, blistering, pigmentary change, scarring, infection, and pain.
Detail Level: Zone
Chemical Peel: PCA Peel with Hydroquinone and Resourcinol
Post-Care Instructions: I reviewed with the patient in detail post-care instructions. Patient should avoid sun exposure and wear sun protection.
Price (Use Numbers Only, No Special Characters Or $): 885
Post Peel Care: After the procedure Sun protection and post-care instructions were reviewed with the patient.

## 2024-11-07 NOTE — PROGRESS NOTES
Subjective:   Chief Complaint/History of Present Illness:  Carlee Hunt is a 74 y.o. female established patient who presents today to discuss medical problems as listed below. Carlee is unaccompanied for today's visit.    History of Present Illness  The patient presents for evaluation of multiple medical concerns.    She has been referred to a gastroenterologist due to difficulty in swallowing, which is believed to be a result of a surgical wound creating a scar and subsequent esophageal diverticulum. Local GI group cannot manage her case due to complexity and recommended tertiary academic referral, given to an out of network location.  She has discontinued the use of certain vitamins because their size causes them to get stuck. She has previously worked with a speech-language pathologist following her cervical spine surgery and would be open to this again in the future.    She is currently on a regimen of levothyroxine 100 mcg for thyroid management, denies symptoms of over or under treatment. Weight is down 10 lb in the past 6 months.    She is experiencing balance issues, which have been confirmed not to be related to an inner ear problem. She is curious if the COVID-19 vaccine could be a contributing factor.    She is taking tramadol 50 mg three tablets daily for maintenance of chronic low back pain and joint pain which continues to be effective. She had a blood test on Monday and would like to review the results.    SOCIAL HISTORY  She quit drinking wine.         Current Medications:  Current Outpatient Medications Ordered in Epic   Medication Sig Dispense Refill    [START ON 11/17/2024] traMADol (ULTRAM) 50 MG Tab Take 1 Tablet by mouth every 8 hours as needed for Moderate Pain for up to 30 days. 90 Tablet 0    levothyroxine (SYNTHROID) 88 MCG Tab Take 1 Tablet by mouth every morning on an empty stomach. 100 Tablet 3    [START ON 12/17/2024] traMADol (ULTRAM) 50 MG Tab Take 1 Tablet by mouth every 8 hours as  "needed for Moderate Pain for up to 30 days. 90 Tablet 0    [START ON 1/16/2025] traMADol (ULTRAM) 50 MG Tab Take 1 Tablet by mouth every 8 hours as needed for Moderate Pain for up to 30 days. 90 Tablet 0    traMADol (ULTRAM) 50 MG Tab Take 1 Tablet by mouth every 8 hours for 30 days. 90 Tablet 0    metoprolol SR (TOPROL XL) 25 MG TABLET SR 24 HR Take 1 Tablet by mouth every day. 100 Tablet 3    citalopram (CELEXA) 10 MG tablet Take 1 Tablet by mouth every day. 100 Tablet 3    Azelaic Acid 15 % Gel APPLY ONE APPLPICATION TOPICALLY TWO TIMES A DAY 50 g 11    Tretinoin 0.05 % Gel Apply 1 Application topically at bedtime. 1 Each 3    estradiol (ESTRACE) 0.1 MG/GM vaginal cream Insert 0.4 g into the vagina every 72 hours. 42.5 g 3    COMBIGAN 0.2-0.5 % Solution       Lutein 20 MG Tab Take  by mouth.      L-Theanine 200 MG Cap Take  by mouth.      Coenzyme Q10 (COQ10 PO) Take 100 mg by mouth every day. Qunol liquid    every other day      magnesium oxide (MAG-OX) 400 MG Tab Take 400 mg by mouth every evening.      Calcium 500 MG Chew Tab Take 1 Tab by mouth every evening.      TURMERIC PO Take 333 mg by mouth every day. Daily (Qunol liquid)      Probiotic Product (PRO-BIOTIC BLEND) Cap Take 1 Capsule by mouth every evening. Women's Probiotic      latanoprost (XALATAN) 0.005 % Solution Place 1 Drop in both eyes every evening.      KRILL OIL PO Take 500 mg by mouth every day.      Multiple Vitamins-Minerals (CENTRUM PO) Take 1 Tab by mouth every day.      clindamycin (CLEOCIN) 1 % Solution        No current Epic-ordered facility-administered medications on file.          Objective:   Physical Exam:    Vitals: /60 (BP Location: Left arm, Patient Position: Sitting, BP Cuff Size: Adult)   Pulse 61   Temp 36.3 °C (97.3 °F) (Temporal)   Resp 12   Ht 1.702 m (5' 7\")   Wt 69.1 kg (152 lb 4.8 oz)   SpO2 95%    BMI: Body mass index is 23.85 kg/m².  Physical Exam  Constitutional:       General: She is not in acute " distress.     Appearance: Normal appearance. She is not ill-appearing.   HENT:      Right Ear: External ear normal.      Left Ear: External ear normal.   Eyes:      General: No scleral icterus.     Conjunctiva/sclera: Conjunctivae normal.   Neck:      Comments: Neck immobility due to prior cervical spine surgery  Cardiovascular:      Rate and Rhythm: Normal rate and regular rhythm.      Pulses: Normal pulses.   Pulmonary:      Effort: Pulmonary effort is normal. No respiratory distress.      Breath sounds: No wheezing.   Abdominal:      General: Bowel sounds are normal.      Palpations: Abdomen is soft.   Musculoskeletal:         General: Tenderness present.      Right lower leg: No edema.      Left lower leg: No edema.      Comments: Low back pain with tenderness   Skin:     General: Skin is warm and dry.      Findings: No rash.   Psychiatric:         Mood and Affect: Mood normal.         Behavior: Behavior normal.         Thought Content: Thought content normal.         Judgment: Judgment normal.           Results  Laboratory Studies  Blood counts, bone marrow, immune cells, white cells, platelets are normal. Inflammatory markers are low. Sed rate is 6. CRP is less than 0.3. Vitamin B12 and D levels are normal. Total cholesterol decreased by 34 points, LDL decreased by 18 points. TSH decreased from 2.3 to 0.8, free T4 increased.    Assessment & Plan  1. Dysphagia and change in voice.  2. Esophageal diverticulum   A video swallow study has been ordered. A referral to a speech language pathologist will be made after updated video swallow is completed, due around Jan 2025. She has been advised to contact if there are any significant changes in her swallowing. She has been advised to avoid dry foods and to tuck her chin while swallowing to prevent aspiration. Trialed PPI but caused diarrhea, no symptoms of GERD so monitor off treatment, could trial H2 blocker in the future if needed.    3. Hypothyroidism, approaching  "supra-therapeutic levels.  Her levothyroxine dosage has been reduced to 88 mcg from 100 mcg due to low TSH and rising FT4 with recent 10 lb weight loss. TSH and free T4 levels will be checked prior to the next visit. Denies current symptoms of over-treatment such as shakiness, racing heart, anxiety, and frequent bowel movements, she will monitor now for under-treatment with dose reduction.    4. Balance issues.  She reports worsening balance issues. No inner ear problems were identified during ENT evaluation. She has been advised that balance issues may be related to aging and medication side effects, she will continue to monitor at this time.    5. Chronic bilateral lower back pain  6. Chronic use of opiate for therapeutic purpose  Tramadol 50 mg 1 tablet three times daily as needed has been renewed. She has been advised to use Advil as needed for additional pain relief as well as acetaminophen and topical modalities. Pain remains stable and medicine is effective to keep her independent with her ADL's and maintain a good quality of life.    Obtained and reviewed patient utilization report from [facility] pharmacy database on 11/6/2024 7:26 PM  prior to writing prescription for controlled substance II, III or IV per [place] bill . Based on assessment of the report, the prescription is medically necessary.     Patient understands this prescription is a controlled substance which is potentially habit-forming and its use is regulated by the [place]. We also discussed the new \"black box\" warning regarding the lethal combination of opioids and benzodiazepines. Refills are subject to terms of a controlled substance agreement and patient has an updated one on file. Most recent UDS is appropriate. Any refill requires an office visit. Narcotics have may adverse effects and the risks of addiction, accidental overdose and death were emphasized. Provided prescriptions for the next three months.      Follow-up  Patient is " scheduled for a follow-up visit in the first week of February 2025.      Assessment and Plan:   Carlee is a 74 y.o. female with the following:  Problem List Items Addressed This Visit       Acquired hypothyroidism    Relevant Medications    levothyroxine (SYNTHROID) 88 MCG Tab    Other Relevant Orders    TSH    FREE THYROXINE    Change in voice, prior laryngeal surgery    Relevant Orders    DX-ESOPHAGUS - [id number]    Chronic bilateral low back pain with left-sided sciatica    Relevant Medications    traMADol (ULTRAM) 50 MG Tab (Start on 11/17/2024)    traMADol (ULTRAM) 50 MG Tab (Start on 12/17/2024)    traMADol (ULTRAM) 50 MG Tab (Start on 1/16/2025)    Other Relevant Orders    Pain Management Screen    Chronic use of opiate for therapeutic purpose    Relevant Medications    traMADol (ULTRAM) 50 MG Tab (Start on 11/17/2024)    traMADol (ULTRAM) 50 MG Tab (Start on 12/17/2024)    traMADol (ULTRAM) 50 MG Tab (Start on 1/16/2025)    Other Relevant Orders    Pain Management Screen    Gait instability     Other Visit Diagnoses       Dysphagia, unspecified type        Relevant Orders    DX-ESOPHAGUS - [id number]               RTC: Return in about 3 months (around 2/6/2025).    I spent a total of 34 minutes with record review, exam, communication with the patient, communication with other providers, and documentation of this encounter.    Verbal consent was acquired by the patient to use [employer] ambient listening note generation during this visit Yes       PLEASE NOTE: This dictation was created using voice recognition software. I have made every reasonable attempt to correct obvious errors, but I expect that there are errors of grammar and possibly content that I did not discover before finalizing the note.      Shari Dent, DO  Geriatric and Internal Medicine  [facility]

## 2024-12-04 ENCOUNTER — APPOINTMENT (OUTPATIENT)
Dept: URBAN - METROPOLITAN AREA CLINIC 36 | Facility: CLINIC | Age: 74
Setting detail: DERMATOLOGY
End: 2024-12-04

## 2024-12-04 DIAGNOSIS — Z41.9 ENCOUNTER FOR PROCEDURE FOR PURPOSES OTHER THAN REMEDYING HEALTH STATE, UNSPECIFIED: ICD-10-CM

## 2024-12-04 PROCEDURE — ? HYDRAFACIAL

## 2024-12-04 ASSESSMENT — LOCATION SIMPLE DESCRIPTION DERM
LOCATION SIMPLE: UPPER LIP
LOCATION SIMPLE: RIGHT CHEEK
LOCATION SIMPLE: LEFT CHEEK
LOCATION SIMPLE: CHIN
LOCATION SIMPLE: NOSE
LOCATION SIMPLE: RIGHT FOREHEAD

## 2024-12-04 ASSESSMENT — LOCATION DETAILED DESCRIPTION DERM
LOCATION DETAILED: RIGHT CENTRAL MALAR CHEEK
LOCATION DETAILED: NASAL SUPRATIP
LOCATION DETAILED: PHILTRUM
LOCATION DETAILED: RIGHT INFERIOR MEDIAL FOREHEAD
LOCATION DETAILED: RIGHT CHIN
LOCATION DETAILED: LEFT CENTRAL MALAR CHEEK

## 2024-12-04 ASSESSMENT — LOCATION ZONE DERM
LOCATION ZONE: NOSE
LOCATION ZONE: FACE
LOCATION ZONE: LIP

## 2024-12-04 NOTE — PROCEDURE: HYDRAFACIAL
Vacuum Pressure High Setting (Will Not Render If Set To 0): 16
Solution: Activ-4
Solution: Beta-HD
Vacuum Pressure Low Setting (Will Not Render If Set To 0): 13
Consent: Written consent obtained, risks reviewed including but not limited to crusting, scabbing, blistering, scarring, darker or lighter pigmentary change, bruising, and/or incomplete response.
Solution: Antiox-6
Number Of Passes Step 1: 2
Vacuum Pressure Low Setting (Will Not Render If Set To 0): 14
Solution Override
Post-Care Instructions: I reviewed with the patient in detail post-care instructions. Patient should stay away from the sun and wear sun protection until treated areas are fully healed.
Tip: Hydropeel Tip, Teal
Vacuum Pressure High Setting (Will Not Render If Set To 0): 22
Price (Use Numbers Only, No Special Characters Or $): 726
Number Of Passes Step 4: 1
Procedure: Peel
Tip: Hydropeel Tip, Orange Aggression
Indication: dehydrated skin
Tip: Hydropeel Tip, Clear
Procedure: Exfoliation
Procedure: Extraction
Procedure: Fusion
Solution: GlySal 15%
Location: face
Procedure: Boost

## 2024-12-24 ENCOUNTER — PHARMACY VISIT (OUTPATIENT)
Dept: PHARMACY | Facility: MEDICAL CENTER | Age: 74
End: 2024-12-24
Payer: COMMERCIAL

## 2024-12-24 PROCEDURE — RXMED WILLOW AMBULATORY MEDICATION CHARGE

## 2025-01-16 ENCOUNTER — APPOINTMENT (OUTPATIENT)
Dept: RADIOLOGY | Facility: MEDICAL CENTER | Age: 75
End: 2025-01-16
Attending: INTERNAL MEDICINE
Payer: MEDICARE

## 2025-02-10 ENCOUNTER — HOSPITAL ENCOUNTER (OUTPATIENT)
Facility: MEDICAL CENTER | Age: 75
End: 2025-02-10
Attending: INTERNAL MEDICINE
Payer: MEDICARE

## 2025-02-10 DIAGNOSIS — E03.9 ACQUIRED HYPOTHYROIDISM: ICD-10-CM

## 2025-02-10 LAB — TSH SERPL-ACNC: 2.04 UIU/ML (ref 0.35–5.5)

## 2025-02-10 PROCEDURE — 84443 ASSAY THYROID STIM HORMONE: CPT

## 2025-02-10 PROCEDURE — 36415 COLL VENOUS BLD VENIPUNCTURE: CPT

## 2025-02-14 ENCOUNTER — HOSPITAL ENCOUNTER (OUTPATIENT)
Facility: MEDICAL CENTER | Age: 75
End: 2025-02-14
Attending: INTERNAL MEDICINE
Payer: MEDICARE

## 2025-02-14 ENCOUNTER — OFFICE VISIT (OUTPATIENT)
Dept: MEDICAL GROUP | Facility: PHYSICIAN GROUP | Age: 75
End: 2025-02-14
Payer: MEDICARE

## 2025-02-14 VITALS
RESPIRATION RATE: 16 BRPM | SYSTOLIC BLOOD PRESSURE: 100 MMHG | HEART RATE: 73 BPM | BODY MASS INDEX: 22.57 KG/M2 | WEIGHT: 143.8 LBS | OXYGEN SATURATION: 95 % | HEIGHT: 67 IN | TEMPERATURE: 97.8 F | DIASTOLIC BLOOD PRESSURE: 60 MMHG

## 2025-02-14 DIAGNOSIS — M54.2 CHRONIC NECK PAIN: ICD-10-CM

## 2025-02-14 DIAGNOSIS — M54.42 CHRONIC BILATERAL LOW BACK PAIN WITH LEFT-SIDED SCIATICA: ICD-10-CM

## 2025-02-14 DIAGNOSIS — R26.81 GAIT INSTABILITY: ICD-10-CM

## 2025-02-14 DIAGNOSIS — Q39.6 ESOPHAGEAL DIVERTICULUM: ICD-10-CM

## 2025-02-14 DIAGNOSIS — R63.4 UNINTENTIONAL WEIGHT LOSS: ICD-10-CM

## 2025-02-14 DIAGNOSIS — G89.29 CHRONIC BILATERAL LOW BACK PAIN WITH LEFT-SIDED SCIATICA: ICD-10-CM

## 2025-02-14 DIAGNOSIS — F33.42 RECURRENT MAJOR DEPRESSIVE DISORDER, IN FULL REMISSION (HCC): ICD-10-CM

## 2025-02-14 DIAGNOSIS — Z79.891 CHRONIC USE OF OPIATE FOR THERAPEUTIC PURPOSE: ICD-10-CM

## 2025-02-14 DIAGNOSIS — Z91.81 RISK FOR FALLS: ICD-10-CM

## 2025-02-14 DIAGNOSIS — G89.29 CHRONIC NECK PAIN: ICD-10-CM

## 2025-02-14 DIAGNOSIS — M43.22 CERVICAL VERTEBRAL FUSION: ICD-10-CM

## 2025-02-14 LAB — AMBIGUOUS DTTM AMBI4: NORMAL

## 2025-02-14 PROCEDURE — 99214 OFFICE O/P EST MOD 30 MIN: CPT | Performed by: INTERNAL MEDICINE

## 2025-02-14 PROCEDURE — 3078F DIAST BP <80 MM HG: CPT | Performed by: INTERNAL MEDICINE

## 2025-02-14 PROCEDURE — G2211 COMPLEX E/M VISIT ADD ON: HCPCS | Performed by: INTERNAL MEDICINE

## 2025-02-14 PROCEDURE — G0482 DRUG TEST DEF 15-21 CLASSES: HCPCS

## 2025-02-14 PROCEDURE — 3074F SYST BP LT 130 MM HG: CPT | Performed by: INTERNAL MEDICINE

## 2025-02-14 RX ORDER — TRAMADOL HYDROCHLORIDE 50 MG/1
50 TABLET ORAL EVERY 8 HOURS PRN
Qty: 90 TABLET | Refills: 0 | Status: SHIPPED | OUTPATIENT
Start: 2025-02-15 | End: 2025-03-17

## 2025-02-14 RX ORDER — TRAMADOL HYDROCHLORIDE 50 MG/1
50 TABLET ORAL EVERY 8 HOURS PRN
Qty: 90 TABLET | Refills: 0 | Status: SHIPPED | OUTPATIENT
Start: 2025-03-17 | End: 2025-04-16

## 2025-02-14 RX ORDER — TRAMADOL HYDROCHLORIDE 50 MG/1
50 TABLET ORAL EVERY 8 HOURS PRN
Qty: 90 TABLET | Refills: 0 | Status: SHIPPED | OUTPATIENT
Start: 2025-04-16 | End: 2025-05-16

## 2025-02-14 ASSESSMENT — FIBROSIS 4 INDEX: FIB4 SCORE: 2.05

## 2025-02-14 ASSESSMENT — PATIENT HEALTH QUESTIONNAIRE - PHQ9: CLINICAL INTERPRETATION OF PHQ2 SCORE: 0

## 2025-02-14 NOTE — LETTER
[employer]  Shari Dent D.O.  [address]  [place] [address]  Fax: [phone]   Authorization for Release/Disclosure of   Protected Health Information   Name: CARLEE ORR : 1950 SSN: [id number]   Address: [facility] [place] [address] Phone:    There are no phone numbers on file.   I authorize the entity listed below to release/disclose the PHI below to:   [employer]/Shari Dent D.O. and Shari Dent D.O.   Provider or Entity Name:  [facility]- Lumbar MRI   Address   City, State, [facility]   Phone:      Fax:     Reason for request: continuity of care   Information to be released: Lumbar MRI   [  ] LAST COLONOSCOPY,  including any PATH REPORT and follow-up  [  ] LAST FIT/COLOGUARD RESULT [  ] LAST DEXA  [  ] LAST MAMMOGRAM  [  ] LAST PAP  [  ] LAST LABS [  ] RETINA EXAM REPORT  [  ] IMMUNIZATION RECORDS  [ x ] Release all info      [ x ] Check here and initial the line next to each item to release ALL health information INCLUDING  _____ Care and treatment for drug and / or alcohol abuse  _____ HIV testing, infection status, or AIDS  _____ Genetic Testing    DATES OF SERVICE OR TIME PERIOD TO BE DISCLOSED: _-____  I understand and acknowledge that:  * This Authorization may be revoked at any time by you in writing, except if your health information has already been used or disclosed.  * Your health information that will be used or disclosed as a result of you signing this authorization could be re-disclosed by the recipient. If this occurs, your re-disclosed health information may no longer be protected by State or Federal laws.  * You may refuse to sign this Authorization. Your refusal will not affect your ability to obtain treatment.  * This Authorization becomes effective upon signing and will  on (date) __________.      If no date is indicated, this Authorization will  one (1) year from the signature date.    Name: Carlee  Shorty Hunt  Signature: Date:   2/14/2025     PLEASE FAX REQUESTED RECORDS BACK TO: [phone]

## 2025-02-14 NOTE — LETTER
[employer]  Shari Dent D.O.  [address]  [place] [address]  Fax: [phone]   Authorization for Release/Disclosure of   Protected Health Information   Name: CARLEE ORR : 1950 SSN: [id number]   Address: [facility] [place] [address] Phone:    There are no phone numbers on file.   I authorize the entity listed below to release/disclose the PHI below to:   [employer]/Shari Dent D.O. and Shari Dent D.O.   Provider or Entity Name:  [facility]- Dr. Sepulveda    Address   City, [facility]   Phone:      Fax:     Reason for request: continuity of care   Information to be released:    [  ] LAST COLONOSCOPY,  including any PATH REPORT and follow-up  [  ] LAST FIT/COLOGUARD RESULT [  ] LAST DEXA  [  ] LAST MAMMOGRAM  [  ] LAST PAP  [  ] LAST LABS [  ] RETINA EXAM REPORT  [  ] IMMUNIZATION RECORDS  [ x ] Release all info      [ x ] Check here and initial the line next to each item to release ALL health information INCLUDING  _____ Care and treatment for drug and / or alcohol abuse  _____ HIV testing, infection status, or AIDS  _____ Genetic Testing    DATES OF SERVICE OR TIME PERIOD TO BE DISCLOSED: _________  I understand and acknowledge that:  * This Authorization may be revoked at any time by you in writing, except if your health information has already been used or disclosed.  * Your health information that will be used or disclosed as a result of you signing this authorization could be re-disclosed by the recipient. If this occurs, your re-disclosed health information may no longer be protected by State or Federal laws.  * You may refuse to sign this Authorization. Your refusal will not affect your ability to obtain treatment.  * This Authorization becomes effective upon signing and will  on (date) __________.      If no date is indicated, this Authorization will  one (1) year from the signature date.    Name: Carlee Oro  Gilbert  Signature: Date:   2/14/2025     PLEASE FAX REQUESTED RECORDS BACK TO: [phone]

## 2025-02-15 NOTE — PROGRESS NOTES
Subjective:   Chief Complaint/History of Present Illness:  Carlee Hunt is a 75 y.o. female established patient who presents today to discuss medical problems as listed below. Carlee is unaccompanied for today's visit.    History of Present Illness  The patient is a 75-year-old female who presents for evaluation of weight loss, balance issues, hip pain, esophageal diverticulum, and medication management.    She has experienced a gradual weight loss of 20 pounds since the fall, which she attributes to dietary changes, including the cessation of alcohol consumption and reduced portion sizes. She reports an overall improvement in her well-being. She maintains a balanced diet, inclusive of soy products, and reports no abnormalities in bowel movements.    She has been experiencing balance issues, leading to two falls this year. The first incident involved a fall in a parking lot, resulting in a pulled piriformis muscle, which was successfully treated by her physical therapist, Jaylyn. The second incident occurred while she was storing away [employer] decorations, where she tripped over boxes, fell backwards, landed on her buttocks, and hit her head. She suspected a fracture in her coccyx or sacrum and sought medical attention at [facility]. An x-ray was performed, and she was advised to undergo an MRI of her lower back. She consulted with Dr. Sepulveda, who reviewed the MRI results and diagnosed a hairline fracture of the sacrum on the right side.    She also reported hip discomfort and expressed interest in another cortisone injection. Dr. Sepulveda recommended a bone density test before proceeding with any further treatment. He also suggested an MRI of her hips. She is currently on tramadol for pain management.    She has been referred to the [facility] for a swallow test, which was initially scheduled a few weeks ago but had to be postponed due to mobility issues. The test is now rescheduled for 03/27/2025.  She is awaiting the results before considering further action. She has an upcoming appointment with [facility] next week to discuss a potential colonoscopy.    SOCIAL HISTORY  The patient quit drinking alcohol.    MEDICATIONS  Current: Tramadol       Current Medications:  Current Outpatient Medications Ordered in Epic   Medication Sig Dispense Refill    [START ON 2/15/2025] traMADol (ULTRAM) 50 MG Tab Take 1 Tablet by mouth every 8 hours as needed for Moderate Pain for up to 30 days. 90 Tablet 0    [START ON 3/17/2025] traMADol (ULTRAM) 50 MG Tab Take 1 Tablet by mouth every 8 hours as needed for Moderate Pain for up to 30 days. 90 Tablet 0    [START ON 4/16/2025] traMADol (ULTRAM) 50 MG Tab Take 1 Tablet by mouth every 8 hours as needed for Moderate Pain for up to 30 days. 90 Tablet 0    levothyroxine (SYNTHROID) 88 MCG Tab Take 1 Tablet by mouth every morning on an empty stomach. 100 Tablet 3    metoprolol SR (TOPROL XL) 25 MG TABLET SR 24 HR Take 1 Tablet by mouth every day. 100 Tablet 3    Azelaic Acid 15 % Gel APPLY ONE APPLPICATION TOPICALLY TWO TIMES A DAY 50 g 11    clindamycin (CLEOCIN) 1 % Solution       Tretinoin 0.05 % Gel Apply 1 Application topically at bedtime. 1 Each 3    estradiol (ESTRACE) 0.1 MG/GM vaginal cream Insert 0.4 g into the vagina every 72 hours. 42.5 g 3    COMBIGAN 0.2-0.5 % Solution       Lutein 20 MG Tab Take  by mouth.      L-Theanine 200 MG Cap Take  by mouth.      Coenzyme Q10 (COQ10 PO) Take 100 mg by mouth every day. Qunol liquid    every other day      magnesium oxide (MAG-OX) 400 MG Tab Take 400 mg by mouth every evening.      Calcium 500 MG Chew Tab Take 1 Tab by mouth every evening.      TURMERIC PO Take 333 mg by mouth every day. Daily (Qunol liquid)      latanoprost (XALATAN) 0.005 % Solution Place 1 Drop in both eyes every evening.      Multiple Vitamins-Minerals (CENTRUM PO) Take 1 Tab by mouth every day.      citalopram (CELEXA) 10 MG tablet Take 1 Tablet by mouth  "every day. 100 Tablet 3     No current Epic-ordered facility-administered medications on file.          Objective:   Physical Exam:    Vitals: /60 (BP Location: Right arm, Patient Position: Sitting, BP Cuff Size: Adult)   Pulse 73   Temp 36.6 °C (97.8 °F) (Temporal)   Resp 16   Ht 1.702 m (5' 7\")   Wt 65.2 kg (143 lb 12.8 oz)   SpO2 95%    BMI: Body mass index is 22.52 kg/m².  Physical Exam  Constitutional:       General: She is not in acute distress.     Appearance: Normal appearance. She is not ill-appearing.   HENT:      Right Ear: Ear canal and external ear normal. There is no impacted cerumen.      Left Ear: Ear canal and external ear normal. There is no impacted cerumen.   Eyes:      General: No scleral icterus.     Conjunctiva/sclera: Conjunctivae normal.   Cardiovascular:      Rate and Rhythm: Normal rate and regular rhythm.      Pulses: Normal pulses.   Pulmonary:      Effort: Pulmonary effort is normal. No respiratory distress.      Breath sounds: No wheezing or rhonchi.   Abdominal:      General: Bowel sounds are normal.      Palpations: Abdomen is soft.   Musculoskeletal:      Cervical back: Rigidity present.      Right lower leg: No edema.      Left lower leg: No edema.   Psychiatric:         Mood and Affect: Mood normal.         Behavior: Behavior normal.         Thought Content: Thought content normal.         Judgment: Judgment normal.         Depression Screening    Little interest or pleasure in doing things?  0 - not at all  Feeling down, depressed , or hopeless? 0 - not at all  Patient Health Questionnaire Score: 0    Results  Laboratory Studies  TSH is normal at 2.    Imaging  MRI shows a hairline fracture of the sacrum on the right side.    Assessment & Plan  1. Weight loss, unintentional.  Her TSH levels are within the normal range, indicating that her thyroid function is not contributing to her weight loss. Her last blood count was satisfactory. Given her overall good health " status, except for some orthopedic issues, there is no immediate concern regarding her weight loss. She is advised to monitor her weight closely and report any significant changes. No B symptoms at this time.    2. Balance issues/gait instability.  She has experienced multiple falls this year, one resulting in a hairline fracture of the sacrum just last month. An MRI of the lumbar spine was completed at [facility], and a bone density test has been recommended in addition to bilateral hip MRI's to further evaluate her condition. She is advised to continue physical therapy with Jaylyn.    3. Hip pain, right side; sacral fracture?  4. Fall  She reported hip pain and has previously received a cortisone shot. An MRI of the hips has been ordered to assess the cause of the pain. She is advised to continue taking tramadol for pain management. Request records from physiatry who she saw this morning.    5. Esophageal diverticulum.  She has a large esophageal diverticulum seen on a video swallow study. She is scheduled for a follow-up swallow test on March 27, 2025. She is advised to keep the appointment and report any changes in symptoms. She will see GI soon, will give her imaging and previous consult records to bring to that upcoming appointment.    6. Recurrent major depressive disorder, in full remission  Chronic and stable, now has her son living with her which has been a good change. Continue current treatment with citalopram 10 mg daily.    7. Chronic bilateral low back pain with sciatica  8. Chronic neck pain, s/p surgery  9. Chronic use of opiate for therapeutic purpose    Chronic and ongoing issue, current analgesia program working well for chronic pain. Recently with a few injuries from falls and established with physiatry with upcoming appointments to obtain imaging and develop a plan of care. No deleterious side effects from tramadol, continue tramadol 50 mg TID.    A prescription for tramadol has been renewed and  "sent to [facility] pharmacy. A urine drug screen has been ordered and will be conducted today.    Obtained and reviewed patient utilization report from [facility] pharmacy database on 2/14/2025 4:24 PM  prior to writing prescription for controlled substance II, III or IV per [place] bill . Based on assessment of the report, the prescription is medically necessary.     Patient understands this prescription is a controlled substance which is potentially habit-forming and its use is regulated by the [employer]. We also discussed the new \"black box\" warning regarding the lethal combination of opioids and benzodiazepines. Refills are subject to terms of a controlled substance agreement and patient has an updated one on file. Most recent UDS is appropriate. Any refill requires an office visit. Narcotics have may adverse effects and the risks of addiction, accidental overdose and death were emphasized. Provided prescriptions for the next three months.      PROCEDURE  The patient received a cortisone injection in the past for hip discomfort.      Assessment and Plan:   Carlee is a 75 y.o. female with the following:  Problem List Items Addressed This Visit       Cervical vertebral fusion    Chronic bilateral low back pain with left-sided sciatica    Relevant Medications    traMADol (ULTRAM) 50 MG Tab (Start on 2/15/2025)    traMADol (ULTRAM) 50 MG Tab (Start on 3/17/2025)    traMADol (ULTRAM) 50 MG Tab (Start on 4/16/2025)    Other Relevant Orders    PAIN MANAGEMENT SCRN, UR    Chronic neck pain    Relevant Medications    traMADol (ULTRAM) 50 MG Tab (Start on 2/15/2025)    traMADol (ULTRAM) 50 MG Tab (Start on 3/17/2025)    traMADol (ULTRAM) 50 MG Tab (Start on 4/16/2025)    Chronic use of opiate for therapeutic purpose    Relevant Medications    traMADol (ULTRAM) 50 MG Tab (Start on 2/15/2025)    traMADol (ULTRAM) 50 MG Tab (Start on 3/17/2025)    traMADol (ULTRAM) 50 MG Tab (Start on 4/16/2025)    Other Relevant Orders    PAIN " MANAGEMENT SCRN, UR    Gait instability    Recurrent major depressive disorder, in full remission (HCC)    Risk for falls    Relevant Orders    Patient identified as fall risk.  Appropriate orders and counseling given.    Unintentional weight loss     Other Visit Diagnoses         Esophageal diverticulum                   RTC: Return in about 3 months (around 5/14/2025) for CSA at next follow up visit.    I spent a total of 32 minutes with record review, exam, communication with the patient, communication with other providers, and documentation of this encounter.    Verbal consent was acquired by the patient to use [employer] ambient listening note generation during this visit Yes     Billing : secondary to the complexity of this patient's illnesses and their interactions.  All problems listed were discussed during the office visit, medications were evaluated and complexities were discussed as well as plan for the future.        PLEASE NOTE: This dictation was created using voice recognition software. I have made every reasonable attempt to correct obvious errors, but I expect that there are errors of grammar and possibly content that I did not discover before finalizing the note.      Shari Dent, DO  Geriatric and Internal Medicine  [facility]

## 2025-02-19 ENCOUNTER — RESULTS FOLLOW-UP (OUTPATIENT)
Dept: MEDICAL GROUP | Facility: PHYSICIAN GROUP | Age: 75
End: 2025-02-19

## 2025-02-19 LAB
1OH-MIDAZOLAM UR QL SCN: NOT DETECTED
6MAM UR QL: NOT DETECTED
7AMINOCLONAZEPAM UR QL: NOT DETECTED
A-OH ALPRAZ UR QL: NOT DETECTED
ALPRAZ UR QL: NOT DETECTED
AMPHET UR QL SCN: NOT DETECTED
ANNOTATION COMMENT IMP: NORMAL
BARBITURATES UR QL: NEGATIVE
BUPRENORPHINE UR QL: NOT DETECTED
BZE UR QL: NEGATIVE
CARBOXYTHC UR QL: NEGATIVE
CARISOPRODOL UR QL: NEGATIVE
CLONAZEPAM UR QL: NOT DETECTED
CODEINE UR QL: NOT DETECTED
CREAT UR-MCNC: 145.3 MG/DL (ref 20–400)
DIAZEPAM UR QL: NOT DETECTED
ETHYL GLUCURONIDE UR QL: NEGATIVE
FENTANYL UR QL: NOT DETECTED
GABAPENTIN UR QL CFM: NOT DETECTED
HYDROCODONE UR QL: NOT DETECTED
HYDROMORPHONE UR QL: NOT DETECTED
LORAZEPAM UR QL: NOT DETECTED
MDA UR QL: NOT DETECTED
MDEA UR QL: NOT DETECTED
MDMA UR QL: NOT DETECTED
ME-PHENIDATE UR QL: NOT DETECTED
METHADONE UR QL: NEGATIVE
METHAMPHET UR QL: NOT DETECTED
MIDAZOLAM UR QL SCN: NOT DETECTED
MORPHINE UR QL: NOT DETECTED
NALOXONE UR QL CFM: NOT DETECTED
NORBUPRENORPHINE UR QL CFM: NOT DETECTED
NORDIAZEPAM UR QL: NOT DETECTED
NORFENTANYL UR QL: NOT DETECTED
NORHYDROCODONE UR QL CFM: NOT DETECTED
NORMEPERIDINE UR QL CFM: NOT DETECTED
NOROXYCODONE UR QL CFM: NOT DETECTED
NOROXYMORPHONE UR QL SCN: NOT DETECTED
OXAZEPAM UR QL: NOT DETECTED
OXYCODONE UR QL: NOT DETECTED
OXYMORPHONE UR QL: NOT DETECTED
PATHOLOGY STUDY: NORMAL
PCP UR QL: NEGATIVE
PHENTERMINE UR QL: NOT DETECTED
PREGABALIN UR QL CFM: NOT DETECTED
SERVICE CMNT-IMP: NORMAL
TAPENTADOL UR QL SCN: NOT DETECTED
TAPENTADOL UR QL SCN: NOT DETECTED
TEMAZEPAM UR QL: NOT DETECTED
TRAMADOL UR QL: NORMAL
ZOLPIDEM PHENYL-4-CARB UR QL SCN: NOT DETECTED
ZOLPIDEM UR QL: NOT DETECTED

## 2025-02-20 ENCOUNTER — HOSPITAL ENCOUNTER (OUTPATIENT)
Dept: RADIOLOGY | Facility: MEDICAL CENTER | Age: 75
End: 2025-02-20
Attending: PHYSICAL MEDICINE & REHABILITATION
Payer: MEDICARE

## 2025-02-20 DIAGNOSIS — M81.0 SENILE OSTEOPOROSIS: ICD-10-CM

## 2025-02-20 PROCEDURE — 77080 DXA BONE DENSITY AXIAL: CPT

## 2025-03-04 ENCOUNTER — HOSPITAL ENCOUNTER (OUTPATIENT)
Dept: RADIOLOGY | Facility: MEDICAL CENTER | Age: 75
End: 2025-03-04
Attending: INTERNAL MEDICINE
Payer: MEDICARE

## 2025-03-04 ENCOUNTER — TELEPHONE (OUTPATIENT)
Dept: HEALTH INFORMATION MANAGEMENT | Facility: OTHER | Age: 75
End: 2025-03-04
Payer: MEDICARE

## 2025-03-04 DIAGNOSIS — R13.19 ESOPHAGEAL DYSPHAGIA: ICD-10-CM

## 2025-03-04 DIAGNOSIS — R49.9 CHANGE IN VOICE: ICD-10-CM

## 2025-03-04 DIAGNOSIS — R13.10 DYSPHAGIA, UNSPECIFIED TYPE: ICD-10-CM

## 2025-03-04 PROCEDURE — 74230 X-RAY XM SWLNG FUNCJ C+: CPT

## 2025-03-04 PROCEDURE — 92611 MOTION FLUOROSCOPY/SWALLOW: CPT

## 2025-03-05 NOTE — OP THERAPY EVALUATION
"[facility]  Outpatient Modified Barium Swallow Study           Patient Name: Carlee Hunt  Date of Evaluation: 3/4/2025  Referring Provider: Shari Dent D.O.  Fax: [phone]        Patient History/Reason for Referral  The pt was referred for a Modified Barium Swallow Study (MBSS) due to history of diverticulum and oropharyngeal dysphagia. Patient reports dysphagia symptoms have not worsened, she continues to eat a regular diet. She reports she continues to have times where food feels stuck and episodes of \"food coming back up\" but they are not common. History of ACDF and palatal surgery reported.        Oral Mechanism Evaluation  Facial Symmetry: Equal  Facial Sensation: Equal  Labial Observations: WFL  Lingual Observations: Midline  Dentition: WFL      Voice  Quality: WFL  Resonance: WFL, mildly strained at times  Intensity: Soft, mild,   Cough: WFL      Current Method of Nutrition   Oral diet (regular solids, thin liquids)      Pertinent Information  Affect/Behavior: Appropriate, Calm, Cooperative  Oxygen Requirements: Room Air  Secretion Management: WNL      Subjective: participates well, provides medical history and concerns for visit appropriately         Discussed with the risks, benefits, and alternatives of the MBSS procedure. Patient/family acknowledged and agreed to proceed.    Assessment  Videofluoroscopic Swallow Study was conducted in the lateral and AP projection(s) to evaluate oropharyngeal swallow function. A radiology tech was present to assist with the procedure.       Positioning: seated upright in fluoroscopy chair, standing (AP view)  Anatomic View: Hardware visualized in cervical spine, Soft palate surgery reported following ACDF, patient appears to have posterior pharyngeal wall augmentation with pharyngeal protruberance visualized where soft palate elevates. Soft palate meets protruberance to form full closure of  port during swallow.  Diverticulum again " visualized in upper esophagus, see images below      Bolus Administration: SLP and Patient      Consistency PAS Score Timing Residue Comments   Thin Liquid 2 During swallow Vallecular Residue: Mild (5%-25%)  Pyriform Sinus Residue: Trace (1%-5%) Penetration midway into laryngeal vestibule during swallow with large consecutive sips of thin liquids, penetrated material does not pass below vocal folds and is cleared during the swallow.   Pudding 1 N/A Vallecular Residue: Mild (5%-25%)  Pyriform Sinus Residue: Trace (1%-5%)    Soft & Bite Sized 1 N/A Vallecular Residue: Mild (5%-25%)  Pyriform Sinus Residue: Trace (1%-5%)    Regular 1 N/A Vallecular Residue: Mild (5%-25%)  Pyriform Sinus Residue: Trace (1%-5%)        Penetration-Aspiration Scale (PAS)  1     No contrast enters airway  2     Contrast enters the airway, remains above the vocal folds, and is ejected from the airway (not seen in the airway at the end of the swallow).  3     Contrast enters the airway, remains above the vocal folds, and is not ejected from the airway (is seen in the airway after the swallow).  4     Contrast enters the airway, contacts the vocal folds, and is ejected from the airway.  5     Contrast enters the airway, contacts the vocal folds, and is not ejected from the airway  6     Contrast enters the airway, crosses the plane of the vocal folds, and is ejected from the airway.  7     Contrast enters the airway, crosses the plane of the vocal folds, and is not ejected from the airway despite effort.  8     Contrast enters the airway, crosses the plane of the vocal folds, is not ejected from the airway and there is no response to aspiration.      Oral phase: History of soft palate surgery reported. Soft palate elevates to meet a posterior pharyngeal wall protruberance (likely posterior pharyngeal wall augmentation). Trace material remains on posterior pharyngeal wall both above and below the level of the soft palate. Suspect very mild   insufficiency persists.       Pharyngeal phase:  - Impaired timing of laryngeal vestibule closure resulted in penetration during the swallow with thin liquids, penetrated material was also cleared during the swallow  - Reduced base of tongue retraction resulted in mild vallecular residue  - Incomplete epiglottic inversion was observed with thin liquids, intermittently incomplete epiglottic inversion was observed with solids. Increase bolus size/density improved epiglottic inversion.  - Impaired pharyngeal shortening resulted in trace pyriform sinus residue    Esophageal phase:  AP sweep completed with thin liquids. Delayed esophageal emptying was observed and mild retrograde flow in distal esophagus appreciated.      Results/recommendations discussed with patient. All questions addressed.       Clinical Impressions  Patient presents with primarily esophageal dysphagia characterized diverticulum again visualized in upper esophagus as well as delayed esophageal emptying and retrograde flow of bolus in distal esophagus. Diverticulum visualized is consistent with diverticulum documented on previous MBSS completed in January 2024. Recommend follow-up with GI for further management recs of divertuculum as well as for potential management recommendations for esophageal dysmotility and possible GERD.    Patient also presents with mild pharyngeal phase dysphagia characterized by reduced base of tongue retraction and reduced pharyngeal shortening resulting in mild pharyngeal residue. Impaired timing of laryngeal vestibule closure resulted in penetration without aspiration of thin liquids. Posterior pharyngeal wall augmentation visualized during evaluation. Soft palate meets augmentation at posterior pharyngeal wall during study, however, trace material does enter nasopharynx and remains above level of soft palate after study consistent with very mild velopharyngeal insufficiency, despite past augmentation. Patient denies  nasal regurgitation and this does not appear to be impacting comfort or function of swallow.      Recommendations  Continue regular diet and thin liquids  2.  Swallowing Instructions & Precautions:   Supervision: Independent  Positioning: Fully upright and midline during oral intake, Remain upright for 30 minutes after oral intake, Meals sitting upright in a chair, as tolerated  Medication: liquid, crushed (due to risk of pills getting stuck in diverticulum), drink plenty of water after taking medications  Strategies: Small bites/sips, Alternate bites and sips, Slow rate of intake, eat smaller more frequent meals (5-6 small meals instead of 3 large meals), do not eat for at least 3 hours before bed, chew thoroughly, add sauce/gravy to moisten dry foods, sit fully upright for meals (preferably in chair), stay upright for 30 minutes after eating  Oral Care: BID  3.  OP SLP referral swallow and voice therapy (swallow therapy to train exercises targeting base of tongue retraction, epiglottic inversion, pharyngeal shortening;  resonant voice therapy)        Thank you for the referral. For further questions, please call [phone].  Ann Mascorro, SLP

## 2025-03-06 ENCOUNTER — RESULTS FOLLOW-UP (OUTPATIENT)
Dept: MEDICAL GROUP | Facility: PHYSICIAN GROUP | Age: 75
End: 2025-03-06
Payer: MEDICARE

## 2025-03-07 ENCOUNTER — APPOINTMENT (OUTPATIENT)
Dept: URBAN - METROPOLITAN AREA CLINIC 15 | Facility: CLINIC | Age: 75
Setting detail: DERMATOLOGY
End: 2025-03-07

## 2025-03-07 DIAGNOSIS — L82.1 OTHER SEBORRHEIC KERATOSIS: ICD-10-CM

## 2025-03-07 DIAGNOSIS — D22 MELANOCYTIC NEVI: ICD-10-CM

## 2025-03-07 DIAGNOSIS — L81.4 OTHER MELANIN HYPERPIGMENTATION: ICD-10-CM

## 2025-03-07 DIAGNOSIS — D18.0 HEMANGIOMA: ICD-10-CM

## 2025-03-07 PROBLEM — D18.01 HEMANGIOMA OF SKIN AND SUBCUTANEOUS TISSUE: Status: ACTIVE | Noted: 2025-03-07

## 2025-03-07 PROBLEM — D22.5 MELANOCYTIC NEVI OF TRUNK: Status: ACTIVE | Noted: 2025-03-07

## 2025-03-07 PROBLEM — D22.62 MELANOCYTIC NEVI OF LEFT UPPER LIMB, INCLUDING SHOULDER: Status: ACTIVE | Noted: 2025-03-07

## 2025-03-07 PROBLEM — D22.61 MELANOCYTIC NEVI OF RIGHT UPPER LIMB, INCLUDING SHOULDER: Status: ACTIVE | Noted: 2025-03-07

## 2025-03-07 PROCEDURE — ? COUNSELING

## 2025-03-07 PROCEDURE — ? LIQUID NITROGEN

## 2025-03-07 PROCEDURE — 99213 OFFICE O/P EST LOW 20 MIN: CPT

## 2025-03-07 ASSESSMENT — LOCATION DETAILED DESCRIPTION DERM
LOCATION DETAILED: RIGHT INFERIOR UPPER BACK
LOCATION DETAILED: RIGHT POSTERIOR SHOULDER
LOCATION DETAILED: RIGHT SUPERIOR MEDIAL MIDBACK
LOCATION DETAILED: SUPERIOR LUMBAR SPINE
LOCATION DETAILED: PERIUMBILICAL SKIN
LOCATION DETAILED: RIGHT CLAVICULAR NECK
LOCATION DETAILED: RIGHT SUPERIOR UPPER BACK
LOCATION DETAILED: RIGHT LATERAL TRAPEZIAL NECK
LOCATION DETAILED: LEFT INFERIOR MEDIAL FOREHEAD
LOCATION DETAILED: SUBXIPHOID
LOCATION DETAILED: RIGHT LATERAL BREAST 9-10:00 REGION
LOCATION DETAILED: RIGHT ANTECUBITAL SKIN
LOCATION DETAILED: RIGHT INFERIOR MEDIAL UPPER BACK
LOCATION DETAILED: RIGHT MID-UPPER BACK
LOCATION DETAILED: LEFT VENTRAL DISTAL FOREARM
LOCATION DETAILED: RIGHT INFERIOR ANTERIOR NECK
LOCATION DETAILED: RIGHT VENTRAL PROXIMAL FOREARM
LOCATION DETAILED: LEFT LATERAL TRAPEZIAL NECK
LOCATION DETAILED: RIGHT MEDIAL UPPER BACK
LOCATION DETAILED: RIGHT RIB CAGE
LOCATION DETAILED: RIGHT LATERAL BREAST 8-9:00 REGION
LOCATION DETAILED: LEFT INFERIOR ANTERIOR NECK
LOCATION DETAILED: LEFT POSTERIOR SHOULDER
LOCATION DETAILED: LEFT VENTRAL PROXIMAL FOREARM
LOCATION DETAILED: RIGHT INFRAMAMMARY CREASE (OUTER QUADRANT)
LOCATION DETAILED: LOWER STERNUM
LOCATION DETAILED: LEFT SUPERIOR UPPER BACK
LOCATION DETAILED: LEFT ANTECUBITAL SKIN
LOCATION DETAILED: MEDIAL FRONTAL SCALP
LOCATION DETAILED: LEFT INFERIOR MEDIAL UPPER BACK
LOCATION DETAILED: RIGHT ANTERIOR DISTAL UPPER ARM

## 2025-03-07 ASSESSMENT — LOCATION SIMPLE DESCRIPTION DERM
LOCATION SIMPLE: RIGHT LOWER BACK
LOCATION SIMPLE: POSTERIOR NECK
LOCATION SIMPLE: RIGHT SHOULDER
LOCATION SIMPLE: RIGHT FOREARM
LOCATION SIMPLE: CHEST
LOCATION SIMPLE: LEFT FOREARM
LOCATION SIMPLE: RIGHT BREAST
LOCATION SIMPLE: RIGHT UPPER BACK
LOCATION SIMPLE: FRONTAL SCALP
LOCATION SIMPLE: LEFT SHOULDER
LOCATION SIMPLE: LEFT UPPER BACK
LOCATION SIMPLE: LEFT UPPER ARM
LOCATION SIMPLE: ABDOMEN
LOCATION SIMPLE: LEFT ANTERIOR NECK
LOCATION SIMPLE: RIGHT UPPER ARM
LOCATION SIMPLE: RIGHT ANTERIOR NECK
LOCATION SIMPLE: LEFT FOREHEAD
LOCATION SIMPLE: LOWER BACK

## 2025-03-07 ASSESSMENT — LOCATION ZONE DERM
LOCATION ZONE: TRUNK
LOCATION ZONE: FACE
LOCATION ZONE: ARM
LOCATION ZONE: NECK
LOCATION ZONE: SCALP

## 2025-03-07 NOTE — PROCEDURE: LIQUID NITROGEN
Post-Care Instructions: I reviewed with the patient in detail post-care instructions. Patient is to wear sunprotection, and avoid picking at any of the treated lesions. Pt may apply Vaseline to crusted or scabbing areas.
Medical Necessity Clause: This procedure was medically necessary because the lesions that were treated were:  If lesion does not resolve, bx is needed.
Spray Paint Text: The liquid nitrogen was applied to the skin utilizing a spray paint frosting technique.
Spray Paint Technique: No
Consent: The patient's consent was obtained including but not limited to risks of crusting, scabbing, blistering, scarring, darker or lighter pigmentary change, recurrence, incomplete removal and infection.
Detail Level: Detailed
Duration Of Freeze Thaw-Cycle (Seconds): 0
Medical Necessity Information: It is in your best interest to select a reason for this procedure from the list below. All of these items fulfill various CMS LCD requirements except the new and changing color options.
Show Spray Paint Technique Variable?: Yes

## 2025-03-19 ENCOUNTER — APPOINTMENT (OUTPATIENT)
Dept: URBAN - METROPOLITAN AREA CLINIC 36 | Facility: CLINIC | Age: 75
Setting detail: DERMATOLOGY
End: 2025-03-19

## 2025-03-19 DIAGNOSIS — Z41.9 ENCOUNTER FOR PROCEDURE FOR PURPOSES OTHER THAN REMEDYING HEALTH STATE, UNSPECIFIED: ICD-10-CM

## 2025-03-19 PROCEDURE — ? HYDRAFACIAL

## 2025-03-19 ASSESSMENT — LOCATION ZONE DERM
LOCATION ZONE: FACE
LOCATION ZONE: NOSE
LOCATION ZONE: LIP

## 2025-03-19 ASSESSMENT — LOCATION DETAILED DESCRIPTION DERM
LOCATION DETAILED: RIGHT SUPERIOR MEDIAL BUCCAL CHEEK
LOCATION DETAILED: RIGHT LATERAL BUCCAL CHEEK
LOCATION DETAILED: NASAL DORSUM
LOCATION DETAILED: RIGHT CHIN
LOCATION DETAILED: RIGHT INFERIOR FOREHEAD
LOCATION DETAILED: LEFT INFERIOR LATERAL FOREHEAD
LOCATION DETAILED: LEFT SUPERIOR LATERAL MALAR CHEEK
LOCATION DETAILED: RIGHT CENTRAL MALAR CHEEK
LOCATION DETAILED: LEFT CENTRAL MALAR CHEEK
LOCATION DETAILED: LEFT CENTRAL BUCCAL CHEEK
LOCATION DETAILED: PHILTRUM
LOCATION DETAILED: GLABELLA
LOCATION DETAILED: RIGHT MID TEMPLE

## 2025-03-19 ASSESSMENT — LOCATION SIMPLE DESCRIPTION DERM
LOCATION SIMPLE: UPPER LIP
LOCATION SIMPLE: RIGHT FOREHEAD
LOCATION SIMPLE: RIGHT TEMPLE
LOCATION SIMPLE: GLABELLA
LOCATION SIMPLE: LEFT CHEEK
LOCATION SIMPLE: LEFT FOREHEAD
LOCATION SIMPLE: RIGHT CHEEK
LOCATION SIMPLE: CHIN
LOCATION SIMPLE: NOSE

## 2025-03-19 NOTE — PROCEDURE: HYDRAFACIAL
Solution: Activ-4
Vacuum Pressure High Setting (Will Not Render If Set To 0): 16
Solution: Antiox-6
Procedure: Extraction
Number Of Passes Step 2: 1
Consent: Written consent obtained, risks reviewed including but not limited to crusting, scabbing, blistering, scarring, darker or lighter pigmentary change, bruising, and/or incomplete response.
Procedure: Peel
Vacuum Pressure Low Setting (Will Not Render If Set To 0): 13
Tip: Hydropeel Tip, Clear
Solution: Beta-HD
Price (Use Numbers Only, No Special Characters Or $): 219
Post-Care Instructions: I reviewed with the patient in detail post-care instructions. Patient should stay away from the sun and wear sun protection until treated areas are fully healed.
Solution: GlySal 15%
Vacuum Pressure Low Setting (Will Not Render If Set To 0): 14
Tip: Hydropeel Tip, Blue
Comments: Added mask ($25)\\nPurchased renewal lotion\\nHad 2 falls since early December
Indication: anti-aging
Procedure: Exfoliation
Vacuum Pressure High Setting (Will Not Render If Set To 0): 22
Tip: Hydropeel Tip, Teal
Number Of Passes Step 1: 2
Procedure: Fusion
Location: face

## 2025-04-11 ENCOUNTER — HOSPITAL ENCOUNTER (OUTPATIENT)
Dept: RADIOLOGY | Facility: MEDICAL CENTER | Age: 75
End: 2025-04-11
Attending: PHYSICAL MEDICINE & REHABILITATION
Payer: MEDICARE

## 2025-04-11 DIAGNOSIS — M84.48XA SACRAL INSUFFICIENCY FRACTURE, INITIAL ENCOUNTER: ICD-10-CM

## 2025-04-15 DIAGNOSIS — L71.9 ROSACEA: ICD-10-CM

## 2025-04-15 RX ORDER — AZELAIC ACID 0.15 G/G
GEL TOPICAL
Qty: 50 G | Refills: 11 | Status: SHIPPED | OUTPATIENT
Start: 2025-04-15

## 2025-04-30 ENCOUNTER — APPOINTMENT (OUTPATIENT)
Dept: URBAN - METROPOLITAN AREA CLINIC 36 | Facility: CLINIC | Age: 75
Setting detail: DERMATOLOGY
End: 2025-04-30

## 2025-04-30 DIAGNOSIS — Z41.9 ENCOUNTER FOR PROCEDURE FOR PURPOSES OTHER THAN REMEDYING HEALTH STATE, UNSPECIFIED: ICD-10-CM

## 2025-04-30 PROCEDURE — ? CHEMICAL PEEL

## 2025-04-30 ASSESSMENT — LOCATION DETAILED DESCRIPTION DERM
LOCATION DETAILED: LEFT LATERAL EYEBROW
LOCATION DETAILED: GLABELLA
LOCATION DETAILED: RIGHT LATERAL BUCCAL CHEEK
LOCATION DETAILED: PHILTRUM
LOCATION DETAILED: LEFT MEDIAL MALAR CHEEK
LOCATION DETAILED: RIGHT INFERIOR MEDIAL MALAR CHEEK
LOCATION DETAILED: RIGHT MID TEMPLE
LOCATION DETAILED: RIGHT SUPERIOR PREAURICULAR CHEEK
LOCATION DETAILED: SUBMENTAL CHIN
LOCATION DETAILED: LEFT SUPERIOR LATERAL BUCCAL CHEEK
LOCATION DETAILED: LEFT SUPERIOR TEMPLE
LOCATION DETAILED: LEFT LATERAL MALAR CHEEK
LOCATION DETAILED: RIGHT CHIN
LOCATION DETAILED: RIGHT LATERAL FOREHEAD
LOCATION DETAILED: INFERIOR MID FOREHEAD

## 2025-04-30 ASSESSMENT — LOCATION SIMPLE DESCRIPTION DERM
LOCATION SIMPLE: RIGHT CHEEK
LOCATION SIMPLE: UPPER LIP
LOCATION SIMPLE: SUBMENTAL CHIN
LOCATION SIMPLE: LEFT TEMPLE
LOCATION SIMPLE: CHIN
LOCATION SIMPLE: LEFT CHEEK
LOCATION SIMPLE: LEFT EYEBROW
LOCATION SIMPLE: RIGHT TEMPLE
LOCATION SIMPLE: INFERIOR FOREHEAD
LOCATION SIMPLE: RIGHT FOREHEAD
LOCATION SIMPLE: GLABELLA

## 2025-04-30 ASSESSMENT — LOCATION ZONE DERM
LOCATION ZONE: FACE
LOCATION ZONE: LIP

## 2025-04-30 NOTE — PROCEDURE: CHEMICAL PEEL
Chemical Peel: Ultra Peel I
Consent: Prior to the procedure, written consent was obtained and risks were reviewed, including but not limited to: redness, peeling, blistering, pigmentary change, scarring, infection, and pain.
Detail Level: Zone
Treatment Number: 1
Price (Use Numbers Only, No Special Characters Or $): 980
Post Peel Care: After the procedure Sun protection and post-care instructions were reviewed with the patient.
Treatment Time (Optional): 3 mins@
Post-Care Instructions: I reviewed with the patient in detail post-care instructions. Patient should avoid sun exposure and wear sun protection.
Comments: Added GF stems & LED ($125)\\nTolerated well, Rev post care.\\nExosome microchannelling facial next\\nPurchased Bioclear Lotion & No show mineral.  Instructed to stop tretinoin, replace with bioclear 2 D’Pitts daily for textural improvement periorally (primary concern).  Will reevaluate after a few months of use before switching to Alpharet
Number Of Layers: 3
Prep: The treated area was degreased with pre-peel cleanser, smoothing toner, Vaseline was applied to protect mucous membranes.

## 2025-05-07 ENCOUNTER — HOSPITAL ENCOUNTER (OUTPATIENT)
Dept: RADIOLOGY | Facility: MEDICAL CENTER | Age: 75
End: 2025-05-07
Attending: PHYSICAL MEDICINE & REHABILITATION
Payer: MEDICARE

## 2025-05-07 PROCEDURE — 72195 MRI PELVIS W/O DYE: CPT

## 2025-05-14 ENCOUNTER — OFFICE VISIT (OUTPATIENT)
Dept: MEDICAL GROUP | Facility: PHYSICIAN GROUP | Age: 75
End: 2025-05-14
Payer: MEDICARE

## 2025-05-14 ENCOUNTER — HOSPITAL ENCOUNTER (OUTPATIENT)
Facility: MEDICAL CENTER | Age: 75
End: 2025-05-14
Attending: INTERNAL MEDICINE
Payer: MEDICARE

## 2025-05-14 VITALS
BODY MASS INDEX: 22.29 KG/M2 | RESPIRATION RATE: 13 BRPM | TEMPERATURE: 98.1 F | OXYGEN SATURATION: 96 % | SYSTOLIC BLOOD PRESSURE: 90 MMHG | HEART RATE: 68 BPM | WEIGHT: 142 LBS | HEIGHT: 67 IN | DIASTOLIC BLOOD PRESSURE: 62 MMHG

## 2025-05-14 DIAGNOSIS — M54.2 CHRONIC NECK PAIN: ICD-10-CM

## 2025-05-14 DIAGNOSIS — M54.42 CHRONIC BILATERAL LOW BACK PAIN WITH LEFT-SIDED SCIATICA: Primary | ICD-10-CM

## 2025-05-14 DIAGNOSIS — R49.9 CHANGE IN VOICE: ICD-10-CM

## 2025-05-14 DIAGNOSIS — E03.9 ACQUIRED HYPOTHYROIDISM: ICD-10-CM

## 2025-05-14 DIAGNOSIS — Z79.891 CHRONIC USE OF OPIATE FOR THERAPEUTIC PURPOSE: ICD-10-CM

## 2025-05-14 DIAGNOSIS — L71.9 ROSACEA: ICD-10-CM

## 2025-05-14 DIAGNOSIS — Q39.6 ESOPHAGEAL DIVERTICULUM: ICD-10-CM

## 2025-05-14 DIAGNOSIS — G89.29 CHRONIC NECK PAIN: ICD-10-CM

## 2025-05-14 DIAGNOSIS — F33.42 RECURRENT MAJOR DEPRESSIVE DISORDER, IN FULL REMISSION (HCC): ICD-10-CM

## 2025-05-14 DIAGNOSIS — M85.831 OSTEOPENIA OF RIGHT FOREARM: ICD-10-CM

## 2025-05-14 DIAGNOSIS — E78.5 DYSLIPIDEMIA: ICD-10-CM

## 2025-05-14 DIAGNOSIS — M84.48XS BILATERAL SACRAL INSUFFICIENCY FRACTURE, SEQUELA: ICD-10-CM

## 2025-05-14 DIAGNOSIS — G89.29 CHRONIC BILATERAL LOW BACK PAIN WITH LEFT-SIDED SCIATICA: ICD-10-CM

## 2025-05-14 DIAGNOSIS — I10 ESSENTIAL HYPERTENSION: ICD-10-CM

## 2025-05-14 DIAGNOSIS — M54.42 CHRONIC BILATERAL LOW BACK PAIN WITH LEFT-SIDED SCIATICA: ICD-10-CM

## 2025-05-14 DIAGNOSIS — R73.01 ELEVATED FASTING BLOOD SUGAR: ICD-10-CM

## 2025-05-14 DIAGNOSIS — G89.29 CHRONIC BILATERAL LOW BACK PAIN WITH LEFT-SIDED SCIATICA: Primary | ICD-10-CM

## 2025-05-14 PROBLEM — M84.48XA BILATERAL SACRAL INSUFFICIENCY FRACTURE: Status: ACTIVE | Noted: 2025-05-14

## 2025-05-14 LAB — AMBIGUOUS DTTM AMBI4: NORMAL

## 2025-05-14 PROCEDURE — 3074F SYST BP LT 130 MM HG: CPT | Performed by: INTERNAL MEDICINE

## 2025-05-14 PROCEDURE — 3078F DIAST BP <80 MM HG: CPT | Performed by: INTERNAL MEDICINE

## 2025-05-14 PROCEDURE — G2211 COMPLEX E/M VISIT ADD ON: HCPCS | Performed by: INTERNAL MEDICINE

## 2025-05-14 PROCEDURE — 99214 OFFICE O/P EST MOD 30 MIN: CPT | Performed by: INTERNAL MEDICINE

## 2025-05-14 PROCEDURE — G0482 DRUG TEST DEF 15-21 CLASSES: HCPCS

## 2025-05-14 RX ORDER — TRAMADOL HYDROCHLORIDE 50 MG/1
50 TABLET ORAL EVERY 8 HOURS PRN
Qty: 90 TABLET | Refills: 0 | Status: CANCELLED | OUTPATIENT
Start: 2025-07-22 | End: 2025-08-21

## 2025-05-14 RX ORDER — METOPROLOL SUCCINATE 25 MG/1
25 TABLET, EXTENDED RELEASE ORAL DAILY
Qty: 100 TABLET | Refills: 3 | Status: SHIPPED | OUTPATIENT
Start: 2025-05-14

## 2025-05-14 RX ORDER — CLINDAMYCIN PHOSPHATE 11.9 MG/ML
1 SOLUTION TOPICAL 2 TIMES DAILY
Qty: 60 ML | Refills: 3 | Status: SHIPPED | OUTPATIENT
Start: 2025-05-14

## 2025-05-14 RX ORDER — TRAMADOL HYDROCHLORIDE 50 MG/1
50 TABLET ORAL EVERY 8 HOURS PRN
Qty: 90 TABLET | Refills: 0 | Status: SHIPPED | OUTPATIENT
Start: 2025-05-23 | End: 2025-06-22

## 2025-05-14 RX ORDER — CELECOXIB 100 MG/1
100 CAPSULE ORAL 2 TIMES DAILY
COMMUNITY
Start: 2025-04-16

## 2025-05-14 RX ORDER — TRAMADOL HYDROCHLORIDE 50 MG/1
50 TABLET ORAL EVERY 8 HOURS PRN
Qty: 90 TABLET | Refills: 0 | Status: SHIPPED | OUTPATIENT
Start: 2025-07-22 | End: 2025-08-21

## 2025-05-14 RX ORDER — TRAMADOL HYDROCHLORIDE 50 MG/1
50 TABLET ORAL EVERY 8 HOURS PRN
Qty: 90 TABLET | Refills: 0 | Status: SHIPPED | OUTPATIENT
Start: 2025-06-22 | End: 2025-07-22

## 2025-05-14 RX ORDER — LEVOTHYROXINE SODIUM 88 UG/1
88 TABLET ORAL
Qty: 100 TABLET | Refills: 3 | Status: SHIPPED | OUTPATIENT
Start: 2025-05-14

## 2025-05-14 RX ORDER — CITALOPRAM HYDROBROMIDE 10 MG/1
10 TABLET ORAL DAILY
Qty: 100 TABLET | Refills: 3 | Status: SHIPPED | OUTPATIENT
Start: 2025-05-14

## 2025-05-14 RX ORDER — TRAMADOL HYDROCHLORIDE 50 MG/1
50 TABLET ORAL EVERY 8 HOURS PRN
Qty: 90 TABLET | Refills: 0 | Status: CANCELLED | OUTPATIENT
Start: 2025-06-22 | End: 2025-07-22

## 2025-05-14 ASSESSMENT — FIBROSIS 4 INDEX: FIB4 SCORE: 2.05

## 2025-05-14 NOTE — PROGRESS NOTES
"Subjective:   Chief Complaint/History of Present Illness:  Carlee Hunt is a 75 y.o. female established patient who presents today to discuss medical problems as listed below. Carlee is unaccompanied for today's visit.    History of Present Illness  The patient presents for evaluation of fractures, diverticulum, and voice change.    She has a history of multiple falls, with the most recent incident occurring in 2020. She has been managing her pain with Celebrex and tramadol, which have proven effective. She is scheduled for a right hip injection. She reports no gastrointestinal issues such as ulcers, gastritis, or bleeding complications. She alternates her medication regimen, taking one dose at night and another during the day.    A bone density test conducted a few months ago revealed minimal osteopenia in the right forearm and normal bone density in the right hip. She was previously on Fosamax but discontinued its use 7 to 8 years ago. She did not experience heartburn while on Fosamax.    She recently consulted with a gastroenterologist regarding her diverticulum. She was informed that a colonoscopy would necessitate hospitalization due to her neck condition. However, the gastroenterologist did not deem a colonoscopy necessary and instead recommended another Cologuard test, which she has undergone in the past.    She experienced sudden voice loss on Saturday, despite not being ill. Her voice was completely absent for 2 days, but it has since shown signs of improvement. She reports no foreign body sensation in her throat.    She is currently not using topical estrogen cream.       Current Medications:  Current Medications and Prescriptions Ordered in Epic[1]       Objective:   Physical Exam:    Vitals: BP 90/62 (BP Location: Left arm, Patient Position: Sitting, BP Cuff Size: Adult)   Pulse 68   Temp 36.7 °C (98.1 °F) (Temporal)   Resp 13   Ht 1.702 m (5' 7\")   Wt 64.4 kg (142 lb)   SpO2 96%    BMI: Body mass " index is 22.24 kg/m².  Physical Exam  Constitutional:       General: She is not in acute distress.     Appearance: Normal appearance. She is not ill-appearing.   HENT:      Right Ear: External ear normal.      Left Ear: External ear normal.      Mouth/Throat:      Comments: hypophonia  Eyes:      General: No scleral icterus.     Conjunctiva/sclera: Conjunctivae normal.   Cardiovascular:      Rate and Rhythm: Normal rate and regular rhythm.      Pulses: Normal pulses.   Pulmonary:      Effort: Pulmonary effort is normal. No respiratory distress.      Breath sounds: No wheezing or rhonchi.   Abdominal:      General: Bowel sounds are normal.      Palpations: Abdomen is soft.   Musculoskeletal:         General: Tenderness present.      Cervical back: Rigidity present.      Right lower leg: No edema.      Left lower leg: No edema.      Comments: Neck and low back pain, worse with standing and extension.    Skin:     General: Skin is warm and dry.      Findings: No rash.   Psychiatric:         Mood and Affect: Mood normal.         Behavior: Behavior normal.         Thought Content: Thought content normal.         Judgment: Judgment normal.           Results  Labs   - CRP: 11/2024, Normal   - Sed Rate: 11/2024, Normal   - Rheumatoid Factor: 2018, Negative    Imaging   - Bone Density Test of the right forearm: Minimal osteopenia   - Bone Density Test of the right hip: Normal    Assessment & Plan  1. Osteopenia  2. Bilateral sacral stress/insufficiency fractures on MRI   - The patient's fractures could be old or recent, and it is challenging to determine their exact timing.  - The CRP and ESR levels were last checked in early November and were within normal limits. The rheumatoid factor test conducted in 2018 was negative.  - The bone density test revealed minimal osteopenia in the right forearm, while the right hip was normal.  - Given the imaging history of fractures, it may be beneficial to reintroduce medication.  However, considering the upcoming hip injection, it would be prudent to wait until the next visit to discuss this further. The patient will continue with Celebrex and tramadol. Additional labs for Dr. Salcedo will be ordered, including HLA-B27 and ALAINA screening.    3. Esophageal Diverticulum.  - The patient has been advised to undergo a Cologuard test every 3 years until the age of 78.  - The patient will contact the gastroenterologist to confirm if they have discussed her case with Dr. Lei and have a plan in place for her next visit.  - If the Cologuard test is positive, a colonoscopy will be considered.  - The patient will continue to be cautious with eating and swallowing as advised.    4. Voice change, history of laryngeal surgery.  - The patient experienced a sudden loss of voice over the weekend, which is now improving.  - No signs of infection or other concerning symptoms were noted.  - The patient will continue to monitor her condition and report any concerns.    5. Chronic bilateral low back pain.  6. Chronic neck pain.  7. Chronic use of opiate for therapeutic purpose.   - The patient will also continue with Celebrex 100 mg twice daily and tramadol 50 mg three times daily as needed. Plans to complete right hip intra-articular injection with Dr. Salcedo in the next week. For chronic pain in the neck she utilizes tramadol 50 mg up to 3 tablets daily, 2 in the AM and 1 in the PM. She is on turmeric. She also is working with physical therapy and physiatry. Update UDS and controlled substance today.     Obtained and reviewed patient utilization report from [facility] pharmacy database on 5/14/2025 4:16 PM  prior to writing prescription for controlled substance II, III or IV per [place] bill . Based on assessment of the report, the prescription is medically necessary.     Patient understands this prescription is a controlled substance which is potentially habit-forming and its use is regulated by the  "[place]. We also discussed the new \"black box\" warning regarding the lethal combination of opioids and benzodiazepines. Refills are subject to terms of a controlled substance agreement and patient has an updated one on file. Most recent UDS is appropriate. Any refill requires an office visit. Narcotics have may adverse effects and the risks of addiction, accidental overdose and death were emphasized. Provided prescriptions for the next three months.      8. Hypothyroidism  9. Dyslipidemia  10. Hypertension   11. Elevated fasting blood sugar  12. Rosacea  13. Recurrent major depressive disorder, in remission  - The patient is currently on levothyroxine 88 mcg daily, metoprolol succinate 25 mg daily, citalopram (Celexa) 10 mg daily, and topical clindamycin.  - These medications will be continued and sent to the pharmacy.  - Update full panel of lab work before next follow up visit.      Assessment and Plan:   Carlee is a 75 y.o. female with the following:  Problem List Items Addressed This Visit       Acquired hypothyroidism    Relevant Medications    levothyroxine (SYNTHROID) 88 MCG Tab    Other Relevant Orders    TSH    FREE THYROXINE    Bilateral sacral insufficiency fracture    Relevant Medications    celecoxib (CELEBREX) 100 MG Cap    traMADol (ULTRAM) 50 MG Tab (Start on 5/23/2025)    traMADol (ULTRAM) 50 MG Tab (Start on 6/22/2025)    traMADol (ULTRAM) 50 MG Tab (Start on 7/22/2025)    Change in voice, prior laryngeal surgery    Chronic bilateral low back pain with left-sided sciatica - Primary    Relevant Medications    celecoxib (CELEBREX) 100 MG Cap    traMADol (ULTRAM) 50 MG Tab (Start on 5/23/2025)    citalopram (CELEXA) 10 MG tablet    traMADol (ULTRAM) 50 MG Tab (Start on 6/22/2025)    traMADol (ULTRAM) 50 MG Tab (Start on 7/22/2025)    Other Relevant Orders    Controlled Substance Treatment Agreement    ANKYLOSING SPONDYLITIS (HLA-B27) GENOTYPING    ANTI-NUCLEAR ANTIBODY SERUM    Chronic neck pain    " Relevant Medications    celecoxib (CELEBREX) 100 MG Cap    traMADol (ULTRAM) 50 MG Tab (Start on 5/23/2025)    citalopram (CELEXA) 10 MG tablet    traMADol (ULTRAM) 50 MG Tab (Start on 6/22/2025)    traMADol (ULTRAM) 50 MG Tab (Start on 7/22/2025)    Other Relevant Orders    Controlled Substance Treatment Agreement    ANKYLOSING SPONDYLITIS (HLA-B27) GENOTYPING    ANTI-NUCLEAR ANTIBODY SERUM    Chronic use of opiate for therapeutic purpose    Relevant Medications    traMADol (ULTRAM) 50 MG Tab (Start on 5/23/2025)    traMADol (ULTRAM) 50 MG Tab (Start on 6/22/2025)    traMADol (ULTRAM) 50 MG Tab (Start on 7/22/2025)    Other Relevant Orders    Controlled Substance Treatment Agreement    Dyslipidemia    Relevant Orders    CBC WITH DIFFERENTIAL    Comp Metabolic Panel    Lipid Profile    VITAMIN D,25 HYDROXY (DEFICIENCY)    VITAMIN B12    Elevated fasting blood sugar    Relevant Orders    HEMOGLOBIN A1C    Osteopenia of right forearm    Recurrent major depressive disorder, in full remission (HCC)    Relevant Medications    citalopram (CELEXA) 10 MG tablet    Rosacea    Relevant Medications    clindamycin (CLEOCIN) 1 % Solution     Other Visit Diagnoses         Essential hypertension        Relevant Medications    metoprolol SR (TOPROL XL) 25 MG TABLET SR 24 HR      Esophageal diverticulum                   RTC: Return in about 3 months (around 8/14/2025).    I spent a total of 34 minutes with record review, exam, communication with the patient, communication with other providers, and documentation of this encounter.    Verbal consent was acquired by the patient to use [employer] ambient listening note generation during this visit Yes     Billing : secondary to the complexity of this patient's illnesses and their interactions.  All problems listed were discussed during the office visit, medications were evaluated and complexities were discussed as well as plan for the future      PLEASE NOTE: This dictation was  created using voice recognition software. I have made every reasonable attempt to correct obvious errors, but I expect that there are errors of grammar and possibly content that I did not discover before finalizing the note.      Shari Dent,   Geriatric and Internal Medicine  [facility]          [1]   Current Outpatient Medications Ordered in Epic   Medication Sig Dispense Refill    celecoxib (CELEBREX) 100 MG Cap Take 100 mg by mouth 2 times a day.      [START ON 5/23/2025] traMADol (ULTRAM) 50 MG Tab Take 1 Tablet by mouth every 8 hours as needed for Moderate Pain or Severe Pain for up to 30 days. 90 Tablet 0    citalopram (CELEXA) 10 MG tablet Take 1 Tablet by mouth every day. 100 Tablet 3    clindamycin (CLEOCIN) 1 % Solution Apply 1 Application topically 2 times a day. 60 mL 3    metoprolol SR (TOPROL XL) 25 MG TABLET SR 24 HR Take 1 Tablet by mouth every day. 100 Tablet 3    levothyroxine (SYNTHROID) 88 MCG Tab Take 1 Tablet by mouth every morning on an empty stomach. 100 Tablet 3    [START ON 6/22/2025] traMADol (ULTRAM) 50 MG Tab Take 1 Tablet by mouth every 8 hours as needed for Moderate Pain or Severe Pain for up to 30 days. 90 Tablet 0    [START ON 7/22/2025] traMADol (ULTRAM) 50 MG Tab Take 1 Tablet by mouth every 8 hours as needed for Moderate Pain or Severe Pain for up to 30 days. 90 Tablet 0    Azelaic Acid 15 % Gel APPLY 1 APPLICATION TOPCIALLY TWO TIMES A DAY 50 g 11    traMADol (ULTRAM) 50 MG Tab Take 1 Tablet by mouth every 8 hours as needed for Moderate Pain for up to 30 days. 90 Tablet 0    COMBIGAN 0.2-0.5 % Solution       Lutein 20 MG Tab Take  by mouth.      L-Theanine 200 MG Cap Take  by mouth.      Coenzyme Q10 (COQ10 PO) Take 100 mg by mouth every day. Qunol liquid    every other day      magnesium oxide (MAG-OX) 400 MG Tab Take 400 mg by mouth every evening.      Calcium 500 MG Chew Tab Take 1 Tab by mouth every evening.      latanoprost (XALATAN) 0.005 % Solution Place  1 Drop in both eyes every evening.      Multiple Vitamins-Minerals (CENTRUM PO) Take 1 Tab by mouth every day.      Tretinoin 0.05 % Gel Apply 1 Application topically at bedtime. 1 Each 3    estradiol (ESTRACE) 0.1 MG/GM vaginal cream Insert 0.4 g into the vagina every 72 hours. (Patient not taking: Reported on 5/14/2025) 42.5 g 3    TURMERIC PO Take 333 mg by mouth every day. Daily (Qunol liquid)       No current Epic-ordered facility-administered medications on file.

## 2025-05-19 LAB
1OH-MIDAZOLAM UR QL SCN: NOT DETECTED
6MAM UR QL: NOT DETECTED
7AMINOCLONAZEPAM UR QL: NOT DETECTED
A-OH ALPRAZ UR QL: NOT DETECTED
ALPRAZ UR QL: NOT DETECTED
AMPHET UR QL SCN: NOT DETECTED
ANNOTATION COMMENT IMP: NORMAL
BARBITURATES UR QL: NEGATIVE
BUPRENORPHINE UR QL: NOT DETECTED
BZE UR QL: NEGATIVE
CARBOXYTHC UR QL: NEGATIVE
CARISOPRODOL UR QL: NEGATIVE
CLONAZEPAM UR QL: NOT DETECTED
CODEINE UR QL: NOT DETECTED
CREAT UR-MCNC: 55.3 MG/DL (ref 20–400)
DIAZEPAM UR QL: NOT DETECTED
ETHYL GLUCURONIDE UR QL: NEGATIVE
FENTANYL UR QL: NOT DETECTED
GABAPENTIN UR QL CFM: NOT DETECTED
HYDROCODONE UR QL: NOT DETECTED
HYDROMORPHONE UR QL: NOT DETECTED
LORAZEPAM UR QL: NOT DETECTED
MDA UR QL: NOT DETECTED
MDEA UR QL: NOT DETECTED
MDMA UR QL: NOT DETECTED
ME-PHENIDATE UR QL: NOT DETECTED
METHADONE UR QL: NEGATIVE
METHAMPHET UR QL: NOT DETECTED
MIDAZOLAM UR QL SCN: NOT DETECTED
MORPHINE UR QL: NOT DETECTED
NALOXONE UR QL CFM: NOT DETECTED
NORBUPRENORPHINE UR QL CFM: NOT DETECTED
NORDIAZEPAM UR QL: NOT DETECTED
NORFENTANYL UR QL: NOT DETECTED
NORHYDROCODONE UR QL CFM: NOT DETECTED
NORMEPERIDINE UR QL CFM: NOT DETECTED
NOROXYCODONE UR QL CFM: NOT DETECTED
NOROXYMORPHONE UR QL SCN: NOT DETECTED
OXAZEPAM UR QL: NOT DETECTED
OXYCODONE UR QL: NOT DETECTED
OXYMORPHONE UR QL: NOT DETECTED
PATHOLOGY STUDY: NORMAL
PCP UR QL: NEGATIVE
PHENTERMINE UR QL: NOT DETECTED
PREGABALIN UR QL CFM: NOT DETECTED
SERVICE CMNT-IMP: NORMAL
TAPENTADOL UR QL SCN: NOT DETECTED
TAPENTADOL UR QL SCN: NOT DETECTED
TEMAZEPAM UR QL: NOT DETECTED
TRAMADOL UR QL: NORMAL
ZOLPIDEM PHENYL-4-CARB UR QL SCN: NOT DETECTED
ZOLPIDEM UR QL: NOT DETECTED

## 2025-06-04 ENCOUNTER — APPOINTMENT (OUTPATIENT)
Dept: URBAN - METROPOLITAN AREA CLINIC 35 | Facility: CLINIC | Age: 75
Setting detail: DERMATOLOGY
End: 2025-06-04

## 2025-06-04 DIAGNOSIS — Z41.9 ENCOUNTER FOR PROCEDURE FOR PURPOSES OTHER THAN REMEDYING HEALTH STATE, UNSPECIFIED: ICD-10-CM

## 2025-06-04 PROCEDURE — ? FACIAL

## 2025-06-04 ASSESSMENT — LOCATION ZONE DERM
LOCATION ZONE: NOSE
LOCATION ZONE: FACE
LOCATION ZONE: LIP

## 2025-06-04 ASSESSMENT — LOCATION DETAILED DESCRIPTION DERM
LOCATION DETAILED: LEFT INFERIOR LATERAL FOREHEAD
LOCATION DETAILED: RIGHT INFERIOR MEDIAL MALAR CHEEK
LOCATION DETAILED: NASAL ROOT
LOCATION DETAILED: INFERIOR MID FOREHEAD
LOCATION DETAILED: LEFT SUBMANDIBULAR AREA
LOCATION DETAILED: SUBMENTAL CHIN
LOCATION DETAILED: PHILTRUM
LOCATION DETAILED: LEFT SUPERIOR PREAURICULAR CHEEK
LOCATION DETAILED: LEFT LOWER CUTANEOUS LIP
LOCATION DETAILED: LEFT MEDIAL MALAR CHEEK
LOCATION DETAILED: RIGHT SUPERIOR LATERAL MALAR CHEEK
LOCATION DETAILED: RIGHT CENTRAL EYEBROW
LOCATION DETAILED: RIGHT CENTRAL BUCCAL CHEEK
LOCATION DETAILED: NASAL SUPRATIP
LOCATION DETAILED: LEFT SUPERIOR LATERAL BUCCAL CHEEK
LOCATION DETAILED: LEFT CENTRAL EYEBROW
LOCATION DETAILED: LEFT SUPERIOR CENTRAL MALAR CHEEK
LOCATION DETAILED: RIGHT INFERIOR LATERAL FOREHEAD
LOCATION DETAILED: RIGHT SUPERIOR LATERAL BUCCAL CHEEK

## 2025-06-04 ASSESSMENT — LOCATION SIMPLE DESCRIPTION DERM
LOCATION SIMPLE: SUBMENTAL CHIN
LOCATION SIMPLE: LEFT CHEEK
LOCATION SIMPLE: LEFT LIP
LOCATION SIMPLE: LEFT FOREHEAD
LOCATION SIMPLE: RIGHT CHEEK
LOCATION SIMPLE: UPPER LIP
LOCATION SIMPLE: INFERIOR FOREHEAD
LOCATION SIMPLE: RIGHT EYEBROW
LOCATION SIMPLE: NOSE
LOCATION SIMPLE: LEFT EYEBROW
LOCATION SIMPLE: LEFT SUBMANDIBULAR AREA
LOCATION SIMPLE: RIGHT FOREHEAD

## 2025-06-04 NOTE — PROCEDURE: FACIAL
Treatment Serum Override: GF stem. cells
Detail Level: Generalized
Treatment Type Override: Glass Stem Cell
Exfoliation Type: enzyme
Assessment: Photodamage
Comments (Non-Sticky): Tolerated well \\napplied 1% lidocaine topically \\nRev post care\\nPurchased phloretin & renewal lotion
Price (Use Numbers Only, No Special Characters Or $): 891
Treatment Type (Optional): Gold Facial
Purelift (Optional): no
Led Light (Optional): red

## 2025-06-09 ENCOUNTER — HOSPITAL ENCOUNTER (OUTPATIENT)
Facility: MEDICAL CENTER | Age: 75
End: 2025-06-09
Attending: INTERNAL MEDICINE
Payer: MEDICARE

## 2025-06-09 DIAGNOSIS — G89.29 CHRONIC NECK PAIN: ICD-10-CM

## 2025-06-09 DIAGNOSIS — G89.29 CHRONIC BILATERAL LOW BACK PAIN WITH LEFT-SIDED SCIATICA: ICD-10-CM

## 2025-06-09 DIAGNOSIS — E03.9 ACQUIRED HYPOTHYROIDISM: ICD-10-CM

## 2025-06-09 DIAGNOSIS — R73.01 ELEVATED FASTING BLOOD SUGAR: ICD-10-CM

## 2025-06-09 DIAGNOSIS — M54.42 CHRONIC BILATERAL LOW BACK PAIN WITH LEFT-SIDED SCIATICA: ICD-10-CM

## 2025-06-09 DIAGNOSIS — M54.2 CHRONIC NECK PAIN: ICD-10-CM

## 2025-06-09 DIAGNOSIS — E78.5 DYSLIPIDEMIA: ICD-10-CM

## 2025-06-09 LAB
25(OH)D3 SERPL-MCNC: 41 NG/ML (ref 30–100)
ALBUMIN SERPL BCP-MCNC: 4.1 G/DL (ref 3.2–4.9)
ALBUMIN/GLOB SERPL: 1.6 G/DL
ALP SERPL-CCNC: 75 U/L (ref 30–99)
ALT SERPL-CCNC: 19 U/L (ref 2–50)
ANION GAP SERPL CALC-SCNC: 10 MMOL/L (ref 7–16)
AST SERPL-CCNC: 22 U/L (ref 12–45)
BASOPHILS # BLD AUTO: 0.9 % (ref 0–1.8)
BASOPHILS # BLD: 0.05 K/UL (ref 0–0.12)
BILIRUB SERPL-MCNC: 0.4 MG/DL (ref 0.1–1.5)
BUN SERPL-MCNC: 16 MG/DL (ref 8–22)
CALCIUM ALBUM COR SERPL-MCNC: 9.1 MG/DL (ref 8.5–10.5)
CALCIUM SERPL-MCNC: 9.2 MG/DL (ref 8.5–10.5)
CHLORIDE SERPL-SCNC: 107 MMOL/L (ref 96–112)
CHOLEST SERPL-MCNC: 161 MG/DL (ref 100–199)
CO2 SERPL-SCNC: 27 MMOL/L (ref 20–33)
CREAT SERPL-MCNC: 0.68 MG/DL (ref 0.5–1.4)
EOSINOPHIL # BLD AUTO: 0.15 K/UL (ref 0–0.51)
EOSINOPHIL NFR BLD: 2.6 % (ref 0–6.9)
ERYTHROCYTE [DISTWIDTH] IN BLOOD BY AUTOMATED COUNT: 44.3 FL (ref 35.9–50)
EST. AVERAGE GLUCOSE BLD GHB EST-MCNC: 114 MG/DL
FASTING STATUS PATIENT QL REPORTED: NORMAL
GFR SERPLBLD CREATININE-BSD FMLA CKD-EPI: 89 ML/MIN/1.73 M 2
GLOBULIN SER CALC-MCNC: 2.5 G/DL (ref 1.9–3.5)
GLUCOSE SERPL-MCNC: 92 MG/DL (ref 65–99)
HBA1C MFR BLD: 5.6 % (ref 4–5.6)
HCT VFR BLD AUTO: 42.3 % (ref 37–47)
HDLC SERPL-MCNC: 52 MG/DL
HGB BLD-MCNC: 13.9 G/DL (ref 12–16)
IMM GRANULOCYTES # BLD AUTO: 0.01 K/UL (ref 0–0.11)
IMM GRANULOCYTES NFR BLD AUTO: 0.2 % (ref 0–0.9)
LDLC SERPL CALC-MCNC: 92 MG/DL
LYMPHOCYTES # BLD AUTO: 2.02 K/UL (ref 1–4.8)
LYMPHOCYTES NFR BLD: 35.3 % (ref 22–41)
MCH RBC QN AUTO: 31 PG (ref 27–33)
MCHC RBC AUTO-ENTMCNC: 32.9 G/DL (ref 32.2–35.5)
MCV RBC AUTO: 94.4 FL (ref 81.4–97.8)
MONOCYTES # BLD AUTO: 0.36 K/UL (ref 0–0.85)
MONOCYTES NFR BLD AUTO: 6.3 % (ref 0–13.4)
NEUTROPHILS # BLD AUTO: 3.13 K/UL (ref 1.82–7.42)
NEUTROPHILS NFR BLD: 54.7 % (ref 44–72)
NRBC # BLD AUTO: 0 K/UL
NRBC BLD-RTO: 0 /100 WBC (ref 0–0.2)
PLATELET # BLD AUTO: 181 K/UL (ref 164–446)
PMV BLD AUTO: 10.6 FL (ref 9–12.9)
POTASSIUM SERPL-SCNC: 4.2 MMOL/L (ref 3.6–5.5)
PROT SERPL-MCNC: 6.6 G/DL (ref 6–8.2)
RBC # BLD AUTO: 4.48 M/UL (ref 4.2–5.4)
SODIUM SERPL-SCNC: 144 MMOL/L (ref 135–145)
T4 FREE SERPL-MCNC: 1.33 NG/DL (ref 0.93–1.7)
TRIGL SERPL-MCNC: 86 MG/DL (ref 0–149)
TSH SERPL DL<=0.005 MIU/L-ACNC: 0.73 UIU/ML (ref 0.38–5.33)
VIT B12 SERPL-MCNC: 630 PG/ML (ref 211–911)
WBC # BLD AUTO: 5.7 K/UL (ref 4.8–10.8)

## 2025-06-09 PROCEDURE — 83036 HEMOGLOBIN GLYCOSYLATED A1C: CPT

## 2025-06-09 PROCEDURE — 36415 COLL VENOUS BLD VENIPUNCTURE: CPT

## 2025-06-09 PROCEDURE — 80061 LIPID PANEL: CPT

## 2025-06-09 PROCEDURE — 81374 HLA I TYPING 1 ANTIGEN LR: CPT

## 2025-06-09 PROCEDURE — 80053 COMPREHEN METABOLIC PANEL: CPT

## 2025-06-09 PROCEDURE — 85025 COMPLETE CBC W/AUTO DIFF WBC: CPT

## 2025-06-09 PROCEDURE — 84443 ASSAY THYROID STIM HORMONE: CPT

## 2025-06-09 PROCEDURE — 86038 ANTINUCLEAR ANTIBODIES: CPT

## 2025-06-09 PROCEDURE — 82306 VITAMIN D 25 HYDROXY: CPT

## 2025-06-09 PROCEDURE — 84439 ASSAY OF FREE THYROXINE: CPT

## 2025-06-09 PROCEDURE — 82607 VITAMIN B-12: CPT

## 2025-06-11 LAB — NUCLEAR IGG SER QL IA: NORMAL

## 2025-06-12 ENCOUNTER — RESULTS FOLLOW-UP (OUTPATIENT)
Dept: MEDICAL GROUP | Facility: PHYSICIAN GROUP | Age: 75
End: 2025-06-12

## 2025-06-13 LAB
HLA-B27 QL NAA+PROBE: NEGATIVE
SPECIMEN SOURCE: NORMAL

## 2025-07-30 ENCOUNTER — APPOINTMENT (OUTPATIENT)
Dept: URBAN - METROPOLITAN AREA CLINIC 20 | Facility: CLINIC | Age: 75
Setting detail: DERMATOLOGY
End: 2025-07-30

## 2025-07-30 ENCOUNTER — APPOINTMENT (OUTPATIENT)
Dept: URBAN - METROPOLITAN AREA CLINIC 35 | Facility: CLINIC | Age: 75
Setting detail: DERMATOLOGY
End: 2025-07-30

## 2025-07-30 DIAGNOSIS — Z41.9 ENCOUNTER FOR PROCEDURE FOR PURPOSES OTHER THAN REMEDYING HEALTH STATE, UNSPECIFIED: ICD-10-CM

## 2025-07-30 PROCEDURE — ? DERMASWEEP

## 2025-07-30 ASSESSMENT — LOCATION SIMPLE DESCRIPTION DERM
LOCATION SIMPLE: SUBMENTAL CHIN
LOCATION SIMPLE: NECK
LOCATION SIMPLE: LEFT CHEEK
LOCATION SIMPLE: GLABELLA
LOCATION SIMPLE: RIGHT TEMPLE
LOCATION SIMPLE: LEFT EYEBROW
LOCATION SIMPLE: CHIN
LOCATION SIMPLE: LEFT TEMPLE
LOCATION SIMPLE: RIGHT EYEBROW
LOCATION SIMPLE: RIGHT FOREHEAD
LOCATION SIMPLE: RIGHT CHEEK
LOCATION SIMPLE: LEFT FOREHEAD
LOCATION SIMPLE: UPPER LIP

## 2025-07-30 ASSESSMENT — LOCATION DETAILED DESCRIPTION DERM
LOCATION DETAILED: LEFT SUPERIOR LATERAL NECK
LOCATION DETAILED: RIGHT INFERIOR TEMPLE
LOCATION DETAILED: RIGHT MEDIAL MALAR CHEEK
LOCATION DETAILED: LEFT SUPERIOR MEDIAL BUCCAL CHEEK
LOCATION DETAILED: LEFT MEDIAL MALAR CHEEK
LOCATION DETAILED: RIGHT SUPERIOR LATERAL NECK
LOCATION DETAILED: RIGHT SUPERIOR MEDIAL BUCCAL CHEEK
LOCATION DETAILED: RIGHT LATERAL BUCCAL CHEEK
LOCATION DETAILED: RIGHT INFERIOR MEDIAL FOREHEAD
LOCATION DETAILED: GLABELLA
LOCATION DETAILED: LEFT INFERIOR LATERAL MALAR CHEEK
LOCATION DETAILED: LEFT SUPERIOR CENTRAL MALAR CHEEK
LOCATION DETAILED: SUBMENTAL CHIN
LOCATION DETAILED: RIGHT SUPERIOR TEMPLE
LOCATION DETAILED: RIGHT LATERAL MALAR CHEEK
LOCATION DETAILED: RIGHT CENTRAL LATERAL NECK
LOCATION DETAILED: LEFT CHIN
LOCATION DETAILED: LEFT INFERIOR LATERAL FOREHEAD
LOCATION DETAILED: RIGHT SUPERIOR CENTRAL MALAR CHEEK
LOCATION DETAILED: LEFT INFERIOR TEMPLE
LOCATION DETAILED: LEFT CENTRAL EYEBROW
LOCATION DETAILED: RIGHT CENTRAL EYEBROW
LOCATION DETAILED: PHILTRUM
LOCATION DETAILED: LEFT CENTRAL LATERAL NECK
LOCATION DETAILED: LEFT LATERAL BUCCAL CHEEK

## 2025-07-30 ASSESSMENT — LOCATION ZONE DERM
LOCATION ZONE: NECK
LOCATION ZONE: LIP
LOCATION ZONE: FACE

## 2025-08-14 ENCOUNTER — OFFICE VISIT (OUTPATIENT)
Dept: MEDICAL GROUP | Facility: PHYSICIAN GROUP | Age: 75
End: 2025-08-14
Payer: MEDICARE

## 2025-08-14 VITALS
DIASTOLIC BLOOD PRESSURE: 62 MMHG | HEART RATE: 68 BPM | BODY MASS INDEX: 21.35 KG/M2 | OXYGEN SATURATION: 95 % | WEIGHT: 136 LBS | TEMPERATURE: 97.1 F | HEIGHT: 67 IN | SYSTOLIC BLOOD PRESSURE: 110 MMHG

## 2025-08-14 DIAGNOSIS — M54.2 CHRONIC NECK PAIN: ICD-10-CM

## 2025-08-14 DIAGNOSIS — G89.29 CHRONIC NECK PAIN: ICD-10-CM

## 2025-08-14 DIAGNOSIS — E78.5 DYSLIPIDEMIA: ICD-10-CM

## 2025-08-14 DIAGNOSIS — M54.42 CHRONIC BILATERAL LOW BACK PAIN WITH LEFT-SIDED SCIATICA: ICD-10-CM

## 2025-08-14 DIAGNOSIS — Z00.00 ANNUAL PHYSICAL EXAM: Primary | ICD-10-CM

## 2025-08-14 DIAGNOSIS — Z79.891 CHRONIC USE OF OPIATE FOR THERAPEUTIC PURPOSE: ICD-10-CM

## 2025-08-14 DIAGNOSIS — R14.0 ABDOMINAL BLOATING: ICD-10-CM

## 2025-08-14 DIAGNOSIS — G89.29 CHRONIC BILATERAL LOW BACK PAIN WITH LEFT-SIDED SCIATICA: ICD-10-CM

## 2025-08-14 DIAGNOSIS — R49.9 CHANGE IN VOICE: ICD-10-CM

## 2025-08-14 DIAGNOSIS — R06.83 SNORING: ICD-10-CM

## 2025-08-14 PROCEDURE — 3078F DIAST BP <80 MM HG: CPT | Performed by: INTERNAL MEDICINE

## 2025-08-14 PROCEDURE — 99214 OFFICE O/P EST MOD 30 MIN: CPT | Performed by: INTERNAL MEDICINE

## 2025-08-14 PROCEDURE — 3074F SYST BP LT 130 MM HG: CPT | Performed by: INTERNAL MEDICINE

## 2025-08-14 RX ORDER — TRAMADOL HYDROCHLORIDE 50 MG/1
50 TABLET ORAL EVERY 8 HOURS PRN
Qty: 90 TABLET | Refills: 0 | Status: SHIPPED | OUTPATIENT
Start: 2025-10-13 | End: 2025-11-12

## 2025-08-14 RX ORDER — CELECOXIB 100 MG/1
100 CAPSULE ORAL 2 TIMES DAILY
Qty: 200 CAPSULE | Refills: 3 | Status: SHIPPED | OUTPATIENT
Start: 2025-08-14

## 2025-08-14 RX ORDER — TRAMADOL HYDROCHLORIDE 50 MG/1
50 TABLET ORAL EVERY 8 HOURS PRN
Qty: 90 TABLET | Refills: 0 | Status: SHIPPED | OUTPATIENT
Start: 2025-09-13 | End: 2025-10-13

## 2025-08-14 RX ORDER — TRAMADOL HYDROCHLORIDE 50 MG/1
50 TABLET ORAL EVERY 8 HOURS PRN
Qty: 90 TABLET | Refills: 0 | Status: SHIPPED | OUTPATIENT
Start: 2025-11-12 | End: 2025-12-12

## 2025-08-14 ASSESSMENT — LIFESTYLE VARIABLES
SKIP TO QUESTIONS 9-10: 1
HOW MANY STANDARD DRINKS CONTAINING ALCOHOL DO YOU HAVE ON A TYPICAL DAY: PATIENT DOES NOT DRINK
AUDIT-C TOTAL SCORE: 0
HOW OFTEN DO YOU HAVE SIX OR MORE DRINKS ON ONE OCCASION: NEVER
HOW OFTEN DO YOU HAVE A DRINK CONTAINING ALCOHOL: NEVER

## 2025-08-14 ASSESSMENT — FIBROSIS 4 INDEX: FIB4 SCORE: 2.15

## (undated) DEVICE — PACK MINOR BASIN - (2EA/CA)

## (undated) DEVICE — TUBING CLEARLINK DUO-VENT - C-FLO (48EA/CA)

## (undated) DEVICE — NEEDLE SAFETY 25 GA X 5/8 IN LL HYPO (100/BX 12BX/CA) WAS #6351

## (undated) DEVICE — SPONGE GAUZESTER. 2X2 4-PL - (2/PK 50PK/BX 30BX/CS)

## (undated) DEVICE — GOWN WARMING STANDARD FLEX - (30/CA)

## (undated) DEVICE — KIT ANESTHESIA W/CIRCUIT & 3/LT BAG W/FILTER (20EA/CA)

## (undated) DEVICE — DRESSING TRANSPARENT FILM TEGADERM 4 X 4.75" (50EA/BX)"

## (undated) DEVICE — STERI STRIP COMPOUND BENZOIN - TINCTURE 0.6ML WITH APPLICATOR (40EA/BX)

## (undated) DEVICE — ELECTRODE 850 FOAM ADHESIVE - HYDROGEL RADIOTRNSPRNT (50/PK)

## (undated) DEVICE — SET EXTENSION WITH 2 PORTS (48EA/CA) ***PART #2C8610 IS A SUBSTITUTE*****

## (undated) DEVICE — LEAD IMPLANT KIT

## (undated) DEVICE — KIT ROOM DECONTAMINATION

## (undated) DEVICE — CANISTER SUCTION 3000ML MECHANICAL FILTER AUTO SHUTOFF MEDI-VAC NONSTERILE LF DISP  (40EA/CA)

## (undated) DEVICE — ELECTRODE DUAL RETURN W/ CORD - (50/PK)

## (undated) DEVICE — HEAD HOLDER JUNIOR/ADULT

## (undated) DEVICE — DRAPE LARGE 3 QUARTER - (20/CA)

## (undated) DEVICE — SUCTION INSTRUMENT YANKAUER BULBOUS TIP W/O VENT (50EA/CA)

## (undated) DEVICE — GLOVE BIOGEL PI INDICATOR SZ 7.5 SURGICAL PF LF -(50/BX 4BX/CA)

## (undated) DEVICE — SODIUM CHL IRRIGATION 0.9% 1000ML (12EA/CA)

## (undated) DEVICE — SET LEADWIRE 5 LEAD BEDSIDE DISPOSABLE ECG (1SET OF 5/EA)

## (undated) DEVICE — PROTECTOR ULNA NERVE - (36PR/CA)

## (undated) DEVICE — DRAPE 36X28IN RAD CARM BND BG - (25/CA) O

## (undated) DEVICE — CLOSURE SKIN STRIP 1/2 X 4 IN - (STERI STRIP) (50/BX 4BX/CA)

## (undated) DEVICE — BLADE SURGICAL #11 - (50/BX)

## (undated) DEVICE — SHEET PEDIATRIC LAPAROTOMY - (10/CA)

## (undated) DEVICE — LACTATED RINGERS INJ 1000 ML - (14EA/CA 60CA/PF)

## (undated) DEVICE — SUTURE 4-0 VICRYL PLUSFS-1 - 27 INCH (36/BX)

## (undated) DEVICE — TRIAL STIMULATOR

## (undated) DEVICE — TAPE CLOTH MEDIPORE 6 INCH - (12RL/CA)

## (undated) DEVICE — CHLORAPREP 26 ML APPLICATOR - ORANGE TINT(25/CA)

## (undated) DEVICE — DRESSING TRANSPARENT FILM TEGADERM 2.375 X 2.75"  (100EA/BX)"

## (undated) DEVICE — GLOVE BIOGEL SZ 7 SURGICAL PF LTX - (50PR/BX 4BX/CA)

## (undated) DEVICE — GLOVE SZ 6 BIOGEL PI MICRO - PF LF (50PR/BX 4BX/CA)

## (undated) DEVICE — REMOTE CONTROL

## (undated) DEVICE — SENSOR SPO2 NEO LNCS ADHESIVE (20/BX) SEE USER NOTES

## (undated) DEVICE — SUTURE GENERAL

## (undated) DEVICE — GLOVE BIOGEL PI INDICATOR SZ 7.0 SURGICAL PF LF - (50/BX 4BX/CA)

## (undated) DEVICE — NEPTUNE 4 PORT MANIFOLD - (20/PK)

## (undated) DEVICE — GLOVE SZ 6.5 BIOGEL PI MICRO - PF LF (50PR/BX)

## (undated) DEVICE — GLOVE BIOGEL INDICATOR SZ 7.5 SURGICAL PF LTX - (50PR/BX 4BX/CA)

## (undated) DEVICE — MASK ANESTHESIA ADULT  - (100/CA)

## (undated) DEVICE — GLOVE SZ 7 BIOGEL PI MICRO - PF LF (50PR/BX 4BX/CA)

## (undated) DEVICE — SUTURE 3-0 VICRYL PLUS SH - 8X 18 INCH (12/BX)

## (undated) DEVICE — SLEEVE, VASO, THIGH, MED